# Patient Record
Sex: FEMALE | Race: BLACK OR AFRICAN AMERICAN | Employment: OTHER | URBAN - METROPOLITAN AREA
[De-identification: names, ages, dates, MRNs, and addresses within clinical notes are randomized per-mention and may not be internally consistent; named-entity substitution may affect disease eponyms.]

---

## 2018-08-04 LAB
CREATININE, EXTERNAL: 0.8
HBA1C MFR BLD HPLC: 6.4 %
LDL-C, EXTERNAL: 54

## 2018-12-27 NOTE — PROGRESS NOTES
HISTORY OF PRESENT ILLNESS  Belvie Harada is a 70 y.o. female. HPI  She presents to [facility]. She has recently moved here from [facility]. She is very involved in her [employer]. She is accompanied by her . She presents for follow up of diabetes mellitus (since ) with microalbuninuria, hypertension (since ), hyperlipidemia (since ), obesity and obstuctive sleep apnea. Old records refer to her having CKD, but creat and eGFR have always been normal. She was offered, but declined Jardiance by prior PCP due to side effect profile and black box warning. Has a very old CPAP machine. When insurance changed she lost \"nice new one. \" . Has had diabetes for 21 years. No retinopathy, neuropathy. She has copies of old records which were reviewed. Last lab was done in August revealing A1c 6.4,  total cholesterol 123, HDL 45, LDL 54, Creat 0.8, microalbumin/creat ratio 183.6, TSH 4.02 (0.27-4.20), CMP nl except gluc 104. .  Diet and Lifestyle: generally follows a low fat low cholesterol diet, generally follows a low sodium diet, follows a diabetic diet regularly, sedentary, nonsmoker  Diabetic ROS - medication compliance: compliant all of the time, Does not feel good with Trulcity, makes her feel flushed and nausea. home glucose monitoring: fasting 102 average, non-fasting not done     home BP Monitorin's/70's.      further diabetic ROS: no polyuria or polydipsia, no numbness, tingling or pain in extremities, no unusual visual symptoms, no hypoglycemia. Cardiovascular ROS:  She denies palpitations, orthopnea, exertional chest pressure/discomfort, claudication, lower extremity edema, dyspnea on exertion, dizziness    She presents for follow up of hypothyroidism (since ). Patient denies weight changes, heat / cold intolerance, change in energy level. Course to date has been well controlled. Spinal stenosis diagnosed in . She had an MRI of cervical and lumbar spines.  She has left sided weakness that was diagnosed as a possible stroke when she saw neurosugeon for spinal stenosis. MRI 2016 showed \"severe stenosis with cauda inquina(sic) nerve impingement. \" She was told to lose 25 lbs. (which she has), and go to pain management. She was given gabapentin and Topamax, but she did not tolerate them due to palpitations and burning pain. She has left sided neck pain with burning pain from behind left ear and to shoulder and down arm and in buttock into left lateral leg. She may have had SARA for cervical spine symptoms in 2017) The old records do not include MRreports only a note about results: \"stenosis possible cord compression\". Colonoscopy 2009 (Dr Silviano Michael in [facility])  [address] (? At [address] scores: Lumbar -1.1, Left femoral neck -1.3  Medicare Wellness Visit 18  Eye exam: 18  Foot exam 18  Pap 2009 Had ELLEN/BSO      Patient Active Problem List   Diagnosis Code    Hypertension, essential I10    Hypercholesterolemia E78.00    Controlled type 2 diabetes mellitus with microalbuminuria, without long-term current use of insulin (HCC) E11.29, R80.9    Acquired hypothyroidism E03.9    MANUEL (obstructive sleep apnea) G47.33    Carpal tunnel syndrome of right wrist G56.01    Spinal stenosis of cervical region M48.02    Spinal stenosis of lumbar region with neurogenic claudication M48.062     History reviewed. No pertinent past medical history. Past Surgical History:   Procedure Laterality Date    HX BREAST BIOPSY      HX  SECTION       x 2    HX CHOLECYSTECTOMY      HX HYSTERECTOMY       Social History     Socioeconomic History    Marital status:      Spouse name: Not on file    Number of children: Not on file    Years of education: Not on file    Highest education level: Not on file   Tobacco Use    Smoking status: Never Smoker    Smokeless tobacco: Never Used   Substance and Sexual Activity    Alcohol use:  No Frequency: Never    Drug use: No    Sexual activity: Yes     Partners: Male     Birth control/protection: Surgical     Family History   Problem Relation Age of Onset   Sandhya Cancer Mother         bone    Diabetes Mother    Sandhya Hypertension Mother     Hypertension Father     Cancer Father         blood    Hypertension Sister     Sleep Apnea Brother     No Known Problems Brother      Allergies   Allergen Reactions    Gabapentin Other (comments)     Hot flashes    Topiramate Other (comments)     Hot flashes     Current Outpatient Medications   Medication Sig Dispense Refill    aspirin delayed-release 81 mg tablet Take  by mouth daily.  acetaminophen (TYLENOL EXTRA STRENGTH) 500 mg tablet Take 1,000 mg by mouth daily.  naproxen sodium (ALEVE) 220 mg cap Take  by mouth daily.  omega 3-dha-epa-fish oil 100-160-1,000 mg cap Take 2 Caps by mouth daily.  magnesium oxide 500 mg tab Take 500 mg by mouth daily.  OTHER energy greens 1 scoop with 8oz water daily      rosuvastatin (CRESTOR) 10 mg tablet Take 10 mg by mouth daily.  levothyroxine (SYNTHROID) 88 mcg tablet 88 mcg. Half tablet on Monday and 1 tablet all other days      metFORMIN (GLUCOPHAGE) 1,000 mg tablet Take 1,000 mg by mouth two (2) times daily (with meals).  TRULICITY 1.07 IR/1.5 mL sub-q pen 0.75 mg by SubCUTAneous route every seven (7) days.  fluticasone (FLONASE ALLERGY RELIEF) 50 mcg/actuation nasal spray 2 Sprays by Both Nostrils route daily.  glucose blood VI test strips (ONETOUCH ULTRA BLUE TEST STRIP) strip by Does Not Apply route See Admin Instructions. Test 3 times daily      amLODIPine (NORVASC) 2.5 mg tablet Take 2.5 mg by mouth daily.  ascorbic acid, vitamin C, (VITAMIN C) 500 mg tablet Take  by mouth.  multivitamin with iron tablet Take 1 Tab by mouth daily.  ergocalciferol (ERGOCALCIFEROL) 50,000 unit capsule Take 50,000 Units by mouth every seven (7) days.       vit B Cmplx 3-FA-Vit C-Biotin (NEPHRO MANINDER RX) 1- mg-mg-mcg tablet Take 1 Tab by mouth daily.  SITagliptin (JANUVIA) 100 mg tablet Take 100 mg by mouth daily.  lisinopril (PRINIVIL, ZESTRIL) 40 mg tablet Take 1 Tab by mouth daily. 90 Tab 3    DULoxetine (CYMBALTA) 30 mg capsule Take 1 Cap by mouth daily. When finished start 60 mg capsules 14 Cap 0    DULoxetine (CYMBALTA) 60 mg capsule Take 1 Cap by mouth daily. 30 Cap 5         Review of Systems   Constitutional: Positive for weight loss (intentionally over 2 years 40 lbs). Negative for malaise/fatigue. HENT: Negative for congestion. Mole on top of right foot, and back of left leg and right thigh. Eyes: Negative for blurred vision and double vision. Respiratory: Negative for cough and shortness of breath. Cardiovascular: Negative for chest pain, palpitations, orthopnea, claudication and leg swelling. Gastrointestinal: Negative for constipation and diarrhea. Genitourinary: Negative for frequency and urgency. Musculoskeletal: Positive for back pain and joint pain (hip rlght >left). Skin: Negative for itching and rash. Neurological: Positive for tingling. Negative for focal weakness (left leg) and headaches. Tremors: right hand median nerve distribution. Endo/Heme/Allergies: Negative for environmental allergies. Psychiatric/Behavioral: Negative for depression. The patient is not nervous/anxious. Visit Vitals  /87 (BP 1 Location: Left arm, BP Patient Position: Sitting)   Pulse 79   Temp 98.6 °F (37 °C) (Oral)   Resp 14   Ht 5' 6\" (1.676 m)   Wt 207 lb (93.9 kg)   SpO2 97%   BMI 33.41 kg/m²     Physical Exam   Constitutional: She is oriented to person, place, and time. She appears well-developed and well-nourished. HENT:   Head: Normocephalic and atraumatic. Eyes: Conjunctivae are normal. Pupils are equal, round, and reactive to light. Neck: Neck supple. Carotid bruit is not present. No thyromegaly present. Cardiovascular: Normal rate, regular rhythm and normal heart sounds. PMI is not displaced. Exam reveals no gallop. No murmur heard. Pulses:       Dorsalis pedis pulses are 2+ on the right side, and 2+ on the left side. Posterior tibial pulses are 2+ on the right side, and 2+ on the left side. Pulmonary/Chest: Effort normal. She has no wheezes. She has no rhonchi. She has no rales. Abdominal: Soft. Normal appearance. She exhibits no abdominal bruit and no mass. There is no hepatosplenomegaly. There is no tenderness. Musculoskeletal: She exhibits no edema. Lymphadenopathy:     She has no cervical adenopathy. Right: No supraclavicular adenopathy present. Left: No supraclavicular adenopathy present. Neurological: She is alert and oriented to person, place, and time. No sensory deficit. Skin: Skin is warm, dry and intact. No rash noted. Psychiatric: She has a normal mood and affect. Her behavior is normal.   Nursing note and vitals reviewed. Results for orders placed or performed in visit on 12/28/18   AMB POC HEMOGLOBIN A1C   Result Value Ref Range    Hemoglobin A1c (POC) 5.8 %     August 2.2018:  A1c 6.4,                              total cholesterol 123, HDL 45, LDL 54,                             Creat 0.8,                             microalbumin/creat ratio 183.6,                              TSH 4.02,                              CMP nl except gluc 104. ASSESSMENT and PLAN    ICD-10-CM ICD-9-CM    1. Controlled type 2 diabetes mellitus with microalbuminuria, without long-term current use of insulin ([facility]) Z77.29 206.08 METABOLIC PANEL, BASIC    T15.2 791.0 HEMOGLOBIN A1C WITH EAG      AMB POC HEMOGLOBIN A1C   2. MANUEL (obstructive sleep apnea) G47.33 327.23 SLEEP MEDICINE REFERRAL   3. Hypertension, essential I10 401.9 lisinopril (PRINIVIL, ZESTRIL) 40 mg tablet   4. Hypercholesterolemia E78.00 272.0    5.  Acquired hypothyroidism E03.9 244.9 TSH 3RD GENERATION      T4 (THYROXINE)   6. Vitamin D deficiency E55.9 268.9 VITAMIN D, 1, 25 DIHYDROXY      VITAMIN D, 25 HYDROXY   7. Spinal stenosis of lumbar region with neurogenic claudication M48.062 724.03 REFERRAL TO ORTHOPEDICS      DULoxetine (CYMBALTA) 30 mg capsule      DULoxetine (CYMBALTA) 60 mg capsule   8. Spinal stenosis of cervical region M48.02 723.0 REFERRAL TO ORTHOPEDICS      DULoxetine (CYMBALTA) 30 mg capsule      DULoxetine (CYMBALTA) 60 mg capsule   9. Screening breast examination Z12.31 V76.10 MARGARET 3D SHANNAN W MAMMO BI SCREENING INCL CAD   8. Encounter for immunization Z23 V03.89 PNEUMOCOCCAL CONJ VACCINE 13 VALENT IM      ADMIN PNEUMOCOCCAL VACCINE     Diagnoses and all orders for this visit:    1. Controlled type 2 diabetes mellitus with microalbuminuria, without long-term current use of insulin ([facility] [address]  Diabetes Mellitus: well controlled. Is taking statin and ACE/ARB  Issues reviewed with her: low cholesterol diet, weight control and daily exercise discussed and glycohemoglobin and other lab monitoring discussed. -     METABOLIC PANEL, BASIC  -     HEMOGLOBIN A1C WITH EAG; Future  -     AMB POC HEMOGLOBIN A1C    2. MANUEL (obstructive sleep apnea)  -     SLEEP MEDICINE REFERRAL    3. Hypertension, essential  Hypertension is controlled  -     lisinopril (PRINIVIL, ZESTRIL) 40 mg tablet; Take 1 Tab by mouth daily. 4. Hypercholesterolemia  Hyperlipidemia is controlled    5. Acquired hypothyroidism  Euthyroid on replacement.   -     TSH 3RD GENERATION; Future  -     T4 (THYROXINE); Future    6. Vitamin D deficiency  -     VITAMIN D, 1, 25 DIHYDROXY; Future  -     VITAMIN D, 25 HYDROXY; Future    7. Spinal stenosis of lumbar region with neurogenic claudication  -     REFERRAL TO ORTHOPEDICS  -     DULoxetine (CYMBALTA) 30 mg capsule; Take 1 Cap by mouth daily. When finished start 60 mg capsules  -     DULoxetine (CYMBALTA) 60 mg capsule; Take 1 Cap by mouth daily.     8. Spinal stenosis of cervical region  -     REFERRAL TO ORTHOPEDICS  -     DULoxetine (CYMBALTA) 30 mg capsule; Take 1 Cap by mouth daily. When finished start 60 mg capsules  -     DULoxetine (CYMBALTA) 60 mg capsule; Take 1 Cap by mouth daily. 9. Screening breast examination  -     [facility] 3D SHANNAN W MAMMO BI SCREENING INCL CAD; Future    10. Encounter for immunization  -     PNEUMOCOCCAL CONJ VACCINE 13 VALENT IM  -     ADMIN PNEUMOCOCCAL VACCINE      Follow-up Disposition:  Return in about 3 months (around 3/28/2019) for DM, HTN, chol, thyroid  non-fasting lab one week prior  . lab results and schedule of future lab studies reviewed with patient  reviewed diet, exercise and weight control  cardiovascular risk and specific lipid/LDL goals reviewed  I have discussed the diagnosis, evaluation and treatment options and the intended plan with the patient. Patient understands and is in agreement. The patient has received an after-visit summary and questions were answered concerning future plans. I have discussed side effects and warnings of any new medications with the patient as well.

## 2018-12-28 ENCOUNTER — OFFICE VISIT (OUTPATIENT)
Dept: INTERNAL MEDICINE CLINIC | Facility: CLINIC | Age: 63
End: 2018-12-28

## 2018-12-28 VITALS
OXYGEN SATURATION: 97 % | SYSTOLIC BLOOD PRESSURE: 131 MMHG | HEIGHT: 66 IN | HEART RATE: 79 BPM | RESPIRATION RATE: 14 BRPM | TEMPERATURE: 98.6 F | BODY MASS INDEX: 33.27 KG/M2 | DIASTOLIC BLOOD PRESSURE: 87 MMHG | WEIGHT: 207 LBS

## 2018-12-28 DIAGNOSIS — Z23 ENCOUNTER FOR IMMUNIZATION: ICD-10-CM

## 2018-12-28 DIAGNOSIS — E03.9 ACQUIRED HYPOTHYROIDISM: ICD-10-CM

## 2018-12-28 DIAGNOSIS — E55.9 VITAMIN D DEFICIENCY: ICD-10-CM

## 2018-12-28 DIAGNOSIS — G47.33 OSA (OBSTRUCTIVE SLEEP APNEA): ICD-10-CM

## 2018-12-28 DIAGNOSIS — R80.9 CONTROLLED TYPE 2 DIABETES MELLITUS WITH MICROALBUMINURIA, WITHOUT LONG-TERM CURRENT USE OF INSULIN (HCC): Primary | ICD-10-CM

## 2018-12-28 DIAGNOSIS — E11.29 CONTROLLED TYPE 2 DIABETES MELLITUS WITH MICROALBUMINURIA, WITHOUT LONG-TERM CURRENT USE OF INSULIN (HCC): Primary | ICD-10-CM

## 2018-12-28 DIAGNOSIS — M48.062 SPINAL STENOSIS OF LUMBAR REGION WITH NEUROGENIC CLAUDICATION: ICD-10-CM

## 2018-12-28 DIAGNOSIS — E78.00 HYPERCHOLESTEROLEMIA: ICD-10-CM

## 2018-12-28 DIAGNOSIS — Z12.39 SCREENING BREAST EXAMINATION: ICD-10-CM

## 2018-12-28 DIAGNOSIS — M48.02 SPINAL STENOSIS OF CERVICAL REGION: ICD-10-CM

## 2018-12-28 DIAGNOSIS — I10 HYPERTENSION, ESSENTIAL: ICD-10-CM

## 2018-12-28 PROBLEM — E89.0 POSTOPERATIVE HYPOTHYROIDISM: Status: ACTIVE | Noted: 2018-12-28

## 2018-12-28 PROBLEM — G56.01 CARPAL TUNNEL SYNDROME OF RIGHT WRIST: Status: ACTIVE | Noted: 2018-12-28

## 2018-12-28 LAB — HBA1C MFR BLD HPLC: 5.8 %

## 2018-12-28 RX ORDER — BACLOFEN 20 MG
500 TABLET ORAL DAILY
COMMUNITY
End: 2021-11-22 | Stop reason: DRUGHIGH

## 2018-12-28 RX ORDER — CHOLECALCIFEROL (VITAMIN D3) 125 MCG
CAPSULE ORAL DAILY
COMMUNITY
End: 2019-09-10

## 2018-12-28 RX ORDER — ASCORBIC ACID 500 MG
TABLET ORAL
COMMUNITY

## 2018-12-28 RX ORDER — LISINOPRIL 40 MG/1
40 TABLET ORAL DAILY
Qty: 90 TAB | Refills: 3 | Status: SHIPPED | OUTPATIENT
Start: 2018-12-28 | End: 2019-12-20

## 2018-12-28 RX ORDER — DULOXETIN HYDROCHLORIDE 30 MG/1
30 CAPSULE, DELAYED RELEASE ORAL DAILY
Qty: 14 CAP | Refills: 0 | Status: SHIPPED | OUTPATIENT
Start: 2018-12-28 | End: 2019-04-05

## 2018-12-28 RX ORDER — LEVOTHYROXINE SODIUM 88 UG/1
88 TABLET ORAL
COMMUNITY
Start: 2018-12-21 | End: 2019-03-11 | Stop reason: SDUPTHER

## 2018-12-28 RX ORDER — FLUTICASONE PROPIONATE 50 MCG
2 SPRAY, SUSPENSION (ML) NASAL AS NEEDED
COMMUNITY

## 2018-12-28 RX ORDER — DULAGLUTIDE 0.75 MG/.5ML
0.75 INJECTION, SOLUTION SUBCUTANEOUS
COMMUNITY
Start: 2018-12-16 | End: 2019-04-05

## 2018-12-28 RX ORDER — ACETAMINOPHEN 500 MG
1000 TABLET ORAL DAILY
COMMUNITY
End: 2021-11-22 | Stop reason: DRUGHIGH

## 2018-12-28 RX ORDER — DULOXETIN HYDROCHLORIDE 60 MG/1
60 CAPSULE, DELAYED RELEASE ORAL DAILY
Qty: 30 CAP | Refills: 5 | Status: SHIPPED | OUTPATIENT
Start: 2018-12-28 | End: 2019-07-25 | Stop reason: SDUPTHER

## 2018-12-28 RX ORDER — LANOLIN ALCOHOL/MO/W.PET/CERES
1000 CREAM (GRAM) TOPICAL DAILY
COMMUNITY
End: 2018-12-28

## 2018-12-28 RX ORDER — LISINOPRIL 30 MG/1
30 TABLET ORAL 2 TIMES DAILY
COMMUNITY
Start: 2018-12-07 | End: 2018-12-28 | Stop reason: DRUGHIGH

## 2018-12-28 RX ORDER — ROSUVASTATIN CALCIUM 10 MG/1
10 TABLET, COATED ORAL DAILY
COMMUNITY
Start: 2018-12-07 | End: 2019-02-11 | Stop reason: SDUPTHER

## 2018-12-28 RX ORDER — MULTIVITAMIN WITH IRON
1 TABLET ORAL DAILY
COMMUNITY
End: 2021-11-22

## 2018-12-28 RX ORDER — METFORMIN HYDROCHLORIDE 1000 MG/1
1000 TABLET ORAL 2 TIMES DAILY WITH MEALS
COMMUNITY
Start: 2018-12-02 | End: 2019-02-05 | Stop reason: SDUPTHER

## 2018-12-28 RX ORDER — ERGOCALCIFEROL 1.25 MG/1
50000 CAPSULE ORAL
COMMUNITY
End: 2019-09-10

## 2018-12-28 RX ORDER — ASPIRIN 81 MG/1
81 TABLET ORAL DAILY
COMMUNITY

## 2018-12-28 RX ORDER — AMLODIPINE BESYLATE 2.5 MG/1
2.5 TABLET ORAL DAILY
COMMUNITY
End: 2019-07-23 | Stop reason: SDUPTHER

## 2018-12-28 NOTE — PROGRESS NOTES
Laz Deleon  Identified pt with two pt identifiers(name and ). Chief Complaint Patient presents with [facility] Establish Care  Immunization/Injection Reviewed record In preparation for visit and have obtained necessary documentation. Will bring a copy of advanced directive / living will. 1. Have you been to the ER, urgent care clinic or hospitalized since your last visit? 1st visit 2. Have you seen or consulted any other health care providers outside of the [address] [place] [address] since your last visit? Include any pap smears or colon screening. 1st visit Vitals reviewed with provider. Health Maintenance reviewed: After verbal order read back of , patient received Pneumococcal 13 in left arm. 0.5ml NDC [id number] lot [id number] exp 20. Patient tolerated procedure without complaints and received VIS. Health Maintenance Due Topic  Hepatitis C Screening  DTaP/Tdap/Td series (1 - Tdap)  PAP AKA CERVICAL CYTOLOGY  Shingrix Vaccine Age 50> (1 of 2)  BREAST CANCER SCRN MAMMOGRAM   
 FOBT Q 1 YEAR AGE 50-75  Influenza Age 5 to Adult  GLAUCOMA SCREENING Q2Y  Bone Densitometry (Dexa) Screening  Pneumococcal 65+ Low/Medium Risk (1 of 2 - PCV13)  MEDICARE YEARLY EXAM   
  
 
 
Wt Readings from Last 3 Encounters:  
18 207 lb (93.9 kg) Temp Readings from Last 3 Encounters:  
18 98.6 °F (37 °C) (Oral) BP Readings from Last 3 Encounters:  
18 131/87 Pulse Readings from Last 3 Encounters:  
18 79 Vitals:  
 18 1129 18 1141 BP: (!) 144/95 131/87 Pulse: 79 Resp: 14 Temp: 98.6 °F (37 °C) TempSrc: Oral   
SpO2: 97% Weight: 207 lb (93.9 kg) Height: 5' 6\" (1.676 m) PainSc:   5 PainLoc: Generalized Learning Assessment:  
:  
 
 
Learning Assessment 2018 PRIMARY LEARNER Patient HIGHEST LEVEL OF EDUCATION - PRIMARY LEARNER  SOME COLLEGE  
 BARRIERS PRIMARY LEARNER NONE  
CO-LEARNER CAREGIVER No  
PRIMARY LANGUAGE ENGLISH  
LEARNER PREFERENCE PRIMARY DEMONSTRATION  
ANSWERED BY patient RELATIONSHIP SELF Depression Screening:  
:  
 
 
PHQ over the last two weeks 12/28/2018 Little interest or pleasure in doing things Not at all Feeling down, depressed, irritable, or hopeless Not at all Total Score PHQ 2 0 Fall Risk Assessment:  
:  
 
 
Fall Risk Assessment, last 12 mths 12/28/2018 Able to walk? Yes Fall in past 12 months? No  
  
 
Abuse Screening:  
:  
 
 
Abuse Screening Questionnaire 12/28/2018 Do you ever feel afraid of your partner? Gonzella Blank Are you in a relationship with someone who physically or mentally threatens you? Gonzella Blank Is it safe for you to go home? Y  
  
 
ADL Screening:  
:  
 
 

## 2018-12-28 NOTE — Clinical Note
See if [facility] can send us any imaging reports, especially DEXA, mammogram and MRI's of C-spine and L-spine

## 2018-12-28 NOTE — PATIENT INSTRUCTIONS
Stop Trulicity for now and we will check Hemoglobin A1c again in 3 months. Request that Cherry Srivastava send you copies of images of C-spine and L-spine done in 2016. You will need these to see spine surgeon. Office visit in 3 months with non-fasting lab a week before visit. [facility], [address] Phone: [phone] Fax: [phone] [facility] [address] Phone: [phone] [facility] 
[address], [address] [place], [place], [address] Phone: [phone] [facility] [address] Jacquie Fairbanks [facility] [address] [facility], [address]  Phone: [phone] Fax: [phone] [place] [address] [employer] [address] [facility], [address] [phone] Vaccine Information Statement Pneumococcal Conjugate Vaccine (PCV13): What You Need to Know Many Vaccine Information Statements are available in [place]ian and other languages. See www.immunize.org/vis. Hojas de información Sobre Vacunas están disponibles en español y en muchos otros idiomas. Visite www.immunize.org/vis. 1. Why get vaccinated? Vaccination can protect both children and adults from pneumococcal disease. Pneumococcal disease is caused by bacteria that can spread from person to person through close contact. It can cause ear infections, and it can also lead to more serious infections of the: 
 Lungs (pneumonia),  Blood (bacteremia), and 
 Covering of the brain and spinal cord (meningitis). Pneumococcal pneumonia is most common among adults. Pneumococcal meningitis can cause deafness and brain damage, and it kills about 1 child in 10 who get it.  
 
Anyone can get pneumococcal disease, but children under 3years of age and adults 72 years and older, people with certain medical conditions, and cigarette smokers are at the highest risk. Before there was a vaccine, the [facility] saw: 
 more than 700 cases of meningitis, 
 about 13,000 blood infections, 
 about 5 million ear infections, and 
 about 200 deaths 
in children under 5 each year from pneumococcal disease. Since vaccine became available, severe pneumococcal disease in these children has fallen by 88%. About 18,000 older adults die of pneumococcal disease each year in the [place]. Treatment of pneumococcal infections with penicillin and other drugs is not as effective as it used to be, because some strains of the disease have become resistant to these drugs. This makes prevention of the disease, through vaccination, even more important. 2. PCV13 vaccine Pneumococcal conjugate vaccine (called PCV13) protects against 13 types of pneumococcal bacteria. PCV13 is routinely given to children at 2, 4, 6, and 1515 months of age. It is also recommended for children and adults 3to 59years of age with certain health conditions, and for all adults 72years of age and older. Your doctor can give you details. 3. Some people should not get this vaccine Anyone who has ever had a life-threatening allergic reaction to a dose of this vaccine, to an earlier pneumococcal vaccine called PCV7, or to any vaccine containing diphtheria toxoid (for example, DTaP), should not get PCV13. Anyone with a severe allergy to any component of PCV13 should not get the vaccine. Tell your doctor if the person being vaccinated has any severe allergies. If the person scheduled for vaccination is not feeling well, your healthcare provider might decide to reschedule the shot on another day. 4. Risks of a vaccine reaction With any medicine, including vaccines, there is a chance of reactions. These are usually mild and go away on their own, but serious reactions are also possible. Problems reported following PCV13 varied by age and dose in the series. The most common problems reported among children were:  About half became drowsy after the shot, had a temporary loss of appetite, or had redness or tenderness where the shot was given.  About 1 out of 3 had swelling where the shot was given.  About 1 out of 3 had a mild fever, and about 1 in 20 had a fever over 102.2°F. 
 Up to about 8 out of 10 became fussy or irritable. Adults have reported pain, redness, and swelling where the shot was given; also mild fever, fatigue, headache, chills, or muscle pain. Willy Masseyill children who get PCV13 along with inactivated flu vaccine at the same time may be at increased risk for seizures caused by fever. Ask your doctor for more information. Problems that could happen after any vaccine:  People sometimes faint after a medical procedure, including vaccination. Sitting or lying down for about 15 minutes can help prevent fainting, and injuries caused by a fall. Tell your doctor if you feel dizzy, or have vision changes or ringing in the ears.  Some older children and adults get severe pain in the shoulder and have difficulty moving the arm where a shot was given. This happens very rarely.  Any medication can cause a severe allergic reaction. Such reactions from a vaccine are very rare, estimated at about 1 in a million doses, and would happen within a few minutes to a few hours after the vaccination. As with any medicine, there is a very small chance of a vaccine causing a serious injury or death. The safety of vaccines is always being monitored. For more information, visit: www.cdc.gov/vaccinesafety/  
 
5. What if there is a serious reaction? What should I look for?  Look for anything that concerns you, such as signs of a severe allergic reaction, very high fever, or unusual behavior.  
 
Signs of a severe allergic reaction can include hives, swelling of the face and throat, difficulty breathing, a fast heartbeat, dizziness, and weakness  usually within a few minutes to a few hours after the vaccination. What should I do?  If you think it is a severe allergic reaction or other emergency that cant wait, call 9-1-1 or get the person to the nearest hospital. Otherwise, call your doctor. Reactions should be reported to the Vaccine Adverse Event Reporting System (VAERS). Your doctor should file this report, or you can do it yourself through the VAERS web site at www.vaers. [facility].gov, or by calling 0-752.634.9883. VAERS does not give medical advice. 6. The National Vaccine Injury Compensation Program 
 
The [facility] Vaccine Injury Compensation Program (VICP) is a federal program that was created to compensate people who may have been injured by certain vaccines. Persons who believe they may have been injured by a vaccine can learn about the program and about filing a claim by calling 0-677.576.9364 or visiting the [address] website at www.[facility].gov/vaccinecompensation. There is a time limit to file a claim for compensation. 7. How can I learn more?  Ask your healthcare provider. He or she can give you the vaccine package insert or suggest other sources of information.  Call your local or state health department.  Contact the Centers for Disease Control and Prevention (CDC): 
- Call 0-207.465.8699 (1-800-CDC-INFO) or 
- Visit CDCs website at www.cdc.gov/vaccines Vaccine Information Statement PCV13 Vaccine 11/5/2015  
[address] ANIRUDH Boggs [facility] [id number]-[id number] [facility] [employer] Centers for Disease Control and Prevention Office Use Only

## 2019-01-12 ENCOUNTER — TELEPHONE (OUTPATIENT)
Dept: INTERNAL MEDICINE CLINIC | Facility: CLINIC | Age: 64
End: 2019-01-12

## 2019-01-12 DIAGNOSIS — R31.9 URINARY TRACT INFECTION WITH HEMATURIA, SITE UNSPECIFIED: Primary | ICD-10-CM

## 2019-01-12 DIAGNOSIS — N39.0 URINARY TRACT INFECTION WITH HEMATURIA, SITE UNSPECIFIED: Primary | ICD-10-CM

## 2019-01-12 RX ORDER — SULFAMETHOXAZOLE AND TRIMETHOPRIM 800; 160 MG/1; MG/1
1 TABLET ORAL 2 TIMES DAILY
Qty: 14 TAB | Refills: 0
Start: 2019-01-12 | End: 2019-01-19

## 2019-01-12 NOTE — TELEPHONE ENCOUNTER
On Call Phone Message:  Patient complains of 2 day history of suprapubic pressure when urinating and small amount of blood with wiping. Has urinary urgency. Denies burning or frequency. Her symptoms feel similar to when she had a UTI years ago. She does not want to wait until Mon, 1/14/19 to be seen or to go to Urgent Care. I called a prescription for Bactrim to the [address] for her and instructed her to call clinic if she notices no improvement in symptoms within 72 hrs.

## 2019-02-05 ENCOUNTER — TELEPHONE (OUTPATIENT)
Dept: INTERNAL MEDICINE CLINIC | Facility: CLINIC | Age: 64
End: 2019-02-05

## 2019-02-05 RX ORDER — METFORMIN HYDROCHLORIDE 1000 MG/1
1000 TABLET ORAL 2 TIMES DAILY WITH MEALS
Qty: 180 TAB | Refills: 3 | Status: SHIPPED | OUTPATIENT
Start: 2019-02-05 | End: 2019-12-30

## 2019-02-05 NOTE — TELEPHONE ENCOUNTER
Pt's  called and stated that pt's previous doctor wrote in her chat that she has chronic kidney disease. He stated however that Dr Henrietta Rivera stated she does not have that condition. She needs a letter to send to her insurance stating that she does not have the disease. Pt's  can be reached at [phone].

## 2019-02-05 NOTE — TELEPHONE ENCOUNTER
----- Message from Kellie Grover sent at 2/5/2019  8:29 AM EST -----  Regarding: Dr. Ramona Joel. [place] Cali / refill  Patient is requesting refill Rx\" metformin 1000 mg\" and also since last visit 12/2018 numbers are high (130 -146) in the morning without taking \"Trulicity 0.24/2.2\"  May need to get back on it.      Send to [facility].  [phone]      Best contact [phone]

## 2019-02-05 NOTE — TELEPHONE ENCOUNTER
Pt states sugar in morning have been around 136 - 138 mostly, twice at 146. Per Dr Jacques Bond keep checking sugars and let us know if it gets any higher, Dr Jacques Bond wants to see how A1C is on lab visit in March. Pt has no further questions at this time.

## 2019-02-11 RX ORDER — ROSUVASTATIN CALCIUM 10 MG/1
10 TABLET, COATED ORAL DAILY
Qty: 90 TAB | Refills: 3 | Status: SHIPPED | OUTPATIENT
Start: 2019-02-11 | End: 2019-06-03 | Stop reason: SDUPTHER

## 2019-02-11 NOTE — TELEPHONE ENCOUNTER
----- Message from Simi Yousif sent at 2/11/2019 11:15 AM EST -----  Regarding: Dr. Darshan Valles refill  Pt would like to request a refill on \" Rosuvastatin 10 mg  \" and sent to the [facility].  Pt only has one pill left to cover her for tomorrow and would like for it to be sent today  Due to  is in the hospital .  Pt stated she  Pharmacy callback: [phone] Pt's callback: [phone]

## 2019-02-11 NOTE — TELEPHONE ENCOUNTER
PCP: Reshma Simmons MD     Last appt: 12/28/2018   Future Appointments   Date Time Provider Chidi Unger   3/29/2019  8:45 AM LAB [facility] SHEMAR JULIEN   4/5/2019  8:15 AM Reshma Simmons MD [facility]   4/11/2019  9:40 AM Nicole Ribeiro  [address]        Requested Prescriptions     Pending Prescriptions Disp Refills    rosuvastatin (CRESTOR) 10 mg tablet 90 Tab 3     Sig: Take 1 Tab by mouth daily.

## 2019-02-21 ENCOUNTER — DOCUMENTATION ONLY (OUTPATIENT)
Dept: INTERNAL MEDICINE CLINIC | Facility: CLINIC | Age: 64
End: 2019-02-21

## 2019-02-21 NOTE — PROGRESS NOTES
Old records from [facility] reviewed. Notes are very complete and summarize past lab. Only 2 most recent visits will be scanned into this chart. Other pages were destroyed.

## 2019-03-07 ENCOUNTER — TELEPHONE (OUTPATIENT)
Dept: INTERNAL MEDICINE CLINIC | Facility: CLINIC | Age: 64
End: 2019-03-07

## 2019-03-07 NOTE — TELEPHONE ENCOUNTER
Pt was taken off Trulicity in Dec per Dr DUMAS Pt calling to advise her readings have been \"crazy\" in feb between 130's and 150's with the fasting morning reading. Was around 890-292 with Trulicity. Would like to know what Dr Mora Tomlinson.

## 2019-03-07 NOTE — TELEPHONE ENCOUNTER
----- Message from Iraida Mars sent at 3/7/2019 11:24 AM EST -----  Regarding: FW: Dr. Fabi Washington: [phone]    Per Reg Pilling    ----- Message -----  From: Harshil Ashraf  Sent: 3/7/2019  11:15 AM  To: [facility]  Subject: Dr. Diane Mayorga                          Pt requesting a call back in regards to her sugar level readings. A few readings between 160, 164, 174 etc.     PT requesting call back from \"Julia\".

## 2019-03-07 NOTE — TELEPHONE ENCOUNTER
We stopped Trulicity because she told me it made her feel bad. Does she feel better without it? Often morning blood sugar is the highest of the day. If this is the case for her, then I am not really worried about 130-140 just yet. Please ask her to check some sugars later in the day (before and after supper). Rotate among checking In the morning, before supper and 2 hours after supper. Call us with results when she has 3 of each. A1c was low enough in August that a slight rise would still leave her in the controlled range. I really would like to see what A1c is in March/April before changing therapy.

## 2019-03-07 NOTE — TELEPHONE ENCOUNTER
Pt reports when she was on Trulicity she would feel some nausea the day she took it, but fine after that day. States now she does not feel as well as when she did when she was on the Trulicity. Pt will take her various glucose readings now and thru the weekend. She will call back Monday with the numbers. Pt also states her eyes feel more glassy and her fingers feel more tingly off of the Trulicity.

## 2019-03-11 ENCOUNTER — TELEPHONE (OUTPATIENT)
Dept: INTERNAL MEDICINE CLINIC | Facility: CLINIC | Age: 64
End: 2019-03-11

## 2019-03-11 NOTE — TELEPHONE ENCOUNTER
----- Message from Leticia Henriquez sent at 3/11/2019 10:58 AM EDT -----  Regarding: /Telephone  Pt is requesting a call from Victorino Young to report he BP readings.  Best contact [phone]

## 2019-03-11 NOTE — TELEPHONE ENCOUNTER
Date  Fasting Prior dinner 2 hrs after dinner  3/7/19  174  105  117  3/8/19  157  154  128  3/9/19  158  137  134  3/10/19     147  3/11/19 143      PCP: Reshma Simmons MD     Last appt: 12/28/2018   Future Appointments   Date Time Provider Chidi Unger   3/15/2019  8:20 AM [facility]   3/29/2019  8:45 AM LAB [facility] SHEMAR SCHED   4/5/2019  8:15 AM Reshma Simmons  [address]   4/11/2019  9:40 AM Nicole Ribeiro  [address]        Requested Prescriptions     Pending Prescriptions Disp Refills    levothyroxine (SYNTHROID) 88 mcg tablet 90 Tab 3     Sig: Half tablet on Monday and 1 tablet all other days    ergocalciferol (ERGOCALCIFEROL) 50,000 unit capsule 12 Cap      Sig: Take 50,000 Units by mouth every seven (7) days.

## 2019-03-12 RX ORDER — LEVOTHYROXINE SODIUM 88 UG/1
TABLET ORAL
Qty: 90 TAB | Refills: 3 | Status: SHIPPED | OUTPATIENT
Start: 2019-03-12 | End: 2020-04-10

## 2019-03-12 RX ORDER — ERGOCALCIFEROL 1.25 MG/1
CAPSULE ORAL
Qty: 12 CAP | OUTPATIENT
Start: 2019-03-12

## 2019-03-12 NOTE — TELEPHONE ENCOUNTER
Vitamin D is a stored vitamin and it possible to get too much of it. The 50,000 units is usually given for only 6 weeks. It is not meant for long term use. It is best to wait for next Vit D level before renewing this. Many experts are now recommending not even checking Vit D levels unless treating osteoporosis with medications that do not work well if level is low. I have refilled levothyroxine.

## 2019-03-15 ENCOUNTER — HOSPITAL ENCOUNTER (OUTPATIENT)
Dept: MAMMOGRAPHY | Age: 64
Discharge: HOME | End: 2019-03-15
Attending: INTERNAL MEDICINE
Payer: MEDICARE

## 2019-03-15 DIAGNOSIS — Z12.39 SCREENING BREAST EXAMINATION: ICD-10-CM

## 2019-03-15 PROCEDURE — 77063 BREAST TOMOSYNTHESIS BI: CPT

## 2019-03-29 DIAGNOSIS — E03.9 ACQUIRED HYPOTHYROIDISM: ICD-10-CM

## 2019-03-29 DIAGNOSIS — R80.9 CONTROLLED TYPE 2 DIABETES MELLITUS WITH MICROALBUMINURIA, WITHOUT LONG-TERM CURRENT USE OF INSULIN (HCC): ICD-10-CM

## 2019-03-29 DIAGNOSIS — E55.9 VITAMIN D DEFICIENCY: ICD-10-CM

## 2019-03-29 DIAGNOSIS — E11.29 CONTROLLED TYPE 2 DIABETES MELLITUS WITH MICROALBUMINURIA, WITHOUT LONG-TERM CURRENT USE OF INSULIN (HCC): ICD-10-CM

## 2019-04-02 LAB
1,25(OH)2D3 SERPL-MCNC: 53.6 PG/ML (ref 19.9–79.3)
25(OH)D3+25(OH)D2 SERPL-MCNC: 50.4 NG/ML (ref 30–100)
EST. AVERAGE GLUCOSE BLD GHB EST-MCNC: 131 MG/DL
HBA1C MFR BLD: 6.2 % (ref 4.8–5.6)
T4 SERPL-MCNC: 6.6 UG/DL (ref 4.5–12)
TSH SERPL DL<=0.005 MIU/L-ACNC: 0.85 UIU/ML (ref 0.45–4.5)

## 2019-04-05 ENCOUNTER — OFFICE VISIT (OUTPATIENT)
Dept: INTERNAL MEDICINE CLINIC | Facility: CLINIC | Age: 64
End: 2019-04-05

## 2019-04-05 VITALS
RESPIRATION RATE: 12 BRPM | HEIGHT: 66 IN | WEIGHT: 207 LBS | SYSTOLIC BLOOD PRESSURE: 123 MMHG | OXYGEN SATURATION: 97 % | HEART RATE: 81 BPM | TEMPERATURE: 98 F | DIASTOLIC BLOOD PRESSURE: 84 MMHG | BODY MASS INDEX: 33.27 KG/M2

## 2019-04-05 DIAGNOSIS — G57.11 MERALGIA PARESTHETICA OF RIGHT SIDE: ICD-10-CM

## 2019-04-05 DIAGNOSIS — E11.29 CONTROLLED TYPE 2 DIABETES MELLITUS WITH MICROALBUMINURIA, WITHOUT LONG-TERM CURRENT USE OF INSULIN (HCC): ICD-10-CM

## 2019-04-05 DIAGNOSIS — M48.02 SPINAL STENOSIS OF CERVICAL REGION: ICD-10-CM

## 2019-04-05 DIAGNOSIS — I10 HYPERTENSION, ESSENTIAL: ICD-10-CM

## 2019-04-05 DIAGNOSIS — Z00.00 MEDICARE ANNUAL WELLNESS VISIT, SUBSEQUENT: Primary | ICD-10-CM

## 2019-04-05 DIAGNOSIS — M48.062 SPINAL STENOSIS OF LUMBAR REGION WITH NEUROGENIC CLAUDICATION: ICD-10-CM

## 2019-04-05 DIAGNOSIS — E03.9 ACQUIRED HYPOTHYROIDISM: ICD-10-CM

## 2019-04-05 DIAGNOSIS — Z78.0 POSTMENOPAUSAL ESTROGEN DEFICIENCY: ICD-10-CM

## 2019-04-05 DIAGNOSIS — Z71.89 ADVANCE DIRECTIVE DISCUSSED WITH PATIENT: ICD-10-CM

## 2019-04-05 DIAGNOSIS — E78.00 HYPERCHOLESTEROLEMIA: ICD-10-CM

## 2019-04-05 DIAGNOSIS — Z12.11 SCREEN FOR COLON CANCER: ICD-10-CM

## 2019-04-05 DIAGNOSIS — R80.9 CONTROLLED TYPE 2 DIABETES MELLITUS WITH MICROALBUMINURIA, WITHOUT LONG-TERM CURRENT USE OF INSULIN (HCC): ICD-10-CM

## 2019-04-05 DIAGNOSIS — Z13.31 SCREENING FOR DEPRESSION: ICD-10-CM

## 2019-04-05 NOTE — PROGRESS NOTES
HISTORY OF PRESENT ILLNESS Zoila Damico is a 70 y.o. female. HPI  She presents for follow up of diabetes mellitus, hypertension and hyperlipidemia. Diet and Lifestyle: generally follows a low fat low cholesterol diet, generally follows a low sodium diet, follows a diabetic diet regularly, does not rigorously follow a diabetic diet Diabetic ROS - medication compliance: compliant most of the time,  
   home glucose monitoring: fasting 140-150's most of the time,  
   home BP Monitorin-130's/80's.  
   further diabetic ROS: no polyuria or polydipsia, no numbness, tingling or pain in extremities, no unusual visual symptoms, no medication side effects noted. She has sweating over night. Does not feel as good since stopped Trulicity. Cardiovascular ROS: 
She denies palpitations, orthopnea, exertional chest pressure/discomfort, claudication, lower extremity edema, dyspnea on exertion, dizziness She presents for follow up of hypothyroidism. Symptoms include heat intolerance. . Patient denies weight changes, change in energy level. Course to date has been well controlled. She has cervical and lumbar spinal stenosis based on note in old records. However there are no imaging results included in the records. Patient Active Problem List  
Diagnosis Code  Hypertension, essential I10  
 Hypercholesterolemia E78.00  Controlled type 2 diabetes mellitus with microalbuminuria, without long-term current use of insulin (HCC) E11.29, R80.9  Acquired hypothyroidism E03.9  MANUEL (obstructive sleep apnea) G47.33  
 Carpal tunnel syndrome of right wrist G56.01  
 Spinal stenosis of cervical region M48.02  Spinal stenosis of lumbar region with neurogenic claudication M48.062 No past medical history on file. Past Surgical History:  
Procedure Laterality Date  HX BREAST BIOPSY Right  Benign (per patient)  HX  SECTION    
  x 2  
 HX CHOLECYSTECTOMY  HX HEENT    
 parathyroidectomy  HX HYSTERECTOMY Social History Socioeconomic History  Marital status:  Spouse name: Not on file  Number of children: Not on file  Years of education: Not on file  Highest education level: Not on file Tobacco Use  Smoking status: Never Smoker  Smokeless tobacco: Never Used Substance and Sexual Activity  Alcohol use: No  
  Frequency: Never  Drug use: No  
 Sexual activity: Yes  
  Partners: Male Birth control/protection: Surgical  
 
Family History Problem Relation Age of Onset  Cancer Mother   
     bone  Diabetes Mother  Hypertension Mother  Hypertension Father  Cancer Father   
     blood  Hypertension Sister  Sleep Apnea Brother  No Known Problems Brother Allergies Allergen Reactions  Gabapentin Other (comments) Hot flashes  Topiramate Other (comments) Hot flashes Current Outpatient Medications Medication Sig Dispense Refill  levothyroxine (SYNTHROID) 88 mcg tablet Half tablet on Monday and 1 tablet all other days 90 Tab 3  
 rosuvastatin (CRESTOR) 10 mg tablet Take 1 Tab by mouth daily. 90 Tab 3  
 metFORMIN (GLUCOPHAGE) 1,000 mg tablet Take 1 Tab by mouth two (2) times daily (with meals). 180 Tab 3  
 aspirin delayed-release 81 mg tablet Take  by mouth daily.  acetaminophen (TYLENOL EXTRA STRENGTH) 500 mg tablet Take 1,000 mg by mouth daily.  naproxen sodium (ALEVE) 220 mg cap Take  by mouth daily.  omega 3-dha-epa-fish oil 100-160-1,000 mg cap Take 2 Caps by mouth daily.  magnesium oxide 500 mg tab Take 500 mg by mouth daily.  OTHER energy greens 1 scoop with 8oz water daily  fluticasone (FLONASE ALLERGY RELIEF) 50 mcg/actuation nasal spray 2 Sprays by Both Nostrils route daily.  glucose blood VI test strips (ONETOUCH ULTRA BLUE TEST STRIP) strip by Does Not Apply route See Admin Instructions. Test 3 times daily  amLODIPine (NORVASC) 2.5 mg tablet Take 2.5 mg by mouth daily.  ascorbic acid, vitamin C, (VITAMIN C) 500 mg tablet Take  by mouth.  multivitamin with iron tablet Take 1 Tab by mouth daily.  ergocalciferol (ERGOCALCIFEROL) 50,000 unit capsule Take 50,000 Units by mouth every seven (7) days.  vit B Cmplx 3-FA-Vit C-Biotin (NEPHRO MANINDER RX) 1- mg-mg-mcg tablet Take 1 Tab by mouth daily.  SITagliptin (JANUVIA) 100 mg tablet Take 100 mg by mouth daily.  lisinopril (PRINIVIL, ZESTRIL) 40 mg tablet Take 1 Tab by mouth daily. 90 Tab 3  
 DULoxetine (CYMBALTA) 60 mg capsule Take 1 Cap by mouth daily. 30 Cap 5 Review of Systems Constitutional: Negative for malaise/fatigue and weight loss. Gastrointestinal: Negative for constipation, diarrhea and heartburn. Musculoskeletal: Negative for back pain and joint pain. Neurological: Positive for tingling (tips of fingers right hand. ). Negative for dizziness and focal weakness. And tingling with sense of warm water running over right alex-lateral thigh Visit Vitals /84 (BP 1 Location: Left arm, BP Patient Position: Sitting) Pulse 81 Temp 98 °F (36.7 °C) (Oral) Resp 12 Ht 5' 6\" (1.676 m) Wt 207 lb (93.9 kg) SpO2 97% BMI 33.41 kg/m² Physical Exam  
Constitutional: She is oriented to person, place, and time. She appears well-developed and well-nourished. HENT:  
Head: Normocephalic and atraumatic. Eyes: Pupils are equal, round, and reactive to light. Conjunctivae are normal.  
Neck: Neck supple. Carotid bruit is not present. No thyromegaly present. Cardiovascular: Normal rate, regular rhythm and normal heart sounds. PMI is not displaced. Exam reveals no gallop. No murmur heard. Pulses: 
     Dorsalis pedis pulses are 2+ on the right side, and 2+ on the left side. Posterior tibial pulses are 2+ on the right side, and 2+ on the left side. Pulmonary/Chest: Effort normal. She has no wheezes. She has no rhonchi. She has no rales. Abdominal: Soft. Normal appearance. She exhibits no abdominal bruit and no mass. There is no hepatosplenomegaly. There is no tenderness. Musculoskeletal: She exhibits no edema. Lymphadenopathy:  
  She has no cervical adenopathy. Right: No supraclavicular adenopathy present. Left: No supraclavicular adenopathy present. Neurological: She is alert and oriented to person, place, and time. A sensory deficit (right anterolateral thigh) is present. Skin: Skin is warm, dry and intact. No rash noted. Psychiatric: She has a normal mood and affect. Her behavior is normal.  
Nursing note and vitals reviewed. Diabetic foot exam:  
 
Left Foot: 
 Visual Exam: normal  
 Pulse DP: 2+ (normal) Filament test: 6/6 Vibratory sensation: diminished Right Foot: 
 Visual Exam: normal  
 Pulse DP: 2+ (normal) Filament test: 6/6 Vibratory sensation: diminished Results for orders placed or performed in visit on 03/29/19 VITAMIN D, 25 HYDROXY Result Value Ref Range VITAMIN D, 25-HYDROXY 50.4 30.0 - 100.0 ng/mL VITAMIN D, 1, 25 DIHYDROXY Result Value Ref Range Calcitriol (Vit D 1, 25 di-OH) 53.6 19.9 - 79.3 pg/mL T4 (THYROXINE) Result Value Ref Range T4, Total 6.6 4.5 - 12.0 ug/dL TSH 3RD GENERATION Result Value Ref Range TSH 0.852 0.450 - 4.500 uIU/mL HEMOGLOBIN A1C WITH EAG Result Value Ref Range Hemoglobin A1c 6.2 (H) 4.8 - 5.6 % Estimated average glucose 131 mg/dL Last lipid and cmp Aug 2018 at former physician ASSESSMENT and PLAN 
  ICD-10-CM ICD-9-CM 1. Medicare annual wellness visit, subsequent Z00.00 V70.0 2. Advance directive discussed with patient Z71.89 V65.49   
3.  Controlled type 2 diabetes mellitus with microalbuminuria, without long-term current use of insulin (HCC) E11.29 250.40  DIABETES FOOT EXAM  
 R80.9 791.0 HEMOGLOBIN A1C WITH EAG  
 MICROALBUMIN, UR, RAND W/ MICROALB/CREAT RATIO SITagliptin (JANUVIA) 100 mg tablet 4. Hypertension, essential I10 401.9 5. Hypercholesterolemia E78.00 272.0 LIPID PANEL  
   METABOLIC PANEL, COMPREHENSIVE 6. Acquired hypothyroidism E03.9 244.9 7. Spinal stenosis of lumbar region with neurogenic claudication M48.062 724.03   
8. Spinal stenosis of cervical region M48.02 723.0 9. Meralgia paresthetica of right side G57.11 355.1 10. Postmenopausal estrogen deficiency Z78.0 V49.81   
11. Screen for colon cancer Z12.11 V76.51   
12. Screening for depression Z13.31 V79.0 [address] Diagnoses and all orders for this visit: 
 
1. Medicare annual wellness visit, subsequent 2. Advance directive discussed with patient 3. Controlled type 2 diabetes mellitus with microalbuminuria, without long-term current use of insulin ([address] Diabetes Mellitus: well controlled. Is taking statin and ACE/ARB Issues reviewed with her: low cholesterol diet, weight control and daily exercise discussed and annual eye examinations at Ophthalmology discussed. -      DIABETES FOOT EXAM 
-     HEMOGLOBIN A1C WITH EAG; Future -     MICROALBUMIN, UR, RAND W/ MICROALB/CREAT RATIO; Future 
-     SITagliptin (JANUVIA) 100 mg tablet; Take 1 Tab by mouth daily. 4. Hypertension, essential 
Hypertension is controlled. 5. Hypercholesterolemia Hyperlipidemia is controlled -     LIPID PANEL; Future -     METABOLIC PANEL, COMPREHENSIVE; Future 6. Acquired hypothyroidism Euthyroid on replacement. 7. Spinal stenosis of lumbar region with neurogenic claudication Stable. She is tolerating current degree of pain. 8. Spinal stenosis of cervical region Stable. She is tolerating current degree of pain. 9. Meralgia paresthetica of right side New problem. Reassured this a cutaneous nerve problem and not related to back problems. 10. Postmenopausal estrogen deficiency We discussed when to repeat DEXA. Last Feb 2016 with T scores Lumbar -1.1, left femoral neck -1.3. She would like to wait until next year to repeat. 11. Screen for colon cancer Last colonoscopy recorded as May 2009, but we have had not response to our request for the report. With or without report, will need to schedule at next OV. 12. Screening for depression-negative -     [id number] [facility] Follow-up and Dispositions · Return in about 4 months (around 8/5/2019) for DM, HTN, chol,   Fasting lab one week prior  . lab results and schedule of future lab studies reviewed with patient 
reviewed diet, exercise and weight control I have discussed the diagnosis, evaluation and treatment options and the intended plan with the patient. Patient understands and is in agreement. The patient has received an after-visit summary and questions were answered concerning future plans. I have discussed side effects and warnings of any new medications with the patient as well.

## 2019-04-05 NOTE — PROGRESS NOTES
This is the Subsequent Medicare Annual Wellness Exam, performed 12 months or more after the Initial AWV or the last Subsequent AWV I have reviewed the patient's medical history in detail and updated the computerized patient record. History No past medical history on file. Past Surgical History:  
Procedure Laterality Date  HX BREAST BIOPSY Right 1990s Benign (per patient)  HX  SECTION    
  x 2  
 HX CHOLECYSTECTOMY  HX HEENT    
 parathyroidectomy  HX HYSTERECTOMY Current Outpatient Medications Medication Sig Dispense Refill  levothyroxine (SYNTHROID) 88 mcg tablet Half tablet on Monday and 1 tablet all other days 90 Tab 3  
 rosuvastatin (CRESTOR) 10 mg tablet Take 1 Tab by mouth daily. 90 Tab 3  
 metFORMIN (GLUCOPHAGE) 1,000 mg tablet Take 1 Tab by mouth two (2) times daily (with meals). 180 Tab 3  
 aspirin delayed-release 81 mg tablet Take  by mouth daily.  acetaminophen (TYLENOL EXTRA STRENGTH) 500 mg tablet Take 1,000 mg by mouth daily.  naproxen sodium (ALEVE) 220 mg cap Take  by mouth daily.  omega 3-dha-epa-fish oil 100-160-1,000 mg cap Take 2 Caps by mouth daily.  magnesium oxide 500 mg tab Take 500 mg by mouth daily.  OTHER energy greens 1 scoop with 8oz water daily  fluticasone (FLONASE ALLERGY RELIEF) 50 mcg/actuation nasal spray 2 Sprays by Both Nostrils route daily.  glucose blood VI test strips (ONETOUCH ULTRA BLUE TEST STRIP) strip by Does Not Apply route See Admin Instructions. Test 3 times daily  amLODIPine (NORVASC) 2.5 mg tablet Take 2.5 mg by mouth daily.  ascorbic acid, vitamin C, (VITAMIN C) 500 mg tablet Take  by mouth.  multivitamin with iron tablet Take 1 Tab by mouth daily.  ergocalciferol (ERGOCALCIFEROL) 50,000 unit capsule Take 50,000 Units by mouth every seven (7) days.  vit B Cmplx 3-FA-Vit C-Biotin (NEPHRO MANINDER RX) 1- mg-mg-mcg tablet Take 1 Tab by mouth daily.  SITagliptin (JANUVIA) 100 mg tablet Take 100 mg by mouth daily.  lisinopril (PRINIVIL, ZESTRIL) 40 mg tablet Take 1 Tab by mouth daily. 90 Tab 3  
 DULoxetine (CYMBALTA) 60 mg capsule Take 1 Cap by mouth daily. 30 Cap 5 Allergies Allergen Reactions  Gabapentin Other (comments) Hot flashes  Topiramate Other (comments) Hot flashes Family History Problem Relation Age of Onset  Cancer Mother   
     bone  Diabetes Mother  Hypertension Mother  Hypertension Father  Cancer Father   
     blood  Hypertension Sister  Sleep Apnea Brother  No Known Problems Brother Social History Tobacco Use  Smoking status: Never Smoker  Smokeless tobacco: Never Used Substance Use Topics  Alcohol use: No  
  Frequency: Never Patient Active Problem List  
Diagnosis Code  Hypertension, essential I10  
 Hypercholesterolemia E78.00  Controlled type 2 diabetes mellitus with microalbuminuria, without long-term current use of insulin (HCC) E11.29, R80.9  Acquired hypothyroidism E03.9  MANUEL (obstructive sleep apnea) G47.33  
 Carpal tunnel syndrome of right wrist G56.01  
 Spinal stenosis of cervical region M48.02  Spinal stenosis of lumbar region with neurogenic claudication M48.062 Depression Risk Factor Screening:  
 
3 most recent PHQ Screens 4/5/2019 Little interest or pleasure in doing things Not at all Feeling down, depressed, irritable, or hopeless Not at all Total Score PHQ 2 0 Alcohol Risk Factor Screening: You do not drink alcohol or very rarely. Functional Ability and Level of Safety:  
Hearing Loss Slight hearing loss in left ear Activities of Daily Living The home contains: handrails and grab bars Patient does total self care Fall Risk Fall Risk Assessment, last 12 mths 12/28/2018 Able to walk? Yes Fall in past 12 months? No  
 
 
Abuse Screen Patient is not abused Cognitive Screening Evaluation of Cognitive Function: 
Has your family/caregiver stated any concerns about your memory: no 
Normal 
 
Patient Care Team  
Patient Care Team: 
Ashlie Collins MD as PCP - General (Internal Medicine) Assessment/Plan Education and counseling provided: 
End-of-Life planning (with patient's consent) Colorectal cancer screening tests ?due May of this year (still trying to get report). Bone mass measurement (DEXA) has osteopenia with last DEXA 2016. She wants to repeat next year. Health Maintenance Due Topic Date Due  
 Hepatitis C Screening  1955  
 FOOT EXAM Q1  12/19/1963  
 EYE EXAM RETINAL OR DILATED  12/19/1963  COLONOSCOPY  12/19/1971  Shingrix Vaccine Age 50> (1 of 2) 12/19/2003  GLAUCOMA SCREENING Q2Y  12/19/2018  Bone Densitometry (Dexa) Screening  12/19/2018  MEDICARE YEARLY EXAM  12/28/2018

## 2019-04-05 NOTE — PROGRESS NOTES
Bruna Villanueva  Identified pt with two pt identifiers(name and ). Chief Complaint Patient presents with  Diabetes  Hypertension  Cholesterol Problem  Thyroid Problem Reviewed record In preparation for visit and have obtained necessary documentation. Will bring a copy of advanced directive / living will. 1. Have you been to the ER, urgent care clinic or hospitalized since your last visit? No  
 
2. Have you seen or consulted any other health care providers outside of the [address] [place] [address] since your last visit? Include any pap smears or colon screening. Mammogram  
 
Vitals reviewed with provider. Health Maintenance reviewed:  
 
Health Maintenance Due Topic  Hepatitis C Screening  FOOT EXAM Q1   
 EYE EXAM RETINAL OR DILATED  COLONOSCOPY  Shingrix Vaccine Age 50> (1 of 2)  GLAUCOMA SCREENING Q2Y  Bone Densitometry (Dexa) Screening  MEDICARE YEARLY EXAM   
  
 
 
Wt Readings from Last 3 Encounters:  
19 207 lb (93.9 kg) 18 207 lb (93.9 kg) Temp Readings from Last 3 Encounters:  
19 98 °F (36.7 °C) (Oral) 18 98.6 °F (37 °C) (Oral) BP Readings from Last 3 Encounters:  
19 123/84  
18 131/87 Pulse Readings from Last 3 Encounters:  
19 81  
18 79 Vitals:  
 19 0830 BP: 123/84 Pulse: 81 Resp: 12 Temp: 98 °F (36.7 °C) TempSrc: Oral  
SpO2: 97% Weight: 207 lb (93.9 kg) Height: 5' 6\" (1.676 m) PainSc:   6 PainLoc: Back Learning Assessment:  
:  
 
 
Learning Assessment 2018 PRIMARY LEARNER Patient HIGHEST LEVEL OF EDUCATION - PRIMARY LEARNER  SOME COLLEGE  
BARRIERS PRIMARY LEARNER NONE  
CO-LEARNER CAREGIVER No  
PRIMARY LANGUAGE ENGLISH  
LEARNER PREFERENCE PRIMARY DEMONSTRATION  
ANSWERED BY patient RELATIONSHIP SELF Depression Screening:  
:  
 
 
3 most recent PHQ Screens 2019 Little interest or pleasure in doing things Not at all Feeling down, depressed, irritable, or hopeless Not at all Total Score PHQ 2 0 Fall Risk Assessment:  
:  
 
 
Fall Risk Assessment, last 12 mths 12/28/2018 Able to walk? Yes Fall in past 12 months? No  
  
 
Abuse Screening:  
:  
 
 
Abuse Screening Questionnaire 12/28/2018 Do you ever feel afraid of your partner? Berenice Ores Are you in a relationship with someone who physically or mentally threatens you? Berenice Ores Is it safe for you to go home? Y  
  
 
ADL Screening:  
:  
 
 

## 2019-04-05 NOTE — PATIENT INSTRUCTIONS
Check at least 3 overnight sugars in the next week to see if they are low. Call with results. If low, we need to decrease medication. If not low, we should add back Trulicity. Office visit in 4 months with fasting lab work a week before visit. The best way to stay healthy is to live a healthy lifestyle. A healthy lifestyle includes regular exercise, eating a well-balanced diet, keeping a healthy weight and not smoking. Regular physical exams and screening tests are another important way to take care of yourself. Preventive exams provided by health care providers can find health problems early when treatment works best and can keep you from getting certain diseases or illnesses. Preventive services include exams, lab tests, screenings, shots, monitoring and information to help you take care of your own health. All people over 65 should have a pneumonia shot. Pneumonia shots are usually only needed once in a lifetime unless your doctor decides differently. In addition to your physical exam, some screening tests are recommended: 
 
All people over 65 should have a yearly flu shot. People over 65 are at medium to high risk for Hepatitis B. Three shots are needed for complete protection. Bone mass measurement (dexa scan) is recommended every two years. Diabetes Mellitus screening is recommended every year. Glaucoma is an eye disease caused by high pressure in the eye. An eye exam is recommended every year. Cardiovascular screening tests that check your cholesterol and other blood fat (lipid) levels are recommended every five years. Colorectal Cancer screening tests help to find pre-cancerous polyps (growths in the colon) so they can be removed before they turn into cancer. Tests ordered for screening depend on your personal and family history risk factors. Prostate Cancer Screening (annually up to age 76) Screening for breast cancer is recommended yearly with a Mammogram. 
 Screening for cervical and vaginal cancer is recommended with a pelvic and Pap test every two years. However if you have had an abnormal pap in the past  three years or at high risk for cervical or vaginal cancer Medicare will cover a pap test and a pelvic exam every year. Here is a list of your current Health Maintenance items with a due date: 
Health Maintenance Due Topic Date Due  
 Hepatitis C Test  1955 [facility] Diabetic Foot Care  12/19/1963 Travis Inman Eye Exam  12/19/1963  Colonoscopy  12/19/1971  Shingles Vaccine (1 of 2) 12/19/2003  Glaucoma Screening   12/19/2018  Bone Mineral Density   12/19/2018 Travis Inman Annual Well Visit  12/28/2018 Learning About Low Blood Sugar (Hypoglycemia) in Diabetes What is low blood sugar (hypoglycemia)? Hypoglycemia means that your blood sugar is low and your body (especially your brain) is not getting enough fuel. If you have diabetes, your blood sugar can go too low if you take too much of some diabetes medicines. It can also go too low if you miss a meal. And it can happen if you exercise too hard without eating enough food. Some medicines used to treat other health problems can cause low blood sugar too. What are the symptoms? Symptoms of low blood sugar can start quickly. It may take just 10 to 15 minutes. If you have had diabetes for many years, you may not realize that your blood sugar is low until it drops very low. · If your blood sugar level drops below 70 (mild low blood sugar), you may feel tired, anxious, dizzy, weak, shaky, or sweaty. You may have a fast heartbeat or blurry vision. · If your blood sugar level continues to drop (usually below 40), your behavior may change. You may feel more irritable. You may find it hard to concentrate or talk. And you may feel unsteady when you stand or walk. You may become too weak or confused to eat something with sugar to raise your blood sugar level. · If your blood sugar level drops very low (usually below 20), you may pass out (lose consciousness). Or you may have a seizure or stroke. If you have symptoms of severe low blood sugar, you need to get medical care right away. If you had a low blood sugar level during the night, you may wake up tired or with a headache. Or you may sweat so much during the night that your pajamas or sheets are damp when you wake up. How is low blood sugar treated? You can treat low blood sugar by eating or drinking something that has 15 grams of carbohydrate. These should be quick-sugar foods. Check your blood sugar level again 15 minutes after having a quick-sugar food to make sure your level is getting back to your target range. Here are examples of quick-sugar foods that have 15 grams of carbohydrate: · 3 to 4 glucose tablets · 1 tube of glucose gel · Hard candy (such as 3 Jolly Ranchers or 5 to [employer]) · 1 tablespoon honey · 2 tablespoons of raisins · ½ cup to ¾ cup (4 to 6 ounces) of fruit juice or regular (not diet) soda · 1 tablespoon of sugar · 1 cup of fat-free milk If you have problems with severe low blood sugar, someone else may have to give you a shot of glucagon. This is a hormone that raises blood sugar levels quickly. How can you prevent low blood sugar? You can take steps to prevent low blood sugar. · Follow your treatment plan. Take your insulin or other diabetes medicine exactly as your doctor prescribed it. Talk with your doctor if you're having low blood sugar often. Your medicine may need to be adjusted if it's causing your low blood sugar. · Check your blood sugar levels often. This helps you find early changes before an emergency happens. · Keep a quick-sugar food with you in case your blood sugar level drops low. · Eat small meals more often so that you don't get too hungry between meals. Don't skip meals. · Balance extra exercise with eating more.  Check your blood sugar and learn how it changes after exercise. If your blood sugar stays at a normal level, you may not need to eat after you exercise. · Limit how much alcohol you drink. Alcohol can make low blood sugar go even lower. Don't drink alcohol if you have problems recognizing the early signs of low blood sugar. · Keep a diary of your symptoms. This helps you learn when changes in your body may signal low blood sugar. And keep track of how often you have low blood sugar, including when you last ate and what you ate. This will help you learn what causes your blood sugar to drop. · Learn about diabetes and low blood sugar. Support groups or a diabetes education center can help you understand how medicines, diet, and exercise affect your blood sugar levels. Since low blood sugar levels can quickly become an emergency, be sure to wear medical alert jewelry, such as a medical alert bracelet. This is to let people know you have diabetes so they can get help for you. You can buy this at most drugstores. And make sure your family, friends, and coworkers know the symptoms of low blood sugar. Teach them what to do to get your sugar level up. Follow-up care is a key part of your treatment and safety. Be sure to make and go to all appointments, and call your doctor if you are having problems. It's also a good idea to know your test results and keep a list of the medicines you take. Where can you learn more? Go to http://stephanie-saleem.info/. Enter C461 in the search box to learn more about \"Learning About Low Blood Sugar (Hypoglycemia) in Diabetes. \" Current as of: July 25, 2018 Content Version: 11.9 © 3245-6482 [employer]. Care instructions adapted under license by [employer] (which disclaims liability or warranty for this information).  If you have questions about a medical condition or this instruction, always ask your healthcare professional. [employer] disclaims any warranty or liability for your use of this information.

## 2019-04-06 PROBLEM — M85.89 OSTEOPENIA OF MULTIPLE SITES: Status: ACTIVE | Noted: 2019-04-06

## 2019-04-11 ENCOUNTER — DOCUMENTATION ONLY (OUTPATIENT)
Dept: SLEEP MEDICINE | Age: 64
End: 2019-04-11

## 2019-04-11 ENCOUNTER — HOSPITAL ENCOUNTER (OUTPATIENT)
Dept: SLEEP MEDICINE | Age: 64
Discharge: HOME | End: 2019-04-11
Payer: MEDICARE

## 2019-04-11 ENCOUNTER — OFFICE VISIT (OUTPATIENT)
Dept: SLEEP MEDICINE | Age: 64
End: 2019-04-11

## 2019-04-11 VITALS
OXYGEN SATURATION: 99 % | DIASTOLIC BLOOD PRESSURE: 86 MMHG | HEART RATE: 75 BPM | RESPIRATION RATE: 19 BRPM | TEMPERATURE: 98.7 F | HEIGHT: 66 IN | WEIGHT: 207 LBS | BODY MASS INDEX: 33.27 KG/M2 | SYSTOLIC BLOOD PRESSURE: 146 MMHG

## 2019-04-11 DIAGNOSIS — R80.9 CONTROLLED TYPE 2 DIABETES MELLITUS WITH MICROALBUMINURIA, WITHOUT LONG-TERM CURRENT USE OF INSULIN (HCC): ICD-10-CM

## 2019-04-11 DIAGNOSIS — E11.29 CONTROLLED TYPE 2 DIABETES MELLITUS WITH MICROALBUMINURIA, WITHOUT LONG-TERM CURRENT USE OF INSULIN (HCC): ICD-10-CM

## 2019-04-11 DIAGNOSIS — I10 HYPERTENSION, ESSENTIAL: ICD-10-CM

## 2019-04-11 DIAGNOSIS — G47.33 OSA (OBSTRUCTIVE SLEEP APNEA): Primary | ICD-10-CM

## 2019-04-11 PROCEDURE — 95806 SLEEP STUDY UNATT&RESP EFFT: CPT | Performed by: INTERNAL MEDICINE

## 2019-04-11 NOTE — PROGRESS NOTES
Patients PAP machine was unable to be downloaded. The following information was able to be collected:     Therapy Type: CPAP  PAP /Model: [employer] Sleepstyle 200  Set Pressure: 15cm with manometer  Heated humidifier: yes  Mask /Size: [employer] Simplus Fullface-medium

## 2019-04-11 NOTE — PROGRESS NOTES
[address] [facility] [place]. [place], [address]  Tel.  [phone]  Fax. 100 [facility]  [place], [address]  Tel.  [phone]  Fax. [phone] [address]  Tel.  [phone]  Fax. [phone]       Subjective:      Waqar Montenegro is a 70 y.o. female who I am asked to see in consultation for evaluation and management of sleep apnea. She was diagnosed with sleep apnea several years ago. She is using her device regularly. She complains of needing a new device associated with hers is very old and not comfortable. .  Symptoms began several months ago, gradually worsening since that time. She usually can fall asleep in 5 minutes. Family or house members note tossing and turning. She denies falling asleep while driving. There are maximal  mask comfort problems. The following problems are noted with PAP:     Drowsiness no Problems exhaling no   Snoring no Forget to put on no   Mask Comfortable no Can't fall asleep no   Dry Mouth yes Mask falls off no   Air Leaking yes Frequent awakenings Yes with this machine (machine is non-downloadable and set at 15 cm     Waqar Monten[place]ro does not wake up frequently at night. She is not bothered by waking up too early and left unable to get back to sleep. She actually sleeps about 10 hours at night and wakes up about 3 times during the night. She does not work shifts:  .   Alice Elizalde indicates she does not get too little sleep at night. Her bedtime is 1000. She awakens at 0800. She does take naps. She takes 7 naps a week lasting 1, Hour(s). She has the following observed behaviors:  ;  .  Other remarks:      [place] Sleepiness Score: 3      Allergies   Allergen Reactions    Gabapentin Other (comments)     Hot flashes    Topiramate Other (comments)     Hot flashes         Current Outpatient Medications:     SITagliptin (JANUVIA) 100 mg tablet, Take 1 Tab by mouth daily. , Disp: 30 Tab, Rfl: 11    levothyroxine (SYNTHROID) 88 mcg tablet, Half tablet on Monday and 1 tablet all other days, Disp: 90 Tab, Rfl: 3    rosuvastatin (CRESTOR) 10 mg tablet, Take 1 Tab by mouth daily. , Disp: 90 Tab, Rfl: 3    metFORMIN (GLUCOPHAGE) 1,000 mg tablet, Take 1 Tab by mouth two (2) times daily (with meals). , Disp: 180 Tab, Rfl: 3    aspirin delayed-release 81 mg tablet, Take  by mouth daily. , Disp: , Rfl:     acetaminophen (TYLENOL EXTRA STRENGTH) 500 mg tablet, Take 1,000 mg by mouth daily. , Disp: , Rfl:     naproxen sodium (ALEVE) 220 mg cap, Take  by mouth daily. , Disp: , Rfl:     omega 3-dha-epa-fish oil 100-160-1,000 mg cap, Take 2 Caps by mouth daily. , Disp: , Rfl:     magnesium oxide 500 mg tab, Take 500 mg by mouth daily. , Disp: , Rfl:     OTHER, energy greens 1 scoop with 8oz water daily, Disp: , Rfl:     fluticasone (FLONASE ALLERGY RELIEF) 50 mcg/actuation nasal spray, 2 Sprays by Both Nostrils route daily. , Disp: , Rfl:     glucose blood VI test strips (ONETOUCH ULTRA BLUE TEST STRIP) strip, by Does Not Apply route See Admin Instructions. Test 3 times daily, Disp: , Rfl:     amLODIPine (NORVASC) 2.5 mg tablet, Take 2.5 mg by mouth daily. , Disp: , Rfl:     ascorbic acid, vitamin C, (VITAMIN C) 500 mg tablet, Take  by mouth., Disp: , Rfl:     multivitamin with iron tablet, Take 1 Tab by mouth daily. , Disp: , Rfl:     ergocalciferol (ERGOCALCIFEROL) 50,000 unit capsule, Take 50,000 Units by mouth every seven (7) days. , Disp: , Rfl:     vit B Cmplx 3-FA-Vit C-Biotin (NEPHRO MANINDER RX) 1- mg-mg-mcg tablet, Take 1 Tab by mouth daily. , Disp: , Rfl:     lisinopril (PRINIVIL, ZESTRIL) 40 mg tablet, Take 1 Tab by mouth daily. , Disp: 90 Tab, Rfl: 3    DULoxetine (CYMBALTA) 60 mg capsule, Take 1 Cap by mouth daily. , Disp: 30 Cap, Rfl: 5     She  has no past medical history on file.     She  has a past surgical history that includes hx hysterectomy; hx cholecystectomy; hx  section; hx heent; and hx breast biopsy (Right, 1990s). She family history includes Cancer in her father and mother; Diabetes in her mother; Hypertension in her father, mother, and sister; No Known Problems in her brother; Sleep Apnea in her brother. She  reports that she has never smoked. She has never used smokeless tobacco. She reports that she does not drink alcohol or use drugs. Review of Systems:  Constitutional:+ weight gain. Eyes:  No blurred vision. CVS:  No significant chest pain  Pulm:  No significant shortness of breath  GI:  No significant nausea or vomiting  :  No significant nocturia  Musculoskeletal:  No significant joint pain at night  Skin:  No significant rashes  Neuro:  No significant dizziness   Psych:  No active mood issues    Sleep Review of Systems: notable for no difficulty falling asleep; +frequent awakenings at night; somedreaming noted; no nightmares ; no early morning headaches ; no memory problems; no concentration issues; no history of any automobile or occupational accidents due to daytime drowsiness. Objective:     Visit Vitals  /86 (BP 1 Location: Left arm, BP Patient Position: Sitting)   Pulse 75   Temp 98.7 °F (37.1 °C) (Oral)   Resp 19   Ht 5' 6\" (1.676 m)   Wt 207 lb (93.9 kg)   SpO2 99%   BMI 33.41 kg/m²         General:   Not in acute distress   Eyes:  Anicteric sclerae, no obvious strabismus   Nose:  No obvious nasal septum deviation    Oropharynx:   Class 3 oropharyngeal outlet, thick tongue base, enlarged and boggy uvula, low-lying soft palate, narrow tonsilo-pharyngeal pilars   Tonsils:   tonsils are present and normal   Neck:   Neck circ. in \"inches\": 15; midline trachea   Chest/Lungs:  Equal lung expansion, clear on auscultation    CVS:  Normal rate, regular rhythm; no JVD   Skin:  Warm to touch; no obvious rashes   Neuro:  No focal deficits ; no obvious tremor    Psych:  Normal affect,  normal countenance;          Assessment:       ICD-10-CM ICD-9-CM    1.  MANUEL (obstructive sleep apnea) G47.33 327.23 SLEEP STUDY UNATTENDED, 4 CHANNEL   2. Hypertension, essential I10 401.9    3. Controlled type 2 diabetes mellitus with microalbuminuria, without long-term current use of insulin ([facility]) E11.29 250.40     R80.9 791.0        Plan:     Sleep study ordered. I have reviewed the different types of sleep studies. Attended sleep studies and home sleep apnea tests. Home sleep testing tests only for the presence and severity of sleep apnea. she understands that if the HSAT does not provide reliable result(such as poor data/failed HSAT recording), she may have to repeat the HSAT or come in for an attended polysomnogram.   She strongly prefers an HSAT  Treatment options for sleep apnea were reviewed. She needs a new machine  I have reviewed medicare requirements regarding PAP usage  The treatment plan was reviewed with the patient in detail and reviewed with the patient and the lead technologist. she understands that the lead technologist will be calling her  with the results and assisting with the next step in the treatment plan as outlined today during the consultation with me. All of her questions were addressed. Counseling was provided regarding the importance of regular PAP use and on proper sleep hygiene and safe driving. 2. Hypertension - she continues on her current regimen. I have reviewed the relationship between hypertension as it relates to sleep-disordered breathing. 3. Type II diabetes - she continues on her current regimen. I have reviewed the relationship between sleep disordered breathing as it relates to diabetes. Thank you for allowing us to participate in your patient's medical care.   Electronically signed by    Edith Carrasquillo MD  Diplomate in Sleep Medicine  [facility]

## 2019-04-11 NOTE — PATIENT INSTRUCTIONS
[address] [place]. [place], [address]  Tel.  [phone]  Fax. [address] [facility] 60  [place], [address] [facility]  Tel.  [phone]  Fax. [phone] [address]  Tel.  [phone]  Fax. [phone]     Sleep Apnea: After Your Visit  Your Care Instructions  Sleep apnea occurs when you frequently stop breathing for 10 seconds or longer during sleep. It can be mild to severe, based on the number of times per hour that you stop breathing or have slowed breathing. Blocked or narrowed airways in your nose, mouth, or throat can cause sleep apnea. Your airway can become blocked when your throat muscles and tongue relax during sleep. Sleep apnea is common, occurring in 1 out of 20 individuals. Individuals having any of the following characteristics should be evaluated and treated right away due to high risk and detrimental consequences from untreated sleep apnea:  1. Obesity  2. Congestive Heart failure  3. Atrial Fibrillation  4. Uncontrolled Hypertension  5. Type II Diabetes  6. Night-time Arrhythmias  7. Stroke  8. Pulmonary Hypertension  9. High-risk Driving Populations (pilots, truck drivers, etc.)  10. Patients Considering Weight-loss Surgery    How do you know you have sleep apnea? You probably have sleep apnea if you answer 'yes' to 3 or more of the following questions:  S - Have you been told that you Snore? T - Are you often Tired during the day? O - Has anyone Observed you stop breathing while sleeping? P- Do you have (or are being treated for) high blood Pressure? B - Are you obese (Body Mass Index > 35)? A - Is your Age 48years old or older? N - Is your Neck size greater than 16 inches? G - Are you male Gender? A sleep physician can prescribe a breathing device that prevents tissues in the throat from blocking your airway.  Or your doctor may recommend using a dental device (oral breathing device) to help keep your airway open. In some cases, surgery may be needed to remove enlarged tissues in the throat. Follow-up care is a key part of your treatment and safety. Be sure to make and go to all appointments, and call your doctor if you are having problems. It's also a good idea to know your test results and keep a list of the medicines you take. How can you care for yourself at home? · Lose weight, if needed. It may reduce the number of times you stop breathing or have slowed breathing. · Go to bed at the same time every night. · Sleep on your side. It may stop mild apnea. If you tend to roll onto your back, sew a pocket in the back of your pajama top. Put a tennis ball into the pocket, and stitch the pocket shut. This will help keep you from sleeping on your back. · Avoid alcohol and medicines such as sleeping pills and sedatives before bed. · Do not smoke. Smoking can make sleep apnea worse. If you need help quitting, talk to your doctor about stop-smoking programs and medicines. These can increase your chances of quitting for good. · Prop up the head of your bed 4 to 6 inches by putting bricks under the legs of the bed. · Treat breathing problems, such as a stuffy nose, caused by a cold or allergies. · Use a continuous positive airway pressure (CPAP) breathing machine if lifestyle changes do not help your apnea and your doctor recommends it. The machine keeps your airway from closing when you sleep. · If CPAP does not help you, ask your doctor whether you should try other breathing machines. A bilevel positive airway pressure machine has two types of air pressureâone for breathing in and one for breathing out. Another device raises or lowers air pressure as needed while you breathe. · If your nose feels dry or bleeds when using one of these machines, talk with your doctor about increasing moisture in the air. A humidifier may help.   · If your nose is runny or stuffy from using a breathing machine, talk with your doctor about using decongestants or a corticosteroid nasal spray. When should you call for help? Watch closely for changes in your health, and be sure to contact your doctor if:  · You still have sleep apnea even though you have made lifestyle changes. · You are thinking of trying a device such as CPAP. · You are having problems using a CPAP or similar machine. Where can you learn more? Go to [employer]. Enter E043 in the search box to learn more about \"Sleep Apnea: After Your Visit. \"   © 7232-5379 Healthwise, Incorporated. Care instructions adapted under license by [facility] [employer] (which disclaims liability or warranty for this information). This care instruction is for use with your licensed healthcare professional. If you have questions about a medical condition or this instruction, always ask your healthcare professional. [place] any warranty or liability for your use of this information. PROPER SLEEP HYGIENE    What to avoid  · Do not have drinks with caffeine, such as coffee or black tea, for 8 hours before bed. · Do not smoke or use other types of tobacco near bedtime. Nicotine is a stimulant and can keep you awake. · Avoid drinking alcohol late in the evening, because it can cause you to wake in the middle of the night. · Do not eat a big meal close to bedtime. If you are hungry, eat a light snack. · Do not drink a lot of water close to bedtime, because the need to urinate may wake you up during the night. · Do not read or watch TV in bed. Use the bed only for sleeping and sexual activity. What to try  · Go to bed at the same time every night, and wake up at the same time every morning. Do not take naps during the day. · Keep your bedroom quiet, dark, and cool. · Get regular exercise, but not within 3 to 4 hours of your bedtime. .  · Sleep on a comfortable pillow and mattress.   · If watching the clock makes you anxious, turn it facing away from you so you cannot see the time. · If you worry when you lie down, start a worry book. Well before bedtime, write down your worries, and then set the book and your concerns aside. · Try meditation or other relaxation techniques before you go to bed. · If you cannot fall asleep, get up and go to another room until you feel sleepy. Do something relaxing. Repeat your bedtime routine before you go to bed again. · Make your house quiet and calm about an hour before bedtime. Turn down the lights, turn off the TV, log off the computer, and turn down the volume on music. This can help you relax after a busy day. Drowsy Driving  The [address] [place] [address] Road Traffic Safety Administration cites drowsiness as a causing factor in more than 087,319 police reported crashes annually, resulting in 76,000 injuries and 1,500 deaths. Other surveys suggest 55% of people polled have driven while drowsy in the past year, 23% had fallen asleep but not crashed, 3% crashed, and 2% had and accident due to drowsy driving. Who is at risk? Young Drivers: One study of drowsy driving accidents states that 55% of the drivers were under 25 years. Of those, 75% were male. Shift Workers and Travelers: People who work overnight or travel across time zones frequently are at higher risk of experiencing Circadian Rhythm Disorders. They are trying to work and function when their body is programed to sleep. Sleep Deprived: Lack of sleep has a serious impact on your ability to pay attention or focus on a task. Consistently getting less than the average of 8 hours your body needs creates partial or cumulative sleep deprivation. Untreated Sleep Disorders: Sleep Apnea, Narcolepsy, R.L.S., and other sleep disorders (untreated) prevent a person from getting enough restful sleep. This leads to excessive daytime sleepiness and increases the risk for drowsy driving accidents by up to 7 times.   Medications / Alcohol: Even over the counter medications can cause drowsiness. Medications that impair a drivers attention should have a warning label. Alcohol naturally makes you sleepy and on its own can cause accidents. Combined with excessive drowsiness its effects are amplified. Signs of Drowsy Driving:   * You don't remember driving the last few miles   * You may drift out of your tyrese   * You are unable to focus and your thoughts wander   * You may yawn more often than normal   * You have difficulty keeping your eyes open / nodding off   * Missing traffic signs, speeding, or tailgating  Prevention-   Good sleep hygiene, lifestyle and behavioral choices have the most impact on drowsy driving. There is no substitute for sleep and the average person requires 8 hours nightly. If you find yourself driving drowsy, stop and sleep. Consider the sleep hygiene tips provided during your visit as well. Medication Refill Policy: Refills for all medications require 1 week advance notice. Please have your pharmacy fax a refill request. We are unable to fax, or call in \"controled substance\" medications and you will need to pick these prescriptions up from our office. Storrz Activation    Thank you for requesting access to Storrz. Please follow the instructions below to securely access and download your online medical record. Storrz allows you to send messages to your doctor, view your test results, renew your prescriptions, schedule appointments, and more. How Do I Sign Up? 1. In your internet browser, go to [email or web address]. 2. Click on the First Time User? Click Here link in the Sign In box. You will see the New Member Sign Up page. 3. Enter your Storrz Access Code exactly as it appears below. You will not need to use this code after youve completed the sign-up process. If you do not sign up before the expiration date, you must request a new code.     Storrz Access Code: [email or web address]  Expires: 4/25/2019 11:02 AM (This is the date your BitPass access code will )    4. Enter the last four digits of your Social Security Number (xxxx) and Date of Birth (mm/dd/yyyy) as indicated and click Submit. You will be taken to the next sign-up page. 5. Create a Red Swoosht ID. This will be your BitPass login ID and cannot be changed, so think of one that is secure and easy to remember. 6. Create a BitPass password. You can change your password at any time. 7. Enter your Password Reset Question and Answer. This can be used at a later time if you forget your password. 8. Enter your e-mail address. You will receive e-mail notification when new information is available in [address] 9. Click Sign Up. You can now view and download portions of your medical record. 10. Click the Download Summary menu link to download a portable copy of your medical information. Additional Information    If you have questions, please call [phone]. Remember, BitPass is NOT to be used for urgent needs. For medical emergencies, dial 911.

## 2019-04-12 ENCOUNTER — DOCUMENTATION ONLY (OUTPATIENT)
Dept: SLEEP MEDICINE | Age: 64
End: 2019-04-12

## 2019-04-12 ENCOUNTER — TELEPHONE (OUTPATIENT)
Dept: INTERNAL MEDICINE CLINIC | Facility: CLINIC | Age: 64
End: 2019-04-12

## 2019-04-12 DIAGNOSIS — E11.29 CONTROLLED TYPE 2 DIABETES MELLITUS WITH MICROALBUMINURIA, WITHOUT LONG-TERM CURRENT USE OF INSULIN (HCC): Primary | ICD-10-CM

## 2019-04-12 DIAGNOSIS — R80.9 CONTROLLED TYPE 2 DIABETES MELLITUS WITH MICROALBUMINURIA, WITHOUT LONG-TERM CURRENT USE OF INSULIN (HCC): Primary | ICD-10-CM

## 2019-04-12 NOTE — TELEPHONE ENCOUNTER
Since overnight sugars are not low, it is safe to add back Trulicity. I believe she has an active prescription; if not we can send one.

## 2019-04-12 NOTE — TELEPHONE ENCOUNTER
Pt states she was on both the trulicity and the [facility] [place] when she cam to us and do not like the way she feels when sugars are high. Per verbal order of Dr Jay Serna may do both. Pt will let us know if she has any problems. Pt has no further questions at this time.

## 2019-04-12 NOTE — TELEPHONE ENCOUNTER
Pt called and stated that she told to call and give her BP readings for three days.   Please see readings below:    Blood Sugar Readings    Monday 4:29   Tuesday 2:42AM 182  Thursday 3:51

## 2019-04-12 NOTE — TELEPHONE ENCOUNTER
Pt notified of Dr Aurelio Diaz message, needs new Rx sent to [facility].

## 2019-04-22 ENCOUNTER — DOCUMENTATION ONLY (OUTPATIENT)
Dept: SLEEP MEDICINE | Age: 64
End: 2019-04-22

## 2019-05-09 ENCOUNTER — OFFICE VISIT (OUTPATIENT)
Dept: INTERNAL MEDICINE CLINIC | Facility: CLINIC | Age: 64
End: 2019-05-09

## 2019-05-09 VITALS
DIASTOLIC BLOOD PRESSURE: 83 MMHG | BODY MASS INDEX: 32.85 KG/M2 | SYSTOLIC BLOOD PRESSURE: 128 MMHG | TEMPERATURE: 98 F | HEIGHT: 66 IN | HEART RATE: 80 BPM | WEIGHT: 204.4 LBS | OXYGEN SATURATION: 93 % | RESPIRATION RATE: 18 BRPM

## 2019-05-09 DIAGNOSIS — M48.062 SPINAL STENOSIS OF LUMBAR REGION WITH NEUROGENIC CLAUDICATION: ICD-10-CM
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
## 2019-05-09 DIAGNOSIS — M85.89 OSTEOPENIA OF MULTIPLE SITES: ICD-10-CM

## 2019-05-09 DIAGNOSIS — M54.50 ACUTE MIDLINE LOW BACK PAIN WITHOUT SCIATICA: Primary | ICD-10-CM

## 2019-05-09 RX ORDER — TRAMADOL HYDROCHLORIDE 50 MG/1
50 TABLET ORAL
Qty: 15 TAB | Refills: 0 | Status: SHIPPED | OUTPATIENT
Start: 2019-05-09 | End: 2019-05-12

## 2019-05-09 RX ORDER — METHOCARBAMOL 500 MG/1
500 TABLET, FILM COATED ORAL AS NEEDED
Qty: 15 TAB | Refills: 0 | Status: SHIPPED | OUTPATIENT
Start: 2019-05-09 | End: 2019-09-10

## 2019-05-09 NOTE — PATIENT INSTRUCTIONS
Learning About How to Have a Healthy Back  What causes back pain? Back pain is often caused by overuse, strain, or injury. For example, people often hurt their backs playing sports or working in the yard, being jolted in a car accident, or lifting something too heavy. Aging plays a part too. Your bones and muscles tend to lose strength as you age, which makes injury more likely. The spongy discs between the bones of the spine (vertebrae) may suffer from wear and tear and no longer provide enough cushion between the bones. A disc that bulges or breaks open (herniated disc) can press on nerves, causing back pain. In some people, back pain is the result of arthritis, broken vertebrae caused by bone loss (osteoporosis), illness, or a spine problem. Although most people have back pain at one time or another, there are steps you can take to make it less likely. How can you have a healthy back? Reduce stress on your back through good posture  Slumping or slouching alone may not cause low back pain. But after the back has been strained or injured, bad posture can make pain worse. · Sleep in a position that maintains your back's normal curves and on a mattress that feels comfortable. Sleep on your side with a pillow between your knees, or sleep on your back with a pillow under your knees. These positions can reduce strain on your back. · Stand and sit up straight. \"Good posture\" generally means your ears, shoulders, and hips are in a straight line. · If you must stand for a long time, put one foot on a stool, ledge, or box. Switch feet every now and then. · Sit in a chair that is low enough to let you place both feet flat on the floor with both knees nearly level with your hips. If your chair or desk is too high, use a footrest to raise your knees. Place a small pillow, a rolled-up towel, or a lumbar roll in the curve of your back if you need extra support.   · Try a kneeling chair, which helps tilt your hips forward. This takes pressure off your lower back. · Try sitting on an exercise ball. It can rock from side to side, which helps keep your back loose. · When driving, keep your knees nearly level with your hips. Sit straight, and drive with both hands on the steering wheel. Your arms should be in a slightly bent position. Reduce stress on your back through careful lifting  · Squat down, bending at the hips and knees only. If you need to, put one knee to the floor and extend your other knee in front of you, bent at a right angle (half kneeling). · Press your chest straight forward. This helps keep your upper back straight while keeping a slight arch in your low back. · Hold the load as close to your body as possible, at the level of your belly button (navel). · Use your feet to change direction, taking small steps. · Lead with your hips as you change direction. Keep your shoulders in line with your hips as you move. · Set down your load carefully, squatting with your knees and hips only. Exercise and stretch your back  · Do some exercise on most days of the week, if your doctor says it is okay. You can walk, run, swim, or cycle. · Stretch your back muscles. Here are a few exercises to try:  ? Lie on your back, and gently pull one bent knee to your chest. Put that foot back on the floor, and then pull the other knee to your chest.  ? Do pelvic tilts. Lie on your back with your knees bent. Tighten your stomach muscles. Pull your belly button (navel) in and up toward your ribs. You should feel like your back is pressing to the floor and your hips and pelvis are slightly lifting off the floor. Hold for 6 seconds while breathing smoothly. ? Sit with your back flat against a wall. · Keep your core muscles strong. The muscles of your back, belly (abdomen), and buttocks support your spine. ? Pull in your belly and imagine pulling your navel toward your spine. Hold this for 6 seconds, then relax.  Remember to keep breathing normally as you tense your muscles. ? Do curl-ups. Always do them with your knees bent. Keep your low back on the floor, and curl your shoulders toward your knees using a smooth, slow motion. Keep your arms folded across your chest. If this bothers your neck, try putting your hands behind your neck (not your head), with your elbows spread apart. ? Lie on your back with your knees bent and your feet flat on the floor. Tighten your belly muscles, and then push with your feet and raise your buttocks up a few inches. Hold this position 6 seconds as you continue to breathe normally, then lower yourself slowly to the floor. Repeat 8 to 12 times. ? If you like group exercise, try Pilates or yoga. These classes have poses that strengthen the core muscles. Lead a healthy lifestyle  · Stay at a healthy weight to avoid strain on your back. · Do not smoke. Smoking increases the risk of osteoporosis, which weakens the spine. If you need help quitting, talk to your doctor about stop-smoking programs and medicines. These can increase your chances of quitting for good. Where can you learn more? Go to http://stephanieIMRICOR MEDICAL SYSTEMSsaleem.info/. Enter L315 in the search box to learn more about \"Learning About How to Have a Healthy Back. \"  Current as of: September 20, 2018  Content Version: 11.9  © 9750-1720 [employer]. Care instructions adapted under license by [employer] (which disclaims liability or warranty for this information). If you have questions about a medical condition or this instruction, always ask your healthcare professional. [place] [address] any warranty or liability for your use of this information. Preventing Falls: Care Instructions  Your Care Instructions    Getting around your home safely can be a challenge if you have injuries or health problems that make it easy for you to fall.  Loose rugs and furniture in walkways are among the dangers for many older people who have problems walking or who have poor eyesight. People who have conditions such as arthritis, osteoporosis, or dementia also have to be careful not to fall. You can make your home safer with a few simple measures. Follow-up care is a key part of your treatment and safety. Be sure to make and go to all appointments, and call your doctor if you are having problems. It's also a good idea to know your test results and keep a list of the medicines you take. How can you care for yourself at home? Taking care of yourself  · You may get dizzy if you do not drink enough water. To prevent dehydration, drink plenty of fluids, enough so that your urine is light yellow or clear like water. Choose water and other caffeine-free clear liquids. If you have kidney, heart, or liver disease and have to limit fluids, talk with your doctor before you increase the amount of fluids you drink. · Exercise regularly to improve your strength, muscle tone, and balance. Walk if you can. Swimming may be a good choice if you cannot walk easily. · Have your vision and hearing checked each year or any time you notice a change. If you have trouble seeing and hearing, you might not be able to avoid objects and could lose your balance. · Know the side effects of the medicines you take. Ask your doctor or pharmacist whether the medicines you take can affect your balance. Sleeping pills or sedatives can affect your balance. · Limit the amount of alcohol you drink. Alcohol can impair your balance and other senses. · Ask your doctor whether calluses or corns on your feet need to be removed. If you wear loose-fitting shoes because of calluses or corns, you can lose your balance and fall. · Talk to your doctor if you have numbness in your feet. Preventing falls at home  · Remove raised doorway thresholds, throw rugs, and clutter. Repair loose carpet or raised areas in the floor.   · Move furniture and electrical cords to keep them out of walking paths. · Use nonskid floor wax, and wipe up spills right away, especially on ceramic tile floors. · If you use a walker or cane, put rubber tips on it. If you use crutches, clean the bottoms of them regularly with an abrasive pad, such as steel wool. · Keep your house well lit, especially [employer], and outside walkways. Use night-lights in areas such as hallways and bathrooms. Add extra light switches or use remote switches (such as switches that go on or off when you clap your hands) to make it easier to turn lights on if you have to get up during the night. · Install sturdy handrails on stairways. · Move items in your cabinets so that the things you use a lot are on the lower shelves (about waist level). · Keep a cordless phone and a flashlight with new batteries by your bed. If possible, put a phone in each of the main rooms of your house, or carry a cell phone in case you fall and cannot reach a phone. Or, you can wear a device around your neck or wrist. You push a button that sends a signal for help. · Wear low-heeled shoes that fit well and give your feet good support. Use footwear with nonskid soles. Check the heels and soles of your shoes for wear. Repair or replace worn heels or soles. · Do not wear socks without shoes on wood floors. · Walk on the grass when the sidewalks are slippery. If you live in an area that gets snow and ice in the winter, sprinkle salt on slippery steps and sidewalks. Preventing falls in the bath  · Install grab bars and nonskid mats inside and outside your shower or tub and near the toilet and sinks. · Use shower chairs and bath benches. · Use a hand-held shower head that will allow you to sit while showering.   · Get into a tub or shower by putting the weaker leg in first. Get out of a tub or shower with your strong side first.  · Repair loose toilet seats and consider installing a raised toilet seat to make getting on and off the toilet easier. · Keep your bathroom door unlocked while you are in the shower. Where can you learn more? Go to http://stephanie-saleem.info/. Enter 0476 [id number] in the search box to learn more about \"Preventing Falls: Care Instructions. \"  Current as of: March 15, 2018  Content Version: 11.9  © 8315-2901 [employer]. Care instructions adapted under license by [employer] (which disclaims liability or warranty for this information). If you have questions about a medical condition or this instruction, always ask your healthcare professional. [place] [address] any warranty or liability for your use of this information.

## 2019-05-09 NOTE — PROGRESS NOTES
HPI  Marlene Carmona is a 70y.o. year old female patient of Evaristo Bennett MD who presents with c/o fall and back pain. Pt has history of has Hypertension, essential, Hypercholesterolemia, Controlled type 2 diabetes mellitus with microalbuminuria, without long-term current use of insulin ([address] Acquired hypothyroidism, MANUEL (obstructive sleep apnea), Carpal tunnel syndrome of right wrist, Spinal stenosis of cervical region, Spinal stenosis of lumbar region with neurogenic claudication, and Osteopenia of multiple sites on their problem list..    Pt c/o fall last Thursday. Having low back pain and left leg pain. Trying to kill a spider in the ceiling- stood on kitchen chair and when trying to reach with fly swatter chair slipped from under her and fell onto her back- hit back of her head. Not having any pain in her head. Denies LOC, denies headaches. Pain is 8/10 in back, hurts across lower back both sides, soreness in both hamstrings. Hurts when goes to get up from chair or first lies down in bed. Feels somewhat better today. Better once she gets going. Taking tylenol and aleve which helps. Denies numbness or tingling in legs- prior to fall has had some numbness in right groin and thigh area. Denies bowel or bladder incontinence. Per chart pt has hx of Spinal stenosis of lumbar region with neurogenic claudication and osteopenia, takes Cymbalta for nerve pain.         Patient Active Problem List   Diagnosis Code    Hypertension, essential I10    Hypercholesterolemia E78.00    Controlled type 2 diabetes mellitus with microalbuminuria, without long-term current use of insulin ([facility]) E11.29, R80.9    Acquired hypothyroidism E03.9    MANUEL (obstructive sleep apnea) G47.33    Carpal tunnel syndrome of right wrist G56.01    Spinal stenosis of cervical region M48.02    Spinal stenosis of lumbar region with neurogenic claudication M48.062    Osteopenia of multiple sites M85.89     No past medical history on file. Past Surgical History:   Procedure Laterality Date    HX BREAST BIOPSY Right 1990s    Benign (per patient)    HX  SECTION       x 2    HX CHOLECYSTECTOMY      HX HEENT      parathyroidectomy    HX HYSTERECTOMY       Social History     Socioeconomic History    Marital status:      Spouse name: Not on file    Number of children: Not on file    Years of education: Not on file    Highest education level: Not on file   Tobacco Use    Smoking status: Never Smoker    Smokeless tobacco: Never Used   Substance and Sexual Activity    Alcohol use: No     Frequency: Never    Drug use: No    Sexual activity: Yes     Partners: Male     Birth control/protection: Surgical     Family History   Problem Relation Age of Onset    Cancer Mother         bone    Diabetes Mother     Hypertension Mother     Hypertension Father     Cancer Father         blood    Hypertension Sister     Sleep Apnea Brother     No Known Problems Brother      Allergies   Allergen Reactions    Gabapentin Other (comments)     Hot flashes    Topiramate Other (comments)     Hot flashes       MEDICATIONS  Current Outpatient Medications   Medication Sig    dulaglutide (TRULICITY) 8.20 DB/3.8 mL sub-q pen 0.75 mg by SubCUTAneous route every seven (7) days.  SITagliptin (JANUVIA) 100 mg tablet Take 1 Tab by mouth daily.  levothyroxine (SYNTHROID) 88 mcg tablet Half tablet on Monday and 1 tablet all other days    rosuvastatin (CRESTOR) 10 mg tablet Take 1 Tab by mouth daily.  metFORMIN (GLUCOPHAGE) 1,000 mg tablet Take 1 Tab by mouth two (2) times daily (with meals).  aspirin delayed-release 81 mg tablet Take  by mouth daily.  acetaminophen (TYLENOL EXTRA STRENGTH) 500 mg tablet Take 1,000 mg by mouth daily.  naproxen sodium (ALEVE) 220 mg cap Take  by mouth daily.  omega 3-dha-epa-fish oil 100-160-1,000 mg cap Take 2 Caps by mouth daily.  magnesium oxide 500 mg tab Take 500 mg by mouth daily.     OTHER energy greens 1 scoop with 8oz water daily    fluticasone (FLONASE ALLERGY RELIEF) 50 mcg/actuation nasal spray 2 Sprays by Both Nostrils route daily.  glucose blood VI test strips (ONETOUCH ULTRA BLUE TEST STRIP) strip by Does Not Apply route See Admin Instructions. Test 3 times daily    amLODIPine (NORVASC) 2.5 mg tablet Take 2.5 mg by mouth daily.  ascorbic acid, vitamin C, (VITAMIN C) 500 mg tablet Take  by mouth.  multivitamin with iron tablet Take 1 Tab by mouth daily.  ergocalciferol (ERGOCALCIFEROL) 50,000 unit capsule Take 50,000 Units by mouth every seven (7) days.  vit B Cmplx 3-FA-Vit C-Biotin (NEPHRO MANINDER RX) 1- mg-mg-mcg tablet Take 1 Tab by mouth daily.  lisinopril (PRINIVIL, ZESTRIL) 40 mg tablet Take 1 Tab by mouth daily.  DULoxetine (CYMBALTA) 60 mg capsule Take 1 Cap by mouth daily. No current facility-administered medications for this visit. REVIEW OF SYSTEMS  Per HPI        Visit Vitals  /83 (BP 1 Location: Left arm, BP Patient Position: Sitting)   Pulse 80   Temp 98 °F (36.7 °C) (Oral)   Resp 18   Ht 5' 6\" (1.676 m)   Wt 204 lb 6.4 oz (92.7 kg)   SpO2 93%   BMI 32.99 kg/m²         General: Well-developed, well-nourished. In no distress. A&O x 3. Head: Normocephalic, atraumatic. Eyes: Conjunctiva clear. Back: Symmetric. Reduced ROM due to pain - reports pain with spinal flexion. Spine is non-tender to palpation. Neg SLR bilat. Skin: No rashes or lesions. Neurological: CN II-XII intact. Normal strength, sensation throughout. LE strength 5/5 bilat. Neurovasc: No edema appreciated. Musculoskeletal: Gait normal.  Psychiatric: Normal mood and affect. Behavior is normal.       No results found for any visits on 05/09/19. ASSESSMENT and PLAN  Diagnoses and all orders for this visit:    1. Acute midline low back pain without sciatica  -     XR SPINE LUMB 2 OR 3 V; Future  -     traMADol (ULTRAM) 50 mg tablet;  Take 1 Tab by mouth every six (6) hours as needed for Pain for up to 3 days. Max Daily Amount: 200 mg.  -     methocarbamol (ROBAXIN) 500 mg tablet; Take 1 Tab by mouth as needed (muscle spasm, up to TID). R/o compression fracture  If xray negative suspect exacerbation of chronic back pain/stenosis  Tramadol for severe pain,  checked  May cont Tylenol for mild to mod pain    2. Fall, initial encounter  See #1    3. Spinal stenosis of lumbar region with neurogenic claudication  Chronic pain managed with cymbalta, has not seen Dr. Nayana Jamison as low weight and pain improved. Discussed if pain persists or xrays positive finding would refer to Dr. Nayana Jamison    4. Osteopenia of multiple sites  R/o compression fx given osteopenia          Patient Instructions        Learning About How to Have a Healthy Back  What causes back pain? Back pain is often caused by overuse, strain, or injury. For example, people often hurt their backs playing sports or working in the yard, being jolted in a car accident, or lifting something too heavy. Aging plays a part too. Your bones and muscles tend to lose strength as you age, which makes injury more likely. The spongy discs between the bones of the spine (vertebrae) may suffer from wear and tear and no longer provide enough cushion between the bones. A disc that bulges or breaks open (herniated disc) can press on nerves, causing back pain. In some people, back pain is the result of arthritis, broken vertebrae caused by bone loss (osteoporosis), illness, or a spine problem. Although most people have back pain at one time or another, there are steps you can take to make it less likely. How can you have a healthy back? Reduce stress on your back through good posture  Slumping or slouching alone may not cause low back pain. But after the back has been strained or injured, bad posture can make pain worse. · Sleep in a position that maintains your back's normal curves and on a mattress that feels comfortable.  Sleep on your side with a pillow between your knees, or sleep on your back with a pillow under your knees. These positions can reduce strain on your back. · Stand and sit up straight. \"Good posture\" generally means your ears, shoulders, and hips are in a straight line. · If you must stand for a long time, put one foot on a stool, ledge, or box. Switch feet every now and then. · Sit in a chair that is low enough to let you place both feet flat on the floor with both knees nearly level with your hips. If your chair or desk is too high, use a footrest to raise your knees. Place a small pillow, a rolled-up towel, or a lumbar roll in the curve of your back if you need extra support. · Try a kneeling chair, which helps tilt your hips forward. This takes pressure off your lower back. · Try sitting on an exercise ball. It can rock from side to side, which helps keep your back loose. · When driving, keep your knees nearly level with your hips. Sit straight, and drive with both hands on the steering wheel. Your arms should be in a slightly bent position. Reduce stress on your back through careful lifting  · Squat down, bending at the hips and knees only. If you need to, put one knee to the floor and extend your other knee in front of you, bent at a right angle (half kneeling). · Press your chest straight forward. This helps keep your upper back straight while keeping a slight arch in your low back. · Hold the load as close to your body as possible, at the level of your belly button (navel). · Use your feet to change direction, taking small steps. · Lead with your hips as you change direction. Keep your shoulders in line with your hips as you move. · Set down your load carefully, squatting with your knees and hips only. Exercise and stretch your back  · Do some exercise on most days of the week, if your doctor says it is okay. You can walk, run, swim, or cycle. · Stretch your back muscles.  Here are a few exercises to try:  ? Lie on your back, and gently pull one bent knee to your chest. Put that foot back on the floor, and then pull the other knee to your chest.  ? Do pelvic tilts. Lie on your back with your knees bent. Tighten your stomach muscles. Pull your belly button (navel) in and up toward your ribs. You should feel like your back is pressing to the floor and your hips and pelvis are slightly lifting off the floor. Hold for 6 seconds while breathing smoothly. ? Sit with your back flat against a wall. · Keep your core muscles strong. The muscles of your back, belly (abdomen), and buttocks support your spine. ? Pull in your belly and imagine pulling your navel toward your spine. Hold this for 6 seconds, then relax. Remember to keep breathing normally as you tense your muscles. ? Do curl-ups. Always do them with your knees bent. Keep your low back on the floor, and curl your shoulders toward your knees using a smooth, slow motion. Keep your arms folded across your chest. If this bothers your neck, try putting your hands behind your neck (not your head), with your elbows spread apart. ? Lie on your back with your knees bent and your feet flat on the floor. Tighten your belly muscles, and then push with your feet and raise your buttocks up a few inches. Hold this position 6 seconds as you continue to breathe normally, then lower yourself slowly to the floor. Repeat 8 to 12 times. ? If you like group exercise, try Pilates or yoga. These classes have poses that strengthen the core muscles. Lead a healthy lifestyle  · Stay at a healthy weight to avoid strain on your back. · Do not smoke. Smoking increases the risk of osteoporosis, which weakens the spine. If you need help quitting, talk to your doctor about stop-smoking programs and medicines. These can increase your chances of quitting for good. Where can you learn more? Go to http://stephanie-saleem.info/.   Enter L315 in the search box to learn more about \"Learning About How to Have a Healthy Back. \"  Current as of: September 20, 2018  Content Version: 11.9  © 2181-7714 [employer]. Care instructions adapted under license by [employer] (which disclaims liability or warranty for this information). If you have questions about a medical condition or this instruction, always ask your healthcare professional. [address] any warranty or liability for your use of this information. Preventing Falls: Care Instructions  Your Care Instructions    Getting around your home safely can be a challenge if you have injuries or health problems that make it easy for you to fall. Loose rugs and furniture in walkways are among the dangers for many older people who have problems walking or who have poor eyesight. People who have conditions such as arthritis, osteoporosis, or dementia also have to be careful not to fall. You can make your home safer with a few simple measures. Follow-up care is a key part of your treatment and safety. Be sure to make and go to all appointments, and call your doctor if you are having problems. It's also a good idea to know your test results and keep a list of the medicines you take. How can you care for yourself at home? Taking care of yourself  · You may get dizzy if you do not drink enough water. To prevent dehydration, drink plenty of fluids, enough so that your urine is light yellow or clear like water. Choose water and other caffeine-free clear liquids. If you have kidney, heart, or liver disease and have to limit fluids, talk with your doctor before you increase the amount of fluids you drink. · Exercise regularly to improve your strength, muscle tone, and balance. Walk if you can. Swimming may be a good choice if you cannot walk easily. · Have your vision and hearing checked each year or any time you notice a change.  If you have trouble seeing and hearing, you might not be able to avoid objects and could lose your balance. · Know the side effects of the medicines you take. Ask your doctor or pharmacist whether the medicines you take can affect your balance. Sleeping pills or sedatives can affect your balance. · Limit the amount of alcohol you drink. Alcohol can impair your balance and other senses. · Ask your doctor whether calluses or corns on your feet need to be removed. If you wear loose-fitting shoes because of calluses or corns, you can lose your balance and fall. · Talk to your doctor if you have numbness in your feet. Preventing falls at home  · Remove raised doorway thresholds, throw rugs, and clutter. Repair loose carpet or raised areas in the floor. · Move furniture and electrical cords to keep them out of walking paths. · Use nonskid floor wax, and wipe up spills right away, especially on ceramic tile floors. · If you use a walker or cane, put rubber tips on it. If you use crutches, clean the bottoms of them regularly with an abrasive pad, such as steel wool. · Keep your house well lit, especially [place], and outside walkways. Use night-lights in areas such as hallways and bathrooms. Add extra light switches or use remote switches (such as switches that go on or off when you clap your hands) to make it easier to turn lights on if you have to get up during the night. · Install sturdy handrails on stairways. · Move items in your cabinets so that the things you use a lot are on the lower shelves (about waist level). · Keep a cordless phone and a flashlight with new batteries by your bed. If possible, put a phone in each of the main rooms of your house, or carry a cell phone in case you fall and cannot reach a phone. Or, you can wear a device around your neck or wrist. You push a button that sends a signal for help. · Wear low-heeled shoes that fit well and give your feet good support. Use footwear with nonskid soles.  Check the heels and soles of your shoes for wear. Repair or replace worn heels or soles. · Do not wear socks without shoes on wood floors. · Walk on the grass when the sidewalks are slippery. If you live in an area that gets snow and ice in the winter, sprinkle salt on slippery steps and sidewalks. Preventing falls in the bath  · Install grab bars and nonskid mats inside and outside your shower or tub and near the toilet and sinks. · Use shower chairs and bath benches. · Use a hand-held shower head that will allow you to sit while showering. · Get into a tub or shower by putting the weaker leg in first. Get out of a tub or shower with your strong side first.  · Repair loose toilet seats and consider installing a raised toilet seat to make getting on and off the toilet easier. · Keep your bathroom door unlocked while you are in the shower. Where can you learn more? Go to http://stephanie-saleem.info/. Enter [id number] [id number] [phone] in the search box to learn more about \"Preventing Falls: Care Instructions. \"  Current as of: March 15, 2018  Content Version: 11.9  © 0676-1623 [employer]. Care instructions adapted under license by [employer] (which disclaims liability or warranty for this information). If you have questions about a medical condition or this instruction, always ask your healthcare professional. [place] [address] any warranty or liability for your use of this information. Please keep your follow-up appointment with Lorna Cheung MD.         Health Maintenance Due   Topic Date Due    Hepatitis C Screening  1955    EYE EXAM RETINAL OR DILATED  12/19/1963    COLONOSCOPY  12/19/1971    Shingrix Vaccine Age 50> (1 of 2) 12/19/2003    GLAUCOMA SCREENING Q2Y  12/19/2018    Bone Densitometry (Dexa) Screening  12/19/2018       I have discussed the diagnosis with the patient and the intended plan as seen in the above orders. Patient is in agreement.   The patient has received an after-visit summary and questions were answered concerning future plans. I have discussed medication side effects and warnings with the patient as well. Warning signs for the above conditions were discussed including when to call our office or go to the emergency room. The nurse provided the patient and/or family with advanced directive information if needed and encouraged the patient to provide a copy to the office when available.

## 2019-05-09 NOTE — PROGRESS NOTES
Ti Manley  Identified pt with two pt identifiers(name and ). Chief Complaint   Patient presents with    Fall     last thursday    Back Pain     lower    Leg Pain       Reviewed record In preparation for visit and have obtained necessary documentation. Has info on advanced directive but has not filled them out. 1. Have you been to the ER, urgent care clinic or hospitalized since your last visit? No     2. Have you seen or consulted any other health care providers outside of the [address] [place] [address] since your last visit? Include any pap smears or colon screening. No    Vitals reviewed with provider.     Health Maintenance reviewed:     Health Maintenance Due   Topic    Hepatitis C Screening     EYE EXAM RETINAL OR DILATED     COLONOSCOPY     Shingrix Vaccine Age 50> (1 of 2)    GLAUCOMA SCREENING Q2Y     Bone Densitometry (Dexa) Screening           Wt Readings from Last 3 Encounters:   19 204 lb 6.4 oz (92.7 kg)   19 207 lb (93.9 kg)   19 207 lb (93.9 kg)        Temp Readings from Last 3 Encounters:   19 98 °F (36.7 °C) (Oral)   19 98.7 °F (37.1 °C) (Oral)   19 98 °F (36.7 °C) (Oral)        BP Readings from Last 3 Encounters:   19 128/83   19 146/86   19 123/84        Pulse Readings from Last 3 Encounters:   19 80   19 75   19 81        Vitals:    19 1110   BP: 128/83   Pulse: 80   Resp: 18   Temp: 98 °F (36.7 °C)   TempSrc: Oral   SpO2: 93%   Weight: 204 lb 6.4 oz (92.7 kg)   Height: 5' 6\" (1.676 m)   PainSc:   8   PainLoc: Buttocks          Learning Assessment:   :       Learning Assessment 2018   PRIMARY LEARNER Patient   HIGHEST LEVEL OF EDUCATION - PRIMARY LEARNER  SOME COLLEGE   BARRIERS PRIMARY LEARNER NONE   CO-LEARNER CAREGIVER No   PRIMARY LANGUAGE ENGLISH   LEARNER PREFERENCE PRIMARY DEMONSTRATION   ANSWERED BY patient   RELATIONSHIP SELF        Depression Screening:   :       3 most recent PHQ Screens 4/5/2019   Little interest or pleasure in doing things Not at all   Feeling down, depressed, irritable, or hopeless Not at all   Total Score PHQ 2 0        Fall Risk Assessment:   :       Fall Risk Assessment, last 12 mths 12/28/2018   Able to walk? Yes   Fall in past 12 months? No        Abuse Screening:   :       Abuse Screening Questionnaire 12/28/2018   Do you ever feel afraid of your partner? N   Are you in a relationship with someone who physically or mentally threatens you? N   Is it safe for you to go home?  Y        ADL Screening:   :       ADL Assessment 12/28/2018   Feeding yourself No Help Needed   Getting from bed to chair No Help Needed   Getting dressed No Help Needed   Bathing or showering No Help Needed   Walk across the room (includes cane/walker) No Help Needed   Using the telphone No Help Needed   Taking your medications No Help Needed   Preparing meals No Help Needed   Managing money (expenses/bills) No Help Needed   Moderately strenuous housework (laundry) No Help Needed   Shopping for personal items (toiletries/medicines) No Help Needed   Shopping for groceries No Help Needed   Driving No Help Needed   Climbing a flight of stairs No Help Needed   Getting to places beyond walking distances No Help Needed

## 2019-05-14 ENCOUNTER — TELEPHONE (OUTPATIENT)
Dept: INTERNAL MEDICINE CLINIC | Facility: CLINIC | Age: 64
End: 2019-05-14

## 2019-05-14 NOTE — TELEPHONE ENCOUNTER
Pt called to speak with the nurse in regards to her dulaglutide (TRULICITY) 4.38 OF/9.5 mL sub-q pen being to expensive. Medication went from 47 $ to 94 $ and will continue to go up until she meets her out of pocket expenses. Pt also stated the SITagliptin (JANUVIA) 100 mg tablet has gone up too. Pt will need another alternative for these two medications. Per Patient a letter can also be written for pt to send to her insurance for patient assistance to get a lower price on these medications.

## 2019-05-16 NOTE — TELEPHONE ENCOUNTER
Pt is in her donut hole, she has 23 tablets of Januvia left and 3 pens of Trulicity. She will call us when she is down to 5 days worth of medication and will send in either glipizide or pioglitazone (per Dr David Jj). Pt has no further questions at this time.

## 2019-05-30 ENCOUNTER — TELEPHONE (OUTPATIENT)
Dept: INTERNAL MEDICINE CLINIC | Facility: CLINIC | Age: 64
End: 2019-05-30

## 2019-05-30 DIAGNOSIS — E11.29 CONTROLLED TYPE 2 DIABETES MELLITUS WITH MICROALBUMINURIA, WITHOUT LONG-TERM CURRENT USE OF INSULIN (HCC): Primary | ICD-10-CM

## 2019-05-30 DIAGNOSIS — R80.9 CONTROLLED TYPE 2 DIABETES MELLITUS WITH MICROALBUMINURIA, WITHOUT LONG-TERM CURRENT USE OF INSULIN (HCC): Primary | ICD-10-CM

## 2019-05-30 RX ORDER — GLIPIZIDE 5 MG/1
5 TABLET, FILM COATED, EXTENDED RELEASE ORAL DAILY
Qty: 30 TAB | Refills: 11 | Status: SHIPPED | OUTPATIENT
Start: 2019-05-30 | End: 2019-08-07 | Stop reason: SDUPTHER

## 2019-05-30 NOTE — TELEPHONE ENCOUNTER
We can add glipizide XR 5 mg once a day to start. This works in a different way than the other medications and will have to see how this does for blood sugars. It also has the potential to cause low blood sugars. Do not overlap with Trulicity and sitagliptin. She needs to send blood sugars in 7-10 days. Alternate checking before breakfast and after dinner.

## 2019-05-30 NOTE — TELEPHONE ENCOUNTER
Pt states she was told to call 1 week prior to running out of medication so that Dr. Jay Serna may try her on an alternative to replace her Loletta Police (will run out next Saturday) and her Trulicity (will use last pen this coming Monday 6/3/19)

## 2019-05-31 NOTE — TELEPHONE ENCOUNTER
Pt advised of Dr Argentina Galdamez message, she will be out of both the Januvia and Trulicity by next weekend. Will send sugars after starting new medication. Pt has no further questions at this time.

## 2019-06-03 DIAGNOSIS — E78.00 HYPERCHOLESTEROLEMIA: Primary | ICD-10-CM

## 2019-06-03 RX ORDER — ROSUVASTATIN CALCIUM 10 MG/1
10 TABLET, COATED ORAL DAILY
Qty: 90 TAB | Refills: 3 | Status: SHIPPED | OUTPATIENT
Start: 2019-06-03 | End: 2020-02-03 | Stop reason: CLARIF

## 2019-06-27 ENCOUNTER — OFFICE VISIT (OUTPATIENT)
Dept: SLEEP MEDICINE | Age: 64
End: 2019-06-27

## 2019-06-27 VITALS
DIASTOLIC BLOOD PRESSURE: 80 MMHG | HEART RATE: 96 BPM | OXYGEN SATURATION: 99 % | RESPIRATION RATE: 21 BRPM | HEIGHT: 66 IN | WEIGHT: 201 LBS | BODY MASS INDEX: 32.3 KG/M2 | SYSTOLIC BLOOD PRESSURE: 146 MMHG

## 2019-06-27 DIAGNOSIS — E11.29 CONTROLLED TYPE 2 DIABETES MELLITUS WITH MICROALBUMINURIA, WITHOUT LONG-TERM CURRENT USE OF INSULIN (HCC): ICD-10-CM

## 2019-06-27 DIAGNOSIS — R80.9 CONTROLLED TYPE 2 DIABETES MELLITUS WITH MICROALBUMINURIA, WITHOUT LONG-TERM CURRENT USE OF INSULIN (HCC): ICD-10-CM

## 2019-06-27 DIAGNOSIS — I10 HYPERTENSION, ESSENTIAL: ICD-10-CM

## 2019-06-27 DIAGNOSIS — G47.33 OBSTRUCTIVE SLEEP APNEA (ADULT) (PEDIATRIC): Primary | ICD-10-CM

## 2019-06-27 NOTE — PROGRESS NOTES
[employer] CPAP pressure changed via [employer] from [id number] to [id number] per Litzy Rosen MD .

## 2019-06-27 NOTE — PROGRESS NOTES
[address] [facility]. [place], [address]  Tel.  [phone]  Fax. 100 [facility] 60  [place], [address] [facility]  Tel.  [phone]  Fax. [phone] [address]   Tel.  [phone]  Fax. [phone]     S>Sumi Love is a 70 y.o. female seen for a positive airway pressure follow-up. She reports no problems using the device. The following problems are identified:    Drowsiness no Problems exhaling no   Snoring no Forget to put on no   Mask Comfortable yes Can't fall asleep no   Dry Mouth no Mask falls off no   Air Leaking no Frequent awakenings no       Download reviewed. She admits that her sleep has improved. Therapy Apnea Index averaged over PAP use: 3 /hr which reflects improved sleep breathing condition. Allergies   Allergen Reactions    Gabapentin Other (comments)     Hot flashes    Topiramate Other (comments)     Hot flashes       She has a current medication list which includes the following prescription(s): rosuvastatin, glipizide sr, methocarbamol, levothyroxine, metformin, aspirin delayed-release, acetaminophen, naproxen sodium, omega 3-dha-epa-fish oil, magnesium oxide, OTHER, fluticasone propionate, glucose blood vi test strips, amlodipine, ascorbic acid (vitamin c), multivitamin with iron, ergocalciferol, vit b cmplx 3-fa-vit c-biotin, lisinopril, duloxetine, sitagliptin, and dulaglutide. .      She  has no past medical history on file. Christoval Sleepiness Score: 4   and Modified F.O.S.Q. Score Total / 2: 19   which reflect improved sleep quality over therapy time.     O>    Visit Vitals  /80 (BP 1 Location: Left arm, BP Patient Position: Sitting)   Pulse 96   Resp 21   Ht 5' 6\" (1.676 m)   Wt 201 lb (91.2 kg)   SpO2 99%   BMI 32.44 kg/m²           General:   Alert, oriented, not in distress   Neck:   No JVD    Chest/Lungs:  symetrical lung expansion , no accessory muscle use    Extremities:  no obvious rashes , negative edema    Neuro:  No focal deficits ; No obvious tremor    Psych:  Normal affect ,  Normal countenance ;           A>    ICD-10-CM ICD-9-CM    1. Obstructive sleep apnea (adult) (pediatric) G47.33 327.23    2. Controlled type 2 diabetes mellitus with microalbuminuria, without long-term current use of insulin (HCC) E11.29 250.40     R80.9 791.0    3. Hypertension, essential I10 401.9      AHI = 34(4-19). On CPAP :  12-16 cmH2O. Compliant:      yes    Therapeutic Response:  Positive    P>      *   Follow-up and Dispositions    · Return in about 1 year (around 6/27/2020). she is compliant with PAP therapy and PAP continues to benefit patient and remains necessary for control of her sleep apnea. Change to 13-16 cmH20       * She was asked to contact our office for any problems regarding PAP therapy. * Counseling was provided regarding the importance of regular PAP use and on proper sleep hygiene and safe driving. * Re-enforced proper and regular cleaning for the device. I have counseled the patient regarding the benefits of weight loss. 2. Type II diabetes - she continues on her current regimen. I have reviewed the relationship between sleep disordered breathing as it relates to diabetes. 3. Hypertension - she continues on her current regimen. I have reviewed the relationship between hypertension as it relates to sleep-disordered breathing.      Electronically signed by    Litzy Rosen MD  Diplomate in Sleep Medicine  [facility]

## 2019-06-27 NOTE — PATIENT INSTRUCTIONS
[address] [place]. [place], [address]  Tel.  [phone]  Fax. 100 [facility] 60  [place], [address]  Tel.  [phone]  Fax. [phone] [address] [facility] Mya Argueta  Tel.  [phone]  Fax. [phone]     PROPER SLEEP HYGIENE    What to avoid  · Do not have drinks with caffeine, such as coffee or black tea, for 8 hours before bed. · Do not smoke or use other types of tobacco near bedtime. Nicotine is a stimulant and can keep you awake. · Avoid drinking alcohol late in the evening, because it can cause you to wake in the middle of the night. · Do not eat a big meal close to bedtime. If you are hungry, eat a light snack. · Do not drink a lot of water close to bedtime, because the need to urinate may wake you up during the night. · Do not read or watch TV in bed. Use the bed only for sleeping and sexual activity. What to try  · Go to bed at the same time every night, and wake up at the same time every morning. Do not take naps during the day. · Keep your bedroom quiet, dark, and cool. · Get regular exercise, but not within 3 to 4 hours of your bedtime. .  · Sleep on a comfortable pillow and mattress. · If watching the clock makes you anxious, turn it facing away from you so you cannot see the time. · If you worry when you lie down, start a worry book. Well before bedtime, write down your worries, and then set the book and your concerns aside. · Try meditation or other relaxation techniques before you go to bed. · If you cannot fall asleep, get up and go to another room until you feel sleepy. Do something relaxing. Repeat your bedtime routine before you go to bed again. · Make your house quiet and calm about an hour before bedtime. Turn down the lights, turn off the TV, log off the computer, and turn down the volume on music. This can help you relax after a busy day.     Drowsy Driving  The [address] [place] [address] Road Traffic Safety Administration cites drowsiness as a causing factor in more than 581,817 police reported crashes annually, resulting in 76,000 injuries and 1,500 deaths. Other surveys suggest 55% of people polled have driven while drowsy in the past year, 23% had fallen asleep but not crashed, 3% crashed, and 2% had and accident due to drowsy driving. Who is at risk? Young Drivers: One study of drowsy driving accidents states that 55% of the drivers were under 25 years. Of those, 75% were male. Shift Workers and Travelers: People who work overnight or travel across time zones frequently are at higher risk of experiencing Circadian Rhythm Disorders. They are trying to work and function when their body is programed to sleep. Sleep Deprived: Lack of sleep has a serious impact on your ability to pay attention or focus on a task. Consistently getting less than the average of 8 hours your body needs creates partial or cumulative sleep deprivation. Untreated Sleep Disorders: Sleep Apnea, Narcolepsy, R.L.S., and other sleep disorders (untreated) prevent a person from getting enough restful sleep. This leads to excessive daytime sleepiness and increases the risk for drowsy driving accidents by up to 7 times. Medications / Alcohol: Even over the counter medications can cause drowsiness. Medications that impair a drivers attention should have a warning label. Alcohol naturally makes you sleepy and on its own can cause accidents. Combined with excessive drowsiness its effects are amplified. Signs of Drowsy Driving:   * You don't remember driving the last few miles   * You may drift out of your tyrese   * You are unable to focus and your thoughts wander   * You may yawn more often than normal   * You have difficulty keeping your eyes open / nodding off   * Missing traffic signs, speeding, or tailgating  Prevention-   Good sleep hygiene, lifestyle and behavioral choices have the most impact on drowsy driving.  There is no substitute for sleep and the average person requires 8 hours nightly. If you find yourself driving drowsy, stop and sleep. Consider the sleep hygiene tips provided during your visit as well. Medication Refill Policy: Refills for all medications require 1 week advance notice. Please have your pharmacy fax a refill request. We are unable to fax, or call in \"controled substance\" medications and you will need to pick these prescriptions up from our office. mydala Activation    Thank you for requesting access to mydala. Please follow the instructions below to securely access and download your online medical record. mydala allows you to send messages to your doctor, view your test results, renew your prescriptions, schedule appointments, and more. How Do I Sign Up? 1. In your internet browser, go to [email or web address]. 2. Click on the First Time User? Click Here link in the Sign In box. You will see the New Member Sign Up page. 3. Enter your mydala Access Code exactly as it appears below. You will not need to use this code after youve completed the sign-up process. If you do not sign up before the expiration date, you must request a new code. mydala Access Code: [email or web address]  Expires: 2019 12:03 PM (This is the date your mydala access code will )    4. Enter the last four digits of your Social Security Number (xxxx) and Date of Birth (mm/dd/yyyy) as indicated and click Submit. You will be taken to the next sign-up page. 5. Create a mydala ID. This will be your mydala login ID and cannot be changed, so think of one that is secure and easy to remember. 6. Create a mydala password. You can change your password at any time. 7. Enter your Password Reset Question and Answer. This can be used at a later time if you forget your password. 8. Enter your e-mail address. You will receive e-mail notification when new information is available in [address] 9. Click Sign Up.  You can now view and download portions of your medical record. 10. Click the Download Summary menu link to download a portable copy of your medical information. Additional Information    If you have questions, please call [phone]. Remember, Thinglink is NOT to be used for urgent needs. For medical emergencies, dial 911.

## 2019-07-03 ENCOUNTER — TELEPHONE (OUTPATIENT)
Dept: INTERNAL MEDICINE CLINIC | Facility: CLINIC | Age: 64
End: 2019-07-03

## 2019-07-03 NOTE — TELEPHONE ENCOUNTER
Pt would like for the nurse to call her so she can go over her blood sugar readings and answer questions relating to the new medication

## 2019-07-03 NOTE — TELEPHONE ENCOUNTER
Date   Prior breakfast  2hrs after dinner  6-9-19  125  6-10-19    127  6-11-19 116  6-12-19    123  6-13-19 126  6-14-19    113   6-15-19 106  6-16-19    150  6-17-19 139  6-18-19    ----  6-19-19 128   6-20-19    138  6-21-19 174  6-22-19    140  6-23-19 151  6-24-19    136  6-25-19 150  6-26-19    147  6-27-19 145  6-28-19    ----  6-29-19 165  6-30-19    142  7-1-19  160  7-2-19     128  7-3-19  163    Only medication she is taking is the glipizide 5mg one tablet daily as the [facility] and [place] and trulicity are too expensive.

## 2019-07-04 NOTE — TELEPHONE ENCOUNTER
In order to avoid hypoglycemia, I recommend we continue current dose of glipizide. If A1c is not in controlled range at OV in September, we can consider a dose increase at that time. Let us know is sugars are consistently higher than 160 in the meantime.

## 2019-07-05 NOTE — TELEPHONE ENCOUNTER
Pt notified of Dr Rosita Rueda message will call back if sugars are consistently above 160. Pt has no further questions at this time.

## 2019-07-23 RX ORDER — AMLODIPINE BESYLATE 2.5 MG/1
2.5 TABLET ORAL DAILY
Qty: 90 TAB | Refills: 3 | Status: SHIPPED | OUTPATIENT
Start: 2019-07-23 | End: 2020-06-18

## 2019-07-23 NOTE — TELEPHONE ENCOUNTER
PCP: Kimberly Marmolejo MD     Last appt: 5/9/2019   Future Appointments   Date Time Provider Chidi Unger   9/4/2019  8:30 AM LAB [facility] SHEMAR JULIEN   9/10/2019  8:15 AM Kimberly Marmolejo  [facility]   6/25/2020 10:40 AM Olinda Briggs  [address]        Requested Prescriptions     Pending Prescriptions Disp Refills    amLODIPine (NORVASC) 2.5 mg tablet 90 Tab 3     Sig: Take 1 Tab by mouth daily.

## 2019-07-25 DIAGNOSIS — M48.062 SPINAL STENOSIS OF LUMBAR REGION WITH NEUROGENIC CLAUDICATION: ICD-10-CM

## 2019-07-25 DIAGNOSIS — M48.02 SPINAL STENOSIS OF CERVICAL REGION: ICD-10-CM

## 2019-07-25 RX ORDER — DULOXETIN HYDROCHLORIDE 60 MG/1
CAPSULE, DELAYED RELEASE ORAL
Qty: 30 CAP | Refills: 5 | Status: SHIPPED | OUTPATIENT
Start: 2019-07-25 | End: 2020-02-11

## 2019-08-07 DIAGNOSIS — E11.29 CONTROLLED TYPE 2 DIABETES MELLITUS WITH MICROALBUMINURIA, WITHOUT LONG-TERM CURRENT USE OF INSULIN (HCC): ICD-10-CM

## 2019-08-07 DIAGNOSIS — R80.9 CONTROLLED TYPE 2 DIABETES MELLITUS WITH MICROALBUMINURIA, WITHOUT LONG-TERM CURRENT USE OF INSULIN (HCC): ICD-10-CM

## 2019-08-07 RX ORDER — GLIPIZIDE 5 MG/1
5 TABLET, FILM COATED, EXTENDED RELEASE ORAL DAILY
Qty: 90 TAB | Refills: 3 | Status: SHIPPED | OUTPATIENT
Start: 2019-08-07 | End: 2020-10-06 | Stop reason: DRUGHIGH

## 2019-08-07 NOTE — TELEPHONE ENCOUNTER
Insurance prefers 90 days. PCP: Patricia Ballard MD     Last appt: 5/9/2019   Future Appointments   Date Time Provider Chidi Unger   9/4/2019  8:30 AM LAB [facility] SHEMAR ANAYA   9/10/2019  8:15 AM Patricia Ballard  [facility]   6/25/2020 10:40 AM Dex Irving  [address]        Requested Prescriptions     Pending Prescriptions Disp Refills    glipiZIDE SR (GLUCOTROL XL) 5 mg CR tablet 90 Tab 3     Sig: Take 1 Tab by mouth daily.

## 2019-08-19 ENCOUNTER — HOSPITAL ENCOUNTER (OUTPATIENT)
Dept: MRI IMAGING | Age: 64
Discharge: HOME | End: 2019-08-19
Attending: ORTHOPAEDIC SURGERY
Payer: MEDICARE

## 2019-08-19 DIAGNOSIS — M54.12 CERVICAL RADICULOPATHY: ICD-10-CM

## 2019-08-19 DIAGNOSIS — M48.061 FORAMINAL STENOSIS OF LUMBAR REGION: ICD-10-CM

## 2019-08-19 DIAGNOSIS — M47.812 CERVICAL SPONDYLOSIS WITHOUT MYELOPATHY: ICD-10-CM

## 2019-08-19 DIAGNOSIS — M54.31 BILATERAL SCIATICA: ICD-10-CM

## 2019-08-19 DIAGNOSIS — M43.10 ACQUIRED SPONDYLOLISTHESIS: ICD-10-CM

## 2019-08-19 DIAGNOSIS — M54.5 LOW BACK PAIN, UNSPECIFIED BACK PAIN LATERALITY, UNSPECIFIED CHRONICITY, WITH SCIATICA PRESENCE UNSPECIFIED: ICD-10-CM

## 2019-08-19 DIAGNOSIS — M54.2 CERVICALGIA: ICD-10-CM

## 2019-08-19 DIAGNOSIS — M54.32 BILATERAL SCIATICA: ICD-10-CM

## 2019-08-19 DIAGNOSIS — M48.062 SPINAL STENOSIS, LUMBAR REGION WITH NEUROGENIC CLAUDICATION: ICD-10-CM

## 2019-08-19 DIAGNOSIS — M47.817 LUMBOSACRAL SPONDYLOSIS WITHOUT MYELOPATHY: ICD-10-CM

## 2019-08-19 PROCEDURE — 72141 MRI NECK SPINE W/O DYE: CPT

## 2019-08-19 PROCEDURE — 72148 MRI LUMBAR SPINE W/O DYE: CPT

## 2019-09-04 DIAGNOSIS — E78.00 HYPERCHOLESTEROLEMIA: ICD-10-CM

## 2019-09-04 DIAGNOSIS — E11.29 CONTROLLED TYPE 2 DIABETES MELLITUS WITH MICROALBUMINURIA, WITHOUT LONG-TERM CURRENT USE OF INSULIN (HCC): ICD-10-CM

## 2019-09-04 DIAGNOSIS — R80.9 CONTROLLED TYPE 2 DIABETES MELLITUS WITH MICROALBUMINURIA, WITHOUT LONG-TERM CURRENT USE OF INSULIN (HCC): ICD-10-CM

## 2019-09-05 LAB
ALBUMIN SERPL-MCNC: 4.3 G/DL (ref 3.6–4.8)
ALBUMIN/CREAT UR: 233.9 MG/G CREAT (ref 0–30)
ALBUMIN/GLOB SERPL: 1.5 {RATIO} (ref 1.2–2.2)
ALP SERPL-CCNC: 54 IU/L (ref 39–117)
ALT SERPL-CCNC: 28 IU/L (ref 0–32)
AST SERPL-CCNC: 30 IU/L (ref 0–40)
BILIRUB SERPL-MCNC: 0.2 MG/DL (ref 0–1.2)
BUN SERPL-MCNC: 14 MG/DL (ref 8–27)
BUN/CREAT SERPL: 17 (ref 12–28)
CALCIUM SERPL-MCNC: 8.9 MG/DL (ref 8.7–10.3)
CHLORIDE SERPL-SCNC: 102 MMOL/L (ref 96–106)
CHOLEST SERPL-MCNC: 137 MG/DL (ref 100–199)
CO2 SERPL-SCNC: 24 MMOL/L (ref 20–29)
CREAT SERPL-MCNC: 0.81 MG/DL (ref 0.57–1)
CREAT UR-MCNC: 78.5 MG/DL
EST. AVERAGE GLUCOSE BLD GHB EST-MCNC: 137 MG/DL
GLOBULIN SER CALC-MCNC: 2.8 G/DL (ref 1.5–4.5)
GLUCOSE SERPL-MCNC: 139 MG/DL (ref 65–99)
HBA1C MFR BLD: 6.4 % (ref 4.8–5.6)
HDLC SERPL-MCNC: 48 MG/DL
LDLC SERPL CALC-MCNC: 64 MG/DL (ref 0–99)
MICROALBUMIN UR-MCNC: 183.6 UG/ML
POTASSIUM SERPL-SCNC: 4.3 MMOL/L (ref 3.5–5.2)
PROT SERPL-MCNC: 7.1 G/DL (ref 6–8.5)
SODIUM SERPL-SCNC: 140 MMOL/L (ref 134–144)
TRIGL SERPL-MCNC: 125 MG/DL (ref 0–149)
VLDLC SERPL CALC-MCNC: 25 MG/DL (ref 5–40)

## 2019-09-06 ENCOUNTER — TELEPHONE (OUTPATIENT)
Dept: NEUROLOGY | Age: 64
End: 2019-09-06

## 2019-09-06 NOTE — TELEPHONE ENCOUNTER
Spoke with patient, advised no sooner appointments when what she has.  Offered our other locations, patient stated she already called [address] and she was offered an appointment for November however she chose to keep the appointment she already has because she wants to stay around this location at [facility].

## 2019-09-06 NOTE — TELEPHONE ENCOUNTER
----- Message from Elaine Cruz sent at 9/6/2019 10:08 AM EDT -----  Regarding: Dr. Jay Dumont Telephone  Patient would like to see if she can come in sooner then Dec for several issues. Will see anyone.  Contact is [phone]

## 2019-09-10 ENCOUNTER — OFFICE VISIT (OUTPATIENT)
Dept: INTERNAL MEDICINE CLINIC | Facility: CLINIC | Age: 64
End: 2019-09-10

## 2019-09-10 VITALS
WEIGHT: 205 LBS | TEMPERATURE: 98.3 F | DIASTOLIC BLOOD PRESSURE: 85 MMHG | SYSTOLIC BLOOD PRESSURE: 130 MMHG | HEIGHT: 66 IN | RESPIRATION RATE: 12 BRPM | HEART RATE: 80 BPM | BODY MASS INDEX: 32.95 KG/M2 | OXYGEN SATURATION: 98 %

## 2019-09-10 DIAGNOSIS — Z12.11 SCREEN FOR COLON CANCER: ICD-10-CM

## 2019-09-10 DIAGNOSIS — Z78.0 POSTMENOPAUSAL ESTROGEN DEFICIENCY: ICD-10-CM

## 2019-09-10 DIAGNOSIS — E66.9 OBESITY (BMI 30.0-34.9): ICD-10-CM

## 2019-09-10 DIAGNOSIS — M48.062 SPINAL STENOSIS OF LUMBAR REGION WITH NEUROGENIC CLAUDICATION: ICD-10-CM

## 2019-09-10 DIAGNOSIS — R80.9 CONTROLLED TYPE 2 DIABETES MELLITUS WITH MICROALBUMINURIA, WITHOUT LONG-TERM CURRENT USE OF INSULIN (HCC): Primary | ICD-10-CM

## 2019-09-10 DIAGNOSIS — E11.29 CONTROLLED TYPE 2 DIABETES MELLITUS WITH MICROALBUMINURIA, WITHOUT LONG-TERM CURRENT USE OF INSULIN (HCC): Primary | ICD-10-CM

## 2019-09-10 DIAGNOSIS — I10 HYPERTENSION, ESSENTIAL: ICD-10-CM

## 2019-09-10 DIAGNOSIS — E03.9 ACQUIRED HYPOTHYROIDISM: ICD-10-CM

## 2019-09-10 DIAGNOSIS — E78.00 HYPERCHOLESTEROLEMIA: ICD-10-CM

## 2019-09-10 RX ORDER — TRAMADOL HYDROCHLORIDE 50 MG/1
50 TABLET ORAL
COMMUNITY
End: 2020-10-06

## 2019-09-10 RX ORDER — LANOLIN ALCOHOL/MO/W.PET/CERES
500 CREAM (GRAM) TOPICAL DAILY
COMMUNITY

## 2019-09-10 NOTE — ACP (ADVANCE CARE PLANNING)
With patient's permission advance care planning discussed. Health Care Agent would be . First Alternate Health Care Agent would be Solange Mcmanus, followed byArmando Carter  . She wants no life prolonging treatment if death is imminent. She wants no life prolonging treatment if there is overwhelming, permanent neurologic injury. Reviewed paperwork. Conversation took 4 minutes.

## 2019-09-10 NOTE — PROGRESS NOTES
Kavya Lutz  Identified pt with two pt identifiers(name and ). Chief Complaint   Patient presents with    Diabetes    Hypertension    Cholesterol Problem       Reviewed record In preparation for visit and have obtained necessary documentation. Will bring a copy of advanced directive / living will. 1. Have you been to the ER, urgent care clinic or hospitalized since your last visit? No     2. Have you seen or consulted any other health care providers outside of the [address] [place] [address] since your last visit? Include any pap smears or colon screening. Dr Dalia Hancock, ortho, MRI    Vitals reviewed with provider.     Health Maintenance reviewed:     Health Maintenance Due   Topic    Hepatitis C Screening     EYE EXAM RETINAL OR DILATED     COLONOSCOPY     Shingrix Vaccine Age 50> (1 of 2)    GLAUCOMA SCREENING Q2Y     Bone Densitometry (Dexa) Screening     Influenza Age 5 to Adult           Wt Readings from Last 3 Encounters:   09/10/19 205 lb (93 kg)   19 201 lb (91.2 kg)   19 204 lb 6.4 oz (92.7 kg)        Temp Readings from Last 3 Encounters:   09/10/19 98.3 °F (36.8 °C) (Oral)   19 98 °F (36.7 °C) (Oral)   19 98.7 °F (37.1 °C) (Oral)        BP Readings from Last 3 Encounters:   09/10/19 130/85   19 146/80   19 128/83        Pulse Readings from Last 3 Encounters:   09/10/19 80   19 96   19 80        Vitals:    09/10/19 0830   BP: 130/85   Pulse: 80   Resp: 12   Temp: 98.3 °F (36.8 °C)   TempSrc: Oral   SpO2: 98%   Weight: 205 lb (93 kg)   Height: 5' 6\" (1.676 m)   PainSc:   6   PainLoc: Back          Learning Assessment:   :       Learning Assessment 2018   PRIMARY LEARNER Patient   HIGHEST LEVEL OF EDUCATION - PRIMARY LEARNER  SOME COLLEGE   BARRIERS PRIMARY LEARNER NONE   CO-LEARNER CAREGIVER No   PRIMARY LANGUAGE ENGLISH   LEARNER PREFERENCE PRIMARY DEMONSTRATION   ANSWERED BY patient   RELATIONSHIP SELF        Depression Screening:   : 3 most recent PHQ Screens 4/5/2019   Little interest or pleasure in doing things Not at all   Feeling down, depressed, irritable, or hopeless Not at all   Total Score PHQ 2 0        Fall Risk Assessment:   :       Fall Risk Assessment, last 12 mths 12/28/2018   Able to walk? Yes   Fall in past 12 months? No        Abuse Screening:   :       Abuse Screening Questionnaire 12/28/2018   Do you ever feel afraid of your partner? N   Are you in a relationship with someone who physically or mentally threatens you? N   Is it safe for you to go home?  Y        ADL Screening:   :       ADL Assessment 12/28/2018   Feeding yourself No Help Needed   Getting from bed to chair No Help Needed   Getting dressed No Help Needed   Bathing or showering No Help Needed   Walk across the room (includes cane/walker) No Help Needed   Using the telphone No Help Needed   Taking your medications No Help Needed   Preparing meals No Help Needed   Managing money (expenses/bills) No Help Needed   Moderately strenuous housework (laundry) No Help Needed   Shopping for personal items (toiletries/medicines) No Help Needed   Shopping for groceries No Help Needed   Driving No Help Needed   Climbing a flight of stairs No Help Needed   Getting to places beyond walking distances No Help Needed

## 2019-09-10 NOTE — PATIENT INSTRUCTIONS
Remember to get your flu shot in October horn November You may call us for a nurse appointment or get this from your pharmacy. Office visit in 6 months with hemoglobin A1c at that visit. Learning About Diabetes Food Guidelines  Your Care Instructions    Meal planning is important to manage diabetes. It helps keep your blood sugar at a target level (which you set with your doctor). You don't have to eat special foods. You can eat what your family eats, including sweets once in a while. But you do have to pay attention to how often you eat and how much you eat of certain foods. You may want to work with a dietitian or a certified diabetes educator (CDE) to help you plan meals and snacks. A dietitian or CDE can also help you lose weight if that is one of your goals. What should you know about eating carbs? Managing the amount of carbohydrate (carbs) you eat is an important part of healthy meals when you have diabetes. Carbohydrate is found in many foods. · Learn which foods have carbs. And learn the amounts of carbs in different foods. ? Bread, cereal, pasta, and rice have about 15 grams of carbs in a serving. A serving is 1 slice of bread (1 ounce), ½ cup of cooked cereal, or 1/3 cup of cooked pasta or rice. ? Fruits have 15 grams of carbs in a serving. A serving is 1 small fresh fruit, such as an apple or orange; ½ of a banana; ½ cup of cooked or canned fruit; ½ cup of fruit juice; 1 cup of melon or raspberries; or 2 tablespoons of dried fruit. ? Milk and no-sugar-added yogurt have 15 grams of carbs in a serving. A serving is 1 cup of milk or 2/3 cup of no-sugar-added yogurt. ? Starchy vegetables have 15 grams of carbs in a serving. A serving is ½ cup of mashed potatoes or sweet potato; 1 cup winter squash; ½ of a small baked potato; ½ cup of cooked beans; or ½ cup cooked corn or green peas.   · Learn how much carbs to eat each day and at each meal. A dietitian or CDE can teach you how to keep track of the amount of carbs you eat. This is called carbohydrate counting. · If you are not sure how to count carbohydrate grams, use the Plate Method to plan meals. It is a good, quick way to make sure that you have a balanced meal. It also helps you spread carbs throughout the day. ? Divide your plate by types of foods. Put non-starchy vegetables on half the plate, meat or other protein food on one-quarter of the plate, and a grain or starchy vegetable in the final quarter of the plate. To this you can add a small piece of fruit and 1 cup of milk or yogurt, depending on how many carbs you are supposed to eat at a meal.  · Try to eat about the same amount of carbs at each meal. Do not \"save up\" your daily allowance of carbs to eat at one meal.  · Proteins have very little or no carbs per serving. Examples of proteins are beef, chicken, turkey, fish, eggs, tofu, cheese, cottage cheese, and peanut butter. A serving size of meat is 3 ounces, which is about the size of a deck of cards. Examples of meat substitute serving sizes (equal to 1 ounce of meat) are 1/4 cup of cottage cheese, 1 egg, 1 tablespoon of peanut butter, and ½ cup of tofu. How can you eat out and still eat healthy? · Learn to estimate the serving sizes of foods that have carbohydrate. If you measure food at home, it will be easier to estimate the amount in a serving of restaurant food. · If the meal you order has too much carbohydrate (such as potatoes, corn, or baked beans), ask to have a low-carbohydrate food instead. Ask for a salad or green vegetables. · If you use insulin, check your blood sugar before and after eating out to help you plan how much to eat in the future. · If you eat more carbohydrate at a meal than you had planned, take a walk or do other exercise. This will help lower your blood sugar. What else should you know? · Limit saturated fat, such as the fat from meat and dairy products.  This is a healthy choice because people who have diabetes are at higher risk of heart disease. So choose lean cuts of meat and nonfat or low-fat dairy products. Use olive or canola oil instead of butter or shortening when cooking. · Don't skip meals. Your blood sugar may drop too low if you skip meals and take insulin or certain medicines for diabetes. · Check with your doctor before you drink alcohol. Alcohol can cause your blood sugar to drop too low. Alcohol can also cause a bad reaction if you take certain diabetes medicines. Follow-up care is a key part of your treatment and safety. Be sure to make and go to all appointments, and call your doctor if you are having problems. It's also a good idea to know your test results and keep a list of the medicines you take. Where can you learn more? Go to http://stephanie-saleem.info/. Enter G251 in the search box to learn more about \"Learning About Diabetes Food Guidelines. \"  Current as of: July 25, 2018  Content Version: 12.1  © 3816-3939 [employer]wise, [employer]. Care instructions adapted under license by [facility] (which disclaims liability or warranty for this information). If you have questions about a medical condition or this instruction, always ask your healthcare professional. [address] any warranty or liability for your use of this information.

## 2019-09-10 NOTE — PROGRESS NOTES
HISTORY OF PRESENT ILLNESS  Renata Lawton is a 70 y.o. female. She is accompanied by her . HPI  She presents for follow up of diabetes mellitus with micoralbuminuria, hypertension, hyperlipidemia and obesity. Diet and Lifestyle: generally follows a low fat low cholesterol diet, generally follows a low sodium diet, does not rigorously follow a diabetic diet, sedentary, nonsmoker  Diabetic ROS - medication compliance: compliant all of the time,      home glucose monitoring: fasting 120-140, highest 170, non-fasting not done     home BP Monitorin's/80's.      further diabetic ROS: no polyuria or polydipsia, no numbness, tingling or pain in extremities, no unusual visual symptoms, no hypoglycemia, no medication side effects noted. Cardiovascular ROS:  She complains of claudication (neurogenic), lower extremity edema (left ankle). She denies palpitations, orthopnea, exertional chest pressure/discomfort, dyspnea on exertion, dizziness      She presents for follow up of hypothyroidism. Symptoms include heat intolerance. . Patient denies weight changes, change in energy level   Course to date has been well controlled. She has spinal stenosis with neurogenic claudication. She cannot have surgery until she has a neurology evaluation for area of focal increased signal within the cord posterior to C2 on the left seen on C-spine MRI. Per radiologist: \"This is nonspecific but may be infectious inflammatory or demyelinating. Neoplasm is  felt to be less likely. \"    Patient Active Problem List   Diagnosis Code    Hypertension, essential I10    Hypercholesterolemia E78.00    Controlled type 2 diabetes mellitus with microalbuminuria, without long-term current use of insulin (HCC) E11.29, R80.9    Acquired hypothyroidism E03.9    MANUEL (obstructive sleep apnea) G47.33    Carpal tunnel syndrome of right wrist G56.01    Spinal stenosis of cervical region M48.02    Spinal stenosis of lumbar region with neurogenic claudication M48.062    Osteopenia of multiple sites M85.89     No past medical history on file. Past Surgical History:   Procedure Laterality Date    HX BREAST BIOPSY Right 1990s    Benign (per patient)    HX  SECTION       x 2    HX CHOLECYSTECTOMY      HX HEENT      parathyroidectomy    HX HYSTERECTOMY       Social History     Socioeconomic History    Marital status:      Spouse name: Not on file    Number of children: Not on file    Years of education: Not on file    Highest education level: Not on file   Tobacco Use    Smoking status: Never Smoker    Smokeless tobacco: Never Used   Substance and Sexual Activity    Alcohol use: No     Frequency: Never    Drug use: No    Sexual activity: Yes     Partners: Male     Birth control/protection: Surgical     Family History   Problem Relation Age of Onset    Cancer Mother         bone    Diabetes Mother     Hypertension Mother     Hypertension Father     Cancer Father         blood    Hypertension Sister     Sleep Apnea Brother     No Known Problems Brother      Allergies   Allergen Reactions    Gabapentin Other (comments)     Hot flashes    Topiramate Other (comments)     Hot flashes     Current Outpatient Medications   Medication Sig Dispense Refill    cyanocobalamin (VITAMIN B-12) 500 mcg tablet Take 500 mcg by mouth daily.  traMADol (ULTRAM) 50 mg tablet Take 50 mg by mouth every six (6) hours as needed for Pain.  glipiZIDE SR (GLUCOTROL XL) 5 mg CR tablet Take 1 Tab by mouth daily. 90 Tab 3    DULoxetine (CYMBALTA) 60 mg capsule TAKE 1 CAPSULE BY MOUTH ONCE DAILY 30 Cap 5    amLODIPine (NORVASC) 2.5 mg tablet Take 1 Tab by mouth daily. 90 Tab 3    rosuvastatin (CRESTOR) 10 mg tablet Take 1 Tab by mouth daily.  90 Tab 3    levothyroxine (SYNTHROID) 88 mcg tablet Half tablet on Monday and 1 tablet all other days 90 Tab 3    metFORMIN (GLUCOPHAGE) 1,000 mg tablet Take 1 Tab by mouth two (2) times daily (with meals). 180 Tab 3    aspirin delayed-release 81 mg tablet Take  by mouth daily.  acetaminophen (TYLENOL EXTRA STRENGTH) 500 mg tablet Take 1,000 mg by mouth daily.  naproxen sodium (ALEVE) 220 mg cap Take  by mouth daily.  omega 3-dha-epa-fish oil 100-160-1,000 mg cap Take 2 Caps by mouth daily.  magnesium oxide 500 mg tab Take 500 mg by mouth daily.  OTHER energy greens 1 scoop with 8oz water daily      fluticasone (FLONASE ALLERGY RELIEF) 50 mcg/actuation nasal spray 2 Sprays by Both Nostrils route daily.  glucose blood VI test strips (ONETOUCH ULTRA BLUE TEST STRIP) strip by Does Not Apply route See Admin Instructions. Test 3 times daily      ascorbic acid, vitamin C, (VITAMIN C) 500 mg tablet Take  by mouth.  multivitamin with iron tablet Take 1 Tab by mouth daily.  vit B Cmplx 3-FA-Vit C-Biotin (NEPHRO MANINDER RX) 1- mg-mg-mcg tablet Take 1 Tab by mouth daily.  lisinopril (PRINIVIL, ZESTRIL) 40 mg tablet Take 1 Tab by mouth daily. 90 Tab 3             Review of Systems   Constitutional: Negative for malaise/fatigue and weight loss. Gastrointestinal: Negative for constipation and diarrhea. Musculoskeletal: Positive for back pain and neck pain (left side into shoulder. ). Negative for joint pain. Neurological: Negative for tingling and focal weakness. Visit Vitals  /85 (BP 1 Location: Left arm, BP Patient Position: Sitting)   Pulse 80   Temp 98.3 °F (36.8 °C) (Oral)   Resp 12   Ht 5' 6\" (1.676 m)   Wt 205 lb (93 kg)   SpO2 98%   BMI 33.09 kg/m²     Physical Exam   Constitutional: She is oriented to person, place, and time. She appears well-developed and well-nourished. HENT:   Head: Normocephalic and atraumatic. Eyes: Pupils are equal, round, and reactive to light. Conjunctivae are normal.   Neck: Neck supple. Carotid bruit is not present. No thyromegaly present. Cardiovascular: Normal rate, regular rhythm and normal heart sounds.  PMI is not displaced. Exam reveals no gallop. No murmur heard. Pulses:       Dorsalis pedis pulses are 2+ on the right side, and 2+ on the left side. Posterior tibial pulses are 2+ on the right side, and 2+ on the left side. Pulmonary/Chest: Effort normal. She has no wheezes. She has no rhonchi. She has no rales. Abdominal: Soft. Normal appearance. She exhibits no abdominal bruit and no mass. There is no hepatosplenomegaly. There is no tenderness. Musculoskeletal: She exhibits no edema. Lymphadenopathy:     She has no cervical adenopathy. Right: No supraclavicular adenopathy present. Left: No supraclavicular adenopathy present. Neurological: She is alert and oriented to person, place, and time. No sensory deficit. Skin: Skin is warm, dry and intact. No rash noted. Psychiatric: She has a normal mood and affect. Her behavior is normal.   Nursing note and vitals reviewed. Results for orders placed or performed in visit on 09/04/19   MICROALBUMIN, UR, RAND W/ MICROALB/CREAT RATIO   Result Value Ref Range    Creatinine, urine 78.5 Not Estab. mg/dL    Microalbumin, urine 183.6 Not Estab. ug/mL    Microalb/Creat ratio (ug/mg creat.) 233.9 (H) 0.0 - 30.0 mg/g creat   HEMOGLOBIN A1C WITH EAG   Result Value Ref Range    Hemoglobin A1c 6.4 (H) 4.8 - 5.6 %    Estimated average glucose 840 mg/dL   METABOLIC PANEL, COMPREHENSIVE   Result Value Ref Range    Glucose 139 (H) 65 - 99 mg/dL    BUN 14 8 - 27 mg/dL    Creatinine 0.81 0.57 - 1.00 mg/dL    GFR est non-AA 76 >59 mL/min/1.73    GFR est AA 88 >59 mL/min/1.73    BUN/Creatinine ratio 17 12 - 28    Sodium 140 134 - 144 mmol/L    Potassium 4.3 3.5 - 5.2 mmol/L    Chloride 102 96 - 106 mmol/L    CO2 24 20 - 29 mmol/L    Calcium 8.9 8.7 - 10.3 mg/dL    Protein, total 7.1 6.0 - 8.5 g/dL    Albumin 4.3 3.6 - 4.8 g/dL    GLOBULIN, TOTAL 2.8 1.5 - 4.5 g/dL    A-G Ratio 1.5 1.2 - 2.2    Bilirubin, total 0.2 0.0 - 1.2 mg/dL    Alk.  phosphatase 54 39 - 117 IU/L    AST (SGOT) 30 0 - 40 IU/L    ALT (SGPT) 28 0 - 32 IU/L   LIPID PANEL   Result Value Ref Range    Cholesterol, total 137 100 - 199 mg/dL    Triglyceride 125 0 - 149 mg/dL    HDL Cholesterol 48 >39 mg/dL    VLDL, calculated 25 5 - 40 mg/dL    LDL, calculated 64 0 - 99 mg/dL       ASSESSMENT and PLAN    ICD-10-CM ICD-9-CM    1. Controlled type 2 diabetes mellitus with microalbuminuria, without long-term current use of insulin (HCC) E11.29 250.40     R80.9 791.0    2. Hypercholesterolemia E78.00 272.0    3. Hypertension, essential I10 401.9    4. Acquired hypothyroidism E03.9 244.9    5. Spinal stenosis of lumbar region with neurogenic claudication M48.062 724.03    6. Screen for colon cancer Z12.11 V76.51 REFERRAL TO GASTROENTEROLOGY   7. Postmenopausal estrogen deficiency Z78.0 V49.81 DEXA BONE DENSITY STUDY AXIAL   8. Obesity (BMI 30.0-34. 9) E66.9 278.00      Diagnoses and all orders for this visit:    1. Controlled type 2 diabetes mellitus with microalbuminuria, without long-term current use of insulin ([facility] [address]  Diabetes Mellitus: well controlled. Is taking statin and ACE/ARB  Issues reviewed with her: low cholesterol diet, weight control and daily exercise discussed and glycohemoglobin and other lab monitoring discussed. 2. Hypercholesterolemia  Hyperlipidemia is controlled    3. Hypertension, essential  Hypertension is controlled. 4. Acquired hypothyroidism  Euthyroid on replacement. 5. Spinal stenosis of lumbar region with neurogenic claudication  Surgery planned, but not until neurologist evaluation for possible  demyelinating disease. I think this is unlikely, but defer to neurologist. Age is atypical and she is without symtptoms. 6. Screen for colon cancer. REFERRAL TO GASTROENTEROLOGY     7. Postmenopausal estrogen deficiency  -     DEXA BONE DENSITY STUDY AXIAL; Future    8. Obesity (BMI 30.0-34. 9)  Discussed using smaller plate for portion control, keeping a food diary for awareness of food consumed, checking weight often, and increasing physical activity. Will re-evaluate next visit. Follow-up and Dispositions    · Return in about 6 months (around 3/10/2020) for DM, HTN, chol, POC A1c.       lab results and schedule of future lab studies reviewed with patient  reviewed diet, exercise and weight control  I have discussed the diagnosis, evaluation and treatment options and the intended plan with the patient. Patient understands and is in agreement. The patient has received an after-visit summary and questions were answered concerning future plans. I have discussed side effects and warnings of any new medications with the patient as well.

## 2019-12-04 ENCOUNTER — OFFICE VISIT (OUTPATIENT)
Dept: NEUROLOGY | Age: 64
End: 2019-12-04

## 2019-12-04 VITALS
DIASTOLIC BLOOD PRESSURE: 84 MMHG | HEIGHT: 66 IN | HEART RATE: 100 BPM | BODY MASS INDEX: 34.07 KG/M2 | SYSTOLIC BLOOD PRESSURE: 142 MMHG | WEIGHT: 212 LBS | OXYGEN SATURATION: 98 %

## 2019-12-04 DIAGNOSIS — D86.89 NEUROSARCOIDOSIS: ICD-10-CM

## 2019-12-04 DIAGNOSIS — M48.02 SPINAL STENOSIS OF CERVICAL REGION: Primary | ICD-10-CM

## 2019-12-04 DIAGNOSIS — E53.8 B12 DEFICIENCY: ICD-10-CM

## 2019-12-04 DIAGNOSIS — M48.062 SPINAL STENOSIS OF LUMBAR REGION WITH NEUROGENIC CLAUDICATION: ICD-10-CM

## 2019-12-04 NOTE — PROGRESS NOTES
NEUROLOGY HISTORY AND PHYSICAL    Name Juan Carlos Horvath Age 70 y.o. MRN [id number]  1955     Referring Physician: Angelita Bourgeois MD      Chief Complaint:  Spinal stenosis     This is a 70 y.o. right handed female with a medical history of chronic back pain and neck pain. She had an MRI of the c and l spine and an incidental  focal increased signal within the cord posterior to C2 on the left. It is nonspecific but within the cord. She has no history or symptoms suggestive of MS. Assessment and Plan  1. Nonspecific T2 hyperintensity of the c spine  Repeat MRI with contrast and get MRI of the brain as well  Depending on results may get Spinal tap. CRP, LEO, B12,     2. Ataxia  Fall precations. 3. Sensory loss  EMG    4. Lumbar stenosis  Severe probably needs surgery    6. Cervical spondylosis        Patient Allergies  Gabapentin and Topiramate     Current Outpatient Medications   Medication Sig    cyanocobalamin (VITAMIN B-12) 500 mcg tablet Take 500 mcg by mouth daily.  traMADol (ULTRAM) 50 mg tablet Take 50 mg by mouth every six (6) hours as needed for Pain.  glipiZIDE SR (GLUCOTROL XL) 5 mg CR tablet Take 1 Tab by mouth daily.  DULoxetine (CYMBALTA) 60 mg capsule TAKE 1 CAPSULE BY MOUTH ONCE DAILY    amLODIPine (NORVASC) 2.5 mg tablet Take 1 Tab by mouth daily.  rosuvastatin (CRESTOR) 10 mg tablet Take 1 Tab by mouth daily.  levothyroxine (SYNTHROID) 88 mcg tablet Half tablet on Monday and 1 tablet all other days    metFORMIN (GLUCOPHAGE) 1,000 mg tablet Take 1 Tab by mouth two (2) times daily (with meals).  aspirin delayed-release 81 mg tablet Take  by mouth daily.  acetaminophen (TYLENOL EXTRA STRENGTH) 500 mg tablet Take 1,000 mg by mouth daily.  omega 3-dha-epa-fish oil 100-160-1,000 mg cap Take 2 Caps by mouth daily.  magnesium oxide 500 mg tab Take 500 mg by mouth daily.     OTHER energy greens 1 scoop with 8oz water daily    fluticasone (FLONASE ALLERGY RELIEF) 50 mcg/actuation nasal spray 2 Sprays by Both Nostrils route daily.  glucose blood VI test strips (ONETOUCH ULTRA BLUE TEST STRIP) strip by Does Not Apply route See Admin Instructions. Test 3 times daily    ascorbic acid, vitamin C, (VITAMIN C) 500 mg tablet Take  by mouth.  multivitamin with iron tablet Take 1 Tab by mouth daily.  vit B Cmplx 3-FA-Vit C-Biotin (NEPHRO MANINDER RX) 1- mg-mg-mcg tablet Take 1 Tab by mouth daily.  lisinopril (PRINIVIL, ZESTRIL) 40 mg tablet Take 1 Tab by mouth daily. No current facility-administered medications for this visit. No past medical history on file. Social History     Tobacco Use    Smoking status: Never Smoker    Smokeless tobacco: Never Used   Substance Use Topics    Alcohol use: No     Frequency: Never       Family History   Problem Relation Age of Onset   Harden Cancer Mother         bone    Diabetes Mother    Harden Hypertension Mother     Hypertension Father     Cancer Father         blood    Hypertension Sister     Sleep Apnea Brother     No Known Problems Brother      Review of Systems   Constitutional: Negative for chills and fever. HENT: Negative for ear pain. Eyes: Negative for pain and discharge. Respiratory: Negative for cough and hemoptysis. Cardiovascular: Negative for chest pain and claudication. Gastrointestinal: Negative for constipation and diarrhea. Genitourinary: Negative for flank pain and hematuria. Musculoskeletal: Positive for myalgias and neck pain. Negative for back pain. Skin: Negative for itching and rash. Neurological: Positive for sensory change. Negative for headaches. Endo/Heme/Allergies: Negative for environmental allergies. Does not bruise/bleed easily. Psychiatric/Behavioral: Negative for depression and hallucinations.        Exam  Visit Vitals  /84   Pulse 100   Ht 5' 6\" (1.676 m)   Wt 212 lb (96.2 kg)   SpO2 98%   BMI 34.22 kg/m²      General: Well developed, well nourished. Patient in no apparent distress   Head: Normocephalic, atraumatic, anicteric sclera   Neck Normal ROM, No thyromegally   Cardiac: Regular rate and rhythm   Ext: No pedal edema   Skin: Supple no rash     NeurologicExam:  Mental Status: Alert and oriented to person place and time   Speech: Fluent no aphasia or dysarthria. Cranial Nerves:  II - XII Intact   Motor:  Full and symmetric strength of upper and lower proximal and distal muscles. Normal bulk and tone. Sensory:   Symmetric distal reduction in sensory perception affecting all modalities   Gait:  Ataxic gait    Tremor:   No tremor noted. Cerebellar:  Coordination intact. Imaging  MRI Results (most recent):  Results from Hospital Encounter encounter on 08/19/19   MRI LUMB SPINE WO CONT    Narrative EXAM: MRI LUMB SPINE WO CONT    INDICATION: Low back pain. COMPARISON: None    TECHNIQUE: MR imaging of the lumbar spine was performed using the following  sequences: sagittal T1, T2, STIR;  axial T1, T2.     CONTRAST:  None. FINDINGS:    6 mm grade 1 anterolisthesis of L4-L5. Minimal grade 1 anterolisthesis of L3-L4. At least moderate facet arthrosis L3-S1. No evidence of spondylolysis. Vertebral  body heights are maintained. Moderate degenerative bone marrow edema as on both  sides of L5-S1. Moderate disc desiccation and vacuum disc phenomenon at L5-S1. No discitis. The conus medullaris terminates at L1. Signal and caliber of the distal spinal  cord are within normal limits. Cauda equina are wavy proximal to L4-5. Cyst in the lower pole of the right kidney measures 2.7 cm. Lower thoracic spine: No herniation or stenosis. L1-L2: No herniation or stenosis. L2-L3: No herniation or stenosis. L3-L4: Diffuse disc bulge, facet arthrosis, and thickened ligamentum flavum. Moderate central spinal canal stenosis. Mild right foraminal stenosis.     L4-L5: Diffuse disc bulge, facet arthrosis, and thickened ligamentum flavum. Severe central spinal canal stenosis. Moderate right foraminal stenosis. L5-S1: Diffuse disc bulge, facet arthrosis, and thickened ligamentum flavum. Epidural lipomatosis. Moderate central spinal canal stenosis. Moderate-severe  bilateral foraminal stenosis. Impression IMPRESSION:    1. L4-5 severe central spinal canal stenosis and grade 1 anterolisthesis. 2. L5-S1 moderate central spinal canal stenosis and moderate-severe bilateral  foraminal stenosis. Degenerative bone marrow edema at L5 and S1 may contribute  to pain.        Lab Review    Lab Results   Component Value Date/Time    Sodium 140 09/04/2019 08:33 AM    Potassium 4.3 09/04/2019 08:33 AM    Chloride 102 09/04/2019 08:33 AM    CO2 24 09/04/2019 08:33 AM    Glucose 139 (H) 09/04/2019 08:33 AM    BUN 14 09/04/2019 08:33 AM    Creatinine 0.81 09/04/2019 08:33 AM    Calcium 8.9 09/04/2019 08:33 AM       Lab Results   Component Value Date/Time    LDL, calculated 64 09/04/2019 08:33 AM     Lab Results   Component Value Date/Time    Hemoglobin A1c 6.4 (H) 09/04/2019 08:33 AM    Hemoglobin A1c (POC) 5.8 12/28/2018 11:49 AM    Hemoglobin A1c, External 6.4 08/04/2018

## 2019-12-17 ENCOUNTER — HOSPITAL ENCOUNTER (OUTPATIENT)
Dept: MRI IMAGING | Age: 64
Discharge: HOME | End: 2019-12-17
Attending: PSYCHIATRY & NEUROLOGY
Payer: MEDICARE

## 2019-12-17 ENCOUNTER — HOSPITAL ENCOUNTER (OUTPATIENT)
Dept: GENERAL RADIOLOGY | Age: 64
Discharge: HOME | End: 2019-12-17
Attending: PSYCHIATRY & NEUROLOGY
Payer: MEDICARE

## 2019-12-17 VITALS — WEIGHT: 210 LBS | HEIGHT: 66 IN | BODY MASS INDEX: 33.75 KG/M2

## 2019-12-17 VITALS
HEART RATE: 78 BPM | OXYGEN SATURATION: 99 % | RESPIRATION RATE: 17 BRPM | DIASTOLIC BLOOD PRESSURE: 86 MMHG | SYSTOLIC BLOOD PRESSURE: 135 MMHG

## 2019-12-17 DIAGNOSIS — D86.89 NEUROSARCOIDOSIS: ICD-10-CM

## 2019-12-17 LAB
APPEARANCE CSF: ABNORMAL
APPEARANCE CSF: ABNORMAL
COLOR CSF: ABNORMAL
COLOR CSF: ABNORMAL
COLOR SPUN CSF: COLORLESS
COLOR SPUN CSF: COLORLESS
GLUCOSE CSF-MCNC: 79 MG/DL (ref 40–70)
LYMPHOCYTES NFR CSF MANUAL: 40 % (ref 28–96)
LYMPHOCYTES NFR CSF MANUAL: 41 % (ref 28–96)
MONOCYTES NFR CSF MANUAL: 10 % (ref 16–56)
MONOCYTES NFR CSF MANUAL: 4 % (ref 16–56)
NEUTROPHILS NFR CSF MANUAL: 50 % (ref 0–7)
NEUTROPHILS NFR CSF MANUAL: 55 % (ref 0–7)
PROT CSF-MCNC: >250 MG/DL (ref 15–45)
RBC # CSF: ABNORMAL /CU MM
RBC # CSF: ABNORMAL /CU MM
TUBE # CSF: 1
TUBE # CSF: 4
VIT B12 SERPL-MCNC: 893 PG/ML (ref 193–986)
WBC # CSF: 25 /CU MM (ref 0–5)
WBC # CSF: 26 /CU MM (ref 0–5)

## 2019-12-17 PROCEDURE — 74011000250 HC RX REV CODE- 250: Performed by: RADIOLOGY

## 2019-12-17 PROCEDURE — A9575 INJ GADOTERATE MEGLUMI 0.1ML: HCPCS | Performed by: RADIOLOGY

## 2019-12-17 PROCEDURE — 72156 MRI NECK SPINE W/O & W/DYE: CPT

## 2019-12-17 PROCEDURE — 82784 ASSAY IGA/IGD/IGG/IGM EACH: CPT

## 2019-12-17 PROCEDURE — 74011250636 HC RX REV CODE- 250/636: Performed by: RADIOLOGY

## 2019-12-17 PROCEDURE — 82164 ANGIOTENSIN I ENZYME TEST: CPT

## 2019-12-17 PROCEDURE — 36415 COLL VENOUS BLD VENIPUNCTURE: CPT

## 2019-12-17 PROCEDURE — 88108 CYTOPATH CONCENTRATE TECH: CPT

## 2019-12-17 PROCEDURE — 89050 BODY FLUID CELL COUNT: CPT

## 2019-12-17 PROCEDURE — 82607 VITAMIN B-12: CPT

## 2019-12-17 PROCEDURE — 84157 ASSAY OF PROTEIN OTHER: CPT

## 2019-12-17 PROCEDURE — 70551 MRI BRAIN STEM W/O DYE: CPT

## 2019-12-17 PROCEDURE — 62270 DX LMBR SPI PNXR: CPT

## 2019-12-17 PROCEDURE — 82042 OTHER SOURCE ALBUMIN QUAN EA: CPT

## 2019-12-17 PROCEDURE — 82945 GLUCOSE OTHER FLUID: CPT

## 2019-12-17 RX ORDER — LIDOCAINE HYDROCHLORIDE 10 MG/ML
4 INJECTION, SOLUTION EPIDURAL; INFILTRATION; INTRACAUDAL; PERINEURAL
Status: COMPLETED | OUTPATIENT
Start: 2019-12-17 | End: 2019-12-17

## 2019-12-17 RX ORDER — GADOTERATE MEGLUMINE 376.9 MG/ML
19 INJECTION INTRAVENOUS
Status: COMPLETED | OUTPATIENT
Start: 2019-12-17 | End: 2019-12-17

## 2019-12-17 RX ADMIN — LIDOCAINE HYDROCHLORIDE 5 ML: 10 INJECTION, SOLUTION EPIDURAL; INFILTRATION; INTRACAUDAL; PERINEURAL at 11:22

## 2019-12-17 RX ADMIN — GADOTERATE MEGLUMINE 19 ML: 376.9 INJECTION INTRAVENOUS at 08:27

## 2019-12-17 RX ADMIN — SODIUM BICARBONATE 2 ML: 0.2 INJECTION, SOLUTION INTRAVENOUS at 11:22

## 2019-12-17 NOTE — PROGRESS NOTES
Patient received s/p lumbar puncture. Site is CDI. Patient denies pain. Vitals taken, see flow sheet. Venipuncture for MS panel and 2 gold tops per order complete. Family to bedside,discharge education reviewed. Patient assisted with dressing, placed in wheelchair and taken to awaiting transportation.   Discharge complete at 12:15

## 2019-12-17 NOTE — DISCHARGE INSTRUCTIONS
[address]  Department of Radiology  [phone]    POST LUMBAR PUNCTURE DISCHARGE INSTRUCTIONS  General Information:  Lumbar Puncture: A LP is done to help diagnose several disorders, like pseudo tumor, migraines, meningitis, and multiple sclerosis. It involves a puncture (usually in the lower spine) into the sac that protects the spinal column. A sample of the fluid in that space is removed and tested in the lab. Today:              When you go home, recline your seat back in your vehicle as much as possible. Rest today on your couch, bed or recliner with your lower back flat. Avoid twisting or bending. You may eat and drink your normal diet but avoid prolonged standing or walking. Drink plenty of water today and if you should feel you need some, you may have caffeine. You may return to taking your previously held medications unless instructed otherwise. Tomorrow, rise slowly. If you develop a headache, return to resting flat for four hours and continue to hydrate with water, then attempt to rise again. You can remove your dressing in 24 hours and shower regularly. Do not soak in hot tub or swim for 72 hours. Call If:   You should call your Physician and/or the Radiology Nurse if you develop a headache that is not relieved by Tylenol, and worsens when you stand and eases when you lie down, you need to call. You may have developed what is referred to as a spinal headache. Our physicians will probably advise you to be on strict bed rest for 24 hours, to drink lots of fluids and caffeine. If this does not help the head pain, call again the next day. You should call if you have bleeding other than a small spot on your bandage. You should call if you have any numbness, tingling, weakness, fever, chills, urinary retention, severe itching, rash, welts, swelling, or confusion. Follow-up Instructions: See the doctor who ordered your procedure as he/she has instructed. If you had a Lumbar Puncture or Myelogram, your results should be available to your ordering doctor in 3-5 business days.    To Reach Us:  Patient Signature:  Date: 12/5/2019  Discharging Nurse: Tip Bnoner RN

## 2019-12-19 LAB
COMMENT, HOLDF: NORMAL
SAMPLES BEING HELD,HOLD: NORMAL

## 2019-12-23 LAB
ALB CSF/SERPL: 19 {RATIO} (ref 0–8)
ALBUMIN CSF-MCNC: 73 MG/DL (ref 11–48)
ALBUMIN SERPL-MCNC: 3.8 G/DL (ref 3.6–4.8)
IGG CSF-MCNC: 18.4 MG/DL (ref 0–8.6)
IGG SERPL-MCNC: 1371 MG/DL (ref 700–1600)
IGG SYNTH RATE SER+CSF CALC-MRATE: 29.6 MG/DAY
IGG/ALB CLEAR SER+CSF-RTO: 0.7 (ref 0–0.7)
IGG/ALB CSF: 0.25 {RATIO} (ref 0–0.25)
OLIGOCLONAL BANDS.IT SER+CSF QL: ABNORMAL

## 2019-12-24 LAB
ALBUMIN SERPL ELPH-MCNC: 4.3 G/DL (ref 2.9–4.4)
ALBUMIN/GLOB SERPL: 1.4 {RATIO} (ref 0.7–1.7)
ALPHA1 GLOB SERPL ELPH-MCNC: 0.1 G/DL (ref 0–0.4)
ALPHA2 GLOB SERPL ELPH-MCNC: 0.6 G/DL (ref 0.4–1)
B-GLOBULIN SERPL ELPH-MCNC: 1.1 G/DL (ref 0.7–1.3)
GAMMA GLOB SERPL ELPH-MCNC: 1.2 G/DL (ref 0.4–1.8)
GLOBULIN SER-MCNC: 3.1 G/DL (ref 2.2–3.9)
IGA SERPL-MCNC: 194 MG/DL (ref 87–352)
IGG SERPL-MCNC: 1465 MG/DL (ref 700–1600)
IGM SERPL-MCNC: 79 MG/DL (ref 26–217)
INTERPRETATION SERPL IEP-IMP: ABNORMAL
KAPPA LC FREE SER-MCNC: 29.2 MG/L (ref 3.3–19.4)
KAPPA LC FREE/LAMBDA FREE SER: 1.12 {RATIO} (ref 0.26–1.65)
LAMBDA LC FREE SERPL-MCNC: 26.1 MG/L (ref 5.7–26.3)
M PROTEIN SERPL ELPH-MCNC: 0.3 G/DL
PROT SERPL-MCNC: 7.4 G/DL (ref 6–8.5)

## 2019-12-30 ENCOUNTER — OFFICE VISIT (OUTPATIENT)
Dept: NEUROLOGY | Age: 64
End: 2019-12-30

## 2019-12-30 VITALS — SYSTOLIC BLOOD PRESSURE: 150 MMHG | DIASTOLIC BLOOD PRESSURE: 85 MMHG | OXYGEN SATURATION: 97 % | HEART RATE: 92 BPM

## 2019-12-30 DIAGNOSIS — E11.42 TYPE 2 DIABETES MELLITUS WITH DIABETIC POLYNEUROPATHY, WITHOUT LONG-TERM CURRENT USE OF INSULIN (HCC): ICD-10-CM

## 2019-12-30 DIAGNOSIS — I10 ESSENTIAL HYPERTENSION: ICD-10-CM

## 2019-12-30 DIAGNOSIS — G57.32 NEUROPATHY OF LEFT PERONEAL NERVE: ICD-10-CM

## 2019-12-30 DIAGNOSIS — M54.16 LUMBAR RADICULOPATHY: ICD-10-CM

## 2019-12-30 DIAGNOSIS — E03.9 ACQUIRED HYPOTHYROIDISM: ICD-10-CM

## 2019-12-30 DIAGNOSIS — G95.9 CERVICAL MYELOPATHY (HCC): Primary | ICD-10-CM

## 2019-12-30 NOTE — PROCEDURES
ELECTRODIAGNOSTIC REPORT      Test Date:  2019    Patient: Mary Mckee : 1955 Physician: Vicky Sanders M.D.   ID#: [id number] SEX: Female Ref. Phys: Vicyk Sanders M.D. Patient History / Exam:  75year old right handed female with lower extremity back pain as well as abnormal MRI of the C spine. EMG & NCV Findings:  Evaluation of the left Fibular motor and the right Fibular motor nerves showed prolonged distal onset latency (L7.1, R6.6 ms), reduced amplitude (L1.0, R1.0 mV), decreased conduction velocity (B Fib-Ankle, L37, R36 m/s), and normal conduction velocity (Poplt-B Fib, L50, R53 m/s). The left Fibular TA motor nerve showed prolonged distal onset latency (5.5 ms) and normal amplitude (3.9 mV). The right Fibular TA motor nerve showed prolonged distal onset latency (5.8 ms), normal amplitude (4.8 mV), and normal conduction velocity (Poplit-Fib Head, 77 m/s). The left tibial motor and the right tibial motor nerves showed normal distal onset latency (L5.2, R5.4 ms), normal amplitude (L1.9, R1.7 mV), and normal conduction velocity (Knee-Ankle, L43, R46 m/s). The left Sup Fibular sensory and the right Sup Fibular sensory nerves showed normal distal peak latency (L2.3, R2.4 ms), normal amplitude (L6.3, R8.4 µV), and normal conduction velocity (Lower leg-Lat ankle, L56, R59 m/s). The left sural sensory and the right sural sensory nerves showed normal distal peak latency (L3.8, R3.6 ms) and normal amplitude (L5.6, R4.6 µV). Needle evaluation of the left abductor hallucis and the left extensor digitorum brevis muscles showed diminished recruitment and increased motor unit amplitude. The left lateral gastrocnemius muscle showed increased insertional activity and increased spontaneous activity. The left Mid Lumb Parasp muscle showed slightly increased polyphasic potentials. All remaining muscles (as indicated in the following table) showed no evidence of electrical instability. Impression  Acute left S1 radiculopathy.  Imaging recommended  Bilateral superficial peroneal neuropathies at the head of the fibula      ___________________________  Vicky Sanders M.D.    Nerve Conduction Studies  Anti Sensory Summary Table     Stim Site NR Onset (ms) Peak (ms) O-P Amp (µV) Norm Peak (ms) Norm O-P Amp Site1 Site2 Dist (cm) Norm Gene (m/s)   Left Sup Fibular Anti Sensory (Lat ankle)  35.1°C   Lower leg    1.8 2.3 6.3 <4.4 >5.0 Lower leg Lat ankle 10.0 >32   Right Sup Fibular Anti Sensory (Lat ankle)  33.8°C   Lower leg    1.7 2.4 8.4 <4.4 >5.0 Lower leg Lat ankle 10.0 >32   Left Sural Anti Sensory (Lat Mall)  35.1°C   Calf    3.0 3.8 5.6 <4.5 >4.0 Calf Lat Mall 14.0    Right Sural Anti Sensory (Lat Mall)  32.3°C   Calf    3.0 3.6 4.6 <4.5 >4.0 Calf Lat Mall 14.0      Motor Summary Table     Stim Site NR Onset (ms) Norm Onset (ms) O-P Amp (mV) Norm O-P Amp P-T Amp (mV) Site1 Site2 Dist (cm) Gene (m/s)   Left Fibular Motor (Ext Dig Brev)  35.2°C   Ankle    7.1 <6.5 1.0 >1.1  Ankle Ext Dig Brev 8.0 11   B Fib    16.0  0.9   B Fib Ankle 33.0 37   Poplt    18.0  0.9   Poplt B Fib 10.0 50   Right Fibular Motor (Ext Dig Brev)  34.4°C   Ankle    6.6 <6.5 1.0 >1.1  Ankle Ext Dig Brev 8.0 12   B Fib    15.5  0.9   B Fib Ankle 32.0 36   Poplt    17.4  0.7   Poplt B Fib 10.0 53   Left Fibular TA Motor (Tib Ant)  35.3°C   Fib Head    5.5 <4.5 3.9 >3.0  Fib Head Tib Ant 10.0 18   Poplit    5.5  4.2   Poplit Fib Head 10.0    Right Fibular TA Motor (Tib Ant)  34.9°C   Fib Head    5.8 <4.5 4.8 >3.0  Fib Head Tib Ant 10.0 17   Poplit    7.1  4.7   Poplit Fib Head 10.0 77   Left Tibial Motor (Abd Pina Brev)  35.3°C   Ankle    5.2 <6.1 1.9 >1.1  Ankle Abd Pina Brev 8.0 15   Knee    14.4  1.4   Knee Ankle 40.0 43   Right Tibial Motor (Abd Pina Brev)  35°C   Ankle    5.4 <6.1 1.7 >1.1  Ankle Abd Pina Brev 8.0 15   Knee    14.1  1.5   Knee Ankle 40.0 46       EMG     Side Muscle Nerve Root Ins Act Fibs Psw Recrt Duration Amp Poly Comment   Left AbdHallucis MedPlantar S1-2 Nml Nml Nml Reduced Nml Incr Nml    Left LatGastroc Tibial S1-2 Incr 3+ 2+ Nml Nml Nml Nml    Left AntTibialis Dp Br Peron L4-5 Nml Nml Nml Nml Nml Nml Nml    Left BicepsFemS Sciatic L5-S1 Nml Nml Nml Nml Nml Nml Nml    Left VastusLat Femoral L2-4 Nml Nml Nml Nml Nml Nml Nml    Left Ext Dig Brev Dp Br Peron L5, S1 Nml Nml Nml Reduced Nml Incr Nml    Right MedGastroc Tibial S1-2 Nml Nml Nml Nml Nml Nml Nml    Left Mid Lumb Parasp Rami L4,5 Nml Nml Nml Nml Nml Nml 1+    Left Lower Lumb Parasp Rami L5,S1 Nml Nml Nml Nml Nml Nml 1+      Waveforms:

## 2019-12-31 NOTE — PROGRESS NOTES
Name: Carla Plata    Chief Complaint: cervical myleopathy    MRI and labs reviewed and discussed with the patient. Most likely lesion seen is related to sarcoid and less likely transverse myelitis. Multiple sclerosis is an unlikely possibility. EMG is support the need for surgical intervention. Assesment and Plan  1. Cervical myelopathy (HCC)  Most likely secondary to sarcoid or less likely transverse myelitis  Repeat MRI in one year  No reason to postpone surgery    2. Lumbar radiculopathy  Followed by Dr. Lan Montiel    3. Neuropathy of left peroneal nerve    4. Essential hypertension  Continue lisinopril    5. Type 2 diabetes mellitus with diabetic polyneuropathy, without long-term current use of insulin (HCC)  Continue glipizide    6. Acquired hypothyroidism  Continue synthroid    D/w with patient and   Allergies  Gabapentin and Topiramate     Medications  Current Outpatient Medications   Medication Sig    metFORMIN (GLUCOPHAGE) 1,000 mg tablet TAKE 1 TABLET BY MOUTH TWICE DAILY WITH MEALS    lisinopril (PRINIVIL, ZESTRIL) 40 mg tablet TAKE 1 TABLET BY MOUTH ONCE DAILY    cyanocobalamin (VITAMIN B-12) 500 mcg tablet Take 500 mcg by mouth daily.  traMADol (ULTRAM) 50 mg tablet Take 50 mg by mouth every six (6) hours as needed for Pain.  glipiZIDE SR (GLUCOTROL XL) 5 mg CR tablet Take 1 Tab by mouth daily.  DULoxetine (CYMBALTA) 60 mg capsule TAKE 1 CAPSULE BY MOUTH ONCE DAILY    amLODIPine (NORVASC) 2.5 mg tablet Take 1 Tab by mouth daily.  rosuvastatin (CRESTOR) 10 mg tablet Take 1 Tab by mouth daily.  levothyroxine (SYNTHROID) 88 mcg tablet Half tablet on Monday and 1 tablet all other days    aspirin delayed-release 81 mg tablet Take  by mouth daily.  acetaminophen (TYLENOL EXTRA STRENGTH) 500 mg tablet Take 1,000 mg by mouth daily.  omega 3-dha-epa-fish oil 100-160-1,000 mg cap Take 2 Caps by mouth daily.  magnesium oxide 500 mg tab Take 500 mg by mouth daily.     OTHER energy greens 1 scoop with 8oz water daily    fluticasone (FLONASE ALLERGY RELIEF) 50 mcg/actuation nasal spray 2 Sprays by Both Nostrils route daily.  glucose blood VI test strips (ONETOUCH ULTRA BLUE TEST STRIP) strip by Does Not Apply route See Admin Instructions. Test 3 times daily    ascorbic acid, vitamin C, (VITAMIN C) 500 mg tablet Take  by mouth.  multivitamin with iron tablet Take 1 Tab by mouth daily.  vit B Cmplx 3-FA-Vit C-Biotin (NEPHRO MANINDER RX) 1- mg-mg-mcg tablet Take 1 Tab by mouth daily. No current facility-administered medications for this visit. Medical History  Diabetes  Hypothyroidism  Hyperlipidemia    Review of Systems   Constitutional: Positive for malaise/fatigue. Negative for chills and fever. HENT: Negative for ear pain. Eyes: Negative for pain and discharge. Respiratory: Negative for cough and hemoptysis. Cardiovascular: Negative for chest pain and claudication. Gastrointestinal: Negative for constipation and diarrhea. Genitourinary: Negative for flank pain and hematuria. Musculoskeletal: Positive for myalgias. Skin: Negative for itching and rash. Neurological: Positive for focal weakness. Negative for headaches. Endo/Heme/Allergies: Negative for environmental allergies. Does not bruise/bleed easily. Psychiatric/Behavioral: Negative for depression and hallucinations. Exam:  Visit Vitals  /85   Pulse 92   Ht (P) 5' 8\" (1.727 m)   Wt (P) 210 lb (95.3 kg)   SpO2 97%   BMI (P) 31.93 kg/m²      General: Well developed, well nourished. Patient in no apparent distress   Head: Normocephalic, atraumatic, anicteric sclera   Neck Normal ROM, No thyromegally   Cardiac: Regular rate and rhythm   Ext: No pedal edema   Skin: Supple no rash      NeurologicExam:  Mental Status: Alert and oriented to person place and time   Speech: Fluent no aphasia or dysarthria.    Cranial Nerves:  II - XII Intact   Motor:  Full and symmetric strength of upper and lower proximal and distal muscles. Normal bulk and tone. Sensory:   Symmetric distal reduction in sensory perception affecting all modalities   Gait:  Ataxic gait    Tremor:   No tremor noted. Cerebellar:  Coordination intact. Imaging    MRI Results (most recent):  Results from Hospital Encounter encounter on 12/17/19   MRI BRAIN WO CONT    Narrative EXAM: MRI BRAIN WO CONT  Clinical history: sarcoidosis  INDICATION: Sarcoidosis    COMPARISON: None. CONTRAST: None. TECHNIQUE:    Multiplanar multisequence acquisition without contrast of the brain. FINDINGS:  There is no Chiari or syrinx. Empty sella. Cavernous sinuses are symmetric. IACs  are symmetric in size. There is no acute infarct, hemorrhage, extra-axial fluid  collection, or mass effect. There is no cerebellar tonsillar herniation. Expected arterial flow-voids are present. The paranasal sinuses, mastoid air cells, and middle ears are clear. The orbital  contents are within normal limits. No significant osseous or scalp lesions are  identified. Abnormal cervical cord signal intensity at the C2 level is better  demonstrated on the MR of the cervical spine performed concomitantly. Impression IMPRESSION:   Normal MRI of the brain. No intracranial mass, hemorrhage or evidence of acute infarction.          Lab Review    Lab Results   Component Value Date/Time    Sodium 140 09/04/2019 08:33 AM    Potassium 4.3 09/04/2019 08:33 AM    Chloride 102 09/04/2019 08:33 AM    CO2 24 09/04/2019 08:33 AM    Glucose 139 (H) 09/04/2019 08:33 AM    BUN 14 09/04/2019 08:33 AM    Creatinine 0.81 09/04/2019 08:33 AM    Calcium 8.9 09/04/2019 08:33 AM           Lab Results   Component Value Date/Time    Hemoglobin A1c 6.4 (H) 09/04/2019 08:33 AM    Hemoglobin A1c (POC) 5.8 12/28/2018 11:49 AM    Hemoglobin A1c, External 6.4 08/04/2018        Lab Results   Component Value Date/Time    Vitamin B12 893 12/17/2019 11:32 AM       Lab Results Component Value Date/Time    Cholesterol, total 137 09/04/2019 08:33 AM    HDL Cholesterol 48 09/04/2019 08:33 AM    LDL, calculated 64 09/04/2019 08:33 AM    VLDL, calculated 25 09/04/2019 08:33 AM    Triglyceride 125 09/04/2019 08:33 AM

## 2020-01-02 DIAGNOSIS — D47.2 MONOCLONAL GAMMOPATHY: Primary | ICD-10-CM

## 2020-01-03 ENCOUNTER — DOCUMENTATION ONLY (OUTPATIENT)
Dept: NEUROLOGY | Age: 65
End: 2020-01-03

## 2020-01-06 ENCOUNTER — TELEPHONE (OUTPATIENT)
Dept: NEUROLOGY | Age: 65
End: 2020-01-06

## 2020-01-06 NOTE — TELEPHONE ENCOUNTER
Spoke with patient and explained that this appointment is because she has abnormal proteins in her blood.

## 2020-01-06 NOTE — TELEPHONE ENCOUNTER
Renetta/Dr. Karen Caldwell Office states that she called pt to [facility] her apptmt. She states that pt was not aware of the apptmt. Jay would like for the nurse to call pt and explain why her apptmt is needed.  She will call pt back tomorrow to [facility]

## 2020-01-13 DIAGNOSIS — E11.29 CONTROLLED TYPE 2 DIABETES MELLITUS WITH MICROALBUMINURIA, WITHOUT LONG-TERM CURRENT USE OF INSULIN (HCC): ICD-10-CM

## 2020-01-13 DIAGNOSIS — R80.9 CONTROLLED TYPE 2 DIABETES MELLITUS WITH MICROALBUMINURIA, WITHOUT LONG-TERM CURRENT USE OF INSULIN (HCC): ICD-10-CM

## 2020-01-13 NOTE — TELEPHONE ENCOUNTER
Pt wanted to notify the nurse that her Medicare enrollment period has restarted and she is now eligible to begin taking Trulicity again. Pt is requesting a refill of   Requested Prescriptions     Pending Prescriptions Disp Refills    dulaglutide (TRULICITY) 4.92 GS/1.4 mL sub-q pen 4 Pen 11     Si.75 mg by SubCUTAneous route every seven (7) days. Be called into the [facility]) on file. Pt stated that her blood sugar levels are 190-210 regularly.

## 2020-01-14 NOTE — TELEPHONE ENCOUNTER
PCP: Cleo Kim MD     Last appt: 9/10/2019   Future Appointments   Date Time Provider Chidi Unger   2020  2:00 PM Ana Maria Van MD [address]   3/3/2020  8:15 AM Cleo Kim  [facility]   3/9/2020 10:00 AM Hudson Velasco  [facility]   2020 10:40 AM Haley Farooq  [address]        Requested Prescriptions     Pending Prescriptions Disp Refills    dulaglutide (TRULICITY) 5.45 FI/1.9 mL sub-q pen 4 Pen 11     Si.75 mg by SubCUTAneous route every seven (7) days.

## 2020-01-17 ENCOUNTER — HOSPITAL ENCOUNTER (OUTPATIENT)
Dept: LAB | Age: 65
Discharge: HOME | End: 2020-01-17
Payer: MEDICARE

## 2020-01-17 ENCOUNTER — OFFICE VISIT (OUTPATIENT)
Dept: ONCOLOGY | Age: 65
End: 2020-01-17

## 2020-01-17 VITALS
WEIGHT: 210 LBS | HEIGHT: 66 IN | DIASTOLIC BLOOD PRESSURE: 85 MMHG | OXYGEN SATURATION: 94 % | HEART RATE: 92 BPM | SYSTOLIC BLOOD PRESSURE: 125 MMHG | BODY MASS INDEX: 33.75 KG/M2 | RESPIRATION RATE: 18 BRPM | TEMPERATURE: 98.4 F

## 2020-01-17 DIAGNOSIS — D47.2 MGUS (MONOCLONAL GAMMOPATHY OF UNKNOWN SIGNIFICANCE): Primary | ICD-10-CM

## 2020-01-17 PROCEDURE — 85025 COMPLETE CBC W/AUTO DIFF WBC: CPT

## 2020-01-17 PROCEDURE — 84165 PROTEIN E-PHORESIS SERUM: CPT

## 2020-01-17 PROCEDURE — 82784 ASSAY IGA/IGD/IGG/IGM EACH: CPT

## 2020-01-17 PROCEDURE — 36415 COLL VENOUS BLD VENIPUNCTURE: CPT

## 2020-01-17 NOTE — PROGRESS NOTES
Chief Complaint   Patient presents with   [address] [place] [address]     new patient       1. Have you been to the ER, urgent care clinic since your last visit? Hospitalized since your last visit? no    2. Have you seen or consulted any other health care providers outside of the [address] [place] [address] since your last visit? Include any pap smears or colon screening.   no

## 2020-01-17 NOTE — PROGRESS NOTES
[facility]  at [address] [place] [address], [address] [facility]  [phone]      Hematology Consultation        Patient: Shereen Polanco MRN: [id number]  SSN: [id number]    YOB: 1955  Age: 75 y.o. Sex: female        Diagnosis:     1. MGUS    Subjective:      Shereen Polanco is a 75 y.o. female who I am seeing in consultation for a diagnosis of MGUS. She suffers with cervical myelopathy and lumbar radiculopathy. Routine laboratory testing reveals a presence of IgG kappa monoclonal protein. She denies fatigue, bone pains, recent infection etc. The abnormal protein has been recognized in just the last month. Review of Systems:    Constitutional: negative  Eyes: negative  Ears, Nose, Mouth, Throat, and Face: negative  Respiratory: negative  Cardiovascular: negative  Gastrointestinal: negative  Genitourinary:negative  Integument/Breast: negative  Hematologic/Lymphatic: negative  Musculoskeletal:negative  Neurological: negative      Past Medical History:   Diagnosis Date    Diabetes ([facility] [address]     Hypercholesterolemia     Hypertension     Thyroid disease      Past Surgical History:   Procedure Laterality Date    HX BREAST BIOPSY Right 1990s    Benign (per patient)    HX  SECTION       x 2    HX CHOLECYSTECTOMY      HX HEENT      parathyroidectomy    HX HYSTERECTOMY        Family History   Problem Relation Age of Onset    Cancer Mother         bone    Diabetes Mother     Hypertension Mother     Hypertension Father     Cancer Father         blood    Hypertension Sister     Sleep Apnea Brother     No Known Problems Brother      Social History     Tobacco Use    Smoking status: Never Smoker    Smokeless tobacco: Never Used   Substance Use Topics    Alcohol use: No     Frequency: Never      Prior to Admission medications    Medication Sig Start Date End Date Taking?  Authorizing Provider dulaglutide (TRULICITY) 8.94 OT/3.4 mL sub-q pen 0.75 mg by SubCUTAneous route every seven (7) days. 1/14/20  Yes Lorna Cheung MD   metFORMIN (GLUCOPHAGE) 1,000 mg tablet TAKE 1 TABLET BY MOUTH TWICE DAILY WITH MEALS 12/30/19  Yes Lorna Cheung MD   lisinopril (PRINIVIL, ZESTRIL) 40 mg tablet TAKE 1 TABLET BY MOUTH ONCE DAILY 12/20/19  Yes Lorna Cheung MD   cyanocobalamin (VITAMIN B-12) 500 mcg tablet Take 500 mcg by mouth daily. Yes Provider, Historical   glipiZIDE SR (GLUCOTROL XL) 5 mg CR tablet Take 1 Tab by mouth daily. 8/7/19  Yes Lorna Cheung MD   DULoxetine (CYMBALTA) 60 mg capsule TAKE 1 CAPSULE BY MOUTH ONCE DAILY 7/25/19  Yes Lorna Cheung MD   amLODIPine (NORVASC) 2.5 mg tablet Take 1 Tab by mouth daily. 7/23/19  Yes Lorna Cheung MD   rosuvastatin (CRESTOR) 10 mg tablet Take 1 Tab by mouth daily. 6/3/19  Yes Lorna Cheung MD   levothyroxine (SYNTHROID) 88 mcg tablet Half tablet on Monday and 1 tablet all other days 3/12/19  Yes Lorna Cheung MD   aspirin delayed-release 81 mg tablet Take  by mouth daily. Yes Provider, Historical   acetaminophen (TYLENOL EXTRA STRENGTH) 500 mg tablet Take 1,000 mg by mouth daily. Yes Provider, Historical   omega 3-dha-epa-fish oil 100-160-1,000 mg cap Take 2 Caps by mouth daily. Yes Provider, Historical   magnesium oxide 500 mg tab Take 500 mg by mouth daily. Yes Provider, Historical   OTHER energy greens 1 scoop with 8oz water daily   Yes Provider, Historical   glucose blood VI test strips (ONETOUCH ULTRA BLUE TEST STRIP) strip by Does Not Apply route See Admin Instructions. Test 3 times daily   Yes Provider, Historical   ascorbic acid, vitamin C, (VITAMIN C) 500 mg tablet Take  by mouth. Yes Provider, Historical   multivitamin with iron tablet Take 1 Tab by mouth daily. Yes Provider, Historical   vit B Cmplx 3-FA-Vit C-Biotin (NEPHRO MANINDER RX) 1- mg-mg-mcg tablet Take 1 Tab by mouth daily.    Yes Provider, Historical   traMADol (ULTRAM) 50 mg tablet Take 50 mg by mouth every six (6) hours as needed for Pain. Provider, Historical   fluticasone (FLONASE ALLERGY RELIEF) 50 mcg/actuation nasal spray 2 Sprays by Both Nostrils route daily. Provider, Historical              Allergies   Allergen Reactions    Gabapentin Other (comments)     Hot flashes    Topiramate Other (comments)     Hot flashes           Objective:     Vitals:    01/17/20 1405   BP: 125/85   Pulse: 92   Resp: 18   Temp: 98.4 °F (36.9 °C)   TempSrc: Oral   SpO2: 94%   Weight: 210 lb (95.3 kg)   Height: 5' 6\" (1.676 m)            Physical Exam:    GENERAL: alert, cooperative, no distress, appears stated age  EYE: conjunctivae/corneas clear. PERRL, EOM's intact  LYMPHATIC: Cervical, supraclavicular, and axillary nodes normal.   THROAT & NECK: normal and no erythema or exudates noted. LUNG: clear to auscultation bilaterally  HEART: regular rate and rhythm, S1, S2 normal, no murmur, click, rub or gallop  ABDOMEN: soft, non-tender. Bowel sounds normal. No masses,  no organomegaly  EXTREMITIES:  extremities normal, atraumatic, no cyanosis or edema  SKIN: Normal.  NEUROLOGIC: AOx3. Gait normal. Reflexes and motor strength normal and symmetric. Cranial nerves 2-12 and sensation grossly intact. Lab Results   Component Value Date/Time    Sodium 140 09/04/2019 08:33 AM    Potassium 4.3 09/04/2019 08:33 AM    Chloride 102 09/04/2019 08:33 AM    CO2 24 09/04/2019 08:33 AM    Glucose 139 (H) 09/04/2019 08:33 AM    BUN 14 09/04/2019 08:33 AM    Creatinine 0.81 09/04/2019 08:33 AM    BUN/Creatinine ratio 17 09/04/2019 08:33 AM    GFR est AA 88 09/04/2019 08:33 AM    GFR est non-AA 76 09/04/2019 08:33 AM    Calcium 8.9 09/04/2019 08:33 AM    Bilirubin, total 0.2 09/04/2019 08:33 AM    AST (SGOT) 30 09/04/2019 08:33 AM    Alk.  phosphatase 54 09/04/2019 08:33 AM    Protein, total 7.4 12/17/2019 11:32 AM    Albumin 4.3 09/04/2019 08:33 AM    A-G Ratio 1.5 09/04/2019 08:33 AM    ALT (SGPT) 28 09/04/2019 08:33 AM                Assessment:     1. MGUS    Incidental finding  Need more laboratory testing to determine the quantity of monoclonal proteins  I will also obtain CBC to rule out anemia. She is not suffering with renal insufficiency or hypercalcemia. I reassured her that in most likelihood the finding is not clinically relevant. Plan:       > CBC, SPEP/BRIANA  > Return as needed      Signed by: Shanel Salcido MD                     January 17, 2020          CC. Sierra Martinez MD  CC.  Shanel Salcido MD

## 2020-01-21 LAB
ALBUMIN SERPL ELPH-MCNC: 4.1 G/DL (ref 2.9–4.4)
ALBUMIN/GLOB SERPL: 1.3 {RATIO} (ref 0.7–1.7)
ALPHA1 GLOB SERPL ELPH-MCNC: 0.1 G/DL (ref 0–0.4)
ALPHA2 GLOB SERPL ELPH-MCNC: 0.7 G/DL (ref 0.4–1)
B-GLOBULIN SERPL ELPH-MCNC: 1.1 G/DL (ref 0.7–1.3)
BASOPHILS # BLD AUTO: 0 X10E3/UL (ref 0–0.2)
BASOPHILS NFR BLD AUTO: 0 %
EOSINOPHIL # BLD AUTO: 0 X10E3/UL (ref 0–0.4)
EOSINOPHIL NFR BLD AUTO: 0 %
ERYTHROCYTE [DISTWIDTH] IN BLOOD BY AUTOMATED COUNT: 14 % (ref 11.7–15.4)
GAMMA GLOB SERPL ELPH-MCNC: 1.2 G/DL (ref 0.4–1.8)
GLOBULIN SER CALC-MCNC: 3.1 G/DL (ref 2.2–3.9)
HCT VFR BLD AUTO: 35.4 % (ref 34–46.6)
HGB BLD-MCNC: 11.5 G/DL (ref 11.1–15.9)
IGA SERPL-MCNC: 181 MG/DL (ref 87–352)
IGG SERPL-MCNC: 1362 MG/DL (ref 700–1600)
IGM SERPL-MCNC: 78 MG/DL (ref 26–217)
IMM GRANULOCYTES # BLD AUTO: 0 X10E3/UL (ref 0–0.1)
IMM GRANULOCYTES NFR BLD AUTO: 1 %
LYMPHOCYTES # BLD AUTO: 1.6 X10E3/UL (ref 0.7–3.1)
LYMPHOCYTES NFR BLD AUTO: 40 %
M PROTEIN SERPL ELPH-MCNC: NORMAL G/DL
MCH RBC QN AUTO: 28 PG (ref 26.6–33)
MCHC RBC AUTO-ENTMCNC: 32.5 G/DL (ref 31.5–35.7)
MCV RBC AUTO: 86 FL (ref 79–97)
MONOCYTES # BLD AUTO: 0.5 X10E3/UL (ref 0.1–0.9)
MONOCYTES NFR BLD AUTO: 12 %
NEUTROPHILS # BLD AUTO: 2 X10E3/UL (ref 1.4–7)
NEUTROPHILS NFR BLD AUTO: 47 %
PLATELET # BLD AUTO: 239 X10E3/UL (ref 150–450)
PLEASE NOTE, 011150: NORMAL
PROT PATTERN SERPL IFE-IMP: NORMAL
PROT SERPL-MCNC: 7.2 G/DL (ref 6–8.5)
RBC # BLD AUTO: 4.11 X10E6/UL (ref 3.77–5.28)
WBC # BLD AUTO: 4.1 X10E3/UL (ref 3.4–10.8)

## 2020-01-28 ENCOUNTER — TELEPHONE (OUTPATIENT)
Dept: INTERNAL MEDICINE CLINIC | Facility: CLINIC | Age: 65
End: 2020-01-28

## 2020-01-28 DIAGNOSIS — E78.00 HYPERCHOLESTEROLEMIA: Primary | ICD-10-CM

## 2020-01-28 NOTE — TELEPHONE ENCOUNTER
[phone] pt number     Patient calling wanting to know what medication could replace Rosuvastatin for Tier 1 or Tier 2

## 2020-02-03 RX ORDER — ATORVASTATIN CALCIUM 20 MG/1
20 TABLET, FILM COATED ORAL DAILY
Qty: 90 TAB | Refills: 3 | Status: SHIPPED | OUTPATIENT
Start: 2020-02-03 | End: 2020-02-14 | Stop reason: ALTCHOICE

## 2020-02-03 NOTE — TELEPHONE ENCOUNTER
Pt states that Atorvastatin is on the formulary, needs a 90 day sent to [facility].

## 2020-02-14 DIAGNOSIS — E78.00 HYPERCHOLESTEROLEMIA: ICD-10-CM

## 2020-02-14 RX ORDER — ATORVASTATIN CALCIUM 20 MG/1
20 TABLET, FILM COATED ORAL DAILY
Qty: 90 TAB | Refills: 3 | Status: CANCELLED | OUTPATIENT
Start: 2020-02-14

## 2020-02-14 RX ORDER — ROSUVASTATIN CALCIUM 10 MG/1
10 TABLET, COATED ORAL
Qty: 90 TAB | Refills: 3 | Status: SHIPPED | OUTPATIENT
Start: 2020-02-14 | End: 2020-02-17 | Stop reason: SDUPTHER

## 2020-02-14 NOTE — TELEPHONE ENCOUNTER
Pt  called and stated that pt needs the rx for atorvastatin refilled but this time send it to the [facility] [address]. Pt  stated that they do not want the lipitor.

## 2020-02-17 DIAGNOSIS — E78.00 HYPERCHOLESTEROLEMIA: ICD-10-CM

## 2020-02-17 RX ORDER — ROSUVASTATIN CALCIUM 10 MG/1
10 TABLET, COATED ORAL
Qty: 90 TAB | Refills: 3 | Status: SHIPPED | OUTPATIENT
Start: 2020-02-17 | End: 2021-05-06 | Stop reason: SDUPTHER

## 2020-02-17 NOTE — TELEPHONE ENCOUNTER
Per Envera:    Dr. Noah Foss   Received: 2 days ago   Message Contents   Sanjiv Brunson Office             Caller (if not patient): Shailesh   Relationship of caller (if not patient):    Best contact number(s): [phone]   Name of medication and dosage if known: \" Rosuvastatin 10mg\"   Is patient out of this medication (yes/no): yes   Pharmacy name:  [facility]   Pharmacy listed in chart? (yes/no): Yes   Pharmacy phone number: [phone]   Date of last visit: Tuesday, September 10, 2019 08:15 AM   Details to clarify the request: Caller stated that he called in his medication and pt medication at the same time . Caller stated that he is at the pharmacy 2/14/2020 and only his medication was called in .  Caller stated that the pt is out of her medication and needs it filled right away.

## 2020-02-17 NOTE — TELEPHONE ENCOUNTER
It was sent to [facility] on Feb 14 and confirmed at 12:02 PM. They should have it. Apparently the correct pharmacy [facility] ) was not attached to the prescription. Will send again.

## 2020-02-17 NOTE — TELEPHONE ENCOUNTER
PCP: Lonny Zheng MD     Last appt: 9/10/2019   Future Appointments   Date Time Provider Chidi Unger   3/3/2020  8:15 AM Lonny Zheng MD [facility] [place]   3/9/2020 10:00 AM MD Susi Chaney   6/25/2020 10:40 AM David Gómez  [address]        Requested Prescriptions     Pending Prescriptions Disp Refills    rosuvastatin (CRESTOR) 10 mg tablet 90 Tab 3     Sig: Take 1 Tab by mouth nightly.

## 2020-03-03 ENCOUNTER — OFFICE VISIT (OUTPATIENT)
Dept: INTERNAL MEDICINE CLINIC | Facility: CLINIC | Age: 65
End: 2020-03-03

## 2020-03-03 VITALS
SYSTOLIC BLOOD PRESSURE: 138 MMHG | RESPIRATION RATE: 12 BRPM | HEIGHT: 66 IN | TEMPERATURE: 98 F | DIASTOLIC BLOOD PRESSURE: 85 MMHG | WEIGHT: 209.5 LBS | HEART RATE: 86 BPM | OXYGEN SATURATION: 95 % | BODY MASS INDEX: 33.67 KG/M2

## 2020-03-03 DIAGNOSIS — Z12.11 SCREEN FOR COLON CANCER: ICD-10-CM

## 2020-03-03 DIAGNOSIS — R00.2 PALPITATIONS: ICD-10-CM

## 2020-03-03 DIAGNOSIS — E03.9 ACQUIRED HYPOTHYROIDISM: ICD-10-CM

## 2020-03-03 DIAGNOSIS — E11.29 CONTROLLED TYPE 2 DIABETES MELLITUS WITH MICROALBUMINURIA, WITHOUT LONG-TERM CURRENT USE OF INSULIN (HCC): Primary | ICD-10-CM

## 2020-03-03 DIAGNOSIS — E78.00 HYPERCHOLESTEROLEMIA: ICD-10-CM

## 2020-03-03 DIAGNOSIS — Z13.31 SCREENING FOR DEPRESSION: ICD-10-CM

## 2020-03-03 DIAGNOSIS — I10 HYPERTENSION, ESSENTIAL: ICD-10-CM

## 2020-03-03 DIAGNOSIS — R80.9 CONTROLLED TYPE 2 DIABETES MELLITUS WITH MICROALBUMINURIA, WITHOUT LONG-TERM CURRENT USE OF INSULIN (HCC): Primary | ICD-10-CM

## 2020-03-03 LAB — HBA1C MFR BLD HPLC: 6.6 %

## 2020-03-03 NOTE — PROGRESS NOTES
Marquis Anglin  Identified pt with two pt identifiers(name and ). Chief Complaint   Patient presents with    Diabetes    Hypertension    Cholesterol Problem       Reviewed record In preparation for visit and have obtained necessary documentation. Advanced directive / living will on file. 1. Have you been to the ER, urgent care clinic or hospitalized since your last visit? No     2. Have you seen or consulted any other health care providers outside of the [address] [place] [address] since your last visit? Include any pap smears or colon screening. Neurology, Dr Mercedes Chowdary reviewed with provider. Health Maintenance reviewed:     Health Maintenance Due   Topic    Hepatitis C Screening     Eye Exam Retinal or Dilated     Colonoscopy     Shingrix Vaccine Age 50> (1 of 2)    GLAUCOMA SCREENING Q2Y     Influenza Age 5 to Adult         Wt Readings from Last 3 Encounters:   20 210 lb (95.3 kg)   19 (P) 210 lb (95.3 kg)   19 210 lb (95.3 kg)      Temp Readings from Last 3 Encounters:   20 98.4 °F (36.9 °C) (Oral)   09/10/19 98.3 °F (36.8 °C) (Oral)   19 98 °F (36.7 °C) (Oral)      BP Readings from Last 3 Encounters:   20 125/85   19 150/85   19 135/86      Pulse Readings from Last 3 Encounters:   20 92   19 92   19 78      There were no vitals filed for this visit.        Learning Assessment:   :     Learning Assessment 2018   PRIMARY LEARNER Patient   HIGHEST LEVEL OF EDUCATION - PRIMARY LEARNER  SOME COLLEGE   BARRIERS PRIMARY LEARNER NONE   CO-LEARNER CAREGIVER No   PRIMARY LANGUAGE ENGLISH   LEARNER PREFERENCE PRIMARY DEMONSTRATION   ANSWERED BY patient   RELATIONSHIP SELF        Depression Screening:   :     3 most recent PHQ Screens 2019   Little interest or pleasure in doing things Not at all   Feeling down, depressed, irritable, or hopeless Not at all   Total Score PHQ 2 0        Fall Risk Assessment:   :     Fall Risk Assessment, last 12 mths 12/4/2019   Able to walk? Yes   Fall in past 12 months? No        Abuse Screening:   :     Abuse Screening Questionnaire 12/28/2018   Do you ever feel afraid of your partner? N   Are you in a relationship with someone who physically or mentally threatens you? N   Is it safe for you to go home?  Y        ADL Screening:   :     ADL Assessment 12/28/2018   Feeding yourself No Help Needed   Getting from bed to chair No Help Needed   Getting dressed No Help Needed   Bathing or showering No Help Needed   Walk across the room (includes cane/walker) No Help Needed   Using the telphone No Help Needed   Taking your medications No Help Needed   Preparing meals No Help Needed   Managing money (expenses/bills) No Help Needed   Moderately strenuous housework (laundry) No Help Needed   Shopping for personal items (toiletries/medicines) No Help Needed   Shopping for groceries No Help Needed   Driving No Help Needed   Climbing a flight of stairs No Help Needed   Getting to places beyond walking distances No Help Needed

## 2020-03-03 NOTE — PROGRESS NOTES
HISTORY OF PRESENT ILLNESS  Ruslan Gray is a 75 y.o. female. HPI  She presents for follow up of diabetes mellitus with micoralbuminuria, hypertension, hyperlipidemia and obesity. Several requests to switch statin to atorvastatin and then back to rosuvastatin. Diet and Lifestyle: generally follows a low fat low cholesterol diet, generally follows a low sodium diet, follows a diabetic diet regularly, exercises sporadically, nonsmoker, alcohol intake none  Diabetic ROS - medication compliance: compliant all of the time,      home glucose monitoring: fasting 100-110's,      home BP Monitorin's/70's.      further diabetic ROS: no polyuria or polydipsia, no unusual visual symptoms, no hypoglycemia, no medication side effects noted, tinglin right thigh and hand. Cardiovascular ROS:  She complains of palpitations (at night after taking amlodipine), lower extremity edema (ankles). She denies orthopnea, exertional chest pressure/discomfort, claudication, dyspnea on exertion, dizziness      She presents for follow up of hypothyroidism. Symptoms include heat intolerance, Patient denies weight changes, change in energy level   Course to date has been well controlled. CTS on right. continues to bother her despite use of splints a t night. Symptoms are constant if not wearing splint. Mentioned it to Dr Cristal Kim, but he did not address.      3 most recent PHQ Screens 3/3/2020   Little interest or pleasure in doing things Not at all   Feeling down, depressed, irritable, or hopeless Not at all   Total Score PHQ 2 0     Patient Active Problem List   Diagnosis Code    Hypertension, essential I10    Hypercholesterolemia E78.00    Controlled type 2 diabetes mellitus with microalbuminuria, without long-term current use of insulin ([facility]) E11.29, R80.9    Acquired hypothyroidism E03.9    MANUEL (obstructive sleep apnea) G47.33    Carpal tunnel syndrome of right wrist G56.01    Spinal stenosis of cervical region M48.02  Spinal stenosis of lumbar region with neurogenic claudication M48.062    Osteopenia of multiple sites M85.89     Past Medical History:   Diagnosis Date    Diabetes ([address]     Hypercholesterolemia     Hypertension     Thyroid disease      Past Surgical History:   Procedure Laterality Date    HX BREAST BIOPSY Right     Benign (per patient)    HX  SECTION       x 2    HX CHOLECYSTECTOMY      HX HEENT      parathyroidectomy    HX HYSTERECTOMY       Social History     Socioeconomic History    Marital status:      Spouse name: Not on file    Number of children: Not on file    Years of education: Not on file    Highest education level: Not on file   Tobacco Use    Smoking status: Never Smoker    Smokeless tobacco: Never Used   Substance and Sexual Activity    Alcohol use: No     Frequency: Never    Drug use: No    Sexual activity: Yes     Partners: Male     Birth control/protection: Surgical     Family History   Problem Relation Age of Onset    Cancer Mother         bone    Diabetes Mother     Hypertension Mother     Hypertension Father     Cancer Father         blood    Hypertension Sister     Sleep Apnea Brother     No Known Problems Brother      Allergies   Allergen Reactions    Gabapentin Other (comments)     Hot flashes    Topiramate Other (comments)     Hot flashes     Current Outpatient Medications   Medication Sig Dispense Refill    rosuvastatin (CRESTOR) 10 mg tablet Take 1 Tab by mouth nightly. 90 Tab 3    DULoxetine (CYMBALTA) 60 mg capsule TAKE 1 CAPSULE BY MOUTH ONCE DAILY 30 Cap 5    dulaglutide (TRULICITY) 0.10 PZ/8.6 mL sub-q pen 0.75 mg by SubCUTAneous route every seven (7) days. 4 Pen 11    metFORMIN (GLUCOPHAGE) 1,000 mg tablet TAKE 1 TABLET BY MOUTH TWICE DAILY WITH MEALS 180 Tab 3    lisinopril (PRINIVIL, ZESTRIL) 40 mg tablet TAKE 1 TABLET BY MOUTH ONCE DAILY 90 Tab 3    cyanocobalamin (VITAMIN B-12) 500 mcg tablet Take 500 mcg by mouth daily.  traMADol (ULTRAM) 50 mg tablet Take 50 mg by mouth every six (6) hours as needed for Pain.  glipiZIDE SR (GLUCOTROL XL) 5 mg CR tablet Take 1 Tab by mouth daily. 90 Tab 3    amLODIPine (NORVASC) 2.5 mg tablet Take 1 Tab by mouth daily. 90 Tab 3    levothyroxine (SYNTHROID) 88 mcg tablet Half tablet on Monday and 1 tablet all other days 90 Tab 3    aspirin delayed-release 81 mg tablet Take  by mouth daily.  acetaminophen (TYLENOL EXTRA STRENGTH) 500 mg tablet Take 1,000 mg by mouth daily.  omega 3-dha-epa-fish oil 100-160-1,000 mg cap Take 2 Caps by mouth daily.  magnesium oxide 500 mg tab Take 500 mg by mouth daily.  OTHER energy greens 1 scoop with 8oz water daily      fluticasone (FLONASE ALLERGY RELIEF) 50 mcg/actuation nasal spray 2 Sprays by Both Nostrils route daily.  glucose blood VI test strips (ONETOUCH ULTRA BLUE TEST STRIP) strip by Does Not Apply route See Admin Instructions. Test 3 times daily      ascorbic acid, vitamin C, (VITAMIN C) 500 mg tablet Take  by mouth.  multivitamin with iron tablet Take 1 Tab by mouth daily.  vit B Cmplx 3-FA-Vit C-Biotin (NEPHRO MANINDER RX) 1- mg-mg-mcg tablet Take 1 Tab by mouth daily. Review of Systems   Constitutional: Negative for malaise/fatigue and weight loss. Gastrointestinal: Negative for constipation and diarrhea. Musculoskeletal: Positive for back pain and joint pain. Neurological: Negative for focal weakness. Visit Vitals  /85 (BP 1 Location: Left arm, BP Patient Position: Sitting)   Pulse 86   Temp 98 °F (36.7 °C) (Oral)   Resp 12   Ht 5' 6\" (1.676 m)   Wt 209 lb 8 oz (95 kg)   SpO2 95%   BMI 33.81 kg/m²       Physical Exam  Vitals signs and nursing note reviewed. Constitutional:       Appearance: Normal appearance. She is well-developed. HENT:      Head: Normocephalic and atraumatic.    Eyes:      Conjunctiva/sclera: Conjunctivae normal.      Pupils: Pupils are equal, round, and reactive to light. Neck:      Musculoskeletal: Neck supple. Thyroid: No thyromegaly. Vascular: No carotid bruit. Cardiovascular:      Rate and Rhythm: Normal rate and regular rhythm. Chest Wall: PMI is not displaced. Pulses:           Dorsalis pedis pulses are 2+ on the right side and 2+ on the left side. Posterior tibial pulses are 2+ on the right side and 2+ on the left side. Heart sounds: Normal heart sounds. No murmur. No gallop. Pulmonary:      Effort: Pulmonary effort is normal.      Breath sounds: No wheezing, rhonchi or rales. Abdominal:      General: Bowel sounds are normal. There is no distension or abdominal bruit. Palpations: Abdomen is soft. There is no hepatomegaly, splenomegaly or mass. Tenderness: There is no abdominal tenderness. Musculoskeletal:      Right lower leg: No edema. Left lower leg: No edema. Lymphadenopathy:      Cervical: No cervical adenopathy. Upper Body:      Right upper body: No supraclavicular adenopathy. Left upper body: No supraclavicular adenopathy. Skin:     General: Skin is warm and dry. Findings: No rash. Neurological:      Mental Status: She is alert and oriented to person, place, and time. Sensory: No sensory deficit. Motor: Motor function is intact. Gait: Gait is intact.    Psychiatric:         Attention and Perception: Attention normal.         Mood and Affect: Mood normal.         Speech: Speech normal.         Behavior: Behavior normal.         Results for orders placed or performed in visit on 03/03/20   AMB POC HEMOGLOBIN A1C   Result Value Ref Range    Hemoglobin A1c (POC) 6.6 %     Lab Results   Component Value Date/Time    Hemoglobin A1c 6.4 (H) 09/04/2019 08:33 AM    Hemoglobin A1c (POC) 5.8 12/28/2018 11:49 AM    Hemoglobin A1c, External 6.4 08/04/2018     Lab Results   Component Value Date/Time    TSH 0.852 03/29/2019 08:47 AM    T4, Total 6.6 03/29/2019 08:47 AM Lab Results   Component Value Date/Time    Microalb/Creat ratio (ug/mg creat.) 233.9 (H) 09/04/2019 08:33 AM     Lab Results   Component Value Date/Time    Cholesterol, total 137 09/04/2019 08:33 AM    HDL Cholesterol 48 09/04/2019 08:33 AM    LDL, calculated 64 09/04/2019 08:33 AM    VLDL, calculated 25 09/04/2019 08:33 AM    Triglyceride 125 09/04/2019 08:33 AM     Lab Results   Component Value Date/Time    Sodium 140 09/04/2019 08:33 AM    Potassium 4.3 09/04/2019 08:33 AM    Chloride 102 09/04/2019 08:33 AM    CO2 24 09/04/2019 08:33 AM    Glucose 139 (H) 09/04/2019 08:33 AM    BUN 14 09/04/2019 08:33 AM    Creatinine 0.81 09/04/2019 08:33 AM    BUN/Creatinine ratio 17 09/04/2019 08:33 AM    GFR est AA 88 09/04/2019 08:33 AM    GFR est non-AA 76 09/04/2019 08:33 AM    Calcium 8.9 09/04/2019 08:33 AM    Bilirubin, total 0.2 09/04/2019 08:33 AM    AST (SGOT) 30 09/04/2019 08:33 AM    Alk. phosphatase 54 09/04/2019 08:33 AM    Protein, total 7.2 01/17/2020 03:00 PM    Albumin 4.3 09/04/2019 08:33 AM    A-G Ratio 1.5 09/04/2019 08:33 AM    ALT (SGPT) 28 09/04/2019 08:33 AM       Results for orders placed or performed in visit on 01/17/20   CBC WITH AUTOMATED DIFF   Result Value Ref Range    WBC 4.1 3.4 - 10.8 x10E3/uL    RBC 4.11 3.77 - 5.28 x10E6/uL    HGB 11.5 11.1 - 15.9 g/dL    HCT 35.4 34.0 - 46.6 %    MCV 86 79 - 97 fL    MCH 28.0 26.6 - 33.0 pg    MCHC 32.5 31.5 - 35.7 g/dL    RDW 14.0 11.7 - 15.4 %    PLATELET 229 833 - 527 x10E3/uL    NEUTROPHILS 47 Not Estab. %    Lymphocytes 40 Not Estab. %    MONOCYTES 12 Not Estab. %    EOSINOPHILS 0 Not Estab. %    BASOPHILS 0 Not Estab. %    ABS. NEUTROPHILS 2.0 1.4 - 7.0 x10E3/uL    Abs Lymphocytes 1.6 0.7 - 3.1 x10E3/uL    ABS. MONOCYTES 0.5 0.1 - 0.9 x10E3/uL    ABS. EOSINOPHILS 0.0 0.0 - 0.4 x10E3/uL    ABS. BASOPHILS 0.0 0.0 - 0.2 x10E3/uL    IMMATURE GRANULOCYTES 1 Not Estab. %    ABS. IMM.  GRANS. 0.0 0.0 - 0.1 x10E3/uL   PROTEIN ELECTROPHORESIS   Result Value Ref Range    Protein, total 7.2 6.0 - 8.5 g/dL    Albumin 4.1 2.9 - 4.4 g/dL    Alpha-1-globulin 0.1 0.0 - 0.4 g/dL    ALPHA-2 GLOBULIN 0.7 0.4 - 1.0 g/dL    Beta globulin 1.1 0.7 - 1.3 g/dL    Gamma globulin 1.2 0.4 - 1.8 g/dL    M-Charlie Not Observed Not Observed g/dL    Globulin, total 3.1 2.2 - 3.9 g/dL    A/G ratio 1.3 0.7 - 1.7    Please note Comment    IMMUNOELECTROPHORESIS [facility].)   Result Value Ref Range    Immunofixation, serum Comment     Immunoglobulin G, Qt. 1,362 700 - 1,600 mg/dL    Immunoglobulin A, Qt. 181 87 - 352 mg/dL    Immunoglobulin M, Qt. 78 26 - 217 mg/dL       ASSESSMENT and PLAN    ICD-10-CM ICD-9-CM    1. Controlled type 2 diabetes mellitus with microalbuminuria, without long-term current use of insulin ([facility]) E11.29 250.40 AMB POC HEMOGLOBIN A1C    R80.9 791.0 MICROALBUMIN, UR, RAND W/ MICROALB/CREAT RATIO      HEMOGLOBIN A1C WITH EAG   2. Acquired hypothyroidism E03.9 244.9 TSH AND FREE T4   3. Hypertension, essential I10 401.9    4. Hypercholesterolemia E78.00 272.0 LIPID PANEL      METABOLIC PANEL, COMPREHENSIVE   5. Screening for depression Z13.31 V79.0 [place] DEPRESSION SCREEN ANNUAL   6. Screen for colon cancer Z12.11 V76.51    7. Palpitations R00.2 785.1 CARDIAC HOLTER MONITOR       1. Controlled type 2 diabetes mellitus with microalbuminuria, without long-term current use of insulin ([facility])  A1c has risen from prediabetes to diabetes zone. We discussed metformin, but she wants to do better with diet and exercise first. Discussed diet and monitoring required. -     AMB POC HEMOGLOBIN A1C  -     MICROALBUMIN, UR, RAND W/ MICROALB/CREAT RATIO; Future  -     HEMOGLOBIN A1C WITH EAG; Future    2. Acquired hypothyroidism  Euthyroid on replacement.   -     TSH AND FREE T4; Future    3. Hypertension, essential  Hypertension is controlled. 4. Hypercholesterolemia  Hyperlipidemia is controlled  -     LIPID PANEL; Future  -     METABOLIC PANEL, COMPREHENSIVE; Future    5.  Screening for depression-Negative  -     [place] DEPRESSION SCREEN ANNUAL    6. Screen for colon cancer    7. Palpitations  -     CARDIAC HOLTER MONITOR; Future      Follow-up and Dispositions    · Return in about 6 months (around 9/3/2020) for DM, HTN, chol, thyroid  Fasting lab one week prior. lab results and schedule of future lab studies reviewed with patient  reviewed diet, exercise and weight control  I have discussed the diagnosis, evaluation and treatment options and the intended plan with the patient. Patient understands and is in agreement. The patient has received an after-visit summary and questions were answered concerning future plans. I have discussed side effects and warnings of any new medications with the patient as well.

## 2020-03-03 NOTE — PATIENT INSTRUCTIONS
Make another appointment with Dr Severiano Montaño regarding carpal tunnel We will order a Holter monitor for palpitations Remember to set up colonoscopy.

## 2020-03-09 ENCOUNTER — OFFICE VISIT (OUTPATIENT)
Dept: NEUROLOGY | Age: 65
End: 2020-03-09

## 2020-03-09 VITALS
HEART RATE: 85 BPM | BODY MASS INDEX: 33.27 KG/M2 | SYSTOLIC BLOOD PRESSURE: 138 MMHG | HEIGHT: 66 IN | DIASTOLIC BLOOD PRESSURE: 84 MMHG | OXYGEN SATURATION: 98 % | WEIGHT: 207 LBS

## 2020-03-09 DIAGNOSIS — I10 ESSENTIAL HYPERTENSION: ICD-10-CM

## 2020-03-09 DIAGNOSIS — E11.42 TYPE 2 DIABETES MELLITUS WITH DIABETIC POLYNEUROPATHY, WITHOUT LONG-TERM CURRENT USE OF INSULIN (HCC): ICD-10-CM

## 2020-03-09 DIAGNOSIS — G56.01 CARPAL TUNNEL SYNDROME OF RIGHT WRIST: Primary | ICD-10-CM

## 2020-03-09 DIAGNOSIS — E03.9 ACQUIRED HYPOTHYROIDISM: ICD-10-CM

## 2020-03-09 NOTE — PROGRESS NOTES
Name: Tabitha Carson    Chief Complaint: cervical myleopathy    Patient comes for a follow up visit. She is complaining of right median digit sensory loss for several years. It is exacerbated by wringing cloths. She has trouble feeling things in her hand. 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 She has been using a a wrist brace for several months. She in significant pain which is refractory to over the counter analgesics and the brace,     Assesment and Plan  1. right carpal tunnel  Refer to Dr. Natalie Riley for carpal tunnel release  In significant pain. Did a carpal tunnel injection    2. Neuropathy of left peroneal nerve    3. Essential hypertension  Continue lisinopril    4. Type 2 diabetes mellitus with diabetic polyneuropathy, without long-term current use of insulin (HCC)  Continue glipizide    5. Acquired hypothyroidism  Continue synthroid    D/w with patient and   Allergies  Gabapentin and Topiramate     Medications  Current Outpatient Medications   Medication Sig    rosuvastatin (CRESTOR) 10 mg tablet Take 1 Tab by mouth nightly.  DULoxetine (CYMBALTA) 60 mg capsule TAKE 1 CAPSULE BY MOUTH ONCE DAILY    dulaglutide (TRULICITY) 1.63 [place]/2.4 mL sub-q pen 0.75 mg by SubCUTAneous route every seven (7) days.  metFORMIN (GLUCOPHAGE) 1,000 mg tablet TAKE 1 TABLET BY MOUTH TWICE DAILY WITH MEALS    lisinopril (PRINIVIL, ZESTRIL) 40 mg tablet TAKE 1 TABLET BY MOUTH ONCE DAILY    cyanocobalamin (VITAMIN B-12) 500 mcg tablet Take 500 mcg by mouth daily.  traMADol (ULTRAM) 50 mg tablet Take 50 mg by mouth every six (6) hours as needed for Pain.  glipiZIDE SR (GLUCOTROL XL) 5 mg CR tablet Take 1 Tab by mouth daily.  amLODIPine (NORVASC) 2.5 mg tablet Take 1 Tab by mouth daily.  levothyroxine (SYNTHROID) 88 mcg tablet Half tablet on Monday and 1 tablet all other days    aspirin delayed-release 81 mg tablet Take  by mouth daily.     acetaminophen (TYLENOL EXTRA STRENGTH) 500 mg tablet Take 1,000 mg by mouth daily.  omega 3-dha-epa-fish oil 100-160-1,000 mg cap Take 2 Caps by mouth daily.  magnesium oxide 500 mg tab Take 500 mg by mouth daily.  OTHER energy greens 1 scoop with 8oz water daily    fluticasone (FLONASE ALLERGY RELIEF) 50 mcg/actuation nasal spray 2 Sprays by Both Nostrils route daily.  glucose blood VI test strips (ONETOUCH ULTRA BLUE TEST STRIP) strip by Does Not Apply route See Admin Instructions. Test 3 times daily    ascorbic acid, vitamin C, (VITAMIN C) 500 mg tablet Take  by mouth.  multivitamin with iron tablet Take 1 Tab by mouth daily.  vit B Cmplx 3-FA-Vit C-Biotin (NEPHRO MANINDER RX) 1- mg-mg-mcg tablet Take 1 Tab by mouth daily. No current facility-administered medications for this visit. Medical History  Diabetes  Hypothyroidism  Hyperlipidemia    Review of Systems   Constitutional: Positive for malaise/fatigue. Negative for chills and fever. HENT: Negative for ear pain. Eyes: Negative for pain and discharge. Respiratory: Negative for cough and hemoptysis. Cardiovascular: Negative for chest pain and claudication. Gastrointestinal: Negative for constipation and diarrhea. Genitourinary: Negative for flank pain and hematuria. Musculoskeletal: Positive for myalgias. Skin: Negative for itching and rash. Neurological: Positive for tingling, sensory change and focal weakness. Negative for headaches. Endo/Heme/Allergies: Negative for environmental allergies. Does not bruise/bleed easily. Psychiatric/Behavioral: Negative for depression and hallucinations. Exam:  Visit Vitals  /84   Pulse 85   Ht 5' 6\" (1.676 m)   Wt 207 lb (93.9 kg)   SpO2 98%   BMI 33.41 kg/m²      General: Well developed, well nourished.  Patient in no apparent distress   Head: Normocephalic, atraumatic, anicteric sclera   Neck Normal ROM, No thyromegally   Cardiac: Regular rate and rhythm   Ext: No pedal edema   Skin: Supple no rash    NeurologicExam:  Mental Status: Alert and oriented to person place and time   Speech: Fluent no aphasia or dysarthria. Cranial Nerves:  II - XII Intact   Motor:  Full and symmetric strength of upper and lower proximal and distal muscles. Normal bulk and tone. Loss of opponens pollicis right    Sensory:   Symmetric distal reduction in sensory perception affecting all modalities  Significant sensory loss right hand especially thumb forefinger and middle finger    Gait:  Ataxic gait    Tremor:   No tremor noted. Cerebellar:  Coordination intact. Imaging    MRI Results (most recent):  Results from Hospital Encounter encounter on 12/17/19   MRI BRAIN WO CONT    Narrative EXAM: MRI BRAIN WO CONT  Clinical history: sarcoidosis  INDICATION: Sarcoidosis    COMPARISON: None. CONTRAST: None. TECHNIQUE:    Multiplanar multisequence acquisition without contrast of the brain. FINDINGS:  There is no Chiari or syrinx. Empty sella. Cavernous sinuses are symmetric. IACs  are symmetric in size. There is no acute infarct, hemorrhage, extra-axial fluid  collection, or mass effect. There is no cerebellar tonsillar herniation. Expected arterial flow-voids are present. The paranasal sinuses, mastoid air cells, and middle ears are clear. The orbital  contents are within normal limits. No significant osseous or scalp lesions are  identified. Abnormal cervical cord signal intensity at the C2 level is better  demonstrated on the MR of the cervical spine performed concomitantly. Impression IMPRESSION:   Normal MRI of the brain. No intracranial mass, hemorrhage or evidence of acute infarction.          Lab Review    Lab Results   Component Value Date/Time    Sodium 140 09/04/2019 08:33 AM    Potassium 4.3 09/04/2019 08:33 AM    Chloride 102 09/04/2019 08:33 AM    CO2 24 09/04/2019 08:33 AM    Glucose 139 (H) 09/04/2019 08:33 AM    BUN 14 09/04/2019 08:33 AM    Creatinine 0.81 09/04/2019 08:33 AM    Calcium 8.9 09/04/2019 08:33 AM           Lab Results   Component Value Date/Time    Hemoglobin A1c 6.4 (H) 09/04/2019 08:33 AM    Hemoglobin A1c (POC) 6.6 03/03/2020 08:10 AM    Hemoglobin A1c, External 6.4 08/04/2018        Lab Results   Component Value Date/Time    Vitamin B12 893 12/17/2019 11:32 AM       Lab Results   Component Value Date/Time    Cholesterol, total 137 09/04/2019 08:33 AM    HDL Cholesterol 48 09/04/2019 08:33 AM    LDL, calculated 64 09/04/2019 08:33 AM    VLDL, calculated 25 09/04/2019 08:33 AM    Triglyceride 125 09/04/2019 08:33 AM

## 2020-03-09 NOTE — PROCEDURES
Right Carpal Tunnel Block    40mg/ml methlyprednisolone suspension was mixed with 1% lidocaine. Approximately 10mg of  Methyldrednisolone was mixed 1ml of lidocaine solution. . 1.5 inch 27 gauge needle was inserted midline approximately  one inch above the wrist line and directed towards the ring finger Needle was aspirated to ensure no vascualr compromise. Patient reported no lancinating pain. The solution was injected. Approximately five minutes later patient reported numbness of the median innervated finger Procedure was tolerated well. No bleeding noted.

## 2020-03-11 ENCOUNTER — DOCUMENTATION ONLY (OUTPATIENT)
Dept: NEUROLOGY | Age: 65
End: 2020-03-11

## 2020-03-16 ENCOUNTER — OFFICE VISIT (OUTPATIENT)
Dept: NEUROLOGY | Age: 65
End: 2020-03-16

## 2020-03-16 VITALS
OXYGEN SATURATION: 97 % | SYSTOLIC BLOOD PRESSURE: 141 MMHG | BODY MASS INDEX: 33.27 KG/M2 | HEIGHT: 66 IN | HEART RATE: 86 BPM | DIASTOLIC BLOOD PRESSURE: 81 MMHG | WEIGHT: 207 LBS

## 2020-03-16 DIAGNOSIS — G56.01 CARPAL TUNNEL SYNDROME OF RIGHT WRIST: ICD-10-CM

## 2020-03-31 DIAGNOSIS — E11.29 CONTROLLED TYPE 2 DIABETES MELLITUS WITH MICROALBUMINURIA, WITHOUT LONG-TERM CURRENT USE OF INSULIN (HCC): ICD-10-CM

## 2020-03-31 DIAGNOSIS — R80.9 CONTROLLED TYPE 2 DIABETES MELLITUS WITH MICROALBUMINURIA, WITHOUT LONG-TERM CURRENT USE OF INSULIN (HCC): ICD-10-CM

## 2020-03-31 NOTE — TELEPHONE ENCOUNTER
PCP: Ankita Rodriguez MD     Last appt: 3/3/2020   Future Appointments   Date Time Provider Chidi Unger   2020 10:40 AM [facility]ab[facility]   2020 11:20 AM Savannah Doan  [facility]        Requested Prescriptions     Pending Prescriptions Disp Refills    dulaglutide (Trulicity) 4.44 UX/3.6 mL sub-q pen 4 Pen 11     Si.75 mg by SubCUTAneous route every seven (7) days.

## 2020-03-31 NOTE — TELEPHONE ENCOUNTER
[address] sent a fax requesting a refill of   Requested Prescriptions     Pending Prescriptions Disp Refills    dulaglutide (Trulicity) 3.04 [place]/3.6 mL sub-q pen 4 Pen 11     Si.75 mg by SubCUTAneous route every seven (7) days.

## 2020-04-01 RX ORDER — DULAGLUTIDE 0.75 MG/.5ML
INJECTION, SOLUTION SUBCUTANEOUS
Qty: 4 PEN | Refills: 11 | Status: SHIPPED | OUTPATIENT
Start: 2020-04-01 | End: 2020-10-14 | Stop reason: SDUPTHER

## 2020-05-07 NOTE — PERIOP NOTES
[facility]  Ambulatory Surgery Unit  Pre-operative Instructions    Surgery/Procedure Date  05/14            Tentative Arrival Time 0715      1. On the day of your surgery/procedure, please report to the Ambulatory Surgery Unit Registration Desk and sign in at your designated time. The Ambulatory Surgery Unit is located in [facility] on the [facility] side of the [facility] across from the [facility] building. Please have all of your health insurance cards and a photo ID. 2. You must have someone with you to drive you home, as you should not drive a car for 24 hours following anesthesia. Please make arrangements for a responsible adult friend or family member to stay with you for at least the first 24 hours after your surgery. 3. Do not have anything to eat or drink (including water, gum, mints, coffee, juice) after 11:59 PM  05/13. This may not apply to medications prescribed by your physician. (Please note below the special instructions with medications to take the morning of surgery, if applicable.)    4. We recommend you do not drink any alcoholic beverages for 24 hours before and after your surgery. 5. Contact your surgeons office for instructions on the following medications: non-steroidal anti-inflammatory drugs (i.e. Advil, Aleve), vitamins, and supplements. (Some surgeons will want you to stop these medications prior to surgery and others may allow you to take them)   **If you are currently taking Plavix, Coumadin, Aspirin and/or other blood-thinning agents, contact your surgeon for instructions. ** Your surgeon will partner with the physician prescribing these medications to determine if it is safe to stop or if you need to continue taking. Please do not stop taking these medications without instructions from your surgeon.     6. In an effort to help prevent surgical site infection, we ask that you shower with an anti-bacterial soap (i.e. Dial/Safeguard, or the soap provided to you at your preadmission testing appointment) for 3 days prior to and on the morning of surgery, using a fresh towel after each shower. (Please begin this process with fresh bed linens.) Do not apply any lotions, powders, or deodorants after the shower on the day of your procedure. If applicable, please do not shave the operative site for 48 hours prior to surgery. 7. Wear comfortable clothes. Wear glasses instead of contacts. Do not bring any jewelry or money (other than copays or fees as instructed). Do not wear make-up, particularly mascara, the morning of your surgery. Do not wear nail polish, particularly if you are having foot /hand surgery. Wear your hair loose or down, no ponytails, buns, xochitl pins or clips. All body piercings must be removed. 8. You should understand that if you do not follow these instructions your surgery may be cancelled. If your physical condition changes (i.e. fever, cold or flu) please contact your surgeon as soon as possible. 9. It is important that you be on time. If a situation occurs where you may be late, or if you have any questions or problems, please call [phone].    10. Your surgery time may be subject to change. You will receive a phone call the day prior to surgery to confirm your arrival time. Special Instructions: Take all medications and inhalers, as prescribed, on the morning of surgery with a sip of water EXCEPT: diabetic meds       Insulin Dependent Diabetic patients: Take your diabetic medications as prescribed the day before surgery. Hold all diabetic medications the day of surgery. If you are scheduled to arrive for surgery after 8:00 AM, and your AM blood sugar is >200, please call Ambulatory Surgery. As a precaution [facility] has implemented a restricted visitation policy. I understand a pre-operative phone call will be made to verify my surgery time.   In the event that I am not available, I give permission for a message to be left on my answering service and/or with another person?       yes         ___________________      ___________________      ________________  (Signature of Patient)          (Witness)                   (Date and Time)

## 2020-05-11 ENCOUNTER — HOSPITAL ENCOUNTER (OUTPATIENT)
Dept: PREADMISSION TESTING | Age: 65
Discharge: HOME | End: 2020-05-11
Attending: ORTHOPAEDIC SURGERY
Payer: MEDICARE

## 2020-05-11 ENCOUNTER — HOSPITAL ENCOUNTER (OUTPATIENT)
Dept: MAMMOGRAPHY | Age: 65
Discharge: HOME | End: 2020-05-11
Attending: INTERNAL MEDICINE
Payer: MEDICARE

## 2020-05-11 DIAGNOSIS — Z12.31 VISIT FOR SCREENING MAMMOGRAM: ICD-10-CM

## 2020-05-11 PROCEDURE — 87635 SARS-COV-2 COVID-19 AMP PRB: CPT

## 2020-05-11 PROCEDURE — 77063 BREAST TOMOSYNTHESIS BI: CPT

## 2020-05-13 ENCOUNTER — ANESTHESIA EVENT (OUTPATIENT)
Dept: SURGERY | Age: 65
End: 2020-05-13
Payer: MEDICARE

## 2020-05-14 ENCOUNTER — ANESTHESIA (OUTPATIENT)
Dept: SURGERY | Age: 65
End: 2020-05-14
Payer: MEDICARE

## 2020-05-14 ENCOUNTER — HOSPITAL ENCOUNTER (OUTPATIENT)
Age: 65
Setting detail: OUTPATIENT SURGERY
Discharge: HOME | End: 2020-05-14
Attending: ORTHOPAEDIC SURGERY | Admitting: ORTHOPAEDIC SURGERY
Payer: MEDICARE

## 2020-05-14 VITALS
DIASTOLIC BLOOD PRESSURE: 74 MMHG | TEMPERATURE: 98 F | WEIGHT: 213 LBS | RESPIRATION RATE: 20 BRPM | HEIGHT: 66 IN | OXYGEN SATURATION: 99 % | SYSTOLIC BLOOD PRESSURE: 139 MMHG | HEART RATE: 92 BPM | BODY MASS INDEX: 34.23 KG/M2

## 2020-05-14 DIAGNOSIS — G89.18 POSTOPERATIVE PAIN OF EXTREMITY: Primary | ICD-10-CM

## 2020-05-14 DIAGNOSIS — M79.609 POSTOPERATIVE PAIN OF EXTREMITY: Primary | ICD-10-CM

## 2020-05-14 LAB
GLUCOSE BLD STRIP.AUTO-MCNC: 106 MG/DL (ref 65–100)
GLUCOSE BLD STRIP.AUTO-MCNC: 138 MG/DL (ref 65–100)
SARS-COV-2, COV2NT: NOT DETECTED
SERVICE CMNT-IMP: ABNORMAL
SERVICE CMNT-IMP: ABNORMAL
SPECIMEN SOURCE, FCOV2M: NORMAL

## 2020-05-14 PROCEDURE — 74011250636 HC RX REV CODE- 250/636: Performed by: ANESTHESIOLOGY

## 2020-05-14 PROCEDURE — 76210000040 HC AMBSU PH I REC FIRST 0.5 HR: Performed by: ORTHOPAEDIC SURGERY

## 2020-05-14 PROCEDURE — 76210000050 HC AMBSU PH II REC 0.5 TO 1 HR: Performed by: ORTHOPAEDIC SURGERY

## 2020-05-14 PROCEDURE — 76060000061 HC AMB SURG ANES 0.5 TO 1 HR: Performed by: ORTHOPAEDIC SURGERY

## 2020-05-14 PROCEDURE — 77030006607 HC BLD CARPL SAFGD INLC -C: Performed by: ORTHOPAEDIC SURGERY

## 2020-05-14 PROCEDURE — 74011250636 HC RX REV CODE- 250/636: Performed by: NURSE ANESTHETIST, CERTIFIED REGISTERED

## 2020-05-14 PROCEDURE — 74011000250 HC RX REV CODE- 250: Performed by: NURSE ANESTHETIST, CERTIFIED REGISTERED

## 2020-05-14 PROCEDURE — 77030040356 HC CORD BPLR FRCP COVD -A: Performed by: ORTHOPAEDIC SURGERY

## 2020-05-14 PROCEDURE — 76030000000 HC AMB SURG OR TIME 0.5 TO 1: Performed by: ORTHOPAEDIC SURGERY

## 2020-05-14 PROCEDURE — 77030018836 HC SOL IRR NACL ICUM -A: Performed by: ORTHOPAEDIC SURGERY

## 2020-05-14 PROCEDURE — 77030002916 HC SUT ETHLN J&J -A: Performed by: ORTHOPAEDIC SURGERY

## 2020-05-14 PROCEDURE — A4565 SLINGS: HCPCS | Performed by: ORTHOPAEDIC SURGERY

## 2020-05-14 PROCEDURE — 77030021352 HC CBL LD SYS DISP COVD -B: Performed by: ORTHOPAEDIC SURGERY

## 2020-05-14 PROCEDURE — 74011250637 HC RX REV CODE- 250/637: Performed by: ORTHOPAEDIC SURGERY

## 2020-05-14 PROCEDURE — 82962 GLUCOSE BLOOD TEST: CPT

## 2020-05-14 PROCEDURE — 77030000032 HC CUF TRNQT ZIMM -B: Performed by: ORTHOPAEDIC SURGERY

## 2020-05-14 PROCEDURE — 74011000250 HC RX REV CODE- 250: Performed by: ORTHOPAEDIC SURGERY

## 2020-05-14 RX ORDER — FENTANYL CITRATE 50 UG/ML
INJECTION, SOLUTION INTRAMUSCULAR; INTRAVENOUS AS NEEDED
Status: DISCONTINUED | OUTPATIENT
Start: 2020-05-14 | End: 2020-05-14 | Stop reason: HOSPADM

## 2020-05-14 RX ORDER — CEFAZOLIN SODIUM 1 G/3ML
INJECTION, POWDER, FOR SOLUTION INTRAMUSCULAR; INTRAVENOUS AS NEEDED
Status: DISCONTINUED | OUTPATIENT
Start: 2020-05-14 | End: 2020-05-14 | Stop reason: HOSPADM

## 2020-05-14 RX ORDER — LIDOCAINE HYDROCHLORIDE AND EPINEPHRINE 20; 5 MG/ML; UG/ML
INJECTION, SOLUTION EPIDURAL; INFILTRATION; INTRACAUDAL; PERINEURAL AS NEEDED
Status: DISCONTINUED | OUTPATIENT
Start: 2020-05-14 | End: 2020-05-14 | Stop reason: HOSPADM

## 2020-05-14 RX ORDER — SODIUM CHLORIDE 0.9 % (FLUSH) 0.9 %
5-40 SYRINGE (ML) INJECTION EVERY 8 HOURS
Status: DISCONTINUED | OUTPATIENT
Start: 2020-05-14 | End: 2020-05-14 | Stop reason: HOSPADM

## 2020-05-14 RX ORDER — ONDANSETRON 2 MG/ML
4 INJECTION INTRAMUSCULAR; INTRAVENOUS AS NEEDED
Status: DISCONTINUED | OUTPATIENT
Start: 2020-05-14 | End: 2020-05-14 | Stop reason: HOSPADM

## 2020-05-14 RX ORDER — FENTANYL CITRATE 50 UG/ML
25 INJECTION, SOLUTION INTRAMUSCULAR; INTRAVENOUS
Status: DISCONTINUED | OUTPATIENT
Start: 2020-05-14 | End: 2020-05-14 | Stop reason: HOSPADM

## 2020-05-14 RX ORDER — SODIUM CHLORIDE, SODIUM LACTATE, POTASSIUM CHLORIDE, CALCIUM CHLORIDE 600; 310; 30; 20 MG/100ML; MG/100ML; MG/100ML; MG/100ML
INJECTION, SOLUTION INTRAVENOUS
Status: DISCONTINUED | OUTPATIENT
Start: 2020-05-14 | End: 2020-05-14 | Stop reason: HOSPADM

## 2020-05-14 RX ORDER — CEFAZOLIN SODIUM 1 G/3ML
INJECTION, POWDER, FOR SOLUTION INTRAMUSCULAR; INTRAVENOUS
Status: COMPLETED
Start: 2020-05-14 | End: 2020-05-14

## 2020-05-14 RX ORDER — HYDROMORPHONE HYDROCHLORIDE 1 MG/ML
.2-.5 INJECTION, SOLUTION INTRAMUSCULAR; INTRAVENOUS; SUBCUTANEOUS
Status: DISCONTINUED | OUTPATIENT
Start: 2020-05-14 | End: 2020-05-14 | Stop reason: HOSPADM

## 2020-05-14 RX ORDER — MIDAZOLAM HYDROCHLORIDE 1 MG/ML
INJECTION, SOLUTION INTRAMUSCULAR; INTRAVENOUS AS NEEDED
Status: DISCONTINUED | OUTPATIENT
Start: 2020-05-14 | End: 2020-05-14 | Stop reason: HOSPADM

## 2020-05-14 RX ORDER — SODIUM CHLORIDE, SODIUM LACTATE, POTASSIUM CHLORIDE, CALCIUM CHLORIDE 600; 310; 30; 20 MG/100ML; MG/100ML; MG/100ML; MG/100ML
25 INJECTION, SOLUTION INTRAVENOUS CONTINUOUS
Status: DISCONTINUED | OUTPATIENT
Start: 2020-05-14 | End: 2020-05-14 | Stop reason: HOSPADM

## 2020-05-14 RX ORDER — DIPHENHYDRAMINE HYDROCHLORIDE 50 MG/ML
12.5 INJECTION, SOLUTION INTRAMUSCULAR; INTRAVENOUS AS NEEDED
Status: DISCONTINUED | OUTPATIENT
Start: 2020-05-14 | End: 2020-05-14 | Stop reason: HOSPADM

## 2020-05-14 RX ORDER — WATER FOR INJECTION,STERILE
VIAL (ML) INJECTION
Status: DISCONTINUED
Start: 2020-05-14 | End: 2020-05-14 | Stop reason: HOSPADM

## 2020-05-14 RX ORDER — SODIUM CHLORIDE 0.9 % (FLUSH) 0.9 %
5-40 SYRINGE (ML) INJECTION AS NEEDED
Status: DISCONTINUED | OUTPATIENT
Start: 2020-05-14 | End: 2020-05-14 | Stop reason: HOSPADM

## 2020-05-14 RX ORDER — LIDOCAINE HYDROCHLORIDE 20 MG/ML
INJECTION, SOLUTION EPIDURAL; INFILTRATION; INTRACAUDAL; PERINEURAL AS NEEDED
Status: DISCONTINUED | OUTPATIENT
Start: 2020-05-14 | End: 2020-05-14 | Stop reason: HOSPADM

## 2020-05-14 RX ORDER — PROPOFOL 10 MG/ML
INJECTION, EMULSION INTRAVENOUS
Status: DISCONTINUED | OUTPATIENT
Start: 2020-05-14 | End: 2020-05-14 | Stop reason: HOSPADM

## 2020-05-14 RX ORDER — MORPHINE SULFATE 10 MG/ML
2 INJECTION, SOLUTION INTRAMUSCULAR; INTRAVENOUS
Status: DISCONTINUED | OUTPATIENT
Start: 2020-05-14 | End: 2020-05-14 | Stop reason: HOSPADM

## 2020-05-14 RX ORDER — HYDROCODONE BITARTRATE AND ACETAMINOPHEN 5; 325 MG/1; MG/1
1 TABLET ORAL
Qty: 15 TAB | Refills: 0 | Status: SHIPPED | OUTPATIENT
Start: 2020-05-14 | End: 2020-05-17

## 2020-05-14 RX ADMIN — CEFAZOLIN 2 G: 1 INJECTION, POWDER, FOR SOLUTION INTRAMUSCULAR; INTRAVENOUS; PARENTERAL at 08:27

## 2020-05-14 RX ADMIN — MIDAZOLAM HYDROCHLORIDE 1 MG: 1 INJECTION, SOLUTION INTRAMUSCULAR; INTRAVENOUS at 08:26

## 2020-05-14 RX ADMIN — MIDAZOLAM HYDROCHLORIDE 1 MG: 1 INJECTION, SOLUTION INTRAMUSCULAR; INTRAVENOUS at 08:33

## 2020-05-14 RX ADMIN — SODIUM CHLORIDE, SODIUM LACTATE, POTASSIUM CHLORIDE, AND CALCIUM CHLORIDE 25 ML/HR: 600; 310; 30; 20 INJECTION, SOLUTION INTRAVENOUS at 08:10

## 2020-05-14 RX ADMIN — SODIUM CHLORIDE, POTASSIUM CHLORIDE, SODIUM LACTATE AND CALCIUM CHLORIDE: 600; 310; 30; 20 INJECTION, SOLUTION INTRAVENOUS at 08:05

## 2020-05-14 RX ADMIN — FENTANYL CITRATE 50 MCG: 50 INJECTION, SOLUTION INTRAMUSCULAR; INTRAVENOUS at 08:26

## 2020-05-14 RX ADMIN — FENTANYL CITRATE 50 MCG: 50 INJECTION, SOLUTION INTRAMUSCULAR; INTRAVENOUS at 08:35

## 2020-05-14 RX ADMIN — PROPOFOL 50 MCG/KG/MIN: 10 INJECTION, EMULSION INTRAVENOUS at 08:35

## 2020-05-14 RX ADMIN — LIDOCAINE HYDROCHLORIDE 20 MG: 20 INJECTION, SOLUTION EPIDURAL; INFILTRATION; INTRACAUDAL; PERINEURAL at 08:34

## 2020-05-14 RX ADMIN — Medication 3 AMPULE: at 08:09

## 2020-05-14 NOTE — DISCHARGE INSTRUCTIONS
Discharge Instructions for:    Julius Gómez / [id number] : 1955    Admitted 2020 Discharged: 2020       Postoperative Hand Surgery Instructions      Elevation:  Elevation is one of the most important things to do following your surgery. Not only does it decrease swelling, but it also decreases pain. For hand or wrist surgery, keep the arm in your sling while up and about, with your hand above your elbow. When lying down, keep your arm propped up on a few pillows, so that your fingers are pointing towards the ceiling. Finger Motion:  **It is very important that you begin moving your fingers immediately after surgery, as often as you can, in order to prevent stiffness. Wiggling your fingers is not the same as moving them - moving your fingers involves bending them until they touch the dressing   (if possible). You should use your other hand to gently move the fingers on the operative hand if necessary. Dressings:  Please leave the surgical dressing in place until you are seen in the office for your first post-operative appointment with Dr Ramya Cho. The dressing helps to prevent infection and positions the extremity to protect the surgical procedure  that was performed. Bathing and showering are allowed as long as the dressing is kept dry. To keep your dressing dry while bathing, simply place a plastic bag (bread bag, newspaper bag, trash bag or subway sandwich bag) over the dressing and seal it with tape or a rubber band. Medications: You have likely been given a prescription for pain medication. Please follow the instructions closely. It is safe to take up to 600mg of Ibuprofen (Advil or Motrin) along with the pain prescription as long you are not allergic to it, are not on blood thinners, and do not have gastric reflux or an ulcer. However, do not take any additional tylenol/acetaminophen if your prescription contains tylenol.       Note that my policy is to give only one prescription for pain medication at the time of the surgery - if you use it up quickly, then you will have no more pain medication left after only a few days, and I will not give you another prescription. So use your pain medication sparingly, and only when necessary! If you have new or increasing numbness after any numbing medicine wears off (12-24 hours for regional blocks, 3 hours for local), call the office immediately. If you experience nausea, vomiting or itching with the pain medication, please call the office during regular office hours. Refills for pain medication prescriptions ARE NOT given on the weekend or after regular office hours. If antibiotics have been prescribed, please be sure to complete the entire prescription. Post-Operative Appointments:  Dr. Mary Yi likes to see you back in the office about 10-14 days following your surgery to change the post-operative dressing and remove any necessary sutures or staples. If you have not received a follow up appointment card please contact our office at [phone]. *If you have any questions or concerns or experience any sudden changes in your condition, please call our office at [phone]*    DO NOT [address]. TAKE NARCOTIC PAIN MEDICATIONS WITH FOOD     Narcotics tend to be constipating, we suggest taking a stool softener such as Colace or Miralax (follow package instructions). DO NOT DRIVE WHILE TAKING NARCOTIC PAIN MEDICATIONS. DO NOT TAKE SLEEPING MEDICATIONS OR ANTIANXIETY MEDICATIONS WHILE TAKING NARCOTIC PAIN MEDICATIONS,  ESPECIALLY THE NIGHT OF ANESTHESIA! CPAP PATIENTS BE SURE TO WEAR MACHINE WHENEVER NAPPING OR SLEEPING! DISCHARGE SUMMARY from Nurse    The following personal items collected during your admission are returned to you:   Dental Appliance: Dental Appliances:  Other (comment)  Vision: Visual Aid: Glasses, With patient  Hearing Aid:    Jewelry: Jewelry: None  Clothing: Clothing: Socks, Undergarments, At bedside, Footwear, Pants, Shirt  Other Valuables: Other Valuables: Eyeglasses(placed in pacu)  Valuables sent to safe:        PATIENT INSTRUCTIONS:    After General Anesthesia or Intravenous Sedation, for 24 hours or while taking prescription Narcotics:        Someone should be with you for the next 24 hours. For your own safety, a responsible adult must drive you home. · Limit your activities  · Recommended activity: Rest today, up with assistance today. Do not climb stairs or shower unattended for the next 24 hours. · Please start with a soft bland diet and advance as tolerated (no nausea) to regular diet. · If you have a sore throat you should try the following: fluids, warm salt water gargles, or throat lozenges. If it does not improve after several days please follow up with your primary physician. · Do not drive and operate hazardous machinery  · Do not make important personal or business decisions  · Do  not drink alcoholic beverages  · If you have not urinated within 8 hours after discharge, please contact your surgeon on call. Report the following to your surgeon:  · Excessive pain, swelling, redness or odor of or around the surgical area  · Temperature over 100.5  · Nausea and vomiting lasting longer than 4 hours or if unable to take medications  · Any signs of decreased circulation or nerve impairment to extremity: change in color, persistent  numbness, tingling, coldness or increase pain      · You will receive a Post Operative Call from one of the Recovery Room Nurses on the day after your surgery to check on you. It is very important for us to know how you are recovering after your surgery. If you have an issue or need to speak with someone, please call your surgeon, do not wait for the post operative call. · You may receive an e-mail or letter in the mail from [employer] regarding your experience with us in the Ambulatory Surgery Unit.  Your feedback is valuable to us and we appreciate your participation in the survey. · If the above instructions are not adequate or you are having problems after your surgery, call the physician at their office number. · We wish you a speedy recovery ? What to do at Home:      *  Please give a list of your current medications to your Primary Care Provider. *  Please update this list whenever your medications are discontinued, doses are      changed, or new medications (including over-the-counter products) are added. *  Please carry medication information at all times in case of emergency situations. If you have not received your influenza and/or pneumococcal vaccine, please follow up with your primary care physician. The discharge information has been reviewed with the patient and caregiver. The patient and caregiver verbalized understanding. TO PREVENT AN INFECTION      1. [address] YOUR HANDS     To prevent infection, good handwashing is the most important thing you or your caregiver can do.  Wash your hands with soap and water or use the hand  we gave you before you touch any wounds. 2. SHOWER     Use the antibacterial soap we gave you when you take a shower.  Shower with this soap until your wounds are healed.  To reach all areas of your body, you may need someone to help you.  Dont forget to clean your belly button with every shower. 3.  USE CLEAN SHEETS     Use freshly cleaned sheets on your bed after surgery.  To keep the surgery site clean, do not allow pets to sleep with you while your wound is still healing. [place] THROMBOSIS AND PULMONARY EMBOLUS    SURGICAL PATIENTS  Surgical patients are the #1 risk for DVT and PE. WHAT IS DVT? WHAT IS PE?  DVT is a serious condition where blood clots develop deep in the veins of the legs.   PE occurs when a blood clot breaks loose from the wall of a vein and travels to the lungs blocking the pulmonary artery or one of its branches impairing blood flow from the heart, which could result in death. RISK FACTORS   Surgery lasting longer than 45 minutes   History of inflammatory bowel disease   Oral contraceptive or hormone replacement therapy   Immobilization   Varicose veins / swollen legs   Smoking    CHF / Acute MI / Irregular heart beat   Family history of thrombosis   General anesthesia greater than 2 hours   Obesity   Infection of less than one month   Less than 1 month postpartum   COPD / Pneumonia   Arthroscopic surgery   Malignancy / cancer   Spine surgery   Blood abnormalities   Stroke / Paralysis / Coma    SIGNS AND SYMPTOMS OF DEEP VEIN TROMBOSIS   -  ER VISIT  Usually occurs in one leg, above or below the knee   Swelling - one calf or thigh may be larger than the other   Feeling increased warmth in the area of the leg that is swollen or painful   Leg pain, which may increase when standing or walking   Swelling along the vein of the leg   When swollen areas is pressed with a finger, a depression may remain   Tenderness of the leg that may be confined to one area   Change in leg color (bluish or red)    SIGNS AND SYMPTOMS OF PULMONARY EMBOLUS  - 911 CALL   Chest pain that gets worse with deep breath, coughing or chest movement   Coughing up blood   Sweating   Shortness of breath or difficulty breathing   Rapid heart beat   Lightheadedness    HOW TO REDUCE THE POSSIBLE RISK OF DVT   Exercise - simple activities as rotating ankles and wrists, wiggling toes and fingers, tightening and relaxing muscles in calves and thighs promotes circulation while recovering from surgery. Please do these exercises every hour during waking hours   JO ANN hose - If you have been given white support hose while having surgery, wear them home.  You may remove them for half an hour every 8-hour period and stop wearing them 48 hours after surgery or as prescribed by your physician. JO ANN hose may be reused for air travel or extended car travel   Take mediation as prescribed by your physician (Lovenox, Coumadin, Aspirin)   Resume your normal activities as soon as your doctor advises you to do so.  Remember, when traveling, to [place] your legs frequently. Wear non skid shoes when JO ANN hose are on. They are very slippery! PATIENTS WHO BELIEVE THEY MAY BE EXPERIENCING SIGNS AND SYMPTOMS OF DVT OR PE SHOULD SEEK MEDICAL HELP IMMEDIATELY         Patient Education   Learning About Coronavirus (COVID-19)  Coronavirus (COVID-19): Overview  What is coronavirus (COVID-19)? The coronavirus disease (COVID-19) is caused by a virus. It is an illness that was first found in [place] in December 2019. It has since spread worldwide. The virus can cause fever, cough, and trouble breathing. In severe cases, it can cause pneumonia and make it hard to breathe without help. It can cause death. Coronaviruses are a large group of viruses. They cause the common cold. They also cause more serious illnesses like Middle East respiratory syndrome (MERS) and severe acute respiratory syndrome (SARS). COVID-19 is caused by a novel coronavirus. That means it's a new type that has not been seen in people before. This virus spreads person-to-person through droplets from coughing and sneezing. It can also spread when you are close to someone who is infected. And it can spread when you touch something that has the virus on it, such as a doorknob or a tabletop. What can you do to protect yourself from coronavirus (COVID-19)? The best way to protect yourself from getting sick is to: Avoid areas where there is an outbreak. Avoid contact with people who may be infected. Wash your hands often with soap or alcohol-based hand sanitizers. Avoid crowds and try to stay at least 6 feet away from other people. Wash your hands often, especially after you cough or sneeze.  Use soap and water, and scrub for at least 20 seconds. If soap and water aren't available, use an alcohol-based hand . Avoid touching your mouth, nose, and eyes. What can you do to avoid spreading the virus to others? To help avoid spreading the virus to others:  Cover your mouth with a tissue when you cough or sneeze. Then throw the tissue in the trash. Use a disinfectant to clean things that you touch often. Stay home if you are sick or have been exposed to the virus. Don't go to school, work, or public areas. And don't use public transportation. If you are sick:  Leave your home only if you need to get medical care. But call the doctor's office first so they know you're coming. And wear a face mask, if you have one. If you have a face mask, wear it whenever you're around other people. It can help stop the spread of the virus when you cough or sneeze. Clean and disinfect your home every day. Use household  and disinfectant wipes or sprays. Take special care to clean things that you grab with your hands. These include doorknobs, remote controls, phones, and handles on your refrigerator and microwave. And don't forget countertops, tabletops, bathrooms, and computer keyboards. When to call for help  Call 911 anytime you think you may need emergency care. For example, call if:  You have severe trouble breathing. (You can't talk at all.)  You have constant chest pain or pressure. You are severely dizzy or lightheaded. You are confused or can't think clearly. Your face and lips have a blue color. You pass out (lose consciousness) or are very hard to wake up. Call your doctor now if you develop symptoms such as:  Shortness of breath. Fever. Cough. If you need to get care, call ahead to the doctor's office for instructions before you go. Make sure you wear a face mask, if you have one, to prevent exposing other people to the virus. Where can you get the latest information?   The following health organizations are tracking and studying this virus. Their websites contain the most up-to-date information. Sina Moscoso also learn what to do if you think you may have been exposed to the virus. U.S. Centers for Disease Control and Prevention (CDC): The CDC provides updated news about the disease and travel advice. The website also tells you how to prevent the spread of infection. www.cdc.gov  World Health Organization [facility]): WHO offers information about the virus outbreaks. WHO also has travel advice. www.who.int  Current as of: April 1, 2020               Content Version: 12.4  © 2006-2020 Healthwise[employer] Incorporated. Care instructions adapted under license by your healthcare professional. If you have questions about a medical condition or this instruction, always ask your healthcare professional. [address] any warranty or liability for your use of this information.

## 2020-05-14 NOTE — BRIEF OP NOTE
Brief Postoperative Note    Patient: Diane London  YOB: 1955  MRN: [id number]    Date of Procedure: 5/14/2020     Pre-Op Diagnosis: RIGHT CARPAL TUNNEL SYNDROME    Post-Op Diagnosis: Same as preoperative diagnosis.       Procedure(s):  RIGHT CARPAL TUNNEL RELEASE    Surgeon(s):  Jax Elizondo MD    Surgical Assistant: None    Anesthesia: MAC     Estimated Blood Loss (mL): Minimal    Complications: None    Specimens: * No specimens in log *     Implants: * No implants in log *    Drains: * No LDAs found *    Findings: CTS    Electronically Signed by Wilbre Dillon MD on 5/14/2020 at 9:22 AM

## 2020-05-14 NOTE — ANESTHESIA POSTPROCEDURE EVALUATION
Procedure(s):  RIGHT CARPAL TUNNEL RELEASE.    general, total IV anesthesia    Anesthesia Post Evaluation      Multimodal analgesia: multimodal analgesia used between 6 hours prior to anesthesia start to PACU discharge  Patient location during evaluation: PACU  Patient participation: complete - patient participated  Level of consciousness: awake and alert  Pain score: 0  Airway patency: patent  Anesthetic complications: no  Cardiovascular status: acceptable  Respiratory status: acceptable  Hydration status: acceptable  Post anesthesia nausea and vomiting:  none      Vitals Value Taken Time   /69 5/14/2020  9:30 AM   Temp 36.8 °C (98.2 °F) 5/14/2020  9:15 AM   Pulse 86 5/14/2020  9:30 AM   Resp 23 5/14/2020  9:30 AM   SpO2 96 % 5/14/2020  9:30 AM

## 2020-05-14 NOTE — PERIOP NOTES
Permission received to review discharge instructions and discuss private health information with  al

## 2020-05-14 NOTE — PERIOP NOTES
Janeth Juarez  1955  [id number]    Situation:  Verbal report given from: Emma Shultz CRNA, Mc, RN  Procedure: Procedure(s):  RIGHT CARPAL TUNNEL RELEASE    Background:    Preoperative diagnosis: RIGHT CARPAL TUNNEL RELEASE    Postoperative diagnosis: RIGHT CARPAL TUNNEL RELEASE    :  Dr. Ernesto Hawley    Assistant(s): Circ-1: Edwinna Epley, RN  Scrub Tech-1: Francheska HANNA RT    Specimens: * No specimens in log *    Assessment:  Intra-procedure medications         Anesthesia gave intra-procedure sedation and medications, see anesthesia flow sheet     Intravenous fluids: LR@ KVO     Vital signs stable

## 2020-05-14 NOTE — PERIOP NOTES
Spoke with Dr. Dora Zamudio yesterday at 445pm and informed him that this patient's COVID results were still pending. He wishes to proceed with procedure as scheduled. Patient will be screened prior to registering and will wear a mask into the building.

## 2020-05-14 NOTE — ANESTHESIA PREPROCEDURE EVALUATION
Relevant Problems   No relevant active problems       Anesthetic History   No history of anesthetic complications            Review of Systems / Medical History  Patient summary reviewed, nursing notes reviewed and pertinent labs reviewed    Pulmonary        Sleep apnea: CPAP           Neuro/Psych         TIA (?)     Cardiovascular    Hypertension: well controlled          Hyperlipidemia    Exercise tolerance: >4 METS     GI/Hepatic/Renal  Within defined limits              Endo/Other    Diabetes: type 2  Hypothyroidism       Other Findings            Physical Exam    Airway  Mallampati: III  TM Distance: 4 - 6 cm  Neck ROM: normal range of motion   Mouth opening: Normal     Cardiovascular    Rhythm: regular  Rate: normal         Dental    Dentition: Lower partial plate     Pulmonary  Breath sounds clear to auscultation               Abdominal  GI exam deferred       Other Findings            Anesthetic Plan    ASA: 2  Anesthesia type: MAC          Induction: Intravenous  Anesthetic plan and risks discussed with: Patient      preop glucose 138

## 2020-05-14 NOTE — OP NOTES
OPERATIVE REPORT          PREOPERATIVE DIAGNOSES  1. right carpal tunnel syndrome. POSTOPERATIVE DIAGNOSES  1. right carpal tunnel syndrome. OPERATIVE PROCEDURE: right carpal tunnel release. SURGEON: Trevon Michael MD    ANESTHESIA: Local MAC anesthesia. COMPLICATIONS: None. ESTIMATED BLOOD LOSS: Minimal.    SPECIMENS:  None    IMPLANTS:  None    DESCRIPTION OF PROCEDURE: The patient brought into the operating room and  placed on the operating room table in the supine position and sedation  administered. The operative site had been identified, and appropriate preoperative antibiotic prophylaxis administered if indicated, in pre-op holding. The right upper extremity was prepped and draped in the usual  manner. After a time-out about 8 mL of 2% lidocaine with epinephrine were  injected in line with the proposed incision in the proximal palm. The limb  was then elevated, exsanguinated with an Esmarch bandage, and the  tourniquet inflated to 250 mmHg. A curved longitudinal incision was made in the proximal palm. The  subcutaneous tissues and palmar fascia were carefully divided. A mosquito  clamp was inserted underneath the ulnar-most portion of the transverse  carpal ligament. Under direct visualization a 15 blade was used to cut down  over the clamp, dividing the distal few millimeters of the TCL. A [place]  elevator was then inserted underneath the ulnar-most portion of the TCL,  followed by positioning of the I carpal tunnel release guide. The  single-use knife was then passed from distal to proximal, completely  dividing the TCL. Complete division was confirmed using right angle  retraction and loop magnification. The wound was thoroughly irrigated. Closure was performed with 4-0 nylon suture. Xeroform, 4 x 4's Kerlix and Coban  were used as dressing. The tourniquet was released. Total tourniquet time  was 29 minutes.  The patient tolerated the procedure well, and was taken  back to the PACU in stable condition.           Han Tabor MD

## 2020-05-15 NOTE — PERIOP NOTES
1400 5/15/20 Post op phone call. Pt states she has some overall itching-no rash. Explained probably from narcotic. She is going to try Claritan and will call MD if develops rash and stop pain medication.  Is going to start Ibuprofen

## 2020-05-29 ENCOUNTER — TELEPHONE (OUTPATIENT)
Dept: INTERNAL MEDICINE CLINIC | Facility: CLINIC | Age: 65
End: 2020-05-29

## 2020-05-29 DIAGNOSIS — R80.9 CONTROLLED TYPE 2 DIABETES MELLITUS WITH MICROALBUMINURIA, WITHOUT LONG-TERM CURRENT USE OF INSULIN (HCC): Primary | ICD-10-CM

## 2020-05-29 DIAGNOSIS — E11.29 CONTROLLED TYPE 2 DIABETES MELLITUS WITH MICROALBUMINURIA, WITHOUT LONG-TERM CURRENT USE OF INSULIN (HCC): Primary | ICD-10-CM

## 2020-05-29 RX ORDER — INSULIN PUMP SYRINGE, 3 ML
EACH MISCELLANEOUS
Qty: 1 KIT | Refills: 0 | Status: SHIPPED | OUTPATIENT
Start: 2020-05-29 | End: 2020-10-06

## 2020-05-29 RX ORDER — LANCETS
EACH MISCELLANEOUS
Qty: 100 EACH | Refills: 3 | Status: SHIPPED | OUTPATIENT
Start: 2020-05-29 | End: 2020-10-06

## 2020-05-29 NOTE — TELEPHONE ENCOUNTER
Pt called to request that the provider issue her an rx for \"diabetic supplies\" including lancets, a new meter, and test strips. Pt stated that she was told by Medicare that her insurance uses the [facility] as the local supplier to cover these supplies. Please give pt a call back at [phone] to discuss as needed.

## 2020-05-29 NOTE — TELEPHONE ENCOUNTER
REFILL     PCP: Kate Dockery MD     Last appt: 3/3/2020     Future Appointments   Date Time Provider Chidi Unger   6/25/2020 10:40 AM Daron Rosales  [facility]   8/20/2020 11:20 AM Yas Quiles  [facility]          Requested Prescriptions     Pending Prescriptions Disp Refills    Blood-Glucose Meter monitoring kit 1 Kit 0     Sig: Use to test blood sugar once a day    lancets misc 100 Each      Sig: Use to test blood sugar once a day    glucose blood VI test strips (blood glucose test) strip 100 Strip      Sig: Use to test blood sugar once a day

## 2020-06-18 RX ORDER — AMLODIPINE BESYLATE 2.5 MG/1
TABLET ORAL
Qty: 90 TAB | Refills: 0 | Status: SHIPPED | OUTPATIENT
Start: 2020-06-18 | End: 2020-10-06 | Stop reason: DRUGHIGH

## 2020-09-08 DIAGNOSIS — M48.062 SPINAL STENOSIS OF LUMBAR REGION WITH NEUROGENIC CLAUDICATION: ICD-10-CM

## 2020-09-08 DIAGNOSIS — M48.02 SPINAL STENOSIS OF CERVICAL REGION: ICD-10-CM

## 2020-09-08 RX ORDER — DULOXETIN HYDROCHLORIDE 60 MG/1
CAPSULE, DELAYED RELEASE ORAL
Qty: 30 CAP | Refills: 0 | Status: SHIPPED | OUTPATIENT
Start: 2020-09-08 | End: 2021-01-19

## 2020-09-29 ENCOUNTER — DOCUMENTATION ONLY (OUTPATIENT)
Dept: SLEEP MEDICINE | Age: 65
End: 2020-09-29

## 2020-09-29 ENCOUNTER — VIRTUAL VISIT (OUTPATIENT)
Dept: SLEEP MEDICINE | Age: 65
End: 2020-09-29
Payer: MEDICARE

## 2020-09-29 DIAGNOSIS — G47.33 OBSTRUCTIVE SLEEP APNEA (ADULT) (PEDIATRIC): Primary | ICD-10-CM

## 2020-09-29 DIAGNOSIS — I10 HYPERTENSION, ESSENTIAL: ICD-10-CM

## 2020-09-29 PROCEDURE — 3017F COLORECTAL CA SCREEN DOC REV: CPT | Performed by: INTERNAL MEDICINE

## 2020-09-29 PROCEDURE — G8756 NO BP MEASURE DOC: HCPCS | Performed by: INTERNAL MEDICINE

## 2020-09-29 PROCEDURE — G9899 SCRN MAM PERF RSLTS DOC: HCPCS | Performed by: INTERNAL MEDICINE

## 2020-09-29 PROCEDURE — 1101F PT FALLS ASSESS-DOCD LE1/YR: CPT | Performed by: INTERNAL MEDICINE

## 2020-09-29 PROCEDURE — G8427 DOCREV CUR MEDS BY ELIG CLIN: HCPCS | Performed by: INTERNAL MEDICINE

## 2020-09-29 PROCEDURE — G8432 DEP SCR NOT DOC, RNG: HCPCS | Performed by: INTERNAL MEDICINE

## 2020-09-29 PROCEDURE — 1090F PRES/ABSN URINE INCON ASSESS: CPT | Performed by: INTERNAL MEDICINE

## 2020-09-29 PROCEDURE — 99213 OFFICE O/P EST LOW 20 MIN: CPT | Performed by: INTERNAL MEDICINE

## 2020-09-29 PROCEDURE — G8399 PT W/DXA RESULTS DOCUMENT: HCPCS | Performed by: INTERNAL MEDICINE

## 2020-09-29 NOTE — PROGRESS NOTES
[address] [place]. [place], [address]  Tel.  [phone]  Fax. 100 [facility] [address]  [place], [address] [facility] [facility]  Tel.  [phone]  Fax. [phone] [address] [facility], [facility] [place]   Tel.  [phone]  Fax. [phone]       Telemedicine visit performed with verbal consent of the patient. Patient called and identity confirmed with 2 patient identifers     Patient was seen at home  Mayur Schaffer is a 75 y.o. female who was seen by synchronous (real-time) audio-video technology on 9/29/2020. Consent:  She and/or her healthcare decision maker is aware that this patient-initiated Telehealth encounter is the equivalent to a face to face encounter in the sleep disorder center and has provided verbal consent to proceed: Yes    I was at home while conducting this encounter. S>Sumi Mercado is a 75 y.o. female seen at this telemedicine visit for a positive airway pressure follow-up. She reports no problems using the device. She is 100% compliant over the past 30 days. The following problems are identified:    Drowsiness no Problems exhaling no   Snoring no Forget to put on no   Mask Comfortable yes  Can't fall asleep no   Dry Mouth no Mask falls off no   Air Leaking no Frequent awakenings no       Download reviewed. She admits that her sleep has improved on PAP therapy using full mask and heated tubing.     Allergies   Allergen Reactions    Gabapentin Other (comments)     Hot flashes    Topiramate Other (comments)     Hot flashes       She has a current medication list which includes the following prescription(s): duloxetine, amlodipine, blood-glucose meter, lancets, glucose blood vi test strips, levothyroxine, trulicity, rosuvastatin, metformin, lisinopril, cyanocobalamin, tramadol, glipizide sr, aspirin delayed-release, acetaminophen, omega 3-dha-epa-fish oil, magnesium oxide, OTHER, fluticasone propionate, glucose blood vi test strips, ascorbic acid (vitamin c), multivitamin with iron, and vit b cmplx 3-fa-vit c-biotin. .      She  has a past medical history of Diabetes ([address] Hypercholesterolemia, Hypertension, Sleep apnea, Thyroid disease, and TIA (transient ischemic attack). [place] Sleepiness Score: 2   and     which reflect improved sleep quality over therapy time. O>      There were no vitals taken for this visit. Vital Signs: (As obtained by patient/caregiver at home)        Constitutional: [x] Appears well-developed and well-nourished [x] No apparent distress      [] Abnormal -     Mental status: [x] Alert and awake  [x] Oriented to person/place/time [x] Able to follow commands    [] Abnormal -     Eyes:   EOM    [x]  Normal    [] Abnormal -   Sclera  [x]  Normal    [] Abnormal -          Discharge [x]  None visible   [] Abnormal -     HENT: [x] Normocephalic, atraumatic  [] Abnormal -     External Ears [x] Normal  [] Abnormal -    Neck: [x] No visualized mass [] Abnormal -     Pulmonary/Chest: [x] Respiratory effort normal   [x] No visualized signs of difficulty breathing or respiratory distress        [] Abnormal -       Neurological:        [x] No Facial Asymmetry (Cranial nerve 7 motor function) (limited exam due to video visit)          [x] No gaze palsy        [] Abnormal -          Skin:        [x] No significant exanthematous lesions or discoloration noted on facial skin         [] Abnormal -            Psychiatric:       [x] Normal Affect [] Abnormal -        Other pertinent observable physical exam findings:-            A>    ICD-10-CM ICD-9-CM    1. Obstructive sleep apnea (adult) (pediatric)  G47.33 327.23 AMB SUPPLY ORDER   2. Hypertension, essential  I10 401.9      AHI = 34 (4-19). On CPAP :  13-16 cmH2O. Compliant:      yes    Therapeutic Response:  Positive    P>    she is compliant with PAP therapy and PAP continues to benefit patient and remains necessary for control of her sleep apnea.    CPAP setting - continue 13-16 cmH20     * We have recommended a dedicated weight loss through appropriate diet and an exercise regimen as significant weight reduction has been shown to reduce severity of obstructive sleep apnea. *   Follow-up and Dispositions    · Return in about 1 year (around 9/29/2021). I have ordered replacement supplies      * She was asked to contact our office for any problems regarding PAP therapy. * Counseling was provided regarding the importance of regular PAP use and on proper sleep hygiene and safe driving. * Re-enforced proper and regular cleaning for the device. 2. Hypertension - she continues on her current regimen. I have reviewed the relationship between hypertension as it relates to sleep-disordered breathing. All of her questions were addressed. Pursuant to the emergency declaration under the [facility], [facility] waiver authority and the [employer] and [employer] Act, this Virtual  Visit was conducted, with patient's consent, to reduce the patient's risk of exposure to COVID-19 and provide continuity of care for an established patient. Services were provided through a video synchronous discussion virtually to substitute for in-person clinic visit.     Darleen Savage MD    Electronically signed by    Sergey Armstrong MD  Diplomate in Sleep Medicine  [facility]

## 2020-09-29 NOTE — PATIENT INSTRUCTIONS
[address] [facility] [place]. [address] [facility], [address] Tel.  [phone] Fax. 100 [facility] [address] [place], [address] Tel.  [phone] Fax. [phone] [address] [facility] [place] Tel.  [phone] Fax. [phone] PROPER SLEEP HYGIENE What to avoid · Do not have drinks with caffeine, such as coffee or black tea, for 8 hours before bed. · Do not smoke or use other types of tobacco near bedtime. Nicotine is a stimulant and can keep you awake. · Avoid drinking alcohol late in the evening, because it can cause you to wake in the middle of the night. · Do not eat a big meal close to bedtime. If you are hungry, eat a light snack. · Do not drink a lot of water close to bedtime, because the need to urinate may wake you up during the night. · Do not read or watch TV in bed. Use the bed only for sleeping and sexual activity. What to try · Go to bed at the same time every night, and wake up at the same time every morning. Do not take naps during the day. · Keep your bedroom quiet, dark, and cool. · Get regular exercise, but not within 3 to 4 hours of your bedtime. Noah Taylor · Sleep on a comfortable pillow and mattress. · If watching the clock makes you anxious, turn it facing away from you so you cannot see the time. · If you worry when you lie down, start a worry book. Well before bedtime, write down your worries, and then set the book and your concerns aside. · Try meditation or other relaxation techniques before you go to bed. · If you cannot fall asleep, get up and go to another room until you feel sleepy. Do something relaxing. Repeat your bedtime routine before you go to bed again. · Make your house quiet and calm about an hour before bedtime. Turn down the lights, turn off the TV, log off the computer, and turn down the volume on music. This can help you relax after a busy day. Drowsy Driving [employer] cites drowsiness as a causing factor in more than 850,424 police reported crashes annually, resulting in 76,000 injuries and 1,500 deaths. Other surveys suggest 55% of people polled have driven while drowsy in the past year, 23% had fallen asleep but not crashed, 3% crashed, and 2% had and accident due to drowsy driving. Who is at risk? Young Drivers: One study of drowsy driving accidents states that 55% of the drivers were under 25 years. Of those, 75% were male. Shift Workers and Travelers: People who work overnight or travel across time zones frequently are at higher risk of experiencing Circadian Rhythm Disorders. They are trying to work and function when their body is programed to sleep. Sleep Deprived: Lack of sleep has a serious impact on your ability to pay attention or focus on a task. Consistently getting less than the average of 8 hours your body needs creates partial or cumulative sleep deprivation. Untreated Sleep Disorders: Sleep Apnea, Narcolepsy, R.L.S., and other sleep disorders (untreated) prevent a person from getting enough restful sleep. This leads to excessive daytime sleepiness and increases the risk for drowsy driving accidents by up to 7 times. Medications / Alcohol: Even over the counter medications can cause drowsiness. Medications that impair a drivers attention should have a warning label. Alcohol naturally makes you sleepy and on its own can cause accidents. Combined with excessive drowsiness its effects are amplified. Signs of Drowsy Driving: * You don't remember driving the last few miles * You may drift out of your tyrese * You are unable to focus and your thoughts wander * You may yawn more often than normal 
 * You have difficulty keeping your eyes open / nodding off * Missing traffic signs, speeding, or tailgating Prevention-  
Good sleep hygiene, lifestyle and behavioral choices have the most impact on drowsy driving. There is no substitute for sleep and the average person requires 8 hours nightly. If you find yourself driving drowsy, stop and sleep. Consider the sleep hygiene tips provided during your visit as well. Medication Refill Policy: Refills for all medications require 1 week advance notice. Please have your pharmacy fax a refill request. We are unable to fax, or call in \"controled substance\" medications and you will need to pick these prescriptions up from our office. [employer] Activation Thank you for requesting access to [facility]. Please follow the instructions below to securely access and download your online medical record. [facility] allows you to send messages to your doctor, view your test results, renew your prescriptions, schedule appointments, and more. How Do I Sign Up? 1. In your internet browser, go to [email or web address]. 2. Click on the First Time User? Click Here link in the Sign In box. You will see the New Member Sign Up page. 3. Enter your [facility] Access Code exactly as it appears below. You will not need to use this code after youve completed the sign-up process. If you do not sign up before the expiration date, you must request a new code. [facility] Access Code: Activation code not generated Current [facility] Status: Active (This is the date your [facility] access code will ) 4. Enter the last four digits of your Social Security Number (xxxx) and Date of Birth (mm/dd/yyyy) as indicated and click Submit. You will be taken to the next sign-up page. 5. Create a Storefrontt ID. This will be your [facility] login ID and cannot be changed, so think of one that is secure and easy to remember. 6. Create a [facility] password. You can change your password at any time. 7. Enter your Password Reset Question and Answer. This can be used at a later time if you forget your password. 8. Enter your e-mail address.  You will receive e-mail notification when new information is available in [facility]. 9. Click Sign Up. You can now view and download portions of your medical record. 10. Click the Download Summary menu link to download a portable copy of your medical information. Additional Information If you have questions, please call [phone]. Remember, [facility] is NOT to be used for urgent needs. For medical emergencies, dial 911.

## 2020-10-02 LAB
ALBUMIN SERPL-MCNC: 4.5 G/DL (ref 3.8–4.8)
ALBUMIN/CREAT UR: 929 MG/G CREAT (ref 0–29)
ALBUMIN/GLOB SERPL: 1.6 {RATIO} (ref 1.2–2.2)
ALP SERPL-CCNC: 64 IU/L (ref 39–117)
ALT SERPL-CCNC: 34 IU/L (ref 0–32)
AST SERPL-CCNC: 35 IU/L (ref 0–40)
BILIRUB SERPL-MCNC: 0.2 MG/DL (ref 0–1.2)
BUN SERPL-MCNC: 9 MG/DL (ref 8–27)
BUN/CREAT SERPL: 9 (ref 12–28)
CALCIUM SERPL-MCNC: 9.2 MG/DL (ref 8.7–10.3)
CHLORIDE SERPL-SCNC: 102 MMOL/L (ref 96–106)
CHOLEST SERPL-MCNC: 128 MG/DL (ref 100–199)
CO2 SERPL-SCNC: 21 MMOL/L (ref 20–29)
CREAT SERPL-MCNC: 0.97 MG/DL (ref 0.57–1)
CREAT UR-MCNC: 68.5 MG/DL
EST. AVERAGE GLUCOSE BLD GHB EST-MCNC: 166 MG/DL
GLOBULIN SER CALC-MCNC: 2.9 G/DL (ref 1.5–4.5)
GLUCOSE SERPL-MCNC: 159 MG/DL (ref 65–99)
HBA1C MFR BLD: 7.4 % (ref 4.8–5.6)
HDLC SERPL-MCNC: 41 MG/DL
LDLC SERPL CALC-MCNC: 69 MG/DL (ref 0–99)
MICROALBUMIN UR-MCNC: 636.3 UG/ML
POTASSIUM SERPL-SCNC: 4.6 MMOL/L (ref 3.5–5.2)
PROT SERPL-MCNC: 7.4 G/DL (ref 6–8.5)
SODIUM SERPL-SCNC: 140 MMOL/L (ref 134–144)
TRIGL SERPL-MCNC: 93 MG/DL (ref 0–149)
VLDLC SERPL CALC-MCNC: 18 MG/DL (ref 5–40)

## 2020-10-04 NOTE — PROGRESS NOTES
Will discuss at 10/6/20 appt. Normal kidney and liver tests, normal cholesterol (tot chol 128, LDL 69). A1c 7.4%, was 6.6% on 3/3/20. A1c of 7.4% means avg  over the past 3 mons. Urine test showed much protein in the urine, an early sign of diabetes starting to affect the kidneys.

## 2020-10-06 ENCOUNTER — OFFICE VISIT (OUTPATIENT)
Dept: INTERNAL MEDICINE CLINIC | Age: 65
End: 2020-10-06
Payer: MEDICARE

## 2020-10-06 VITALS
HEART RATE: 92 BPM | WEIGHT: 216.2 LBS | BODY MASS INDEX: 34.75 KG/M2 | DIASTOLIC BLOOD PRESSURE: 68 MMHG | TEMPERATURE: 98.7 F | SYSTOLIC BLOOD PRESSURE: 138 MMHG | OXYGEN SATURATION: 98 % | RESPIRATION RATE: 20 BRPM | HEIGHT: 66 IN

## 2020-10-06 DIAGNOSIS — Z78.9 ADVANCE DIRECTIVE ON FILE: ICD-10-CM

## 2020-10-06 DIAGNOSIS — Z76.89 ENCOUNTER TO ESTABLISH CARE: ICD-10-CM

## 2020-10-06 DIAGNOSIS — Z13.31 SCREENING FOR DEPRESSION: ICD-10-CM

## 2020-10-06 DIAGNOSIS — I10 HYPERTENSION, ESSENTIAL: ICD-10-CM

## 2020-10-06 DIAGNOSIS — Z23 ENCOUNTER FOR IMMUNIZATION: ICD-10-CM

## 2020-10-06 DIAGNOSIS — R80.9 CONTROLLED TYPE 2 DIABETES MELLITUS WITH MICROALBUMINURIA, WITHOUT LONG-TERM CURRENT USE OF INSULIN (HCC): ICD-10-CM

## 2020-10-06 DIAGNOSIS — G47.33 OSA (OBSTRUCTIVE SLEEP APNEA): ICD-10-CM

## 2020-10-06 DIAGNOSIS — E78.00 HYPERCHOLESTEROLEMIA: ICD-10-CM

## 2020-10-06 DIAGNOSIS — M48.02 SPINAL STENOSIS OF CERVICAL REGION: ICD-10-CM

## 2020-10-06 DIAGNOSIS — E03.9 ACQUIRED HYPOTHYROIDISM: ICD-10-CM

## 2020-10-06 DIAGNOSIS — E11.29 CONTROLLED TYPE 2 DIABETES MELLITUS WITH MICROALBUMINURIA, WITHOUT LONG-TERM CURRENT USE OF INSULIN (HCC): ICD-10-CM

## 2020-10-06 DIAGNOSIS — Z00.00 MEDICARE ANNUAL WELLNESS VISIT, SUBSEQUENT: Primary | ICD-10-CM

## 2020-10-06 DIAGNOSIS — Z12.11 SCREENING FOR COLON CANCER: ICD-10-CM

## 2020-10-06 PROBLEM — E66.01 SEVERE OBESITY (HCC): Status: ACTIVE | Noted: 2020-10-06

## 2020-10-06 PROCEDURE — G9899 SCRN MAM PERF RSLTS DOC: HCPCS

## 2020-10-06 PROCEDURE — 3051F HG A1C>EQUAL 7.0%<8.0%: CPT

## 2020-10-06 PROCEDURE — 99215 OFFICE O/P EST HI 40 MIN: CPT

## 2020-10-06 PROCEDURE — 90694 VACC AIIV4 NO PRSRV 0.5ML IM: CPT

## 2020-10-06 PROCEDURE — 3017F COLORECTAL CA SCREEN DOC REV: CPT

## 2020-10-06 PROCEDURE — G8427 DOCREV CUR MEDS BY ELIG CLIN: HCPCS

## 2020-10-06 PROCEDURE — 2022F DILAT RTA XM EVC RTNOPTHY: CPT

## 2020-10-06 PROCEDURE — G0008 ADMIN INFLUENZA VIRUS VAC: HCPCS

## 2020-10-06 PROCEDURE — 1101F PT FALLS ASSESS-DOCD LE1/YR: CPT

## 2020-10-06 PROCEDURE — G8399 PT W/DXA RESULTS DOCUMENT: HCPCS

## 2020-10-06 PROCEDURE — G8510 SCR DEP NEG, NO PLAN REQD: HCPCS

## 2020-10-06 PROCEDURE — G8754 DIAS BP LESS 90: HCPCS

## 2020-10-06 PROCEDURE — G0439 PPPS, SUBSEQ VISIT: HCPCS

## 2020-10-06 PROCEDURE — G8536 NO DOC ELDER MAL SCRN: HCPCS

## 2020-10-06 PROCEDURE — 1090F PRES/ABSN URINE INCON ASSESS: CPT

## 2020-10-06 PROCEDURE — G8417 CALC BMI ABV UP PARAM F/U: HCPCS

## 2020-10-06 PROCEDURE — G8752 SYS BP LESS 140: HCPCS

## 2020-10-06 PROCEDURE — G0444 DEPRESSION SCREEN ANNUAL: HCPCS

## 2020-10-06 RX ORDER — GLIPIZIDE 10 MG/1
10 TABLET, FILM COATED, EXTENDED RELEASE ORAL DAILY
Qty: 90 TAB | Refills: 3 | Status: SHIPPED | OUTPATIENT
Start: 2020-10-06 | End: 2021-10-06 | Stop reason: SDUPTHER

## 2020-10-06 RX ORDER — AMLODIPINE BESYLATE 5 MG/1
5 TABLET ORAL DAILY
Qty: 90 TAB | Refills: 3 | Status: SHIPPED | OUTPATIENT
Start: 2020-10-06 | End: 2021-09-27

## 2020-10-06 NOTE — PATIENT INSTRUCTIONS
Vaccine Information Statement Influenza (Flu) Vaccine (Inactivated or Recombinant): What You Need to Know Many Vaccine Information Statements are available in [place] and other languages. See www.immunize.org/vis Hojas de información sobre vacunas están disponibles en español y en muchos otros idiomas. Visite www.immunize.org/vis 1. Why get vaccinated? Influenza vaccine can prevent influenza (flu). Flu is a contagious disease that spreads around the [facility] every year, usually between October and May. Anyone can get the flu, but it is more dangerous for some people. Infants and young children, people 72years of age and older, pregnant women, and people with certain health conditions or a weakened immune system are at greatest risk of flu complications. Pneumonia, bronchitis, sinus infections and ear infections are examples of flu-related complications. If you have a medical condition, such as heart disease, cancer or diabetes, flu can make it worse. Flu can cause fever and chills, sore throat, muscle aches, fatigue, cough, headache, and runny or stuffy nose. Some people may have vomiting and diarrhea, though this is more common in children than adults. Each year thousands of people in the [facility] die from flu, and many more are hospitalized. Flu vaccine prevents millions of illnesses and flu-related visits to the doctor each year. 2. Influenza vaccines CDC recommends everyone 10months of age and older get vaccinated every flu season. Children 6 months through 6years of age may need 2 doses during a single flu season. Everyone else needs only 1 dose each flu season. It takes about 2 weeks for protection to develop after vaccination. There are many flu viruses, and they are always changing. Each year a new flu vaccine is made to protect against three or four viruses that are likely to cause disease in the upcoming flu season.  Even when the vaccine doesnt exactly match these viruses, it may still provide some protection. Influenza vaccine does not cause flu. Influenza vaccine may be given at the same time as other vaccines. 3. Talk with your health care provider Tell your vaccine provider if the person getting the vaccine: 
 Has had an allergic reaction after a previous dose of influenza vaccine, or has any severe, life-threatening allergies.  Has ever had Guillain-Barré Syndrome (also called GBS). In some cases, your health care provider may decide to postpone influenza vaccination to a future visit. People with minor illnesses, such as a cold, may be vaccinated. People who are moderately or severely ill should usually wait until they recover before getting influenza vaccine. Your health care provider can give you more information. 4. Risks of a reaction  Soreness, redness, and swelling where shot is given, fever, muscle aches, and headache can happen after influenza vaccine.  There may be a very small increased risk of Guillain-Barré Syndrome (GBS) after inactivated influenza vaccine (the flu shot). [employer] children who get the flu shot along with pneumococcal vaccine (PCV13), and/or DTaP vaccine at the same time might be slightly more likely to have a seizure caused by fever. Tell your health care provider if a child who is getting flu vaccine has ever had a seizure. People sometimes faint after medical procedures, including vaccination. Tell your provider if you feel dizzy or have vision changes or ringing in the ears. As with any medicine, there is a very remote chance of a vaccine causing a severe allergic reaction, other serious injury, or death. 5. What if there is a serious problem? An allergic reaction could occur after the vaccinated person leaves the clinic.  If you see signs of a severe allergic reaction (hives, swelling of the face and throat, difficulty breathing, a fast heartbeat, dizziness, or weakness), call 9-1-1 and get the person to the nearest hospital. 
 
For other signs that concern you, call your health care provider. Adverse reactions should be reported to the Vaccine Adverse Event Reporting System (VAERS). Your health care provider will usually file this report, or you can do it yourself. Visit the VAERS website at www.vaers. [facility].gov or call 7-395.967.2881. VAERS is only for reporting reactions, and VAERS staff do not give medical advice. 6. The National Vaccine Injury Compensation Program 
 
The [facility] Vaccine Injury Compensation Program (VICP) is a federal program that was created to compensate people who may have been injured by certain vaccines. Visit the VICP website at www.[facility].gov/vaccinecompensation or call 5-187.466.4584 to learn about the program and about filing a claim. There is a time limit to file a claim for compensation. 7. How can I learn more?  Ask your health care provider.  Call your local or state health department.  Contact the Centers for Disease Control and Prevention (CDC): 
- Call 0-843.315.6808 (7-987-EFV-INFO) or 
- Visit CDCs influenza website at www.cdc.gov/flu Vaccine Information Statement (Interim) Inactivated Influenza Vaccine 8/15/2019 
42 ANIRUDH Mathews 381AL-02 Department of Health and [place] [place] Centers for Disease Control and Prevention Office Use Only Vaccine Information Statement Influenza (Flu) Vaccine (Inactivated or Recombinant): What You Need to Know Many Vaccine Information Statements are available in Pashto and other languages. See www.immunize.org/vis Hojas de información sobre vacunas están disponibles en español y en muchos otros idiomas. Visite www.immunize.org/vis 1. Why get vaccinated? Influenza vaccine can prevent influenza (flu). Flu is a contagious disease that spreads around the [place] every year, usually between October and May. Anyone can get the flu, but it is more dangerous for some people. Infants and young children, people 72years of age and older, pregnant women, and people with certain health conditions or a weakened immune system are at greatest risk of flu complications. Pneumonia, bronchitis, sinus infections and ear infections are examples of flu-related complications. If you have a medical condition, such as heart disease, cancer or diabetes, flu can make it worse. Flu can cause fever and chills, sore throat, muscle aches, fatigue, cough, headache, and runny or stuffy nose. Some people may have vomiting and diarrhea, though this is more common in children than adults. Each year thousands of people in the [facility] die from flu, and many more are hospitalized. Flu vaccine prevents millions of illnesses and flu-related visits to the doctor each year. 2. Influenza vaccines CDC recommends everyone 10months of age and older get vaccinated every flu season. Children 6 months through 6years of age may need 2 doses during a single flu season. Everyone else needs only 1 dose each flu season. It takes about 2 weeks for protection to develop after vaccination. There are many flu viruses, and they are always changing. Each year a new flu vaccine is made to protect against three or four viruses that are likely to cause disease in the upcoming flu season. Even when the vaccine doesnt exactly match these viruses, it may still provide some protection. Influenza vaccine does not cause flu. Influenza vaccine may be given at the same time as other vaccines. 3. Talk with your health care provider Tell your vaccine provider if the person getting the vaccine: 
 Has had an allergic reaction after a previous dose of influenza vaccine, or has any severe, life-threatening allergies.  Has ever had Guillain-Barré Syndrome (also called GBS). In some cases, your health care provider may decide to postpone influenza vaccination to a future visit.  
 
People with minor illnesses, such as a cold, may be vaccinated. People who are moderately or severely ill should usually wait until they recover before getting influenza vaccine. Your health care provider can give you more information. 4. Risks of a reaction  Soreness, redness, and swelling where shot is given, fever, muscle aches, and headache can happen after influenza vaccine.  There may be a very small increased risk of Guillain-Barré Syndrome (GBS) after inactivated influenza vaccine (the flu shot). [employer] children who get the flu shot along with pneumococcal vaccine (PCV13), and/or DTaP vaccine at the same time might be slightly more likely to have a seizure caused by fever. Tell your health care provider if a child who is getting flu vaccine has ever had a seizure. People sometimes faint after medical procedures, including vaccination. Tell your provider if you feel dizzy or have vision changes or ringing in the ears. As with any medicine, there is a very remote chance of a vaccine causing a severe allergic reaction, other serious injury, or death. 5. What if there is a serious problem? An allergic reaction could occur after the vaccinated person leaves the clinic. If you see signs of a severe allergic reaction (hives, swelling of the face and throat, difficulty breathing, a fast heartbeat, dizziness, or weakness), call 9-1-1 and get the person to the nearest hospital. 
 
For other signs that concern you, call your health care provider. Adverse reactions should be reported to the Vaccine Adverse Event Reporting System (VAERS). Your health care provider will usually file this report, or you can do it yourself. Visit the VAERS website at www.vaers. hhs.gov or call 2-411.303.3258. VAERS is only for reporting reactions, and VAERS staff do not give medical advice.  
 
6. The National Vaccine Injury Compensation Program 
 
The [employer] Vaccine Injury Compensation Program (VICP) is a federal program that was created to compensate people who may have been injured by certain vaccines. Visit the VICP website at www.hrsa.gov/vaccinecompensation or call 8-905.846.2435 to learn about the program and about filing a claim. There is a time limit to file a claim for compensation. 7. How can I learn more?  Ask your health care provider.  Call your local or state health department.  Contact the Centers for Disease Control and Prevention (CDC): 
- Call 9-569.243.8116 (1-800-CDC-INFO) or 
- Visit CDCs influenza website at www.cdc.gov/flu Vaccine Information Statement (Interim) Inactivated Influenza Vaccine 8/15/2019 
42 ANIRUDH Perea [id number]-86 [facility] and [employer] Centers for Disease Control and Prevention Office Use Only Medicare Wellness Visit, Female The best way to live healthy is to have a lifestyle where you eat a well-balanced diet, exercise regularly, limit alcohol use, and quit all forms of tobacco/nicotine, if applicable. Regular preventive services are another way to keep healthy. Preventive services (vaccines, screening tests, monitoring & exams) can help personalize your care plan, which helps you manage your own care. Screening tests can find health problems at the earliest stages, when they are easiest to treat. [facility] follows the current, evidence-based guidelines published by the [place] (USPSTF) when recommending preventive services for our patients. Because we follow these guidelines, sometimes recommendations change over time as research supports it. (For example, mammograms used to be recommended annually. Even though Medicare will still pay for an annual mammogram, the newer guidelines recommend a mammogram every two years for women of average risk).   Of course, you and your doctor may decide to screen more often for some diseases, based on your risk and your co-morbidities (chronic disease you are already diagnosed with). Preventive services for you include: - Medicare offers their members a free annual wellness visit, which is time for you and your primary care provider to discuss and plan for your preventive service needs. Take advantage of this benefit every year! 
-All adults over the age of 72 should receive the recommended pneumonia vaccines. Current USPSTF guidelines recommend a series of two vaccines for the best pneumonia protection.  
-All adults should have a flu vaccine yearly and a tetanus vaccine every 10 years.  
-All adults age 48 and older should receive the shingles vaccines (series of two vaccines). -All adults age 38-68 who are overweight should have a diabetes screening test once every three years.  
-All adults born between [birth year] and 1965 should be screened once for Hepatitis C. 
-Other screening tests and preventive services for persons with diabetes include: an eye exam to screen for diabetic retinopathy, a kidney function test, a foot exam, and stricter control over your cholesterol.  
-Cardiovascular screening for adults with routine risk involves an electrocardiogram (ECG) at intervals determined by your doctor.  
-Colorectal cancer screenings should be done for adults age 54-65 with no increased risk factors for colorectal cancer. There are a number of acceptable methods of screening for this type of cancer. Each test has its own benefits and drawbacks. Discuss with your doctor what is most appropriate for you during your annual wellness visit. The different tests include: colonoscopy (considered the best screening method), a fecal occult blood test, a fecal DNA test, and sigmoidoscopy. 
 
-A bone mass density test is recommended when a woman turns 65 to screen for osteoporosis. This test is only recommended one time, as a screening. Some providers will use this same test as a disease monitoring tool if you already have osteoporosis.  
-Breast cancer screenings are recommended every other year for women of normal risk, age 54-69. 
-Cervical cancer screenings for women over age 72 are only recommended with certain risk factors. Here is a list of your current Health Maintenance items (your personalized list of preventive services) with a due date: 
Health Maintenance Due Topic Date Due  
 Hepatitis C Test  1955 [facility] Eye Exam  12/19/1963  Colonoscopy  12/19/1971  Shingles Vaccine (1 of 2) 12/19/2003 [facility] Annual Well Visit  04/05/2020 [facility]  04/05/2020  Yearly Flu Vaccine (1) 09/01/2020

## 2020-10-06 NOTE — PROGRESS NOTES
Room: 3b    Identified pt with two pt identifiers(name and ). Reviewed record in preparation for visit and have obtained necessary documentation. All patient medications has been reviewed. Chief Complaint   Patient presents with    Annual Wellness Visit    Diabetes    Cholesterol Problem    Hypertension       Health Maintenance Due   Topic    Hepatitis C Screening     Eye Exam Retinal or Dilated     Colonoscopy     Shingrix Vaccine Age 50> (1 of 2)    Medicare Yearly Exam     Foot Exam Q1     Flu Vaccine (1)     Colonoscopy:  Patient need referal    Shingrix: Not Done    Eye Exam: DR. Mehreen Gaspar        Vitals:    10/06/20 1322   BP: (!) 143/81   Pulse: 92   Resp: 20   Temp: 98.7 °F (37.1 °C)   TempSrc: Oral   SpO2: 98%   Weight: 216 lb 3.2 oz (98.1 kg)   Height: 5' 5.5\" (1.664 m)   PainSc:   8   PainLoc: Generalized       4. Have you been to the ER, urgent care clinic since your last visit? Hospitalized since your last visit? No    5. Have you seen or consulted any other health care providers outside of the [address] [place] [address] since your last visit? Include any pap smears or colon screening. No    6. Would you like to receive your flu shot today? YES    8. Do you have an Advanced Directive/ Living Will in place? YES  If yes, do we have a copy on file YES  If no, would you like information NO    Patient is accompanied by self I have received verbal consent from Vianey Cuello to discuss any/all medical information while they are present in the room.

## 2020-10-06 NOTE — PROGRESS NOTES
This is the Subsequent Medicare Annual Wellness Exam, performed 12 months or more after the Initial AWV or the last Subsequent AWV    I have reviewed the patient's medical history in detail and updated the computerized patient record. History     Jai Tavera is a 75 y.o. female. Presents for Medicare AWV and to establish care. Former PCP was Dr. Mallika Hannon, now retired, last saw on 3/3/20. She has type 2 DM, HTN, hyperlipidemia, hypothyroidism, MANUEL on CPAP, cervical and lumbar spinal stenosis, and obesity. She complains of blisters on her feet that develop when her DM is not well-controlled. Says she stopped taking Cymbalta because it was not helping her cervical stenosis symptoms. Endocrine Review  She is seen for diabetes. Diagnosed 23 yrs ago; reports she had been well-controlled for years on Janumet and Trulicity but due to being in the Medicare Part D donut hole last year was changed to glipizide and off Trulicity for 9 months; in donut hole again presently, pays $310 a month for Trulicity out of pocket; will be out of the donut hole in Dec.  Since last visit she reports no polyuria or polydipsia, no hypoglycemia, no significant changes. Denies numbness, tingling, or burning at feet. Home glucose monitoring: is performed regularly. FBS: 150's. PM: less than 190's. She reports medication compliance: compliant all of the time. Medication side effects: none. Diabetic diet compliance: compliant most of the time. Lab review: labs reviewed and discussed with patient, A1c was 7.4% on 10/1/20. Eye exam: UTD (Dr. Emilie Mcburney). Cardiovascular Review  The patient has hypertension and hyperlipidemia. Denies HA's, CP, SOB, palpitations, or leg swelling. Home BP: 130-138/70's. She reports taking medications as instructed, no medication side effects noted.   Diet and Lifestyle: generally follows a low fat low cholesterol diet, generally follows a low sodium diet, no formal exercise but active during the day. Lab review: labs reviewed and discussed with patient. Thyroid Review  Patient is seen for follow up of hypothyroidism. Thyroid ROS: denies fatigue, weight changes, heat/cold intolerance, bowel/skin changes or CVS symptoms. Taking medication as prescribed. Last TSH was normal.    Soc Hx  . Has 2 sons and 9 grandchildren. 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 Never smoker. Denies alcohol. Does stretching exercises regularly. Health Maintenance  Flu vaccine: 10/6/20  Pneumonia vaccine: PPSV-23 2/5/16, PCV-13 12/28/18, due for PPSV-23 in 12/2021  Tetanus vaccine: Tdap 7/17/12  Zoster vaccine: due for this, check at pharmacy  Colonoscopy: 2010, due for this, prefers to do FIT this yr due to COVID-19 pandemic  Mammogram: 5/11/20  Bone density: 9/24/19, osteopenia at lumbar spine  Eye exam: 9/20, Dr. Eva Santos exam: 10/6/20  Lipids: 10/1/20 (tot chol 128, LDL 69)  A1c: 10/1/20 (7.4%)  Urine microalbumin: 10/1/20, positive  Advanced Directives: on file  End of Life: on file       ROS  A complete review of systems was performed and is negative except for those mentioned in the HPI.     Patient Active Problem List   Diagnosis Code    Hypertension, essential I10    Hypercholesterolemia E78.00    Controlled type 2 diabetes mellitus with microalbuminuria, without long-term current use of insulin (HCC) E11.29, R80.9    Acquired hypothyroidism E03.9    MANUEL (obstructive sleep apnea) G47.33    Carpal tunnel syndrome of right wrist G56.01    Spinal stenosis of cervical region M48.02    Spinal stenosis of lumbar region with neurogenic claudication M48.062    Osteopenia of multiple sites M85.89    Severe obesity (HCC) E66.01     Past Medical History:   Diagnosis Date    Diabetes ([facility] [address]     Hypercholesterolemia     Hypertension     Sleep apnea     CPAP complient     Thyroid disease     TIA (transient ischemic attack)     Questionable per patient left side defecits Past Surgical History:   Procedure Laterality Date    HX BREAST BIOPSY Right     Benign (per patient)    HX  SECTION       x 2    HX CHOLECYSTECTOMY      HX HEENT      parathyroidectomy    HX HYSTERECTOMY       Current Outpatient Medications   Medication Sig Dispense Refill    DULoxetine (CYMBALTA) 60 mg capsule Take 1 capsule by mouth once daily 30 Cap 0    amLODIPine (NORVASC) 2.5 mg tablet Take 1 tablet by mouth once daily 90 Tab 0    Blood-Glucose Meter monitoring kit Use to test blood sugar once a day 1 Kit 0    lancets misc Use to test blood sugar once a day 100 Each 3    glucose blood VI test strips (blood glucose test) strip Use to test blood sugar once a day 100 Strip 3    levothyroxine (SYNTHROID) 88 mcg tablet TAKE 1/2 (ONE-HALF) TABLET BY MOUTH ON MONDAY,  AND 1 TAB ALL OTHER DAYS 90 Tab 3    dulaglutide (Trulicity) 3.75 SK/8.9 mL sub-q pen 0.75 mg by SubCUTAneous route every seven (7) days. 4 Pen 11    rosuvastatin (CRESTOR) 10 mg tablet Take 1 Tab by mouth nightly. 90 Tab 3    metFORMIN (GLUCOPHAGE) 1,000 mg tablet TAKE 1 TABLET BY MOUTH TWICE DAILY WITH MEALS 180 Tab 3    lisinopril (PRINIVIL, ZESTRIL) 40 mg tablet TAKE 1 TABLET BY MOUTH ONCE DAILY 90 Tab 3    cyanocobalamin (VITAMIN B-12) 500 mcg tablet Take 500 mcg by mouth daily.  glipiZIDE SR (GLUCOTROL XL) 5 mg CR tablet Take 1 Tab by mouth daily. 90 Tab 3    aspirin delayed-release 81 mg tablet Take  by mouth daily.  acetaminophen (TYLENOL EXTRA STRENGTH) 500 mg tablet Take 1,000 mg by mouth daily.  omega 3-dha-epa-fish oil 100-160-1,000 mg cap Take 2 Caps by mouth daily.  magnesium oxide 500 mg tab Take 500 mg by mouth daily.  OTHER energy greens 1 scoop with 8oz water daily      fluticasone (FLONASE ALLERGY RELIEF) 50 mcg/actuation nasal spray 2 Sprays by Both Nostrils route as needed.       glucose blood VI test strips (ONETOUCH ULTRA BLUE TEST STRIP) strip by Does Not Apply route See Admin Instructions. Test 3 times daily      ascorbic acid, vitamin C, (VITAMIN C) 500 mg tablet Take  by mouth.  multivitamin with iron tablet Take 1 Tab by mouth daily.  vit B Cmplx 3-FA-Vit C-Biotin (NEPHRO MANINDER RX) 1- mg-mg-mcg tablet Take 1 Tab by mouth daily. Allergies   Allergen Reactions    Gabapentin Other (comments)     Hot flashes    Topiramate Other (comments)     Hot flashes       Family History   Problem Relation Age of Onset   [facility] Cancer Mother         bone    Diabetes Mother     Hypertension Mother     Hypertension Father     Cancer Father         blood    Hypertension Sister     Sleep Apnea Brother     No Known Problems Brother      Social History     Tobacco Use    Smoking status: Never Smoker    Smokeless tobacco: Never Used   Substance Use Topics    Alcohol Use     Frequency: Never       Depression Risk Factor Screening:     3 most recent PHQ Screens 10/6/2020   Little interest or pleasure in doing things Not at all   Feeling down, depressed, irritable, or hopeless Not at all   Total Score PHQ 2 0       Alcohol Risk Screen   Do you average more than 1 drink per night or more than 7 drinks a week:  No    On any one occasion in the past three months have you have had more than 3 drinks containing alcohol:  No      Functional Ability and Level of Safety:   Hearing: Hearing is good. Activities of Daily Living: The home contains: grab bars  Patient does total self care     Ambulation: with no difficulty     Fall Risk:  Fall Risk Assessment, last 12 mths 10/6/2020   Able to walk? Yes   Fall in past 12 months?  No     Abuse Screen:  Patient is not abused       Cognitive Screening   Has your family/caregiver stated any concerns about your memory: no     Cognitive Screening: normal by exam    Visit Vitals  /68 (BP 1 Location: Left arm, BP Patient Position: Sitting)   Pulse 92   Temp 98.7 °F (37.1 °C) (Oral)   Resp 20   Ht 5' 5.5\" (1.664 m)   Wt 216 lb 3.2 oz (98.1 kg)   SpO2 98%   BMI 35.43 kg/m²       General: Obese, no distress. HEENT:  Head normocephalic/atraumatic, no scleral icterus  Neck: Supple. No JVD, lymphadenopathy, or thyromegaly. Lungs:  Clear to auscultation bilaterally. Good air movement. Heart:  Regular rate and rhythm, normal S1 and S2, no murmur, gallop, or rub  Extremities: No clubbing, cyanosis, or edema. Neurological: Alert and oriented. Psychiatric: Normal mood and affect. Behavior is normal.   Diabetic foot exam:     Left Foot:   Visual Exam: normal except for small blisters at 1st 2 toes   Pulse DP: 2+ (normal)   Filament test: normal sensation    Vibratory sensation: normal      Right Foot:   Visual Exam: normal except for small blister at great toe   Pulse DP: 2+ (normal)   Filament test: normal sensation    Vibratory sensation: normal    Results for orders placed or performed in visit on 08/35/27   METABOLIC PANEL, COMPREHENSIVE   Result Value Ref Range    Glucose 159 (H) 65 - 99 mg/dL    BUN 9 8 - 27 mg/dL    Creatinine 0.97 0.57 - 1.00 mg/dL    GFR est non-AA 61 >59 mL/min/1.73    GFR est AA 70 >59 mL/min/1.73    BUN/Creatinine ratio 9 (L) 12 - 28    Sodium 140 134 - 144 mmol/L    Potassium 4.6 3.5 - 5.2 mmol/L    Chloride 102 96 - 106 mmol/L    CO2 21 20 - 29 mmol/L    Calcium 9.2 8.7 - 10.3 mg/dL    Protein, total 7.4 6.0 - 8.5 g/dL    Albumin 4.5 3.8 - 4.8 g/dL    GLOBULIN, TOTAL 2.9 1.5 - 4.5 g/dL    A-G Ratio 1.6 1.2 - 2.2    Bilirubin, total 0.2 0.0 - 1.2 mg/dL    Alk.  phosphatase 64 39 - 117 IU/L    AST (SGOT) 35 0 - 40 IU/L    ALT (SGPT) 34 (H) 0 - 32 IU/L   LIPID PANEL   Result Value Ref Range    Cholesterol, total 128 100 - 199 mg/dL    Triglyceride 93 0 - 149 mg/dL    HDL Cholesterol 41 >39 mg/dL    VLDL Cholesterol Inko 18 5 - 40 mg/dL    LDL Chol Calc (NIH) 69 0 - 99 mg/dL   MICROALBUMIN, UR, RAND W/ MICROALB/CREAT RATIO   Result Value Ref Range    Creatinine, urine 68.5 Not Estab. mg/dL    Microalbumin, urine 636.3 Not Estab. ug/mL    Microalb/Creat ratio (ug/mg creat.) 929 (H) 0 - 29 mg/g creat   HEMOGLOBIN A1C WITH EAG   Result Value Ref Range    Hemoglobin A1c 7.4 (H) 4.8 - 5.6 %    Estimated average glucose 166 mg/dL       Patient Care Team   Patient Care Team:  Cory Zaldivar MD as PCP - General (Internal Medicine)  Tabitha Kenny NP as PCP - [address] [place] [address] [facility]  Elis Orona MD as Physician (Neurology)    Assessment/Plan   Education and counseling provided:  Are appropriate based on today's review and evaluation  End-of-Life planning (with patient's consent)    Discussed lab results from 10/1/20 with patient. Normal kidney and liver tests, normal cholesterol (tot chol 128, LDL 69).  A1c 7.4%, was 6.6% on 3/3/20.  A1c of 7.4% means avg  over the past 3 mons.  Urine test showed much protein in the urine, an early sign of diabetes starting to affect the kidneys. Diagnoses and all orders for this visit:    1. Medicare annual wellness visit, subsequent    2. Encounter to establish care  Medical records reviewed. 3. Controlled type 2 diabetes mellitus with microalbuminuria, without long-term current use of insulin ([facility])  A1c 7.4% on 10/1/20; not optimally controlled. Patient wants to continue on Trulicity despite cost.  Increase glipizide CR from 5 to 10 mg daily. -      DIABETES FOOT EXAM  -     Start glipiZIDE SR (GLUCOTROL XL) 10 mg CR tablet; Take 1 Tab by mouth daily. Indications: type 2 diabetes mellitus    4. Hypertension, essential  At upper normal range. Will increase amlodipine for better control.  -     Start amLODIPine (NORVASC) 5 mg tablet; Take 1 Tab by mouth daily. 5. Hypercholesterolemia  Controlled on Crestor 10 mg nightly. 6. Acquired hypothyroidism  Controlled on levothyroxine. 7. MANUEL (obstructive sleep apnea)  Controlled on CPAP. 8. Spinal stenosis of cervical region  Not controlled with Cymbalta. 9. Screening for depression  -     Lynda Alaniz    10. Screening for colon cancer  -     OCCULT BLOOD IMMUNOASSAY,DIAGNOSTIC; Future    11. Encounter for immunization  -     ADMIN INFLUENZA VIRUS VAC  -     FLU (FLUAD QUAD INFLUENZA VACCINE,QUAD,ADJUVANTED)    12. Advance directive on file    Greater than 40 mins direct face-to-face time spent with patient. Greater than 50% of time spent on counseling and coordination of care. Follow-up and Dispositions    · Return in about 3 months (around 1/6/2021), or if symptoms worsen or fail to improve, for DM, HTN, POC A1c.            Health Maintenance Due   Topic Date Due    Hepatitis C Screening  1955    Eye Exam Retinal or Dilated  12/19/1963    Colonoscopy  12/19/1971    Shingrix Vaccine Age 50> (1 of 2) 12/19/2003

## 2020-10-07 NOTE — ACP (ADVANCE CARE PLANNING)
Advance Care Planning       Advance Care Planning (ACP) Physician/NP/PA (Provider) Conversation        Date of ACP Conversation: 10/6/2020    Conversation Conducted with:   Patient with Decision Making Modesta Salazar Maker:    Current Designated Health Care Decision Maker:   (If there is a valid [address] named in the [address]" box in the ACP activity, but it is not visible above, be sure to open that field and then select the health care decision maker relationship (ie \"primary\") in the blank space to the right of the name.)    Note: Assess and validate information in current ACP documents, as indicated. If no Authorized Decision Maker has previously been identified, then patient chooses [address]:  \"Who would you like to name as your primary health care decision-maker? \"    Name: Kay Rodríguez   Relationship:  Phone number: [phone] (home), [phone] (mobile)  \"Can this person be reached easily? \" YES  \"Who would you like to name as your back-up decision maker? \"   Name: Nirmal Allen      Note: If the relationship of these Decision-Makers to the patient does NOT follow your state's Next of Kin hierarchy, recommend that patient complete ACP document that meets state-specific requirements to allow them to act on the patient's behalf when appropriate. Care Preferences:    Hospitalization: \"If your health worsens and it becomes clear that your chance of recovery is unlikely, what would your preference be regarding hospitalization? \"  If the patient would want hospitalization, answer \"yes\". If the patient would prefer comfort-focused treatment without hospitalization, answer \"no\". yes      Ventilation: \"If you were in your present state of health and suddenly became very ill and were unable to breathe on your own, what would your preference be about the use of a ventilator (breathing machine) if it was available to you? \"    If patient would desire the use of a ventilator (breathing machine), answer \"yes\", if not answer \"no\":yes    \"If your health worsens and it becomes clear that your chance of recovery is unlikely, what would your preference be about the use of a ventilator (breathing machine) if it was available to you? \"   no      Resuscitation:  \"CPR works best to restart the heart when there is a sudden event, like a heart attack, in someone who is otherwise healthy. Unfortunately, CPR does not typically restart the heart for people who have serious health conditions or who are very sick. \"    \"In the event your heart stopped as a result of an underlying serious health condition, would you want attempts to be made to restart your heart (answer \"yes\" for attempt to resuscitate) or would you prefer a natural death (answer \"no\" for do not attempt to resuscitate)? \"   yes, if chance of recovery likely    NOTE: If the patient has a valid advance directive AND provides care preference(s) that are inconsistent with that prior directive, advise the patient to consider either: creating a new advance directive that complies with state-specific requirements; or, if that is not possible, orally revoking that prior directive in accordance with state-specific requirements, which must be documented in the EHR.     Conversation Outcomes / Follow-Up Plan:   Reviewed Advance Directive on file      Length of Voluntary ACP Conversation in minutes:  <16 minutes (Non-Billable)      Tommy Tobin MD

## 2020-10-14 DIAGNOSIS — E11.29 CONTROLLED TYPE 2 DIABETES MELLITUS WITH MICROALBUMINURIA, WITHOUT LONG-TERM CURRENT USE OF INSULIN (HCC): ICD-10-CM

## 2020-10-14 DIAGNOSIS — R80.9 CONTROLLED TYPE 2 DIABETES MELLITUS WITH MICROALBUMINURIA, WITHOUT LONG-TERM CURRENT USE OF INSULIN (HCC): ICD-10-CM

## 2020-10-14 RX ORDER — DULAGLUTIDE 0.75 MG/.5ML
INJECTION, SOLUTION SUBCUTANEOUS
Qty: 12 PEN | Refills: 3 | Status: SHIPPED | OUTPATIENT
Start: 2020-10-14 | End: 2021-01-19 | Stop reason: DRUGHIGH

## 2020-10-14 NOTE — TELEPHONE ENCOUNTER
PCP: Cory Zaldivar MD     Last appt: 10/6/2020   Future Appointments   Date Time Provider Chidi Unger   2021 11:00 AM Ria Reyes MD [facility] AMB        Requested Prescriptions     Pending Prescriptions Disp Refills    dulaglutide (Trulicity) 2.61 JG/4.6 mL sub-q pen 12 Pen 3     Si.75 mg by SubCUTAneous route every seven (7) days.

## 2020-10-14 NOTE — TELEPHONE ENCOUNTER
[facility] on file sent a fax requesting a 90-day refill of   Requested Prescriptions     Pending Prescriptions Disp Refills    dulaglutide (Trulicity) 5.83 ZS/0.6 mL sub-q pen 4 Pen 11     Si.75 mg by SubCUTAneous route every seven (7) days.

## 2020-10-16 LAB — HEMOCCULT STL QL IA: NEGATIVE

## 2020-10-19 NOTE — PROGRESS NOTES
Message sent to patient by My Chart:  Your FIT test for colon cancer screening returned negative. This means you are very unlikely to have colon cancer. You should have a repeat FIT test in one year.     Dr. Candi Alpers

## 2020-12-06 DIAGNOSIS — I10 HYPERTENSION, ESSENTIAL: ICD-10-CM

## 2020-12-06 RX ORDER — LISINOPRIL 40 MG/1
TABLET ORAL
Qty: 90 TAB | Refills: 0 | Status: SHIPPED | OUTPATIENT
Start: 2020-12-06 | End: 2021-03-03

## 2021-01-19 ENCOUNTER — OFFICE VISIT (OUTPATIENT)
Dept: INTERNAL MEDICINE CLINIC | Age: 66
End: 2021-01-19
Payer: MEDICARE

## 2021-01-19 VITALS
OXYGEN SATURATION: 98 % | DIASTOLIC BLOOD PRESSURE: 84 MMHG | SYSTOLIC BLOOD PRESSURE: 137 MMHG | TEMPERATURE: 98.4 F | RESPIRATION RATE: 12 BRPM | HEART RATE: 83 BPM | HEIGHT: 65 IN | BODY MASS INDEX: 36.32 KG/M2 | WEIGHT: 218 LBS

## 2021-01-19 DIAGNOSIS — Z11.59 NEED FOR HEPATITIS C SCREENING TEST: ICD-10-CM

## 2021-01-19 DIAGNOSIS — M54.42 CHRONIC BILATERAL LOW BACK PAIN WITH LEFT-SIDED SCIATICA: ICD-10-CM

## 2021-01-19 DIAGNOSIS — E03.9 ACQUIRED HYPOTHYROIDISM: ICD-10-CM

## 2021-01-19 DIAGNOSIS — E78.00 HYPERCHOLESTEROLEMIA: ICD-10-CM

## 2021-01-19 DIAGNOSIS — E11.9 TYPE 2 DIABETES MELLITUS WITHOUT COMPLICATION, WITHOUT LONG-TERM CURRENT USE OF INSULIN (HCC): Primary | ICD-10-CM

## 2021-01-19 DIAGNOSIS — Z23 NEED FOR SHINGLES VACCINE: ICD-10-CM

## 2021-01-19 DIAGNOSIS — E66.01 SEVERE OBESITY (HCC): ICD-10-CM

## 2021-01-19 DIAGNOSIS — G89.29 CHRONIC BILATERAL LOW BACK PAIN WITH LEFT-SIDED SCIATICA: ICD-10-CM

## 2021-01-19 DIAGNOSIS — I10 HYPERTENSION, ESSENTIAL: ICD-10-CM

## 2021-01-19 PROBLEM — R80.9 CONTROLLED TYPE 2 DIABETES MELLITUS WITH MICROALBUMINURIA, WITHOUT LONG-TERM CURRENT USE OF INSULIN (HCC): Status: RESOLVED | Noted: 2018-12-28 | Resolved: 2021-01-19

## 2021-01-19 PROBLEM — E11.29 CONTROLLED TYPE 2 DIABETES MELLITUS WITH MICROALBUMINURIA, WITHOUT LONG-TERM CURRENT USE OF INSULIN (HCC): Status: RESOLVED | Noted: 2018-12-28 | Resolved: 2021-01-19

## 2021-01-19 LAB — HBA1C MFR BLD HPLC: 7.3 %

## 2021-01-19 PROCEDURE — G9899 SCRN MAM PERF RSLTS DOC: HCPCS | Performed by: INTERNAL MEDICINE

## 2021-01-19 PROCEDURE — 99214 OFFICE O/P EST MOD 30 MIN: CPT | Performed by: INTERNAL MEDICINE

## 2021-01-19 PROCEDURE — 3051F HG A1C>EQUAL 7.0%<8.0%: CPT | Performed by: INTERNAL MEDICINE

## 2021-01-19 PROCEDURE — 2022F DILAT RTA XM EVC RTNOPTHY: CPT | Performed by: INTERNAL MEDICINE

## 2021-01-19 PROCEDURE — G8754 DIAS BP LESS 90: HCPCS | Performed by: INTERNAL MEDICINE

## 2021-01-19 PROCEDURE — G8399 PT W/DXA RESULTS DOCUMENT: HCPCS | Performed by: INTERNAL MEDICINE

## 2021-01-19 PROCEDURE — G8752 SYS BP LESS 140: HCPCS | Performed by: INTERNAL MEDICINE

## 2021-01-19 PROCEDURE — 1090F PRES/ABSN URINE INCON ASSESS: CPT | Performed by: INTERNAL MEDICINE

## 2021-01-19 PROCEDURE — 1101F PT FALLS ASSESS-DOCD LE1/YR: CPT | Performed by: INTERNAL MEDICINE

## 2021-01-19 PROCEDURE — G8510 SCR DEP NEG, NO PLAN REQD: HCPCS | Performed by: INTERNAL MEDICINE

## 2021-01-19 PROCEDURE — G8427 DOCREV CUR MEDS BY ELIG CLIN: HCPCS | Performed by: INTERNAL MEDICINE

## 2021-01-19 PROCEDURE — 3017F COLORECTAL CA SCREEN DOC REV: CPT | Performed by: INTERNAL MEDICINE

## 2021-01-19 PROCEDURE — 83036 HEMOGLOBIN GLYCOSYLATED A1C: CPT | Performed by: INTERNAL MEDICINE

## 2021-01-19 PROCEDURE — G8536 NO DOC ELDER MAL SCRN: HCPCS | Performed by: INTERNAL MEDICINE

## 2021-01-19 PROCEDURE — G8417 CALC BMI ABV UP PARAM F/U: HCPCS | Performed by: INTERNAL MEDICINE

## 2021-01-19 NOTE — PROGRESS NOTES
CC:   Chief Complaint   Patient presents with    Diabetes    Hypertension       HISTORY OF PRESENT ILLNESS  Janeth Pizarro is a 77 y.o. female. Presents for 3 month follow up evaluation. She has type 2 DM, HTN, hyperlipidemia, hypothyroidism, MANUEL on CPAP, cervical and lumbar spinal stenosis with left-sided sciatica, and obesity. Complains of low back pain that radiates down left leg. No relief with Tylenol. Aleve 1 tab daily helps but was told in the past not to take NSAID's. Has occasional left-sided neck pain    Endocrine Review  She is seen for diabetes. Since last visit she reports no polyuria or polydipsia, no hypoglycemia, no numbness, tingling, or burning at feet. Home glucose monitoring: is performed regularly. FBS: 109-120. She reports medication compliance: compliant all of the time.  Medication side effects: none.  Diabetic diet compliance: compliant most of the time.  Lab review: A1c was 7.2% today (1/19/21), was 7.4% on 10/1/20.  Eye exam: UTD (Dr. Cameron Miranda).     Cardiovascular Review  The patient has hypertension and hyperlipidemia.  Denies HA's, CP, SOB, palpitations, or leg swelling. At last clinic visit, amlodipine increased from 2.5 to 5 mg daily. Home BP: 125-130/60-70's. She reports taking medications as instructed, no medication side effects noted.  Diet and Lifestyle: generally follows a low fat low cholesterol diet, generally follows a low sodium diet, no formal exercise but active during the day.       Thyroid Review  Patient is seen for follow up of hypothyroidism. Thyroid ROS: denies fatigue, weight changes, heat/cold intolerance, bowel/skin changes or CVS symptoms. Taking medication as prescribed. Last TSH was normal.     Soc Hx  . Has 2 sons and 9 grandchildren. 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 Never smoker. Denies alcohol.  Does stretching exercises regularly.      ROS  A complete review of systems was performed and is negative except for those mentioned in the HPI. Patient Active Problem List   Diagnosis Code    Hypertension, essential I10    Hypercholesterolemia E78.00    Controlled type 2 diabetes mellitus with microalbuminuria, without long-term current use of insulin (HCC) E11.29, R80.9    Acquired hypothyroidism E03.9    MANUEL (obstructive sleep apnea) G47.33    Carpal tunnel syndrome of right wrist G56.01    Spinal stenosis of cervical region M48.02    Spinal stenosis of lumbar region with neurogenic claudication M48.062    Osteopenia of multiple sites M85.89    Severe obesity ([facility]) E66.01     Past Medical History:   Diagnosis Date    Diabetes ([facility]     Hypercholesterolemia     Hypertension     Sleep apnea     CPAP complient     Thyroid disease     TIA (transient ischemic attack)     Questionable per patient left side defecits      Allergies   Allergen Reactions    Gabapentin Other (comments)     Hot flashes    Topiramate Other (comments)     Hot flashes       Current Outpatient Medications   Medication Sig Dispense Refill    lisinopriL (PRINIVIL, ZESTRIL) 40 mg tablet Take 1 tablet by mouth once daily 90 Tab 0    dulaglutide (Trulicity) 6.57 NH/6.3 mL sub-q pen 0.75 mg by SubCUTAneous route every seven (7) days. 12 Pen 3    amLODIPine (NORVASC) 5 mg tablet Take 1 Tab by mouth daily. 90 Tab 3    glipiZIDE SR (GLUCOTROL XL) 10 mg CR tablet Take 1 Tab by mouth daily. Indications: type 2 diabetes mellitus 90 Tab 3    levothyroxine (SYNTHROID) 88 mcg tablet TAKE 1/2 (ONE-HALF) TABLET BY MOUTH ON MONDAY,  AND 1 TAB ALL OTHER DAYS 90 Tab 3    rosuvastatin (CRESTOR) 10 mg tablet Take 1 Tab by mouth nightly. 90 Tab 3    metFORMIN (GLUCOPHAGE) 1,000 mg tablet TAKE 1 TABLET BY MOUTH TWICE DAILY WITH MEALS 180 Tab 3    cyanocobalamin (VITAMIN B-12) 500 mcg tablet Take 500 mcg by mouth daily.  aspirin delayed-release 81 mg tablet Take  by mouth daily.       acetaminophen (TYLENOL EXTRA STRENGTH) 500 mg tablet Take 1,000 mg by mouth daily.  omega 3-dha-epa-fish oil 100-160-1,000 mg cap Take 2 Caps by mouth daily.  magnesium oxide 500 mg tab Take 500 mg by mouth daily.  OTHER energy greens 1 scoop with 8oz water daily      fluticasone (FLONASE ALLERGY RELIEF) 50 mcg/actuation nasal spray 2 Sprays by Both Nostrils route as needed.  ascorbic acid, vitamin C, (VITAMIN C) 500 mg tablet Take  by mouth.  multivitamin with iron tablet Take 1 Tab by mouth daily.  vit B Cmplx 3-FA-Vit C-Biotin (NEPHRO MANINDER RX) 1- mg-mg-mcg tablet Take 1 Tab by mouth daily. PHYSICAL EXAM  Visit Vitals  /84 (BP 1 Location: Left arm, BP Patient Position: Sitting)   Pulse 83   Temp 98.4 °F (36.9 °C) (Oral)   Resp 12   Ht 5' 5\" (1.651 m)   Wt 218 lb (98.9 kg)   SpO2 98%   BMI 36.28 kg/m²       General: Obese, no distress. HEENT:  Head normocephalic/atraumatic, no scleral icterus  Neck: Supple. No carotid bruits, JVD, lymphadenopathy, or thyromegaly. Lungs:  Clear to ausculation bilaterally. Good air movement. Heart:  Regular rate and rhythm, normal S1 and S2, no murmur, gallop, or rub  Back: Decreased flexion. No tenderness. Neg SLR bilaterally. Extremities: No clubbing, cyanosis, or edema. Neurological: Alert and oriented. Psychiatric: Normal mood and affect. Behavior is normal.     Results for orders placed or performed in visit on 01/19/21   AMB POC HEMOGLOBIN A1C   Result Value Ref Range    Hemoglobin A1c (POC) 7.3 %         ASSESSMENT AND PLAN    ICD-10-CM ICD-9-CM    1. Type 2 diabetes mellitus without complication, without long-term current use of insulin (HCC)  E11.9 250.00 AMB POC HEMOGLOBIN A1C      dulaglutide (TRULICITY) 1.5 GR/0.5 mL sub-q pen      HEMOGLOBIN A1C WITH EAG   2. Hypertension, essential  Z64 135.3 METABOLIC PANEL, COMPREHENSIVE      CBC WITH AUTOMATED DIFF   3. Hypercholesterolemia  E78.00 272.0 LIPID PANEL   4. Acquired hypothyroidism  E03.9 244.9 TSH 3RD GENERATION   5.  Chronic bilateral low back pain with left-sided sciatica  M54.42 724.2     G89.29 724.3      338.29    6. Severe obesity ([address]  E66.01 278.01    7. Need for shingles vaccine  Z23 V04.89    8. Need for hepatitis C screening test  Z11.59 V73.89 HEPATITIS C AB     Diagnoses and all orders for this visit:    1. Type 2 diabetes mellitus without complication, without long-term current use of insulin (HCC)  Improving control with A1c 7.3% today but not at goal of <7.0%. Will increase Trulicity dose. Continue glipizide and metformin.  -     AMB POC HEMOGLOBIN A1C  -     Increase to: dulaglutide (TRULICITY) 1.5 PETERSON/7.9 mL sub-q pen; 0.5 mL by SubCUTAneous route every seven (7) days.  -     HEMOGLOBIN A1C WITH EAG; Future    2. Hypertension, essential  Controlled. Continue lisinopril and amlodipine.   -     METABOLIC PANEL, COMPREHENSIVE; Future  -     CBC WITH AUTOMATED DIFF; Future    3. Hypercholesterolemia  Controlled on Crestor 10 mg nightly. -     LIPID PANEL; Future    4. Acquired hypothyroidism  Controlled on present dose of levothyroxine.  -     TSH 3RD GENERATION; Future    5. Chronic bilateral low back pain with left-sided sciatica  Continue Aleve as needed; take no more than 2 tabs a day. 6. Severe obesity ([address]  Discussed the patient's BMI with her. The BMI follow up plan is as follows: dietary management education, guidance, and counseling; encourage exercise; monitor weight; prescribed dietary intake. Follow up BMI in 4 months. 7. Need for shingles vaccine    8. Need for hepatitis C screening test  -     HEPATITIS C AB; Future      Follow-up and Dispositions    · Return in about 4 months (around 5/19/2021), or if symptoms worsen or fail to improve, for DM, HTN, hypothyroidism; schedule fasting labs 1 week prior to next appointment. I have discussed the diagnosis with the patient and the intended plan as seen in the above orders. Patient is in agreement.   The patient has received an after-visit summary and questions were answered concerning future plans. I have discussed medication side effects and warnings with the patient as well.

## 2021-01-19 NOTE — PATIENT INSTRUCTIONS
Sciatica: Exercises  Introduction  Here are some examples of typical rehabilitation exercises for your condition. Start each exercise slowly. Ease off the exercise if you start to have pain. Your doctor or physical therapist will tell you when you can start these exercises and which ones will work best for you. When you are not being active, find a comfortable position for rest. Some people are comfortable on the floor or a medium-firm bed with a small pillow under their head and another under their knees. Some people prefer to lie on their side with a pillow between their knees. Don't stay in one position for too long. Take short walks (10 to 20 minutes) every 2 to 3 hours. Avoid slopes, hills, and stairs until you feel better. Walk only distances you can manage without pain, especially leg pain. How to do the exercises  Back stretches   1. Get down on your hands and knees on the floor. 2. Relax your head and allow it to droop. Round your back up toward the ceiling until you feel a nice stretch in your upper, middle, and lower back. Hold this stretch for as long as it feels comfortable, or about 15 to 30 seconds. 3. Return to the starting position with a flat back while you are on your hands and knees. 4. Let your back sway by pressing your stomach toward the floor. Lift your buttocks toward the ceiling. 5. Hold this position for 15 to 30 seconds. 6. Repeat 2 to 4 times. Follow-up care is a key part of your treatment and safety. Be sure to make and go to all appointments, and call your doctor if you are having problems. It's also a good idea to know your test results and keep a list of the medicines you take. Where can you learn more? Go to http://www.gray.com/  Enter X329 in the search box to learn more about \"Sciatica: Exercises. \"  Current as of: March 2, 2020               Content Version: 12.6  © 9896-1061 [employer].    Care instructions adapted under license by [address] (which disclaims liability or warranty for this information). If you have questions about a medical condition or this instruction, always ask your healthcare professional. [address] any warranty or liability for your use of this information.

## 2021-01-19 NOTE — PROGRESS NOTES
[facility]  Identified pt with two pt identifiers(name and ). Chief Complaint   Patient presents with    Diabetes    Hypertension       Reviewed record In preparation for visit and have obtained necessary documentation. Advanced directive / living will on file. 1. Have you been to the ER, urgent care clinic or hospitalized since your last visit? No     2. Have you seen or consulted any other health care providers outside of the [address] [place] [address] since your last visit? Include any pap smears or colon screening. No    Vitals reviewed with provider. Health Maintenance reviewed:     Health Maintenance Due   Topic    Hepatitis C Screening     Shingrix Vaccine Age 50> (1 of 2)        Wt Readings from Last 3 Encounters:   10/06/20 216 lb 3.2 oz (98.1 kg)   20 213 lb (96.6 kg)   20 207 lb (93.9 kg)      Temp Readings from Last 3 Encounters:   10/06/20 98.7 °F (37.1 °C) (Oral)   20 98 °F (36.7 °C)   20 98 °F (36.7 °C) (Oral)      BP Readings from Last 3 Encounters:   10/06/20 138/68   20 139/74   20 141/81      Pulse Readings from Last 3 Encounters:   10/06/20 92   20 92   20 86      There were no vitals filed for this visit. Learning Assessment:   :     Learning Assessment 2018   PRIMARY LEARNER Patient   HIGHEST LEVEL OF EDUCATION - PRIMARY LEARNER  SOME COLLEGE   BARRIERS PRIMARY LEARNER NONE   CO-LEARNER CAREGIVER No   PRIMARY LANGUAGE ENGLISH   LEARNER PREFERENCE PRIMARY DEMONSTRATION   ANSWERED BY patient   RELATIONSHIP SELF        Depression Screening:   :     3 most recent PHQ Screens 10/6/2020   Little interest or pleasure in doing things Not at all   Feeling down, depressed, irritable, or hopeless Not at all   Total Score PHQ 2 0        Fall Risk Assessment:   :     Fall Risk Assessment, last 12 mths 10/6/2020   Able to walk? Yes   Fall in past 12 months?  No        Abuse Screening:   :     Abuse Screening Questionnaire 10/6/2020 3/3/2020 12/28/2018   Do you ever feel afraid of your partner? N N N   Are you in a relationship with someone who physically or mentally threatens you? N N N   Is it safe for you to go home?  Y Y Y        ADL Screening:   :     ADL Assessment 10/6/2020   Feeding yourself No Help Needed   Getting from bed to chair No Help Needed   Getting dressed No Help Needed   Bathing or showering No Help Needed   Walk across the room (includes cane/walker) No Help Needed   Using the telphone No Help Needed   Taking your medications No Help Needed   Preparing meals No Help Needed   Managing money (expenses/bills) No Help Needed   Moderately strenuous housework (laundry) No Help Needed   Shopping for personal items (toiletries/medicines) No Help Needed   Shopping for groceries No Help Needed   Driving No Help Needed   Climbing a flight of stairs No Help Needed   Getting to places beyond walking distances No Help Needed

## 2021-04-15 DIAGNOSIS — M54.16 LUMBAR RADICULOPATHY: Primary | ICD-10-CM

## 2021-04-15 RX ORDER — AMITRIPTYLINE HYDROCHLORIDE 25 MG/1
25 TABLET, FILM COATED ORAL
Qty: 30 TAB | Refills: 3 | Status: SHIPPED | OUTPATIENT
Start: 2021-04-15 | End: 2021-05-21 | Stop reason: SDDI

## 2021-04-29 ENCOUNTER — TRANSCRIBE ORDER (OUTPATIENT)
Dept: SCHEDULING | Age: 66
End: 2021-04-29

## 2021-04-29 DIAGNOSIS — Z12.31 VISIT FOR SCREENING MAMMOGRAM: Primary | ICD-10-CM

## 2021-05-03 RX ORDER — LEVOTHYROXINE SODIUM 88 UG/1
TABLET ORAL
Qty: 90 TAB | Refills: 0 | Status: SHIPPED | OUTPATIENT
Start: 2021-05-03 | End: 2021-08-02

## 2021-05-06 DIAGNOSIS — E78.00 HYPERCHOLESTEROLEMIA: ICD-10-CM

## 2021-05-06 RX ORDER — ROSUVASTATIN CALCIUM 10 MG/1
10 TABLET, COATED ORAL
Qty: 90 TAB | Refills: 3 | Status: SHIPPED | OUTPATIENT
Start: 2021-05-06 | End: 2022-06-24

## 2021-05-06 NOTE — TELEPHONE ENCOUNTER
Requested Prescriptions     Pending Prescriptions Disp Refills    rosuvastatin (CRESTOR) 10 mg tablet 90 Tab 3     Sig: Take 1 Tab by mouth nightly.

## 2021-05-06 NOTE — TELEPHONE ENCOUNTER
PCP: Ludwig Esparza MD     Last appt: 1/19/2021   Future Appointments   Date Time Provider Chidi Unger   5/21/2021  8:30 AM Ludwig Esparza MD [facility] BS AMB   6/2/2021 10:30 AM [address]   9/28/2021 11:00 AM Theadore Kayser, MD [facility] BS AMB        Requested Prescriptions     Pending Prescriptions Disp Refills    rosuvastatin (CRESTOR) 10 mg tablet 90 Tab 3     Sig: Take 1 Tab by mouth nightly.

## 2021-05-14 LAB
ALBUMIN SERPL-MCNC: 4.5 G/DL (ref 3.8–4.8)
ALBUMIN/GLOB SERPL: 1.5 {RATIO} (ref 1.2–2.2)
ALP SERPL-CCNC: 56 IU/L (ref 39–117)
ALT SERPL-CCNC: 57 IU/L (ref 0–32)
AST SERPL-CCNC: 64 IU/L (ref 0–40)
BASOPHILS # BLD AUTO: 0 X10E3/UL (ref 0–0.2)
BASOPHILS NFR BLD AUTO: 0 %
BILIRUB SERPL-MCNC: 0.2 MG/DL (ref 0–1.2)
BUN SERPL-MCNC: 12 MG/DL (ref 8–27)
BUN/CREAT SERPL: 13 (ref 12–28)
CALCIUM SERPL-MCNC: 9.9 MG/DL (ref 8.7–10.3)
CHLORIDE SERPL-SCNC: 102 MMOL/L (ref 96–106)
CHOLEST SERPL-MCNC: 109 MG/DL (ref 100–199)
CO2 SERPL-SCNC: 22 MMOL/L (ref 20–29)
CREAT SERPL-MCNC: 0.95 MG/DL (ref 0.57–1)
EOSINOPHIL # BLD AUTO: 0 X10E3/UL (ref 0–0.4)
EOSINOPHIL NFR BLD AUTO: 0 %
ERYTHROCYTE [DISTWIDTH] IN BLOOD BY AUTOMATED COUNT: 15.4 % (ref 11.7–15.4)
EST. AVERAGE GLUCOSE BLD GHB EST-MCNC: 163 MG/DL
GLOBULIN SER CALC-MCNC: 3 G/DL (ref 1.5–4.5)
GLUCOSE SERPL-MCNC: 111 MG/DL (ref 65–99)
HBA1C MFR BLD: 7.3 % (ref 4.8–5.6)
HCT VFR BLD AUTO: 36.6 % (ref 34–46.6)
HCV AB S/CO SERPL IA: <0.1 S/CO RATIO (ref 0–0.9)
HDLC SERPL-MCNC: 40 MG/DL
HGB BLD-MCNC: 11.7 G/DL (ref 11.1–15.9)
IMM GRANULOCYTES # BLD AUTO: 0 X10E3/UL (ref 0–0.1)
IMM GRANULOCYTES NFR BLD AUTO: 1 %
LDLC SERPL CALC-MCNC: 54 MG/DL (ref 0–99)
LYMPHOCYTES # BLD AUTO: 1.6 X10E3/UL (ref 0.7–3.1)
LYMPHOCYTES NFR BLD AUTO: 44 %
MCH RBC QN AUTO: 27.7 PG (ref 26.6–33)
MCHC RBC AUTO-ENTMCNC: 32 G/DL (ref 31.5–35.7)
MCV RBC AUTO: 87 FL (ref 79–97)
MONOCYTES # BLD AUTO: 0.3 X10E3/UL (ref 0.1–0.9)
MONOCYTES NFR BLD AUTO: 9 %
NEUTROPHILS # BLD AUTO: 1.6 X10E3/UL (ref 1.4–7)
NEUTROPHILS NFR BLD AUTO: 46 %
PLATELET # BLD AUTO: 237 X10E3/UL (ref 150–450)
POTASSIUM SERPL-SCNC: 4.4 MMOL/L (ref 3.5–5.2)
PROT SERPL-MCNC: 7.5 G/DL (ref 6–8.5)
RBC # BLD AUTO: 4.22 X10E6/UL (ref 3.77–5.28)
SODIUM SERPL-SCNC: 137 MMOL/L (ref 134–144)
TRIGL SERPL-MCNC: 74 MG/DL (ref 0–149)
TSH SERPL DL<=0.005 MIU/L-ACNC: 3.42 UIU/ML (ref 0.45–4.5)
VLDLC SERPL CALC-MCNC: 15 MG/DL (ref 5–40)
WBC # BLD AUTO: 3.5 X10E3/UL (ref 3.4–10.8)

## 2021-05-20 NOTE — PROGRESS NOTES
Will discuss results at 5/21/21 appt. A1c 7.3%, was the same on 1/19/21. Two liver tests were mildly high. May be due to medications (Crestor or metformin) or to fat build-up in the liver. Consider US. Other labs all normal (kidney tests, blood counts, cholesterol, thyroid, and hep C screening test).

## 2021-05-21 ENCOUNTER — OFFICE VISIT (OUTPATIENT)
Dept: INTERNAL MEDICINE CLINIC | Age: 66
End: 2021-05-21
Payer: MEDICARE

## 2021-05-21 VITALS
DIASTOLIC BLOOD PRESSURE: 81 MMHG | WEIGHT: 213 LBS | SYSTOLIC BLOOD PRESSURE: 133 MMHG | TEMPERATURE: 98.6 F | BODY MASS INDEX: 34.23 KG/M2 | HEART RATE: 85 BPM | OXYGEN SATURATION: 98 % | RESPIRATION RATE: 16 BRPM | HEIGHT: 66 IN

## 2021-05-21 DIAGNOSIS — I10 HYPERTENSION, ESSENTIAL: ICD-10-CM

## 2021-05-21 DIAGNOSIS — E11.9 TYPE 2 DIABETES MELLITUS WITHOUT COMPLICATION, WITHOUT LONG-TERM CURRENT USE OF INSULIN (HCC): Primary | ICD-10-CM

## 2021-05-21 DIAGNOSIS — E78.00 HYPERCHOLESTEROLEMIA: ICD-10-CM

## 2021-05-21 DIAGNOSIS — M48.02 SPINAL STENOSIS OF CERVICAL REGION: ICD-10-CM

## 2021-05-21 DIAGNOSIS — E03.9 ACQUIRED HYPOTHYROIDISM: ICD-10-CM

## 2021-05-21 DIAGNOSIS — R74.8 ELEVATED LIVER ENZYMES: ICD-10-CM

## 2021-05-21 DIAGNOSIS — E66.9 OBESITY (BMI 30-39.9): ICD-10-CM

## 2021-05-21 DIAGNOSIS — M48.062 SPINAL STENOSIS OF LUMBAR REGION WITH NEUROGENIC CLAUDICATION: ICD-10-CM

## 2021-05-21 PROCEDURE — G8510 SCR DEP NEG, NO PLAN REQD: HCPCS | Performed by: INTERNAL MEDICINE

## 2021-05-21 PROCEDURE — 1101F PT FALLS ASSESS-DOCD LE1/YR: CPT | Performed by: INTERNAL MEDICINE

## 2021-05-21 PROCEDURE — 99214 OFFICE O/P EST MOD 30 MIN: CPT | Performed by: INTERNAL MEDICINE

## 2021-05-21 PROCEDURE — G8536 NO DOC ELDER MAL SCRN: HCPCS | Performed by: INTERNAL MEDICINE

## 2021-05-21 PROCEDURE — G8427 DOCREV CUR MEDS BY ELIG CLIN: HCPCS | Performed by: INTERNAL MEDICINE

## 2021-05-21 PROCEDURE — G8417 CALC BMI ABV UP PARAM F/U: HCPCS | Performed by: INTERNAL MEDICINE

## 2021-05-21 PROCEDURE — 3017F COLORECTAL CA SCREEN DOC REV: CPT | Performed by: INTERNAL MEDICINE

## 2021-05-21 PROCEDURE — 3051F HG A1C>EQUAL 7.0%<8.0%: CPT | Performed by: INTERNAL MEDICINE

## 2021-05-21 PROCEDURE — 1090F PRES/ABSN URINE INCON ASSESS: CPT | Performed by: INTERNAL MEDICINE

## 2021-05-21 PROCEDURE — G8399 PT W/DXA RESULTS DOCUMENT: HCPCS | Performed by: INTERNAL MEDICINE

## 2021-05-21 PROCEDURE — G8754 DIAS BP LESS 90: HCPCS | Performed by: INTERNAL MEDICINE

## 2021-05-21 PROCEDURE — G8752 SYS BP LESS 140: HCPCS | Performed by: INTERNAL MEDICINE

## 2021-05-21 PROCEDURE — G9899 SCRN MAM PERF RSLTS DOC: HCPCS | Performed by: INTERNAL MEDICINE

## 2021-05-21 PROCEDURE — 2022F DILAT RTA XM EVC RTNOPTHY: CPT | Performed by: INTERNAL MEDICINE

## 2021-05-21 NOTE — PROGRESS NOTES
Identified pt with two pt identifiers. Reviewed record in preparation for visit and have obtained necessary documentation. All patient medications has been reviewed. Chief Complaint   Patient presents with    Hypertension    Thyroid Problem    Diabetes     Additional information about chief complaint:    Visit Vitals  /81 (BP 1 Location: Left upper arm, BP Patient Position: Sitting, BP Cuff Size: Large adult)   Pulse 85   Temp 98.6 °F (37 °C) (Oral)   Resp 16   Ht 5' 5.5\" (1.664 m)   Wt 213 lb (96.6 kg)   SpO2 98%   BMI 34.91 kg/m²       Health Maintenance Due   Topic    COVID-19 Vaccine (1)    Pneumococcal 65+ years (2 of 2 - PPSV23)       1. Have you been to the ER, urgent care clinic since your last visit? Hospitalized since your last visit? NO   2. Have you seen or consulted any other health care providers outside of the [address] [place] [address] since your last visit? Include any pap smears or colon screening.   NO   bdg

## 2021-05-21 NOTE — PATIENT INSTRUCTIONS

## 2021-05-21 NOTE — PROGRESS NOTES
CC:   Chief Complaint   Patient presents with    Hypertension    Thyroid Problem    Diabetes       HISTORY OF PRESENT ILLNESS  Ivanna Costello is a 77 y.o. female. Presents for 4 month follow up evaluation. She has type 2 DM, HTN, hyperlipidemia, hypothyroidism, MANUEL on CPAP, cervical and lumbar spinal stenosis with left-sided sciatica, and obesity.     No new complaints. Is concerned about elevated liver tests on recent labs. Was told by a former physician that she had a fatty liver. Has been on Crestor and metformin for over 10 years. Unchanged low back pain that sometimes radiates down left leg and pain at left side of neck. Does stretching exercises and uses a special massage brush to soothe pain. Denies polydipsia, polyuria, hypoglycemia, or paresthesias at feet. Home glucose monitoring: is performed regularly.  FBS: 109-120's. Compliant with glipizide, metformin, and Trulicity. At last clinic visit Trulicity dose increased from 0.75 mg to 1.5 mg weekly. Mild nausea with this initially.  Diabetic diet compliance: compliant most of the time.  Lab review: A1c was 7.3% on 5/13/21, was 7.3% on 1/19/21. Eye exam: UTD (Dr. Fidelina Lew).     Denies HA's, CP, SOB, palpitations, or leg swelling. Reports compliance with lisinopril 40 mg, amlodipine 5 mg daily, and Crestor 10 mg nightly. Home BP monitoring is done. She reports taking medications as instructed, no medication side effects noted.  Diet and Lifestyle: generally follows a low fat low cholesterol diet, generally follows a low sodium diet, no formal exercise but active during the day.       Thyroid ROS: denies fatigue, weight changes, heat/cold intolerance, bowel/skin changes or CVS symptoms. Taking medication as prescribed.  Last TSH was normal (5/13/21:3.420).    Soc Hx   (: Elnita Friend). Has 2 sons and 9 grandchildren. 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 Never smoker. Denies alcohol.  Does stretching exercises regularly.      Health Maintenance  Mammogram: scheduled 6/2/21  COVID vaccine: plans to have after mammogram    ROS  A complete review of systems was performed and is negative except for those mentioned in the HPI. Patient Active Problem List   Diagnosis Code    Hypertension, essential I10    Hypercholesterolemia E78.00    Acquired hypothyroidism E03.9    MANUEL (obstructive sleep apnea) G47.33    Carpal tunnel syndrome of right wrist G56.01    Spinal stenosis of cervical region M48.02    Spinal stenosis of lumbar region with neurogenic claudication M48.062    Osteopenia of multiple sites M85.89    Severe obesity (HCC) E66.01    Type 2 diabetes mellitus without complication, without long-term current use of insulin (HCC) E11.9     Past Medical History:   Diagnosis Date    Diabetes ([facility] [address]     Hypercholesterolemia     Hypertension     Sleep apnea     CPAP complient     Thyroid disease     TIA (transient ischemic attack)     Questionable per patient left side defecits      Allergies   Allergen Reactions    Gabapentin Other (comments)     Hot flashes    Topiramate Other (comments)     Hot flashes       Current Outpatient Medications   Medication Sig Dispense Refill    rosuvastatin (CRESTOR) 10 mg tablet Take 1 Tab by mouth nightly. 90 Tab 3    levothyroxine (SYNTHROID) 88 mcg tablet TAKE 1/2 (ONE-HALF) TABLET BY MOUTH ON MONDAY, AND 1 TABLET ALL OTHER DAYS 90 Tab 0    lisinopriL (PRINIVIL, ZESTRIL) 40 mg tablet Take 1 tablet by mouth once daily 90 Tab 1    dulaglutide (TRULICITY) 1.5 YB/7.8 mL sub-q pen 0.5 mL by SubCUTAneous route every seven (7) days. 12 Syringe 1    metFORMIN (GLUCOPHAGE) 1,000 mg tablet TAKE 1 TABLET BY MOUTH TWICE DAILY WITH MEALS 180 Tab 3    amLODIPine (NORVASC) 5 mg tablet Take 1 Tab by mouth daily. 90 Tab 3    glipiZIDE SR (GLUCOTROL XL) 10 mg CR tablet Take 1 Tab by mouth daily.  Indications: type 2 diabetes mellitus 90 Tab 3    cyanocobalamin (VITAMIN B-12) 500 mcg tablet Take 500 mcg by mouth daily.  aspirin delayed-release 81 mg tablet Take  by mouth daily.  acetaminophen (TYLENOL EXTRA STRENGTH) 500 mg tablet Take 1,000 mg by mouth daily.  omega 3-dha-epa-fish oil 100-160-1,000 mg cap Take 2 Caps by mouth daily.  magnesium oxide 500 mg tab Take 500 mg by mouth daily.  OTHER energy greens 1 scoop with 8oz water daily      ascorbic acid, vitamin C, (VITAMIN C) 500 mg tablet Take  by mouth.  multivitamin with iron tablet Take 1 Tab by mouth daily.  vit B Cmplx 3-FA-Vit C-Biotin (NEPHRO MANINDER RX) 1- mg-mg-mcg tablet Take 1 Tab by mouth daily.  fluticasone (FLONASE ALLERGY RELIEF) 50 mcg/actuation nasal spray 2 Sprays by Both Nostrils route as needed. (Patient not taking: Reported on 5/21/2021)           PHYSICAL EXAM  Visit Vitals  /81 (BP 1 Location: Left upper arm, BP Patient Position: Sitting, BP Cuff Size: Large adult)   Pulse 85   Temp 98.6 °F (37 °C) (Oral)   Resp 16   Ht 5' 5.5\" (1.664 m)   Wt 213 lb (96.6 kg)   SpO2 98%   BMI 34.91 kg/m²   5 lb weight decrease since last clinic visit 4 months ago    General: Obese, no distress. HEENT:  Head normocephalic/atraumatic, no scleral icterus  Neck: Supple. No JVD, lymphadenopathy, or thyromegaly. Lungs:  Clear to ausculation bilaterally. Good air movement. Heart:  Regular rate and rhythm, normal S1 and S2, no murmur, gallop, or rub  Abdomen: Soft, non-distended, normal bowel sounds, no tenderness, no guarding, masses, rebound tenderness, or HSM. Extremities: No clubbing, cyanosis, or edema. 2+ pedal pulses. Normal ROM at knees. Neurological: Alert and oriented. Psychiatric: Normal mood and affect.  Behavior is normal.     Results for orders placed or performed in visit on 05/13/21   CBC WITH AUTOMATED DIFF   Result Value Ref Range    WBC 3.5 3.4 - 10.8 x10E3/uL    RBC 4.22 3.77 - 5.28 x10E6/uL    HGB 11.7 11.1 - 15.9 g/dL    HCT 36.6 34.0 - 46.6 %    MCV 87 79 - 97 fL MCH 27.7 26.6 - 33.0 pg    MCHC 32.0 31.5 - 35.7 g/dL    RDW 15.4 11.7 - 15.4 %    PLATELET 801 470 - 535 x10E3/uL    NEUTROPHILS 46 Not Estab. %    Lymphocytes 44 Not Estab. %    MONOCYTES 9 Not Estab. %    EOSINOPHILS 0 Not Estab. %    BASOPHILS 0 Not Estab. %    ABS. NEUTROPHILS 1.6 1.4 - 7.0 x10E3/uL    Abs Lymphocytes 1.6 0.7 - 3.1 x10E3/uL    ABS. MONOCYTES 0.3 0.1 - 0.9 x10E3/uL    ABS. EOSINOPHILS 0.0 0.0 - 0.4 x10E3/uL    ABS. BASOPHILS 0.0 0.0 - 0.2 x10E3/uL    IMMATURE GRANULOCYTES 1 Not Estab. %    ABS. IMM. GRANS. 0.0 0.0 - 0.1 V58Q2/RX   METABOLIC PANEL, COMPREHENSIVE   Result Value Ref Range    Glucose 111 (H) 65 - 99 mg/dL    BUN 12 8 - 27 mg/dL    Creatinine 0.95 0.57 - 1.00 mg/dL    GFR est non-AA 62 >59 mL/min/1.73    GFR est AA 72 >59 mL/min/1.73    BUN/Creatinine ratio 13 12 - 28    Sodium 137 134 - 144 mmol/L    Potassium 4.4 3.5 - 5.2 mmol/L    Chloride 102 96 - 106 mmol/L    CO2 22 20 - 29 mmol/L    Calcium 9.9 8.7 - 10.3 mg/dL    Protein, total 7.5 6.0 - 8.5 g/dL    Albumin 4.5 3.8 - 4.8 g/dL    GLOBULIN, TOTAL 3.0 1.5 - 4.5 g/dL    A-G Ratio 1.5 1.2 - 2.2    Bilirubin, total 0.2 0.0 - 1.2 mg/dL    Alk. phosphatase 56 39 - 117 IU/L    AST (SGOT) 64 (H) 0 - 40 IU/L    ALT (SGPT) 57 (H) 0 - 32 IU/L   LIPID PANEL   Result Value Ref Range    Cholesterol, total 109 100 - 199 mg/dL    Triglyceride 74 0 - 149 mg/dL    HDL Cholesterol 40 >39 mg/dL    VLDL, calculated 15 5 - 40 mg/dL    LDL, calculated 54 0 - 99 mg/dL   HEMOGLOBIN A1C WITH EAG   Result Value Ref Range    Hemoglobin A1c 7.3 (H) 4.8 - 5.6 %    Estimated average glucose 163 mg/dL   TSH 3RD GENERATION   Result Value Ref Range    TSH 3.420 0.450 - 4.500 uIU/mL   HEPATITIS C AB   Result Value Ref Range    Hep C Virus Ab <0.1 0.0 - 0.9 s/co ratio         ASSESSMENT AND PLAN    ICD-10-CM ICD-9-CM    1.  Type 2 diabetes mellitus without complication, without long-term current use of insulin (HCC)  E11.9 250.00 HEMOGLOBIN A1C WITH EAG HEMOGLOBIN A1C WITH EAG   2. Hypertension, essential  L88 549.1 METABOLIC PANEL, COMPREHENSIVE      METABOLIC PANEL, COMPREHENSIVE   3. Hypercholesterolemia  E78.00 272.0    4. Acquired hypothyroidism  E03.9 244.9    5. Spinal stenosis of cervical region  M48.02 723.0    6. Spinal stenosis of lumbar region with neurogenic claudication  M48.062 724.03    7. Obesity (BMI 30-39. 9)  E66.9 278.00    8. Elevated liver enzymes  R74.8 790.5      Discussed lab results from 5/13/21 with patient. A1c 7.3%, was the same on 1/19/21.  Two liver tests were mildly high.  May be due to medications (Crestor or metformin) or to fat build-up in the liver. Other labs all normal (kidney tests, blood counts, cholesterol, thyroid, and hep C screening test). Diagnoses and all orders for this visit:    1. Type 2 diabetes mellitus without complication, without long-term current use of insulin ([facility])  A1c 7.3% on 5/131/21, unchanged from 4 months ago despite Trulicity dose increase. She is reluctant to increase Trulicity dose more due to nausea. Continue glipizide and metformin. Counseled on diabetic diet, exercise, and weight loss. -     HEMOGLOBIN A1C WITH EAG; Future    2. Hypertension, essential  Controlled. Continue lisinopril and amlodipine.   -     METABOLIC PANEL, COMPREHENSIVE; Future    3. Hypercholesterolemia  Controlled. Continue Crestor 10 mg nightly. 4. Acquired hypothyroidism  Asymptomatic. Controlled on present dose of levothyroxine    5. Spinal stenosis of cervical region  Stable. Continue present management. 6. Spinal stenosis of lumbar region with neurogenic claudication  Stable. Continue present management. 7. Obesity (BMI 30-39. 9)  Congratulated on weight loss. Continue working on diet and exercise. 8. Elevated liver enzymes  Mostly likely due to NAFLD (fatty liver). Doubt due to medications (metformin or Crestor) since she has been on these a long time. Will recheck liver tests in 6 months.   If remains elevated, plan to order RUQ US. Follow-up and Dispositions    · Return in about 6 months (around 11/21/2021), or if symptoms worsen or fail to improve, for Medicare AW; schedule labs 1 week prior to appointment (call clinic in Oct to schedule). I have discussed the diagnosis with the patient and the intended plan as seen in the above orders. Patient is in agreement. The patient has received an after-visit summary and questions were answered concerning future plans. I have discussed medication side effects and warnings with the patient as well.

## 2021-06-02 ENCOUNTER — HOSPITAL ENCOUNTER (OUTPATIENT)
Dept: MAMMOGRAPHY | Age: 66
Discharge: HOME | End: 2021-06-02
Attending: INTERNAL MEDICINE
Payer: MEDICARE

## 2021-06-02 DIAGNOSIS — Z12.31 VISIT FOR SCREENING MAMMOGRAM: ICD-10-CM

## 2021-06-02 PROCEDURE — 77063 BREAST TOMOSYNTHESIS BI: CPT

## 2021-07-06 ENCOUNTER — TELEPHONE (OUTPATIENT)
Dept: NEUROLOGY | Age: 66
End: 2021-07-06

## 2021-07-09 ENCOUNTER — TELEPHONE (OUTPATIENT)
Dept: NEUROLOGY | Age: 66
End: 2021-07-09

## 2021-08-09 DIAGNOSIS — E11.9 TYPE 2 DIABETES MELLITUS WITHOUT COMPLICATION, WITHOUT LONG-TERM CURRENT USE OF INSULIN (HCC): Primary | ICD-10-CM

## 2021-08-09 RX ORDER — CALCIUM CITRATE/VITAMIN D3 200MG-6.25
TABLET ORAL
Qty: 200 STRIP | Refills: 3 | Status: SHIPPED | OUTPATIENT
Start: 2021-08-09 | End: 2022-02-24

## 2021-08-09 NOTE — TELEPHONE ENCOUNTER
PCP: Levon Lagos MD     Last appt: 5/21/2021     Future Appointments   Date Time Provider Chidi Unger   9/28/2021 11:00 AM [facility]   11/24/2021  9:30 AM Levon Lagos MD [facility] AMB          Requested Prescriptions     Pending Prescriptions Disp Refills    glucose blood VI test strips (True Metrix Glucose Test Strip) strip 200 Strip 11

## 2021-08-10 NOTE — TELEPHONE ENCOUNTER
Generic orders for glucometer, test strips and lancets pended. PCP: Libertad Bustamante MD     Last appt: 5/21/2021   Future Appointments   Date Time Provider Chidi Unger   9/28/2021 11:00 AM [facility]   11/24/2021  9:30 AM Libertad Bustamante MD [facility] BS AMB        Requested Prescriptions     Pending Prescriptions Disp Refills    lancets misc 1 Each 11    glucose blood VI test strips (ASCENSIA AUTODISC VI, ONE TOUCH ULTRA TEST VI) strip      Blood-Glucose Meter monitoring kit 1 Kit 0     Signed Prescriptions Disp Refills    glucose blood VI test strips (True Metrix Glucose Test Strip) strip 200 Strip 3     Sig: Use to check blood glucose up to twice a day.  Dx: E11.9     Authorizing Provider: Lauren Zuleta

## 2021-08-10 NOTE — TELEPHONE ENCOUNTER
----- Message from Sary Crook sent at 8/10/2021 11:18 AM EDT -----  Regarding: Dr. Davis Host Message/Vendor Calls    Caller's first and last name: [facility]       Reason for call: Rx called in for True Metrix Glucose Test Strip but [employer] is not wanting to cover this for patient. They will however cover the One Touch or Accucheck. The pharmacy will need a prescription for the glucose meter, lancets, and test strips for either one of these.        Callback required yes/no and why: yes, to discuss       Best contact number(s): [phone]      Details to clarify the request: N/A       Sary Crook

## 2021-08-11 RX ORDER — INSULIN PUMP SYRINGE, 3 ML
EACH MISCELLANEOUS
Qty: 1 KIT | Refills: 0 | Status: SHIPPED | OUTPATIENT
Start: 2021-08-11

## 2021-08-11 RX ORDER — LANCETS
EACH MISCELLANEOUS
Qty: 200 EACH | Refills: 3 | Status: SHIPPED | OUTPATIENT
Start: 2021-08-11

## 2021-08-18 ENCOUNTER — TRANSCRIBE ORDER (OUTPATIENT)
Dept: SCHEDULING | Age: 66
End: 2021-08-18

## 2021-08-18 DIAGNOSIS — M54.50 LUMBAGO: Primary | ICD-10-CM

## 2021-08-18 DIAGNOSIS — M47.817 LUMBOSACRAL SPONDYLOSIS WITHOUT MYELOPATHY: ICD-10-CM

## 2021-08-20 ENCOUNTER — TELEPHONE (OUTPATIENT)
Dept: INTERNAL MEDICINE CLINIC | Age: 66
End: 2021-08-20

## 2021-08-20 DIAGNOSIS — E11.9 TYPE 2 DIABETES MELLITUS WITHOUT COMPLICATION, WITHOUT LONG-TERM CURRENT USE OF INSULIN (HCC): ICD-10-CM

## 2021-08-20 RX ORDER — DULAGLUTIDE 3 MG/.5ML
0.5 INJECTION, SOLUTION SUBCUTANEOUS
Qty: 12 ML | Refills: 1 | Status: SHIPPED | OUTPATIENT
Start: 2021-08-20 | End: 2022-02-14 | Stop reason: SDUPTHER

## 2021-08-20 NOTE — TELEPHONE ENCOUNTER
Pt called and stated that her bg levels have been high again and all over the place. 150-180 in July, 160's -208 in August. Pt is concerned and wanted to speak to the provider about this and stated that Dr. Heath Gongora said that she might increase her trulicity.

## 2021-08-20 NOTE — TELEPHONE ENCOUNTER
Tell patient that Dr. Elizabeth Crisostomo has increased her Trulicity from 1.5 mg to 3 mg a week. She should let us know if she has any problems with it, including abdominal pain or nausea.

## 2021-08-25 ENCOUNTER — HOSPITAL ENCOUNTER (OUTPATIENT)
Dept: MRI IMAGING | Age: 66
Discharge: HOME | End: 2021-08-25
Attending: ORTHOPAEDIC SURGERY
Payer: MEDICARE

## 2021-08-25 DIAGNOSIS — M47.817 LUMBOSACRAL SPONDYLOSIS WITHOUT MYELOPATHY: ICD-10-CM

## 2021-08-25 DIAGNOSIS — M54.50 LUMBAGO: ICD-10-CM

## 2021-08-25 PROCEDURE — 72148 MRI LUMBAR SPINE W/O DYE: CPT

## 2021-08-30 NOTE — PROGRESS NOTES
Radha Chaudhary is a 77 y.o. female who was seen by synchronous (real-time) audio-video technology on 8/31/2021 for Cyst (on left arm // noticed a bruise last week // sometime wobbles and walks into the door // )        Assessment & Plan:   Diagnoses and all orders for this visit:    1. Arm bruise, left, initial encounter  Appears to be a bruise on limited video exam  Pt not having any pain, heat, redness or edema. Discussed low suspicion for blood clot. She will monitor and call back if sx worsen or doesn't heal              Subjective:     Pt c/o bruise to left arm x a week or so. Doesn't remember injuring herself. Has chronic reduced mobility on her lower left side due to back pain, seeing specialist, sometimes trips/is clumsy due to that. States arm feels like a swollen vein, oval shaped. Felt a knot yesterday for first time. Denies pain to the touch. Area hasn't been getting bigger or smaller, staying the same. Area doesn't feel warm or hot to the touch. Had her COVID shot in the other arm on the 13th of august.      Lab Results   Component Value Date/Time    WBC 3.5 05/13/2021 12:15 PM    HGB 11.7 05/13/2021 12:15 PM    HCT 36.6 05/13/2021 12:15 PM    PLATELET 754 83/22/5094 12:15 PM    MCV 87 05/13/2021 12:15 PM             Prior to Admission medications    Medication Sig Start Date End Date Taking? Authorizing Provider   dulaglutide (Trulicity) 3 CI/2.0 mL pnij 0.5 mL by SubCUTAneous route every seven (7) days. 8/20/21   Salinas Pichardo MD   lancets misc Use to check blood glucose up to twice a day. Dx: E11.9 8/11/21   Salinas Pichardo MD   glucose blood VI test strips (ASCENSIA AUTODISC VI, ONE TOUCH ULTRA TEST VI) strip Use to check blood glucose up to twice a day. Dx: E11.9 8/11/21   Salinas Pichardo MD   Blood-Glucose Meter monitoring kit Use to check blood glucose up to twice a day.  Dx: E11.9 8/11/21   Salinas Pichardo MD   glucose blood VI test strips (True Metrix Glucose Test Strip) strip Use to check blood glucose up to twice a day. Dx: E11.9 8/9/21   Nadeem Villatoro MD   levothyroxine (SYNTHROID) 88 mcg tablet TAKE 1/2 (ONE-HALF) TABLET BY MOUTH ONCE DAILY ON  MONDAY,  AND  ONE  TABLET  ALL  OTHER  DAYS 8/2/21   Nadeem Villatoro MD   rosuvastatin (CRESTOR) 10 mg tablet Take 1 Tab by mouth nightly. 5/6/21   Ramila Peralta PA-C   lisinopriL (PRINIVIL, ZESTRIL) 40 mg tablet Take 1 tablet by mouth once daily 3/3/21   Nadeem Villatoro MD   metFORMIN (GLUCOPHAGE) 1,000 mg tablet TAKE 1 TABLET BY MOUTH TWICE DAILY WITH MEALS 1/19/21   Nadeem Villatoro MD   amLODIPine (NORVASC) 5 mg tablet Take 1 Tab by mouth daily. 10/6/20   Nadeem Villatoro MD   glipiZIDE SR (GLUCOTROL XL) 10 mg CR tablet Take 1 Tab by mouth daily. Indications: type 2 diabetes mellitus 10/6/20   Nadeem Villatoro MD   cyanocobalamin (VITAMIN B-12) 500 mcg tablet Take 500 mcg by mouth daily. Provider, Historical   aspirin delayed-release 81 mg tablet Take  by mouth daily. Provider, Historical   acetaminophen (TYLENOL EXTRA STRENGTH) 500 mg tablet Take 1,000 mg by mouth daily. Provider, Historical   omega 3-dha-epa-fish oil 100-160-1,000 mg cap Take 2 Caps by mouth daily. Provider, Historical   magnesium oxide 500 mg tab Take 500 mg by mouth daily. Provider, Historical   OTHER energy greens 1 scoop with 8oz water daily    Provider, Historical   fluticasone (FLONASE ALLERGY RELIEF) 50 mcg/actuation nasal spray 2 Sprays by Both Nostrils route as needed. Patient not taking: Reported on 5/21/2021    Provider, Historical   ascorbic acid, vitamin C, (VITAMIN C) 500 mg tablet Take  by mouth. Provider, Historical   multivitamin with iron tablet Take 1 Tab by mouth daily. Provider, Historical   vit B Cmplx 3-FA-Vit C-Biotin (NEPHRO MANINDER RX) 1- mg-mg-mcg tablet Take 1 Tab by mouth daily. Provider, Historical         ROS  See hpi   This visit was completed virtually using [email or web address] me     Objective:     Patient-Reported Vitals 9/29/2020   Patient-Reported Weight 215lb   Patient-Reported Pulse 90   Patient-Reported Temperature 98.1   Patient-Reported SpO2 100   Patient-Reported Systolic  585   Patient-Reported Diastolic 72        [INSTRUCTIONS:  \"[x]\" Indicates a positive item  \"[]\" Indicates a negative item  -- DELETE ALL ITEMS NOT EXAMINED]    Constitutional: [x] Appears well-developed and well-nourished [x] No apparent distress      [] Abnormal -     Mental status: [x] Alert and awake  [x] Oriented to person/place/time [x] Able to follow commands    [] Abnormal -     Eyes:   EOM    [x]  Normal    [] Abnormal -   Sclera  [x]  Normal    [] Abnormal -          Discharge [x]  None visible   [] Abnormal -     HENT: [x] Normocephalic, atraumatic  [] Abnormal -   [x] Mouth/Throat: Mucous membranes are moist    External Ears [x] Normal  [] Abnormal -    Neck: [x] No visualized mass [] Abnormal -     Pulmonary/Chest: [x] Respiratory effort normal   [x] No visualized signs of difficulty breathing or respiratory distress        [] Abnormal -      Musculoskeletal:   [x] Normal gait with no signs of ataxia         [x] Normal range of motion of neck        [] Abnormal -     Neurological:        [x] No Facial Asymmetry (Cranial nerve 7 motor function) (limited exam due to video visit)          [x] No gaze palsy        [] Abnormal -          Skin:        [] No significant exanthematous lesions or discoloration noted on facial skin         [x] Abnormal -  Small area of ecchymosis to left bicep, limited exam due to video. No visible edema. Psychiatric:       [x] Normal Affect [] Abnormal -        [x] No Hallucinations    Other pertinent observable physical exam findings:-        We discussed the expected course, resolution and complications of the diagnosis(es) in detail. Medication risks, benefits, costs, interactions, and alternatives were discussed as indicated.   I advised her to contact the office if her condition worsens, changes or fails to improve as anticipated. She expressed understanding with the diagnosis(es) and plan. Rasrigo Guerrero, was evaluated through a synchronous (real-time) audio-video encounter. The patient (or guardian if applicable) is aware that this is a billable service. Verbal consent to proceed has been obtained within the past 12 months. The visit was conducted pursuant to the emergency declaration under the [address] [place] [address] and the [employer] General Act. Patient identification was verified, and a caregiver was present when appropriate. The patient was located in a state where the provider was credentialed to provide care.       Blake Rivear NP

## 2021-08-31 ENCOUNTER — VIRTUAL VISIT (OUTPATIENT)
Dept: INTERNAL MEDICINE CLINIC | Age: 66
End: 2021-08-31
Payer: MEDICARE

## 2021-08-31 DIAGNOSIS — S40.022A ARM BRUISE, LEFT, INITIAL ENCOUNTER: Primary | ICD-10-CM

## 2021-08-31 PROCEDURE — 99213 OFFICE O/P EST LOW 20 MIN: CPT | Performed by: NURSE PRACTITIONER

## 2021-08-31 PROCEDURE — 3017F COLORECTAL CA SCREEN DOC REV: CPT | Performed by: NURSE PRACTITIONER

## 2021-08-31 PROCEDURE — G8399 PT W/DXA RESULTS DOCUMENT: HCPCS | Performed by: NURSE PRACTITIONER

## 2021-08-31 PROCEDURE — G8756 NO BP MEASURE DOC: HCPCS | Performed by: NURSE PRACTITIONER

## 2021-08-31 PROCEDURE — G8432 DEP SCR NOT DOC, RNG: HCPCS | Performed by: NURSE PRACTITIONER

## 2021-08-31 PROCEDURE — G9899 SCRN MAM PERF RSLTS DOC: HCPCS | Performed by: NURSE PRACTITIONER

## 2021-08-31 PROCEDURE — 1090F PRES/ABSN URINE INCON ASSESS: CPT | Performed by: NURSE PRACTITIONER

## 2021-08-31 PROCEDURE — 1101F PT FALLS ASSESS-DOCD LE1/YR: CPT | Performed by: NURSE PRACTITIONER

## 2021-08-31 PROCEDURE — G8427 DOCREV CUR MEDS BY ELIG CLIN: HCPCS | Performed by: NURSE PRACTITIONER

## 2021-08-31 NOTE — PATIENT INSTRUCTIONS
Bruises: Care Instructions  Your Care Instructions     Bruises occur when small blood vessels under the skin tear or rupture, most often from a twist, bump, or fall. Blood leaks into tissues under the skin and causes a black-and-blue spot that often turns colors, including purplish black, reddish blue, or yellowish green, as the bruise heals. Bruises hurt, but most are not serious and will go away on their own within 2 to 4 weeks. Sometimes, gravity causes them to spread down the body. A leg bruise usually will take longer to heal than a bruise on the face or arms. Follow-up care is a key part of your treatment and safety. Be sure to make and go to all appointments, and call your doctor if you are having problems. It's also a good idea to know your test results and keep a list of the medicines you take. How can you care for yourself at home? · Take pain medicines exactly as directed. ? If the doctor gave you a prescription medicine for pain, take it as prescribed. ? If you are not taking a prescription pain medicine, ask your doctor if you can take an over-the-counter medicine. · Put ice or a cold pack on the area for 10 to 20 minutes at a time. Put a thin cloth between the ice and your skin. · If you can, prop up the bruised area on pillows as much as possible for the next few days. Try to keep the bruise above the level of your heart. When should you call for help? Call your doctor now or seek immediate medical care if:    · You have signs of infection, such as:  ? Increased pain, swelling, warmth, or redness. ? Red streaks leading from the bruise. ? Pus draining from the bruise. ? A fever.     · You have a bruise on your leg and signs of a blood clot, such as:  ? Increasing redness and swelling along with warmth, tenderness, and pain in the bruised area. ? Pain in your calf, back of the knee, thigh, or groin. ? Redness and swelling in your leg or groin.     · Your pain gets worse.    Watch closely for changes in your health, and be sure to contact your doctor if:    · You do not get better as expected. Where can you learn more? Go to http://www.gray.com/  Enter T177 in the search box to learn more about \"Bruises: Care Instructions. \"  Current as of: February 26, 2020               Content Version: 12.8  © 2006-2021 [employer]. Care instructions adapted under license by [employer] (which disclaims liability or warranty for this information). If you have questions about a medical condition or this instruction, always ask your healthcare professional. [place] [address] any warranty or liability for your use of this information.

## 2021-08-31 NOTE — PROGRESS NOTES
Duane Showman  Identified pt with two pt identifiers(name and ). Chief Complaint   Patient presents with    Cyst     on left arm // noticed a bruise last week // sometime wobbles and walks into the door //        Reviewed record In preparation for visit and have obtained necessary documentation. 1. Have you been to the ER, urgent care clinic or hospitalized since your last visit? No     2. Have you seen or consulted any other health care providers outside of the [address] [place] [address] since your last visit? Include any pap smears or colon screening. No    Patient has an advance directive. Vitals reviewed with provider. Health Maintenance reviewed:     Health Maintenance Due   Topic    COVID-19 Vaccine (1)    Shingrix Vaccine Age 49> (1 of 2)    Pneumococcal 65+ years (2 of 2 - PPSV23)          Wt Readings from Last 3 Encounters:   21 213 lb (96.6 kg)   21 218 lb (98.9 kg)   10/06/20 216 lb 3.2 oz (98.1 kg)        Temp Readings from Last 3 Encounters:   21 98.6 °F (37 °C) (Oral)   21 98.4 °F (36.9 °C) (Oral)   10/06/20 98.7 °F (37.1 °C) (Oral)        BP Readings from Last 3 Encounters:   21 133/81   21 137/84   10/06/20 138/68        Pulse Readings from Last 3 Encounters:   21 85   21 83   10/06/20 92      There were no vitals filed for this visit.        Learning Assessment:   :       Learning Assessment 2018   PRIMARY LEARNER Patient   HIGHEST LEVEL OF EDUCATION - PRIMARY LEARNER  SOME COLLEGE   BARRIERS PRIMARY LEARNER NONE   CO-LEARNER CAREGIVER No   PRIMARY LANGUAGE ENGLISH   LEARNER PREFERENCE PRIMARY DEMONSTRATION   ANSWERED BY patient   RELATIONSHIP SELF        Depression Screening:   :       3 most recent PHQ Screens 2021   Little interest or pleasure in doing things Not at all   Feeling down, depressed, irritable, or hopeless Not at all   Total Score PHQ 2 0        Fall Risk Assessment:   :       Fall Risk Assessment, last 12 mths 5/21/2021   Able to walk? Yes   Fall in past 12 months? 0   Do you feel unsteady? 0   Are you worried about falling 0        Abuse Screening:   :       Abuse Screening Questionnaire 10/6/2020 3/3/2020 12/28/2018   Do you ever feel afraid of your partner? N N N   Are you in a relationship with someone who physically or mentally threatens you? N N N   Is it safe for you to go home?  Y Y Y        ADL Screening:   :       ADL Assessment 10/6/2020   Feeding yourself No Help Needed   Getting from bed to chair No Help Needed   Getting dressed No Help Needed   Bathing or showering No Help Needed   Walk across the room (includes cane/walker) No Help Needed   Using the telphone No Help Needed   Taking your medications No Help Needed   Preparing meals No Help Needed   Managing money (expenses/bills) No Help Needed   Moderately strenuous housework (laundry) No Help Needed   Shopping for personal items (toiletries/medicines) No Help Needed   Shopping for groceries No Help Needed   Driving No Help Needed   Climbing a flight of stairs No Help Needed   Getting to places beyond walking distances No Help Needed

## 2021-09-07 DIAGNOSIS — I10 HYPERTENSION, ESSENTIAL: ICD-10-CM

## 2021-09-08 RX ORDER — LISINOPRIL 40 MG/1
TABLET ORAL
Qty: 90 TABLET | Refills: 0 | Status: SHIPPED | OUTPATIENT
Start: 2021-09-08 | End: 2021-12-09

## 2021-09-27 ENCOUNTER — OFFICE VISIT (OUTPATIENT)
Dept: INTERNAL MEDICINE CLINIC | Age: 66
End: 2021-09-27
Payer: MEDICARE

## 2021-09-27 VITALS
WEIGHT: 219.4 LBS | TEMPERATURE: 98.2 F | BODY MASS INDEX: 35.26 KG/M2 | HEART RATE: 83 BPM | SYSTOLIC BLOOD PRESSURE: 154 MMHG | DIASTOLIC BLOOD PRESSURE: 70 MMHG | RESPIRATION RATE: 16 BRPM | HEIGHT: 66 IN | OXYGEN SATURATION: 99 %

## 2021-09-27 DIAGNOSIS — M79.89 LEG SWELLING: ICD-10-CM

## 2021-09-27 DIAGNOSIS — L98.9 SKIN LESION: ICD-10-CM

## 2021-09-27 DIAGNOSIS — E03.9 ACQUIRED HYPOTHYROIDISM: ICD-10-CM

## 2021-09-27 DIAGNOSIS — I10 HYPERTENSION, ESSENTIAL: Primary | ICD-10-CM

## 2021-09-27 DIAGNOSIS — R00.2 PALPITATIONS: ICD-10-CM

## 2021-09-27 PROCEDURE — G8536 NO DOC ELDER MAL SCRN: HCPCS | Performed by: INTERNAL MEDICINE

## 2021-09-27 PROCEDURE — 1090F PRES/ABSN URINE INCON ASSESS: CPT | Performed by: INTERNAL MEDICINE

## 2021-09-27 PROCEDURE — G8427 DOCREV CUR MEDS BY ELIG CLIN: HCPCS | Performed by: INTERNAL MEDICINE

## 2021-09-27 PROCEDURE — G8432 DEP SCR NOT DOC, RNG: HCPCS | Performed by: INTERNAL MEDICINE

## 2021-09-27 PROCEDURE — G9899 SCRN MAM PERF RSLTS DOC: HCPCS | Performed by: INTERNAL MEDICINE

## 2021-09-27 PROCEDURE — G8417 CALC BMI ABV UP PARAM F/U: HCPCS | Performed by: INTERNAL MEDICINE

## 2021-09-27 PROCEDURE — 3017F COLORECTAL CA SCREEN DOC REV: CPT | Performed by: INTERNAL MEDICINE

## 2021-09-27 PROCEDURE — G8754 DIAS BP LESS 90: HCPCS | Performed by: INTERNAL MEDICINE

## 2021-09-27 PROCEDURE — G8399 PT W/DXA RESULTS DOCUMENT: HCPCS | Performed by: INTERNAL MEDICINE

## 2021-09-27 PROCEDURE — 99214 OFFICE O/P EST MOD 30 MIN: CPT | Performed by: INTERNAL MEDICINE

## 2021-09-27 PROCEDURE — 1101F PT FALLS ASSESS-DOCD LE1/YR: CPT | Performed by: INTERNAL MEDICINE

## 2021-09-27 PROCEDURE — G8753 SYS BP > OR = 140: HCPCS | Performed by: INTERNAL MEDICINE

## 2021-09-27 PROCEDURE — 93000 ELECTROCARDIOGRAM COMPLETE: CPT | Performed by: INTERNAL MEDICINE

## 2021-09-27 RX ORDER — PREGABALIN 50 MG/1
50 CAPSULE ORAL 2 TIMES DAILY
COMMUNITY

## 2021-09-27 RX ORDER — HYDROCHLOROTHIAZIDE 25 MG/1
25 TABLET ORAL DAILY
Qty: 30 TABLET | Refills: 1 | Status: SHIPPED | OUTPATIENT
Start: 2021-09-27 | End: 2021-11-24 | Stop reason: ALTCHOICE

## 2021-09-27 RX ORDER — LORAZEPAM 1 MG/1
TABLET ORAL
COMMUNITY
Start: 2021-09-23 | End: 2021-11-22

## 2021-09-27 NOTE — PROGRESS NOTES
Colette Patel  Identified pt with two pt identifiers(name and ). Chief Complaint   Patient presents with    Leg Swelling     Ongoing for about 3 weeks     Ankle swelling       Reviewed record In preparation for visit and have obtained necessary documentation. 1. Have you been to the ER, urgent care clinic or hospitalized since your last visit? No     2. Have you seen or consulted any other health care providers outside of the [address] [place] [address] since your last visit? Include any pap smears or colon screening. Vascular surgery for swelling, no blood clots located. Patient has an advance directive. Vitals reviewed with provider.     Health Maintenance reviewed:     Health Maintenance Due   Topic    COVID-19 Vaccine (1)    Shingrix Vaccine Age 49> (1 of 2)    Pneumococcal 65+ years (2 of 2 - PPSV23)    Flu Vaccine (1)    MICROALBUMIN Q1     Foot Exam Q1     Medicare Yearly Exam     Colorectal Cancer Screening Combo           Wt Readings from Last 3 Encounters:   21 219 lb 6.4 oz (99.5 kg)   21 213 lb (96.6 kg)   21 218 lb (98.9 kg)        Temp Readings from Last 3 Encounters:   21 98.2 °F (36.8 °C) (Oral)   21 98.6 °F (37 °C) (Oral)   21 98.4 °F (36.9 °C) (Oral)        BP Readings from Last 3 Encounters:   21 (!) 145/86   21 133/81   21 137/84        Pulse Readings from Last 3 Encounters:   21 83   21 85   21 83        Vitals:    21 1111   BP: (!) 145/86   Pulse: 83   Resp: 16   Temp: 98.2 °F (36.8 °C)   TempSrc: Oral   SpO2: 99%   Weight: 219 lb 6.4 oz (99.5 kg)   Height: 5' 5.5\" (1.664 m)   PainSc:   1   PainLoc: Leg          Learning Assessment:   :       Learning Assessment 2018   PRIMARY LEARNER Patient   HIGHEST LEVEL OF EDUCATION - PRIMARY LEARNER  SOME COLLEGE   BARRIERS PRIMARY LEARNER NONE   CO-LEARNER CAREGIVER No   PRIMARY LANGUAGE ENGLISH   LEARNER PREFERENCE PRIMARY DEMONSTRATION ANSWERED BY patient   RELATIONSHIP SELF        Depression Screening:   :       3 most recent PHQ Screens 8/31/2021   Little interest or pleasure in doing things Not at all   Feeling down, depressed, irritable, or hopeless Not at all   Total Score PHQ 2 0        Fall Risk Assessment:   :       Fall Risk Assessment, last 12 mths 9/27/2021   Able to walk? Yes   Fall in past 12 months? 0   Do you feel unsteady? 0   Are you worried about falling 0        Abuse Screening:   :       Abuse Screening Questionnaire 10/6/2020 3/3/2020 12/28/2018   Do you ever feel afraid of your partner? N N N   Are you in a relationship with someone who physically or mentally threatens you? N N N   Is it safe for you to go home?  Y Y Y        ADL Screening:   :       ADL Assessment 10/6/2020   Feeding yourself No Help Needed   Getting from bed to chair No Help Needed   Getting dressed No Help Needed   Bathing or showering No Help Needed   Walk across the room (includes cane/walker) No Help Needed   Using the telphone No Help Needed   Taking your medications No Help Needed   Preparing meals No Help Needed   Managing money (expenses/bills) No Help Needed   Moderately strenuous housework (laundry) No Help Needed   Shopping for personal items (toiletries/medicines) No Help Needed   Shopping for groceries No Help Needed   Driving No Help Needed   Climbing a flight of stairs No Help Needed   Getting to places beyond walking distances No Help Needed

## 2021-09-27 NOTE — PROGRESS NOTES
HPI  Ms. Mackenzie Leija is a 77y.o. year old female, she is seen today for ankle and leg swelling - getting worse since May. Says Dr. Aleksandra Rosario sent her for dopplers b/l LE last week, negative. No chest pain or sob. Sometimes legs will itch, sometimes mild pain left lateral calf better with massage. No PND or orthopnea  Had flat mole on the back of her left leg which is now raised when previously flat. Says nothing has changed since May had been eating mike about once per week. No clear increase in sodium in her diet. Swelling gets worse as day goes on. Chief Complaint   Patient presents with    Leg Swelling     Ongoing for about 3 weeks     Ankle swelling        Prior to Admission medications    Medication Sig Start Date End Date Taking? Authorizing Provider   LORazepam (ATIVAN) 1 mg tablet TAKE ONE TABLET BY MOUTH 45 MINUTES PRIOR TO INJECTION REPEAT IF NEEDED 9/23/21  Yes Provider, Historical   pregabalin (LYRICA) 25 mg capsule Take  by mouth. Yes Provider, Historical   hydroCHLOROthiazide (HYDRODIURIL) 25 mg tablet Take 1 Tablet by mouth daily for 360 days. 9/27/21 9/22/22 Yes John Galindo MD   lisinopriL (PRINIVIL, ZESTRIL) 40 mg tablet Take 1 tablet by mouth once daily 9/8/21  Yes Koki Hernandez PA-C   dulaglutide (Trulicity) 3 RS/4.6 mL pnij 0.5 mL by SubCUTAneous route every seven (7) days. 8/20/21  Yes Aissatou Stringer MD   lancets misc Use to check blood glucose up to twice a day. Dx: E11.9 8/11/21  Yes Aissatou Stringer MD   glucose blood VI test strips (ASCENSIA AUTODISC VI, ONE TOUCH ULTRA TEST VI) strip Use to check blood glucose up to twice a day. Dx: E11.9 8/11/21  Yes Aissatou Stringer MD   Blood-Glucose Meter monitoring kit Use to check blood glucose up to twice a day. Dx: E11.9 8/11/21  Yes Aissatou Stringer MD   glucose blood VI test strips (True Metrix Glucose Test Strip) strip Use to check blood glucose up to twice a day.  Dx: E11.9 8/9/21  Yes Aissatou Stringer MD levothyroxine (SYNTHROID) 88 mcg tablet TAKE 1/2 (ONE-HALF) TABLET BY MOUTH ONCE DAILY ON  MONDAY,  AND  ONE  TABLET  ALL  OTHER  DAYS 8/2/21  Yes Doran Burkitt, MD   rosuvastatin (CRESTOR) 10 mg tablet Take 1 Tab by mouth nightly. 5/6/21  Yes Yaima Belle PA-C   metFORMIN (GLUCOPHAGE) 1,000 mg tablet TAKE 1 TABLET BY MOUTH TWICE DAILY WITH MEALS 1/19/21  Yes Natalie Alonzo MD   glipiZIDE SR (GLUCOTROL XL) 10 mg CR tablet Take 1 Tab by mouth daily. Indications: type 2 diabetes mellitus 10/6/20  Yes Kvng Alonzo MD   cyanocobalamin (VITAMIN B-12) 500 mcg tablet Take 500 mcg by mouth daily. Yes Provider, Historical   aspirin delayed-release 81 mg tablet Take  by mouth daily. Yes Provider, Historical   acetaminophen (TYLENOL EXTRA STRENGTH) 500 mg tablet Take 1,000 mg by mouth daily. Yes Provider, Historical   omega 3-dha-epa-fish oil 100-160-1,000 mg cap Take 2 Caps by mouth daily. Yes Provider, Historical   magnesium oxide 500 mg tab Take 500 mg by mouth daily. Yes Provider, Historical   OTHER energy greens 1 scoop with 8oz water daily   Yes Provider, Historical   fluticasone (FLONASE ALLERGY RELIEF) 50 mcg/actuation nasal spray 2 Sprays by Both Nostrils route as needed. Yes Provider, Historical   ascorbic acid, vitamin C, (VITAMIN C) 500 mg tablet Take  by mouth. Yes Provider, Historical   multivitamin with iron tablet Take 1 Tab by mouth daily. Yes Provider, Historical   vit B Cmplx 3-FA-Vit C-Biotin (NEPHRO MANINDER RX) 1- mg-mg-mcg tablet Take 1 Tab by mouth daily. Yes Provider, Historical   amLODIPine (NORVASC) 5 mg tablet Take 1 Tab by mouth daily.  10/6/20 9/27/21  Doran Burkitt, MD         Allergies   Allergen Reactions    Gabapentin Other (comments)     Hot flashes    Topiramate Other (comments)     Hot flashes         REVIEW OF SYSTEMS:  Per HPI    PHYSICAL EXAM:  Visit Vitals  BP (!) 154/70   Pulse 83   Temp 98.2 °F (36.8 °C) (Oral)   Resp 16   Ht 5' 5.5\" (1.664 m)   Wt 219 lb 6.4 oz (99.5 kg)   SpO2 99%   BMI 35.95 kg/m²     Constitutional: Appears well-developed and well-nourished. No distress. HENT:   Head: Normocephalic and atraumatic. Eyes: No scleral icterus. Neck: no lad, no tm, supple   Cardiovascular: Normal S1/S2, regular rhythm. No murmurs, rubs, or gallops. Pulmonary/Chest: Effort normal and breath sounds normal. No respiratory distress. No wheezes, rhonchi, or rales. Abdomen: obese, soft, NT/ND, +BS, no rebound or guarding, no masses, no HSM appreciated. Ext: 2+ edema left foot to trace mid lower leg, 1+ edema right foot to trace mid leg. Skin: multiple seborrheic keratosis on legs, dark papule approx 2cm left leg below knee which appears to be seborrheic keratosis   Neurological: Alert. Psychiatric: Normal mood and affect. Behavior is normal.     Lab Results   Component Value Date/Time    Sodium 137 05/13/2021 12:15 PM    Potassium 4.4 05/13/2021 12:15 PM    Chloride 102 05/13/2021 12:15 PM    CO2 22 05/13/2021 12:15 PM    Glucose 111 (H) 05/13/2021 12:15 PM    BUN 12 05/13/2021 12:15 PM    Creatinine 0.95 05/13/2021 12:15 PM    BUN/Creatinine ratio 13 05/13/2021 12:15 PM    GFR est AA 72 05/13/2021 12:15 PM    GFR est non-AA 62 05/13/2021 12:15 PM    Calcium 9.9 05/13/2021 12:15 PM    Bilirubin, total 0.2 05/13/2021 12:15 PM    Alk.  phosphatase 56 05/13/2021 12:15 PM    Protein, total 7.5 05/13/2021 12:15 PM    Albumin 4.5 05/13/2021 12:15 PM    A-G Ratio 1.5 05/13/2021 12:15 PM    ALT (SGPT) 57 (H) 05/13/2021 12:15 PM     Lab Results   Component Value Date/Time    Hemoglobin A1c 7.3 (H) 05/13/2021 12:15 PM    Hemoglobin A1c 7.4 (H) 10/01/2020 08:51 AM    Hemoglobin A1c 6.4 (H) 09/04/2019 08:33 AM    Hemoglobin A1c, External 6.4 08/04/2018 12:00 AM      Lab Results   Component Value Date/Time    Cholesterol, total 109 05/13/2021 12:15 PM    HDL Cholesterol 40 05/13/2021 12:15 PM    LDL, calculated 54 05/13/2021 12:15 PM    LDL, calculated 64 09/04/2019 08:33 AM    VLDL, calculated 15 05/13/2021 12:15 PM    VLDL, calculated 25 09/04/2019 08:33 AM    Triglyceride 74 05/13/2021 12:15 PM          ASSESSMENT/PLAN  Diagnoses and all orders for this visit:    1. Hypertension, essential  -     METABOLIC PANEL, COMPREHENSIVE; Future  -     CBC WITH AUTOMATED DIFF; Future  -     hydroCHLOROthiazide (HYDRODIURIL) 25 mg tablet; Take 1 Tablet by mouth daily for 360 days. 2. Palpitations  -     AMB POC EKG ROUTINE W/ 12 LEADS, INTER & REP    3. Skin lesion  -     REFERRAL TO DERMATOLOGY    4. Acquired hypothyroidism  -     TSH 3RD GENERATION; Future    5. Leg swelling    reportedly negative LE dopplers recently  Suspect edema related to amlodipine  Stop amlodipine, start hctz, get labs now and again in 2-4 prior to follow up appt  Suspect seborrheic keratosis - refer to derm for skin exam  ekg nsr      Health Maintenance Due   Topic Date Due    COVID-19 Vaccine (1) Never done    Shingrix Vaccine Age 50> (1 of 2) Never done    Pneumococcal 65+ years (2 of 2 - PPSV23) 02/05/2021    Flu Vaccine (1) 09/01/2021    MICROALBUMIN Q1  10/01/2021    Foot Exam Q1  10/06/2021    Medicare Yearly Exam  10/07/2021    Colorectal Cancer Screening Combo  10/08/2021        Follow-up and Dispositions    · Return in about 1 month (around 10/27/2021), or if symptoms worsen or fail to improve, for bp, leg swelling. Reviewed plan of care. Patient has provided input and agrees with goals. The nurse provided the patient and/or family with advanced directive information if needed and encouraged the patient to provide a copy to the office when available.

## 2021-09-28 ENCOUNTER — DOCUMENTATION ONLY (OUTPATIENT)
Dept: SLEEP MEDICINE | Age: 66
End: 2021-09-28

## 2021-09-28 ENCOUNTER — OFFICE VISIT (OUTPATIENT)
Dept: SLEEP MEDICINE | Age: 66
End: 2021-09-28
Payer: MEDICARE

## 2021-09-28 VITALS
WEIGHT: 221.2 LBS | DIASTOLIC BLOOD PRESSURE: 89 MMHG | TEMPERATURE: 98.9 F | OXYGEN SATURATION: 97 % | BODY MASS INDEX: 36.25 KG/M2 | RESPIRATION RATE: 20 BRPM | HEART RATE: 72 BPM | SYSTOLIC BLOOD PRESSURE: 154 MMHG

## 2021-09-28 DIAGNOSIS — I10 HYPERTENSION, ESSENTIAL: ICD-10-CM

## 2021-09-28 DIAGNOSIS — G47.33 OBSTRUCTIVE SLEEP APNEA (ADULT) (PEDIATRIC): Primary | ICD-10-CM

## 2021-09-28 LAB
ALBUMIN SERPL-MCNC: 4.5 G/DL (ref 3.8–4.8)
ALBUMIN/GLOB SERPL: 1.5 {RATIO} (ref 1.2–2.2)
ALP SERPL-CCNC: 55 IU/L (ref 44–121)
ALT SERPL-CCNC: 49 IU/L (ref 0–32)
AST SERPL-CCNC: 59 IU/L (ref 0–40)
BASOPHILS # BLD AUTO: 0 X10E3/UL (ref 0–0.2)
BASOPHILS NFR BLD AUTO: 1 %
BILIRUB SERPL-MCNC: 0.2 MG/DL (ref 0–1.2)
BUN SERPL-MCNC: 14 MG/DL (ref 8–27)
BUN/CREAT SERPL: 14 (ref 12–28)
CALCIUM SERPL-MCNC: 9.3 MG/DL (ref 8.7–10.3)
CHLORIDE SERPL-SCNC: 105 MMOL/L (ref 96–106)
CO2 SERPL-SCNC: 21 MMOL/L (ref 20–29)
CREAT SERPL-MCNC: 0.97 MG/DL (ref 0.57–1)
EOSINOPHIL # BLD AUTO: 0 X10E3/UL (ref 0–0.4)
EOSINOPHIL NFR BLD AUTO: 0 %
ERYTHROCYTE [DISTWIDTH] IN BLOOD BY AUTOMATED COUNT: 14.6 % (ref 11.7–15.4)
GLOBULIN SER CALC-MCNC: 3.1 G/DL (ref 1.5–4.5)
GLUCOSE SERPL-MCNC: 134 MG/DL (ref 65–99)
HCT VFR BLD AUTO: 34.4 % (ref 34–46.6)
HGB BLD-MCNC: 11 G/DL (ref 11.1–15.9)
IMM GRANULOCYTES # BLD AUTO: 0 X10E3/UL (ref 0–0.1)
IMM GRANULOCYTES NFR BLD AUTO: 1 %
LYMPHOCYTES # BLD AUTO: 0.9 X10E3/UL (ref 0.7–3.1)
LYMPHOCYTES NFR BLD AUTO: 28 %
MCH RBC QN AUTO: 28.1 PG (ref 26.6–33)
MCHC RBC AUTO-ENTMCNC: 32 G/DL (ref 31.5–35.7)
MCV RBC AUTO: 88 FL (ref 79–97)
MONOCYTES # BLD AUTO: 0.4 X10E3/UL (ref 0.1–0.9)
MONOCYTES NFR BLD AUTO: 14 %
NEUTROPHILS # BLD AUTO: 1.8 X10E3/UL (ref 1.4–7)
NEUTROPHILS NFR BLD AUTO: 56 %
PLATELET # BLD AUTO: 231 X10E3/UL (ref 150–450)
POTASSIUM SERPL-SCNC: 4.6 MMOL/L (ref 3.5–5.2)
PROT SERPL-MCNC: 7.6 G/DL (ref 6–8.5)
RBC # BLD AUTO: 3.92 X10E6/UL (ref 3.77–5.28)
SODIUM SERPL-SCNC: 140 MMOL/L (ref 134–144)
TSH SERPL DL<=0.005 MIU/L-ACNC: 5.3 UIU/ML (ref 0.45–4.5)
WBC # BLD AUTO: 3.3 X10E3/UL (ref 3.4–10.8)

## 2021-09-28 PROCEDURE — G8754 DIAS BP LESS 90: HCPCS | Performed by: INTERNAL MEDICINE

## 2021-09-28 PROCEDURE — G8427 DOCREV CUR MEDS BY ELIG CLIN: HCPCS | Performed by: INTERNAL MEDICINE

## 2021-09-28 PROCEDURE — G9899 SCRN MAM PERF RSLTS DOC: HCPCS | Performed by: INTERNAL MEDICINE

## 2021-09-28 PROCEDURE — G8536 NO DOC ELDER MAL SCRN: HCPCS | Performed by: INTERNAL MEDICINE

## 2021-09-28 PROCEDURE — G8753 SYS BP > OR = 140: HCPCS | Performed by: INTERNAL MEDICINE

## 2021-09-28 PROCEDURE — 1101F PT FALLS ASSESS-DOCD LE1/YR: CPT | Performed by: INTERNAL MEDICINE

## 2021-09-28 PROCEDURE — 3017F COLORECTAL CA SCREEN DOC REV: CPT | Performed by: INTERNAL MEDICINE

## 2021-09-28 PROCEDURE — 99213 OFFICE O/P EST LOW 20 MIN: CPT | Performed by: INTERNAL MEDICINE

## 2021-09-28 PROCEDURE — G8417 CALC BMI ABV UP PARAM F/U: HCPCS | Performed by: INTERNAL MEDICINE

## 2021-09-28 PROCEDURE — 1090F PRES/ABSN URINE INCON ASSESS: CPT | Performed by: INTERNAL MEDICINE

## 2021-09-28 PROCEDURE — G8432 DEP SCR NOT DOC, RNG: HCPCS | Performed by: INTERNAL MEDICINE

## 2021-09-28 PROCEDURE — G8399 PT W/DXA RESULTS DOCUMENT: HCPCS | Performed by: INTERNAL MEDICINE

## 2021-09-28 NOTE — PROGRESS NOTES
[address] [facility]. [place], [address]  Tel.  [phone]  Fax. [address] [facility] [address]  [place], [address] [facility]  Tel.  [phone]  Fax. [phone] [address]   Tel.  [phone]  Fax. [phone]     S>Sumi Nuñez is a 77 y.o. female seen for a positive airway pressure follow-up. She reports no problems using the device. The following problems are identified:    Drowsiness no Problems exhaling no   Snoring no Forget to put on no   Mask Comfortable yes Can't fall asleep no   Dry Mouth no Mask falls off no   Air Leaking no Frequent awakenings no     Download reviewed. She admits that her sleep has improved. Therapy Apnea Index averaged over PAP use: 5 /hr which reflects improved sleep breathing condition. Allergies   Allergen Reactions    Gabapentin Other (comments)     Hot flashes    Topiramate Other (comments)     Hot flashes       She has a current medication list which includes the following prescription(s): lorazepam, pregabalin, hydrochlorothiazide, lisinopril, trulicity, lancets, glucose blood vi test strips, blood-glucose meter, true metrix glucose test strip, levothyroxine, rosuvastatin, glipizide sr, cyanocobalamin, aspirin delayed-release, acetaminophen, omega 3-dha-epa-fish oil, magnesium oxide, OTHER, fluticasone propionate, ascorbic acid (vitamin c), multivitamin with iron, vit b cmplx 3-fa-vit c-biotin, and metformin. .      She  has a past medical history of Diabetes ([address] Hypercholesterolemia, Hypertension, Sleep apnea, Thyroid disease, and TIA (transient ischemic attack). [place] Sleepiness Score: 6   and Modified F.O.S.Q. Score Total / 2: 20   which reflect improved sleep quality over therapy time.     O>    Visit Vitals  BP (!) 154/89 (BP 1 Location: Right arm, BP Patient Position: Sitting, BP Cuff Size: Large adult)   Pulse 72   Temp 98.9 °F (37.2 °C) (Temporal)   Resp 20   Wt 221 lb 3.2 oz (100.3 kg) SpO2 97%   BMI 36.25 kg/m²           General:   Alert, oriented, not in distress   Neck:   No JVD    Chest/Lungs:  symetrical lung expansion , no accessory muscle use    Extremities:  no obvious rashes , negative edema    Neuro:  No focal deficits ; No obvious tremor    Psych:  Normal affect ,  Normal countenance ;         A>    ICD-10-CM ICD-9-CM    1. Obstructive sleep apnea (adult) (pediatric)  G47.33 327.23 AMB SUPPLY ORDER   2. Hypertension, essential  I10 401.9      AHI = 34(4-19). On CPAP, [employer] :  13-16 cmH2O. Compliant:      yes    Therapeutic Response:  Positive    P>      *   Follow-up and Dispositions    · Return in about 1 year (around 9/28/2022). she is compliant with PAP therapy and PAP continues to benefit patient and remains necessary for control of her sleep apnea. I have ordered replacement supplies      * She was asked to contact our office for any problems regarding PAP therapy. * Counseling was provided regarding the importance of regular PAP use and on proper sleep hygiene and safe driving. I reviewed the Bl[employer]tr recall. We discussed the problem of possible sound abatement foam breakdown. she does not use a So-clean device or other commercial PAP . she understands the use of those devices may increase likelihood of the foam breakdown. she was advised of the 's recommendation to stop use of these PAP devices until the problem is rectified. We discussed the pros and cons of withholding PAP therapy. she was informed that [employer] is working on a solution and updating their website daily. she was encouraged to check the site daily. she was advised to get on the recall register. Should she decide to discontinue PAP device, she should sleep with head of bed elevated or avoid sleeping on her back to help minimize respiratory events.    she was advised that if/when she receives the replacement PAP device from [employer], she should contact his medical supply company so they can set the PAP to her previous settings and nubia the [facility] access to the modem. Previous hose and mask should be compatible with the new device. * Re-enforced proper and regular cleaning for the device. 2. Hypertension - she continues on her current regimen. I have reviewed the relationship between hypertension as it relates to sleep-disordered breathing.      Electronically signed by    Angie Gaitan MD  Diplomate in Sleep Medicine  [employer]    9/28/2021

## 2021-09-28 NOTE — PATIENT INSTRUCTIONS
[address] [place]. [place], [address]  Tel.  [phone]  Fax. [address] [facility] [address]  [place], [address] [facility]  Tel.  [phone]  Fax. [phone] [address] [facility] [address]  Tel.  [phone]  Fax. [phone]     PROPER SLEEP HYGIENE    What to avoid  · Do not have drinks with caffeine, such as coffee or black tea, for 8 hours before bed. · Do not smoke or use other types of tobacco near bedtime. Nicotine is a stimulant and can keep you awake. · Avoid drinking alcohol late in the evening, because it can cause you to wake in the middle of the night. · Do not eat a big meal close to bedtime. If you are hungry, eat a light snack. · Do not drink a lot of water close to bedtime, because the need to urinate may wake you up during the night. · Do not read or watch TV in bed. Use the bed only for sleeping and sexual activity. What to try  · Go to bed at the same time every night, and wake up at the same time every morning. Do not take naps during the day. · Keep your bedroom quiet, dark, and cool. · Get regular exercise, but not within 3 to 4 hours of your bedtime. .  · Sleep on a comfortable pillow and mattress. · If watching the clock makes you anxious, turn it facing away from you so you cannot see the time. · If you worry when you lie down, start a worry book. Well before bedtime, write down your worries, and then set the book and your concerns aside. · Try meditation or other relaxation techniques before you go to bed. · If you cannot fall asleep, get up and go to another room until you feel sleepy. Do something relaxing. Repeat your bedtime routine before you go to bed again. · Make your house quiet and calm about an hour before bedtime. Turn down the lights, turn off the TV, log off the computer, and turn down the volume on music. This can help you relax after a busy day.     Drowsy Driving  The [address] [place] [address] Road Traffic Safety Administration cites drowsiness as a causing factor in more than 430,904 police reported crashes annually, resulting in 76,000 injuries and 1,500 deaths. Other surveys suggest 55% of people polled have driven while drowsy in the past year, 23% had fallen asleep but not crashed, 3% crashed, and 2% had and accident due to drowsy driving. Who is at risk? Young Drivers: One study of drowsy driving accidents states that 55% of the drivers were under 25 years. Of those, 75% were male. Shift Workers and Travelers: People who work overnight or travel across time zones frequently are at higher risk of experiencing Circadian Rhythm Disorders. They are trying to work and function when their body is programed to sleep. Sleep Deprived: Lack of sleep has a serious impact on your ability to pay attention or focus on a task. Consistently getting less than the average of 8 hours your body needs creates partial or cumulative sleep deprivation. Untreated Sleep Disorders: Sleep Apnea, Narcolepsy, R.L.S., and other sleep disorders (untreated) prevent a person from getting enough restful sleep. This leads to excessive daytime sleepiness and increases the risk for drowsy driving accidents by up to 7 times. Medications / Alcohol: Even over the counter medications can cause drowsiness. Medications that impair a drivers attention should have a warning label. Alcohol naturally makes you sleepy and on its own can cause accidents. Combined with excessive drowsiness its effects are amplified. Signs of Drowsy Driving:   * You don't remember driving the last few miles   * You may drift out of your tyrese   * You are unable to focus and your thoughts wander   * You may yawn more often than normal   * You have difficulty keeping your eyes open / nodding off   * Missing traffic signs, speeding, or tailgating  Prevention-   Good sleep hygiene, lifestyle and behavioral choices have the most impact on drowsy driving.  There is no substitute for sleep and the average person requires 8 hours nightly. If you find yourself driving drowsy, stop and sleep. Consider the sleep hygiene tips provided during your visit as well. Medication Refill Policy: Refills for all medications require 1 week advance notice. Please have your pharmacy fax a refill request. We are unable to fax, or call in \"controled substance\" medications and you will need to pick these prescriptions up from our office. [employer] Activation    Thank you for requesting access to [facility]. Please follow the instructions below to securely access and download your online medical record. [facility] allows you to send messages to your doctor, view your test results, renew your prescriptions, schedule appointments, and more. How Do I Sign Up? 1. In your internet browser, go to [email or web address]. 2. Click on the First Time User? Click Here link in the Sign In box. You will see the New Member Sign Up page. 3. Enter your [facility] Access Code exactly as it appears below. You will not need to use this code after youve completed the sign-up process. If you do not sign up before the expiration date, you must request a new code. [facility] Access Code: Activation code not generated  Current [facility] Status: Active (This is the date your [facility] access code will )    4. Enter the last four digits of your Social Security Number (xxxx) and Date of Birth (mm/dd/yyyy) as indicated and click Submit. You will be taken to the next sign-up page. 5. Create a [facility] ID. This will be your [facility] login ID and cannot be changed, so think of one that is secure and easy to remember. 6. Create a [facility] password. You can change your password at any time. 7. Enter your Password Reset Question and Answer. This can be used at a later time if you forget your password. 8. Enter your e-mail address. You will receive e-mail notification when new information is available in [address]   9. Click Sign Up. You can now view and download portions of your medical record. 10. Click the Download Summary menu link to download a portable copy of your medical information. Additional Information    If you have questions, please call [phone]. Remember, [employer] is NOT to be used for urgent needs. For medical emergencies, dial 911.

## 2021-10-06 DIAGNOSIS — R80.9 CONTROLLED TYPE 2 DIABETES MELLITUS WITH MICROALBUMINURIA, WITHOUT LONG-TERM CURRENT USE OF INSULIN (HCC): ICD-10-CM

## 2021-10-06 DIAGNOSIS — E11.29 CONTROLLED TYPE 2 DIABETES MELLITUS WITH MICROALBUMINURIA, WITHOUT LONG-TERM CURRENT USE OF INSULIN (HCC): ICD-10-CM

## 2021-10-06 RX ORDER — GLIPIZIDE 10 MG/1
10 TABLET, FILM COATED, EXTENDED RELEASE ORAL DAILY
Qty: 90 TABLET | Refills: 3 | Status: SHIPPED | OUTPATIENT
Start: 2021-10-06 | End: 2022-09-20

## 2021-10-06 NOTE — TELEPHONE ENCOUNTER
PCP: Jo Mao MD     Last appt: 9/27/2021   Future Appointments   Date Time Provider Chidi Unger   11/3/2021 11:10 AM Jo Mao MD [facility] AMB   11/24/2021  9:30 AM MD [facility]  AMB   9/27/2022 11:00 AM Addis Gongora MD [facility] AMB        Requested Prescriptions     Pending Prescriptions Disp Refills    glipiZIDE SR (GLUCOTROL XL) 10 mg CR tablet 90 Tablet 3     Sig: Take 1 Tablet by mouth daily.  Indications: type 2 diabetes mellitus

## 2021-10-06 NOTE — TELEPHONE ENCOUNTER
[facility] requests refill of   Requested Prescriptions     Pending Prescriptions Disp Refills    glipiZIDE SR (GLUCOTROL XL) 10 mg CR tablet 90 Tablet 3     Sig: Take 1 Tablet by mouth daily.  Indications: type 2 diabetes mellitus

## 2021-10-11 ENCOUNTER — TELEPHONE (OUTPATIENT)
Dept: INTERNAL MEDICINE CLINIC | Age: 66
End: 2021-10-11

## 2021-10-11 RX ORDER — FUROSEMIDE 20 MG/1
20 TABLET ORAL
Qty: 30 TABLET | Refills: 0 | Status: SHIPPED | OUTPATIENT
Start: 2021-10-11 | End: 2022-02-24

## 2021-10-11 NOTE — TELEPHONE ENCOUNTER
I called the patient and verified them by name and date of birth. Saw Dr. Ector Rodrigues on 09.27 for leg swelling. It is still swelling and will go down a little at night. Once up in the morning and starts walking swells back up. Dr. Ector Rodrigues took her off of Amlodopine and put on HCTZ. Has been taken that since, drinking plenty of water and using the bathroom as well. Saw CPAP provider on Friday and they noticed the leg swelling. Mentioned that it might be thyroid. Is getting cortisone injection into L3 on Friday. Patient would also like to know lab results.

## 2021-10-11 NOTE — TELEPHONE ENCOUNTER
tsh is minimally elevated, not in a range to start medications and not cause of swelling. Recheck tsh in about 3-6 mos. No anemia. Normal kidney and liver labs. May take lasix 20mg daily as needed for swelling, get compression stockings for legs which should help.

## 2021-10-11 NOTE — TELEPHONE ENCOUNTER
Patient is still having leg swelling. Saw Dr. Danyell Kaur last. Skye Grimes is primary care provider. Also would like to know lab results.

## 2021-10-13 NOTE — TELEPHONE ENCOUNTER
I called the patient and verified them by name and date of birth. I informed the patient on the message from the provider. To wear compression stockings throughout the day, can take off at night. They stated understanding and had no further questions.

## 2021-10-26 ENCOUNTER — TELEPHONE (OUTPATIENT)
Dept: INTERNAL MEDICINE CLINIC | Age: 66
End: 2021-10-26

## 2021-10-26 DIAGNOSIS — E03.9 ACQUIRED HYPOTHYROIDISM: ICD-10-CM

## 2021-10-26 DIAGNOSIS — E11.9 TYPE 2 DIABETES MELLITUS WITHOUT COMPLICATION, WITHOUT LONG-TERM CURRENT USE OF INSULIN (HCC): Primary | ICD-10-CM

## 2021-10-26 DIAGNOSIS — E78.00 HYPERCHOLESTEROLEMIA: ICD-10-CM

## 2021-10-26 NOTE — TELEPHONE ENCOUNTER
----- Message from Pancho Villalpando sent at 10/26/2021  4:57 PM EDT -----  Subject: Referral Request    QUESTIONS   Reason for referral request? Pt is requesting to have labs put in before   her appt on 11/14; Has the physician seen you for this condition before? No   Preferred Specialist (if applicable)? Do you already have an appointment scheduled? Yes  Select Scheduled Date? 2021-11-24  Select Scheduled Physician? Natalie Alonzo   Additional Information for Provider?   ---------------------------------------------------------------------------  --------------  Gene Mick BAUER  What is the best way for the office to contact you? OK to leave message on   voicemail  Preferred Call Back Phone Number?  [phone]

## 2021-10-27 NOTE — TELEPHONE ENCOUNTER
Lab orders signed and printed. To be mailed to patient to have done at [facility] 1 week before appointment.

## 2021-11-20 LAB
ALBUMIN SERPL-MCNC: 4.5 G/DL (ref 3.8–4.8)
ALBUMIN/GLOB SERPL: 1.7 {RATIO} (ref 1.2–2.2)
ALP SERPL-CCNC: 54 IU/L (ref 44–121)
ALT SERPL-CCNC: 39 IU/L (ref 0–32)
AST SERPL-CCNC: 52 IU/L (ref 0–40)
BILIRUB SERPL-MCNC: <0.2 MG/DL (ref 0–1.2)
BUN SERPL-MCNC: 22 MG/DL (ref 8–27)
BUN/CREAT SERPL: 18 (ref 12–28)
CALCIUM SERPL-MCNC: 9.3 MG/DL (ref 8.7–10.3)
CHLORIDE SERPL-SCNC: 98 MMOL/L (ref 96–106)
CHOLEST SERPL-MCNC: 116 MG/DL (ref 100–199)
CO2 SERPL-SCNC: 22 MMOL/L (ref 20–29)
CREAT SERPL-MCNC: 1.2 MG/DL (ref 0.57–1)
EST. AVERAGE GLUCOSE BLD GHB EST-MCNC: 180 MG/DL
GLOBULIN SER CALC-MCNC: 2.7 G/DL (ref 1.5–4.5)
GLUCOSE SERPL-MCNC: 141 MG/DL (ref 65–99)
HBA1C MFR BLD: 7.9 % (ref 4.8–5.6)
HDLC SERPL-MCNC: 34 MG/DL
LDLC SERPL CALC-MCNC: 61 MG/DL (ref 0–99)
POTASSIUM SERPL-SCNC: 4.3 MMOL/L (ref 3.5–5.2)
PROT SERPL-MCNC: 7.2 G/DL (ref 6–8.5)
SODIUM SERPL-SCNC: 134 MMOL/L (ref 134–144)
T4 FREE SERPL-MCNC: 0.86 NG/DL (ref 0.82–1.77)
TRIGL SERPL-MCNC: 114 MG/DL (ref 0–149)
TSH SERPL DL<=0.005 MIU/L-ACNC: 5.98 UIU/ML (ref 0.45–4.5)
VLDLC SERPL CALC-MCNC: 21 MG/DL (ref 5–40)

## 2021-11-21 NOTE — TELEPHONE ENCOUNTER
Will discuss at 11/24/21 appt. A1c 7.9%; was 7.3% on 5/13/21. Consider increasing Trulicity to 4.5 mg weekly or increasing glipizide SR to 20 mg daily. Mildly abnormal kidney tests for the first time (creat 1.20, GFR 54). Avoid NSAID's. Liver enzymes mildly elevated; started 6 months ago. Thyroid hormone level a little too low. Change to take levothyroxine 88 mcg 1 tablet daily. Plan to order liver US. Rest of labs normal, including blood counts and cholesterol (tot chol 116, LDL 61).

## 2021-11-22 ENCOUNTER — HOSPITAL ENCOUNTER (OUTPATIENT)
Dept: GENERAL RADIOLOGY | Age: 66
Discharge: HOME | End: 2021-11-22
Attending: ORTHOPAEDIC SURGERY
Payer: MEDICARE

## 2021-11-22 ENCOUNTER — HOSPITAL ENCOUNTER (OUTPATIENT)
Dept: PREADMISSION TESTING | Age: 66
Discharge: HOME | End: 2021-11-22
Attending: ORTHOPAEDIC SURGERY
Payer: MEDICARE

## 2021-11-22 ENCOUNTER — HOSPITAL ENCOUNTER (OUTPATIENT)
Dept: PHYSICAL THERAPY | Age: 66
Discharge: HOME | End: 2021-11-22
Attending: ORTHOPAEDIC SURGERY
Payer: MEDICARE

## 2021-11-22 VITALS
WEIGHT: 223.11 LBS | OXYGEN SATURATION: 98 % | HEIGHT: 66 IN | BODY MASS INDEX: 35.86 KG/M2 | DIASTOLIC BLOOD PRESSURE: 70 MMHG | HEART RATE: 88 BPM | RESPIRATION RATE: 18 BRPM | SYSTOLIC BLOOD PRESSURE: 142 MMHG | TEMPERATURE: 98.1 F

## 2021-11-22 LAB
25(OH)D3 SERPL-MCNC: 92.5 NG/ML (ref 30–100)
ABO + RH BLD: NORMAL
ALBUMIN SERPL-MCNC: 3.9 G/DL (ref 3.5–5)
ALBUMIN/GLOB SERPL: 1 {RATIO} (ref 1.1–2.2)
ALP SERPL-CCNC: 53 U/L (ref 45–117)
ALT SERPL-CCNC: 58 U/L (ref 12–78)
AMPHET UR QL SCN: NEGATIVE
ANION GAP SERPL CALC-SCNC: 4 MMOL/L (ref 5–15)
APPEARANCE UR: CLEAR
AST SERPL-CCNC: 60 U/L (ref 15–37)
BACTERIA URNS QL MICRO: ABNORMAL /HPF
BARBITURATES UR QL SCN: NEGATIVE
BENZODIAZ UR QL: NEGATIVE
BILIRUB SERPL-MCNC: 0.3 MG/DL (ref 0.2–1)
BILIRUB UR QL: NEGATIVE
BLOOD GROUP ANTIBODIES SERPL: NORMAL
BUN SERPL-MCNC: 15 MG/DL (ref 6–20)
BUN/CREAT SERPL: 14 (ref 12–20)
CALCIUM SERPL-MCNC: 9.6 MG/DL (ref 8.5–10.1)
CANNABINOIDS UR QL SCN: NEGATIVE
CHLORIDE SERPL-SCNC: 106 MMOL/L (ref 97–108)
CO2 SERPL-SCNC: 26 MMOL/L (ref 21–32)
COCAINE UR QL SCN: NEGATIVE
COLOR UR: ABNORMAL
CREAT SERPL-MCNC: 1.11 MG/DL (ref 0.55–1.02)
DRUG SCRN COMMENT,DRGCM: NORMAL
EPITH CASTS URNS QL MICRO: ABNORMAL /LPF
ERYTHROCYTE [DISTWIDTH] IN BLOOD BY AUTOMATED COUNT: 14.6 % (ref 11.5–14.5)
GLOBULIN SER CALC-MCNC: 4 G/DL (ref 2–4)
GLUCOSE SERPL-MCNC: 93 MG/DL (ref 65–100)
GLUCOSE UR STRIP.AUTO-MCNC: NEGATIVE MG/DL
HCT VFR BLD AUTO: 30.5 % (ref 35–47)
HGB BLD-MCNC: 9.8 G/DL (ref 11.5–16)
HGB UR QL STRIP: NEGATIVE
HYALINE CASTS URNS QL MICRO: ABNORMAL /LPF (ref 0–5)
INR PPP: 1 (ref 0.9–1.1)
KETONES UR QL STRIP.AUTO: NEGATIVE MG/DL
LEUKOCYTE ESTERASE UR QL STRIP.AUTO: NEGATIVE
MCH RBC QN AUTO: 28.3 PG (ref 26–34)
MCHC RBC AUTO-ENTMCNC: 32.1 G/DL (ref 30–36.5)
MCV RBC AUTO: 88.2 FL (ref 80–99)
METHADONE UR QL: NEGATIVE
NITRITE UR QL STRIP.AUTO: NEGATIVE
NRBC # BLD: 0 K/UL (ref 0–0.01)
NRBC BLD-RTO: 0 PER 100 WBC
OPIATES UR QL: NEGATIVE
PCP UR QL: NEGATIVE
PH UR STRIP: 6 [PH] (ref 5–8)
PLATELET # BLD AUTO: 238 K/UL (ref 150–400)
PMV BLD AUTO: 9.7 FL (ref 8.9–12.9)
POTASSIUM SERPL-SCNC: 4.1 MMOL/L (ref 3.5–5.1)
PREALB SERPL-MCNC: 35.2 MG/DL (ref 20–40)
PROT SERPL-MCNC: 7.9 G/DL (ref 6.4–8.2)
PROT UR STRIP-MCNC: 30 MG/DL
PROTHROMBIN TIME: 10.3 SEC (ref 9–11.1)
RBC # BLD AUTO: 3.46 M/UL (ref 3.8–5.2)
RBC #/AREA URNS HPF: ABNORMAL /HPF (ref 0–5)
SODIUM SERPL-SCNC: 136 MMOL/L (ref 136–145)
SP GR UR REFRACTOMETRY: 1.01 (ref 1–1.03)
SPECIMEN EXP DATE BLD: NORMAL
UA: UC IF INDICATED,UAUC: ABNORMAL
UROBILINOGEN UR QL STRIP.AUTO: 0.2 EU/DL (ref 0.2–1)
WBC # BLD AUTO: 2.6 K/UL (ref 3.6–11)
WBC URNS QL MICRO: ABNORMAL /HPF (ref 0–4)

## 2021-11-22 PROCEDURE — 80053 COMPREHEN METABOLIC PANEL: CPT

## 2021-11-22 PROCEDURE — 81001 URINALYSIS AUTO W/SCOPE: CPT

## 2021-11-22 PROCEDURE — 97161 PT EVAL LOW COMPLEX 20 MIN: CPT

## 2021-11-22 PROCEDURE — 85610 PROTHROMBIN TIME: CPT

## 2021-11-22 PROCEDURE — 86901 BLOOD TYPING SEROLOGIC RH(D): CPT

## 2021-11-22 PROCEDURE — 71046 X-RAY EXAM CHEST 2 VIEWS: CPT

## 2021-11-22 PROCEDURE — 82306 VITAMIN D 25 HYDROXY: CPT

## 2021-11-22 PROCEDURE — 84134 ASSAY OF PREALBUMIN: CPT

## 2021-11-22 PROCEDURE — 85027 COMPLETE CBC AUTOMATED: CPT

## 2021-11-22 PROCEDURE — 97116 GAIT TRAINING THERAPY: CPT

## 2021-11-22 PROCEDURE — 80307 DRUG TEST PRSMV CHEM ANLYZR: CPT

## 2021-11-22 RX ORDER — ACETAMINOPHEN 500 MG
5000 TABLET ORAL DAILY
COMMUNITY

## 2021-11-22 RX ORDER — CHOLECALCIFEROL (VITAMIN D3) 125 MCG
220 CAPSULE ORAL
COMMUNITY
End: 2021-12-02

## 2021-11-22 RX ORDER — ACETAMINOPHEN 500 MG
1000 TABLET ORAL
COMMUNITY

## 2021-11-22 RX ORDER — ZINC GLUCONATE 10 MG
250 LOZENGE ORAL DAILY
COMMUNITY

## 2021-11-22 RX ORDER — MENTHOL
1000 GEL (GRAM) TOPICAL DAILY
COMMUNITY

## 2021-11-22 RX ORDER — OMEGA-3-ACID ETHYL ESTERS 1 G/1
2 CAPSULE, LIQUID FILLED ORAL 2 TIMES DAILY
COMMUNITY

## 2021-11-22 RX ORDER — CETIRIZINE HYDROCHLORIDE 10 MG/1
10 CAPSULE, LIQUID FILLED ORAL DAILY
COMMUNITY
End: 2022-06-24 | Stop reason: ALTCHOICE

## 2021-11-22 NOTE — PROGRESS NOTES
[facility]  Physical Therapy Pre-surgery evaluation  [address], [address]    PHYSICAL THERAPY PRE SPINE SURGERY EVALUATION  Joe Clayton (76 y.o. female)  Date: 11/22/2021    PAT  Procedure(s) (LRB):  L3-5 LATERAL FUSION FROM  LEFT SIDED APPROACH (ANES CHOICE) (Left)     Precautions: spinal         ASSESSMENT :  Based on the objective data described below, the patient presents with impaired tolerance of activity, altered gait mechanics, and pain in left glutes and lateral calf due to end stage degenerative joint disease in the spinal level: lumbar spine. Discussed anticipated disposition to home with possible discharge within a 1 to 2 day time frame post-surgery. Patient  in agreement, scheduled for a two part surgery. Anticipate patient will need acute PT and OT orders based on expected deficits post surgery. GOALS: (Goals have been discussed and agreed upon with patient.)  DISCHARGE GOALS: Time Frame: 1 DAY  1. Patient will demonstrate increased strength, range of motion, and pain control via a home exercise program in order to minimize functional deficits in preparation for their upcoming surgery. This will be achieved by using education, demonstration and through the use of an informational handout including a home exercise program.  REHABILITATION POTENTIAL FOR STATED GOALS: Good     RECOMMENDATIONS AND PLANNED INTERVENTIONS: (Benefits and precautions of physical therapy have been discussed with the patient.)  · Home Exercise Program  TREATMENT PLAN EFFECTIVE DATES: 11/22/2021 to 11/22/2021   FREQUENCY/DURATION: Patient to continue to perform home exercise program at least twice daily until surgery. SUBJECTIVE:   Patient stated It's hard to walk with left leg.     OBJECTIVE DATA SUMMARY:   HISTORY:      Past Medical History:   Diagnosis Date    Diabetes ([facility] [address]     DM2-trulicity, PCP treats    Hypercholesterolemia     Hypertension     Sarcoidosis     brain    Sleep apnea     CPAP complient , Dr. Monty Lopez post epidural steroid injection 10/15/2021    Thyroid disease     TIA (transient ischemic attack)     Questionable per patient left side defecits (due to spinal stenosis)     Past Surgical History:   Procedure Laterality Date    HX BREAST BIOPSY Right     Benign (per patient)    HX CARPAL TUNNEL RELEASE Right     HX  SECTION       x 2    HX CHOLECYSTECTOMY      HX HEENT      parathyroidectomy    HX HYSTERECTOMY         Prior Level of Function/Home Situation: Patient ambulates indep in community and driving, no falls, no AD  Personal factors and/or comorbidities impacting plan of care: Patient lives with , indep with ADLs      Home Situation  Home Environment: Private residence  # Steps to Enter: 1  One/Two Story Residence: One story  Living Alone: No  Support Systems: Spouse/Significant Other  Current DME Used/Available at Home: Shower chair  Tub or Shower Type: Shower           EXAMINATION/PRESENTATION/DECISION MAKING:     ADLs (Current Functional Status): Bathing/Showering:   [x] Independent  [] Requires Assistance from Someone  [] Sponge Bath Only   Ambulation:  [x] Independent  [] Walk Indoors Only  [] Walk Outdoors  [] Use Assistive Device  [] Use Wheelchair Only     Dressing:  [x] Independent    Requires Assistance from Someone for:  [] Sock/Shoes  [] Pants  [] Everything   Household Activities:  [x] Routine house and yard work  [] Light Housework Only  [] None       Critical Behavior:                Strength:     Strength:  Within functional limits                       Tone & Sensation:                                     Range Of Motion:     AROM: Within functional limits                            Coordination:        Functional Mobility:  Transfers:  Sit to Stand: Modified independent  Stand to Sit: Modified independent                       Balance:   Sitting: Intact  Standing: Impaired  Standing - Static: Good  Standing - Dynamic : Occasional  Ambulation/Gait Training:  Distance (ft): 100 Feet (ft)     Ambulation - Level of Assistance: Modified independent        Gait Abnormalities: Decreased step clearance, Trunk sway increased        Base of Support: Widened     Speed/Ada: Pace decreased (<100 feet/min)  Step Length: Left shortened, Right shortened                      Functional Measure:  10 Meter walk test:  (Specify if any supplemental oxygen is used, the type, pre, during and post sats.)       Trial 1 (Time to Walk 10 Meters): 13.3 Seconds  Trial 2 (Time to Walk 10 Meters): 19.4 Seconds  Trial 3 (Time to Walk 10 Meters): 14.4 Seconds  Average : 15.7 Seconds  Score (Meters/Second): 0.6 Meters/Second             Walking Speed (m/s)  Modifier Scale Age 52-63 Age 61-76 Age 66-77 Age 80-80    Male Female Male Female Male Female Male Female   CH   0% Impaired ? 1.39 ? 1.40 ? 1.36 ? 1.30 ? 1.33 ? 1.27 ? 1.21 ? 1.15   CI   1-19% Impaired 1.11-1.38 1.12-1.39 1.09-1.35 1.04-1.29 1.06-1.32 1.01-1.26 0.96-1.20 0.92-1.14   CJ   20-39% Impaired 0.83-1.10 0.84-1.11 0.82-1.08 0.78-1.03 0.80-1.05 0.76-1.00 0.72-0.95 0.69-0.91   CK   40-59% Impaired 0.56-0.82 0.57-0.83 0.54-0.81 0.52-0.77 0.53-0.79 0.51-0.75 0.48-0.71 0.46-0.68   CL   60-79% Impaired 0.28-0.55 0.28-0.56 0.27-0.53 0.26-0.51 0.27-0.52 0.25-0.50 0.24-0.49 0.23-0.45   CM   80-99% Impaired 0.01-0.28 < 0.01-0.28 < 0.01-0.27 < 0.01-0.26 0.01-0.27 0.01-0.24 0.01-0.23 0.01-0.22   CN   100% Impaired Cannot Perform   Minimal Detectable Change (MDC-90) = 0.1 m/s  Lynne HERNADNEZ \"Comfortable and maximum walking speed of adults aged 20-79 years: reference values and determinants. \" Age and Agin Volume 26(1):15-9. Jayjay Lee. \"Age- and gender-related test performance in community-dwelling elderly people: Six-Minute Walk Test, Diaz Balance Scale, Timed Up & Go Test, and gait speeds. \" Physical Therapy: 2002 Volume 82(2):128-37. Zane RANDLE, Carlos PW, Sierra PERALTAD, [facility] KEENA. \"Assessing stability and change of four performance measures: a longitudinal study evaluating outcome following total hip and knee arthroplasty. \" [facility] Musculoskeletal Disorders: 2005 Volume 6(3). Crys Dsouza, PhD; Satinder Ventura, PhD. Scott Mejia Paper: \"Walking Speed: the Sixth Vital Sign\" Journal of Geriatric Physical Therapy: 2009 - Volume 32 - Issue 2 - p 2-5 . Pain:  Pain Scale 1: Numeric (0 - 10)  Pain Intensity 1: 5  Pain Location 1: Back  Pain Orientation 1: Lower  Pain Description 1: Aching; Intermittent       Radicular pain:   Pain in left gluteal region to lateral thigh and lateral calf, no numbness or paresthesia    Activity Tolerance:   good  Patient []   does  [x]   does not demonstrate signs/symptoms of shortness of breath/dyspnea on exertion/respiratory distress. COMMUNICATION/EDUCATION:   The patient was educated on:  [x]         Early post operative mobility is imperative to achieve a patient's desired outcomes and to restore biological function. [x]         Post operative spinal precautions may/may not be applicable. These precautions are based on the patient's physician and the procedure(s) performed.     [x]         Spinal precautions including:  ·   No bending forward, sideways, or backwards  ·   No twisting   ·   No lifting more than 5-10 pounds  ·   No sitting longer than 30-60 minutes at a time  ·   Hong brace when out of bed and mobilizing    The patients plan of care was discussed as follows:   [x]         The patient verbalized understanding of his/her plan in preparation for their upcoming surgery  []         The patient's  was present for this session  []        The patient reports that he/she does not have a  identified at this time  []         The  verbalized understanding of the education regarding the patient's upcoming surgery  [x]         Patient/family agree to work toward stated goals and plan of care. []         Patient understands intent and goals of therapy, but is neutral about his/her participation. []         Patient is unable to participate in goal setting and plan of care.       Thank you for this referral.  Aiyana Feldman, PT    Time Calculation: 17 mins

## 2021-11-22 NOTE — PERIOP NOTES
[facility]  Joint/Spine Preoperative Instructions    Surgery Date 11/29/2021          Time of Arrival 1230  Contact # [phone]    1. On the day of your surgery, please report to the Surgical Services Registration Desk and sign in at your designated time. The Surgery Center is located to the right of the Emergency Room. 2. You must have someone with you to drive you home. You should not drive a car for 24 hours following surgery. Please make arrangements for a friend or family member to stay with you for the first 24 hours after your surgery. 3. No food after midnight 11/28/2021. Medications morning of surgery should be taken with a sip of water. Please follow pre-surgery drink instructions that were given at your Pre Admission Testing appointment. 4. We recommend you do not drink any alcoholic beverages for 24 hours before and after your surgery. 5. Contact your surgeons office for instructions on the following medications: non-steroidal anti-inflammatory drugs (i.e. Advil, Aleve), vitamins, and supplements. (Some surgeons will want you to stop these medications prior to surgery and others may allow you to take them)  **If you are currently taking Plavix, Coumadin, Aspirin and/or other blood-thinning agents, contact your surgeon for instructions. ** Your surgeon will partner with the physician prescribing these medications to determine if it is safe to stop or if you need to continue taking. Please do not stop taking these medications without instructions from your surgeon    6. Wear comfortable clothes. Wear glasses instead of contacts. Do not bring any money or jewelry. Please bring picture ID, insurance card, and any prearranged co-payment or hospital payment. Do not wear make-up, particularly mascara the morning of your surgery. Do not wear nail polish, particularly if you are having foot /hand surgery.   Wear your hair loose or down, no ponytails, buns, xochitl pins or clips. All body piercings must be removed. Please shower with antibacterial soap for three consecutive days before and on the morning of surgery, but do not apply any lotions, powders or deodorants after the shower on the day of surgery. Please use a fresh towels after each shower. Please sleep in clean clothes and change bed linens the night before surgery. Please do not shave for 48 hours prior to surgery. Shaving of the face is acceptable. 7. You should understand that if you do not follow these instructions your surgery may be cancelled. If your physical condition changes (I.e. fever, cold or flu) please contact your surgeon as soon as possible. 8. It is important that you be on time. If a situation occurs where you may be late, please call [phone] (OR Holding Area). 9. If you have any questions and or problems, please call [phone] (Pre-admission Testing). 10. Your surgery time may be subject to change. You will receive a phone call the evening prior if your time changes. 11.  If having outpatient surgery, you must have someone to drive you here, stay with you during the duration of your stay, and to drive you home at time of discharge. 12. The following link is for the educational video for patients and/or families. http://howard-munoz.org/. com/locations/nkynktiku-bmkxxvo-rcikagc/[place]/[facility]/educational-materials    Special Instructions:   Patient is scheduled for a Covid-19 test @ [facility] between 7am - 12/noon on Wednesday, 11/24/2021; patient was instructed to self quarantine after test through day of surgery/procedure. please see attached directions/location. Please bring your CPAP machine to hospital on day of surgery. Check your blood sugar morning of surgery, if abnormal or your are feeling symptoms of low or high blood sugar please call the OR Holding area at this phone # above.     TAKE ALL MEDICATIONS THE DAY OF SURGERY EXCEPT: Pregabalin, Metformin, Glipizide, vitamins/supplements      I understand a pre-operative phone call will be made to verify my surgery time. In the event that I am not available, I give permission for a message to be left on my answering service and/or with another person?   yes         ___________________        __________   11/22/2021 @ 4616    (Signature of Patient)             (Witness)                (Date and Time)

## 2021-11-22 NOTE — PERIOP NOTES
[facility]  \"We've Got Your Back! Caring For Yourself Before and After Spine Surgery\" handbook was provided & reviewed with the patient during the pre-admission testing (PAT) appointment. An opportunity for questions was provided, patient verbalized understanding.

## 2021-11-22 NOTE — PERIOP NOTES
Hibiclens/Chlorhexidine    Preventing Infections Before and After - Your Surgery    IMPORTANT INSTRUCTIONS    Please read and follow these instructions carefully. If you are unable to comply with the below instructions your procedure will be cancelled. Every Night for Three (3) nights before your surgery:  1. Shower with an antibacterial soap, such as Dial, or the soap provided at your preassessment appointment. A shower is better than a bath for cleaning your skin. 2. If needed, ask someone to help you reach all areas of your body. Dont forget to clean your belly button with every shower. The night before your surgery: If you lose your Hibiclens/chlorhexidine please contact surgery center or you can purchase it at a local pharmacy  1. On the night before your surgery, shower with an antibacterial soap, such as Dial, or the soap provided at your preassessment appointment. 2. With one packet of Hibiclens/Chlorhexidine in hand, turn water off.  3. Apply Hibiclens antiseptic skin cleanser with a clean, freshly washed washcloth. ? Gently apply to your body from chin to toes (except the genital area) and especially the area(s) where your incision(s) will be. ? Leave Hibiclens/Chlorhexidine on your skin for at least 20 seconds. CAUTION: If needed, Hibiclens/chlorhexidine may be used to clean the folds of skin of the legs (such as in the area of the groin) and on your buttocks and hips. However, do not use Hibiclens/Chlorhexidine above the neck or in the genital area (your bottom) or put inside any area of your body. 4. Turn the water back on and rinse. 5. Dry gently with a clean, freshly washed towel. 6. After your shower, do not use any powder, deodorant, perfumes or lotion. 7. Use clean, freshly washed towels and washcloths every time you shower. 8. Wear clean, freshly washed pajamas to bed the night before surgery. 9. Sleep on clean, freshly washed sheets.   10. Do not allow pets to sleep in your bed with you. The Morning of your surgery:  1. Shower again thoroughly with an antibacterial soap, such as Dial or the soap provided at your preassessment appointment. If needed, ask someone for help to reach all areas of your body. Dont forget to clean your belly button! Rinse. 2. Dry gently with a clean, freshly washed towel. 3. After your shower, do not use any powder, deodorant, perfumes or lotion prior to surgery. 4. Put on clean, freshly washed clothing. Tips to help prevent infections after your surgery:  1. Protect your surgical wound from germs:  ? Hand washing is the most important thing you and your caregivers can do to prevent infections. ? Keep your bandage clean and dry! ? Do not touch your surgical wound. 2. Use clean, freshly washed towels and washcloths every time you shower; do not share bath linens with others. 3. Until your surgical wound is healed, wear clothing and sleep on bed linens each day that are clean and freshly washed. 4. Do not allow pets to sleep in your bed with you or touch your surgical wound. 5. Do not smoke - smoking delays wound healing. This may be a good time to stop smoking. 6. If you have diabetes, it is important for you to manage your blood sugar levels properly before your surgery as well as after your surgery. Poorly managed blood sugar levels slow down wound healing and prevent you from healing completely. If you lose your Hibiclens/chlorhexidine, please call the [facility], or it is available for purchase at your pharmacy.                ___________________      ___________________      11/22/2021 @ 8821  (Signature of Patient)          (Witness)                   (Date and Time)

## 2021-11-22 NOTE — PERIOP NOTES
Incentive Spirometer        Using the incentive spirometer helps expand the small air sacs of your lungs, helps you breathe deeply, and helps improve your lung function. Use your incentive spirometer twice a day (10 breaths each time) prior to surgery. How to Use Your Incentive Spirometer:  1. Hold the incentive spirometer in an upright position. 2. Breathe out as usual.   3. Place the mouthpiece in your mouth and seal your lips tightly around it. 4. Take a deep breath. Breathe in slowly and as deeply as possible. Keep the blue flow rate guide between the arrows. 5. Hold your breath as long as possible. Then exhale slowly and allow the piston to fall to the bottom of the column. 6. Rest for a few seconds and repeat steps one through five at least 10 times. PAT Tidal Volume___1250, 1500_______________  x___2_____________  Date___11/22/2021____    Hoda Jallohidens THE INCENTIVE SPIROMETER WITH YOU TO THE HOSPITAL ON THE DAY OF YOUR SURGERY. Opportunity given to ask and answer questions as well as to observe return demonstration.     Patient signature_____________________________          Witness____________________________

## 2021-11-23 LAB
BACTERIA SPEC CULT: NORMAL
BACTERIA SPEC CULT: NORMAL
SERVICE CMNT-IMP: NORMAL

## 2021-11-23 RX ORDER — SODIUM CHLORIDE, SODIUM LACTATE, POTASSIUM CHLORIDE, CALCIUM CHLORIDE 600; 310; 30; 20 MG/100ML; MG/100ML; MG/100ML; MG/100ML
25 INJECTION, SOLUTION INTRAVENOUS CONTINUOUS
Status: CANCELLED | OUTPATIENT
Start: 2021-11-29

## 2021-11-23 RX ORDER — ACETAMINOPHEN 500 MG
1000 TABLET ORAL ONCE
Status: CANCELLED | OUTPATIENT
Start: 2021-11-29 | End: 2021-11-29

## 2021-11-24 ENCOUNTER — TELEPHONE (OUTPATIENT)
Dept: INTERNAL MEDICINE CLINIC | Age: 66
End: 2021-11-24

## 2021-11-24 ENCOUNTER — OFFICE VISIT (OUTPATIENT)
Dept: INTERNAL MEDICINE CLINIC | Age: 66
End: 2021-11-24
Payer: MEDICARE

## 2021-11-24 ENCOUNTER — HOSPITAL ENCOUNTER (OUTPATIENT)
Dept: PREADMISSION TESTING | Age: 66
Discharge: HOME | End: 2021-11-24
Payer: MEDICARE

## 2021-11-24 VITALS
HEART RATE: 77 BPM | WEIGHT: 219 LBS | HEIGHT: 66 IN | TEMPERATURE: 98.6 F | BODY MASS INDEX: 35.2 KG/M2 | SYSTOLIC BLOOD PRESSURE: 135 MMHG | DIASTOLIC BLOOD PRESSURE: 85 MMHG | OXYGEN SATURATION: 98 % | RESPIRATION RATE: 16 BRPM

## 2021-11-24 DIAGNOSIS — E03.9 ACQUIRED HYPOTHYROIDISM: ICD-10-CM

## 2021-11-24 DIAGNOSIS — E11.9 TYPE 2 DIABETES MELLITUS WITHOUT COMPLICATION, WITHOUT LONG-TERM CURRENT USE OF INSULIN (HCC): ICD-10-CM

## 2021-11-24 DIAGNOSIS — R93.89 ABNORMAL CXR: ICD-10-CM

## 2021-11-24 DIAGNOSIS — N17.9 AKI (ACUTE KIDNEY INJURY) (HCC): ICD-10-CM

## 2021-11-24 DIAGNOSIS — Z00.00 MEDICARE ANNUAL WELLNESS VISIT, SUBSEQUENT: Primary | ICD-10-CM

## 2021-11-24 DIAGNOSIS — M48.062 SPINAL STENOSIS OF LUMBAR REGION WITH NEUROGENIC CLAUDICATION: ICD-10-CM

## 2021-11-24 DIAGNOSIS — Z01.818 PRE-OP EVALUATION: ICD-10-CM

## 2021-11-24 DIAGNOSIS — I10 HYPERTENSION, ESSENTIAL: ICD-10-CM

## 2021-11-24 DIAGNOSIS — E66.01 SEVERE OBESITY (HCC): ICD-10-CM

## 2021-11-24 DIAGNOSIS — D64.9 NORMOCYTIC ANEMIA: ICD-10-CM

## 2021-11-24 DIAGNOSIS — Z13.31 SCREENING FOR DEPRESSION: ICD-10-CM

## 2021-11-24 DIAGNOSIS — Z12.11 SCREENING FOR COLON CANCER: ICD-10-CM

## 2021-11-24 PROCEDURE — G9899 SCRN MAM PERF RSLTS DOC: HCPCS | Performed by: INTERNAL MEDICINE

## 2021-11-24 PROCEDURE — G0439 PPPS, SUBSEQ VISIT: HCPCS | Performed by: INTERNAL MEDICINE

## 2021-11-24 PROCEDURE — G8427 DOCREV CUR MEDS BY ELIG CLIN: HCPCS | Performed by: INTERNAL MEDICINE

## 2021-11-24 PROCEDURE — G8417 CALC BMI ABV UP PARAM F/U: HCPCS | Performed by: INTERNAL MEDICINE

## 2021-11-24 PROCEDURE — 99215 OFFICE O/P EST HI 40 MIN: CPT | Performed by: INTERNAL MEDICINE

## 2021-11-24 PROCEDURE — G8754 DIAS BP LESS 90: HCPCS | Performed by: INTERNAL MEDICINE

## 2021-11-24 PROCEDURE — 1101F PT FALLS ASSESS-DOCD LE1/YR: CPT | Performed by: INTERNAL MEDICINE

## 2021-11-24 PROCEDURE — G8510 SCR DEP NEG, NO PLAN REQD: HCPCS | Performed by: INTERNAL MEDICINE

## 2021-11-24 PROCEDURE — 2022F DILAT RTA XM EVC RTNOPTHY: CPT | Performed by: INTERNAL MEDICINE

## 2021-11-24 PROCEDURE — 1090F PRES/ABSN URINE INCON ASSESS: CPT | Performed by: INTERNAL MEDICINE

## 2021-11-24 PROCEDURE — G8536 NO DOC ELDER MAL SCRN: HCPCS | Performed by: INTERNAL MEDICINE

## 2021-11-24 PROCEDURE — G8399 PT W/DXA RESULTS DOCUMENT: HCPCS | Performed by: INTERNAL MEDICINE

## 2021-11-24 PROCEDURE — 3017F COLORECTAL CA SCREEN DOC REV: CPT | Performed by: INTERNAL MEDICINE

## 2021-11-24 PROCEDURE — U0005 INFEC AGEN DETEC AMPLI PROBE: HCPCS

## 2021-11-24 PROCEDURE — G8752 SYS BP LESS 140: HCPCS | Performed by: INTERNAL MEDICINE

## 2021-11-24 PROCEDURE — 3051F HG A1C>EQUAL 7.0%<8.0%: CPT | Performed by: INTERNAL MEDICINE

## 2021-11-24 RX ORDER — PREGABALIN 150 MG/1
150 CAPSULE ORAL ONCE
Status: CANCELLED | OUTPATIENT
Start: 2021-11-29 | End: 2021-11-29

## 2021-11-24 RX ORDER — LEVOTHYROXINE SODIUM 88 UG/1
88 TABLET ORAL
Qty: 90 TABLET | Refills: 2
Start: 2021-11-24 | End: 2022-05-07

## 2021-11-24 NOTE — PROGRESS NOTES
Identified pt with two pt identifiers. Reviewed record in preparation for visit and have obtained necessary documentation. All patient medications has been reviewed. Chief Complaint   Patient presents with    Annual Wellness Visit    Pre-op Exam     Additional information about chief complaint:    Visit Vitals  BP (!) 144/86 (BP 1 Location: Left upper arm, BP Patient Position: Sitting, BP Cuff Size: Large adult)   Pulse 77   Temp 98.6 °F (37 °C) (Oral)   Resp 16   Ht 5' 5.5\" (1.664 m)   Wt 219 lb (99.3 kg)   SpO2 98%   BMI 35.89 kg/m²       Health Maintenance Due   Topic    Shingrix Vaccine Age 50> (1 of 2)    Pneumococcal 65+ years (2 of 2 - PPSV23)    Flu Vaccine (1)    MICROALBUMIN Q1     Foot Exam Q1     Medicare Yearly Exam     Colorectal Cancer Screening Combo        1. Have you been to the ER, urgent care clinic since your last visit? Hospitalized since your last visit? No     2. Have you seen or consulted any other health care providers outside of the [address] [place] [address] since your last visit? Include any pap smears or colon screening. Dermatology for benign mole     deniseg

## 2021-11-24 NOTE — PERIOP NOTES
Spoke to Virgilio Meyers at Dr Jeanie Le office regarding abnormal CXR. Dr Jeanie Le to order CT Chest. Per Virgilio Meyers, this would not postpone her surgery. Asked Virgilio Meyers in Dr Jeanie Le office to follow up on PCP clearance.

## 2021-11-24 NOTE — TELEPHONE ENCOUNTER
Per MD check out note: Return in about 3 months (around 2/24/2022), or if symptoms worsen or fail to improve, for DM, KOKI, anemia; have labs 1 week prior to 3 month follow up visit. Called and LVM for the patient to call office and schedule.

## 2021-11-24 NOTE — PROGRESS NOTES
This is the Subsequent Medicare Annual Wellness Exam, performed 12 months or more after the Initial AWV or the last Subsequent AWV    I have reviewed the patient's medical history in detail and updated the computerized patient record. Assessment/Plan   Education and counseling provided:  Are appropriate based on today's review and evaluation  End-of-Life planning (with patient's consent)  Influenza Vaccine    1. Medicare annual wellness visit, subsequent  2. Type 2 diabetes mellitus without complication, without long-term current use of insulin ([facility] [address]  3. Screening for depression  -     DEPRESSION SCREEN ANNUAL       Depression Risk Factor Screening     3 most recent PHQ Screens 11/24/2021   Little interest or pleasure in doing things Not at all   Feeling down, depressed, irritable, or hopeless Not at all   Total Score PHQ 2 0       Alcohol Risk Screen    Do you average more than 1 drink per night or more than 7 drinks a week:  No    On any one occasion in the past three months have you have had more than 3 drinks containing alcohol:  No        Functional Ability and Level of Safety    Hearing: Hearing is good. Activities of Daily Living: The home contains: no safety equipment. Patient does total self care      Ambulation: with no difficulty     Fall Risk:  Fall Risk Assessment, last 12 mths 11/24/2021   Able to walk? Yes   Fall in past 12 months? 0   Do you feel unsteady?  0   Are you worried about falling 0      Abuse Screen:  Patient is not abused       Cognitive Screening    Has your family/caregiver stated any concerns about your memory: no     Cognitive Screening: normal on exam    Health Maintenance Due     Health Maintenance Due   Topic Date Due    Shingrix Vaccine Age 49> (1 of 2) Never done    Pneumococcal 65+ years (2 of 2 - PPSV23) 02/05/2021    Flu Vaccine (1) 09/01/2021    MICROALBUMIN Q1  10/01/2021    Foot Exam Q1  10/06/2021    Colorectal Cancer Screening Combo  10/08/2021 Patient Care Team   Patient Care Team:  Soledad Danielle MD as PCP - General (Internal Medicine)  Soledad Danielle [facility]  Reggie Vasquez MD as Physician (Neurology)  Jean Us MD (Sleep Medicine)    History     Patient Active Problem List   Diagnosis Code    Hypertension, essential I10    Hypercholesterolemia E78.00    Acquired hypothyroidism E03.9    MANUEL (obstructive sleep apnea) G47.33    Carpal tunnel syndrome of right wrist G56.01    Spinal stenosis of cervical region M48.02    Spinal stenosis of lumbar region with neurogenic claudication M48.062    Osteopenia of multiple sites M85.89    Severe obesity ([address] E66.01    Type 2 diabetes mellitus without complication, without long-term current use of insulin ([address] E11.9     Past Medical History:   Diagnosis Date    Diabetes ([address]     DM2-trulicity, PCP treats    Hypercholesterolemia     Hypertension     Sarcoidosis     brain    Sleep apnea     CPAP complient , Dr. Tamar Phan post epidural steroid injection 10/15/2021    Thyroid disease     TIA (transient ischemic attack)     Questionable per patient left side defecits (due to spinal stenosis)      Past Surgical History:   Procedure Laterality Date    HX BREAST BIOPSY Right     Benign (per patient)    HX CARPAL TUNNEL RELEASE Right     HX  SECTION       x 2    HX CHOLECYSTECTOMY      HX HEENT      parathyroidectomy    HX HYSTERECTOMY       Current Outpatient Medications   Medication Sig Dispense Refill    vitamin e (E GEMS) 1,000 unit capsule Take 1,000 Units by mouth daily.  TURMERIC PO Take 800 mg by mouth daily.  OTHER Take  by mouth daily. Immuneti supplement (Vitamin C, Zinc, Vitamin D3, Garlic bulb, Elderberry, Echinacea)      ferrous fumarate/vit Bcomp,C (SUPER B COMPLEX PO) Take 1 Capsule by mouth daily.  magnesium 250 mg tab Take 250 mg by mouth daily.       cholecalciferol (Vitamin D3) (5000 Units/125 mcg) tab tablet Take 5,000 Units by mouth two (2) times a day.  acetaminophen (Acetaminophen Extra Strength) 500 mg tablet Take 1,000 mg by mouth every six (6) hours as needed for Pain.  naproxen sodium (Aleve) 220 mg cap Take 220 mg by mouth three (3) times daily as needed.  Cetirizine (ZyrTEC) 10 mg cap Take 10 mg by mouth daily.  OTHER Blood sugar support      OTHER Liver clear      APPLE CIDER VINEGAR PO Take 2 Capsules by mouth daily.  omega-3 acid ethyl esters (LOVAZA) 1 gram capsule Take 2 g by mouth two (2) times a day.  glipiZIDE SR (GLUCOTROL XL) 10 mg CR tablet Take 1 Tablet by mouth daily. Indications: type 2 diabetes mellitus 90 Tablet 3    pregabalin (LYRICA) 25 mg capsule Take 50 mg by mouth two (2) times a day.  hydroCHLOROthiazide (HYDRODIURIL) 25 mg tablet Take 1 Tablet by mouth daily for 360 days. 30 Tablet 1    lisinopriL (PRINIVIL, ZESTRIL) 40 mg tablet Take 1 tablet by mouth once daily (Patient taking differently: Take 40 mg by mouth daily.) 90 Tablet 0    dulaglutide (Trulicity) 3 DI/1.9 mL pnij 0.5 mL by SubCUTAneous route every seven (7) days. (Patient taking differently: 3 mg by SubCUTAneous route every seven (7) days. Wednesdays) 12 mL 1    lancets misc Use to check blood glucose up to twice a day. Dx: E11.9 200 Each 3    glucose blood VI test strips (ASCENSIA AUTODISC VI, ONE TOUCH ULTRA TEST VI) strip Use to check blood glucose up to twice a day. Dx: E11.9 200 Strip 3    Blood-Glucose Meter monitoring kit Use to check blood glucose up to twice a day. Dx: E11.9 1 Kit 0    glucose blood VI test strips (True Metrix Glucose Test Strip) strip Use to check blood glucose up to twice a day. Dx: E11.9 200 Strip 3    levothyroxine (SYNTHROID) 88 mcg tablet TAKE 1/2 (ONE-HALF) TABLET BY MOUTH ONCE DAILY ON  MONDAY,  AND  ONE  TABLET  ALL  OTHER  DAYS (Patient taking differently: Take 88 mcg by mouth Daily (before breakfast).  TAKE 1/2 (ONE-HALF) TABLET BY MOUTH ONCE DAILY ON  MONDAY,  AND  ONE  TABLET  ALL  OTHER  DAYS) 90 Tablet 2    rosuvastatin (CRESTOR) 10 mg tablet Take 1 Tab by mouth nightly. 90 Tab 3    metFORMIN (GLUCOPHAGE) 1,000 mg tablet TAKE 1 TABLET BY MOUTH TWICE DAILY WITH MEALS (Patient taking differently: Take 1,000 mg by mouth two (2) times daily (with meals). Hold dose if CrCl is below 30 mL/min. Hold dose prior to contrast and for 48 hours after receiving contrast if any of the following apply:    - CrCl is below 60 mL/min,   - History of liver disease,   - Alcoholism,   - Heart failure,   - Iodinated contrast will be administered via intra-arterial.   ) 180 Tab 3    cyanocobalamin (VITAMIN B-12) 500 mcg tablet Take 500 mcg by mouth daily.  aspirin delayed-release 81 mg tablet Take 81 mg by mouth daily.  fluticasone (FLONASE ALLERGY RELIEF) 50 mcg/actuation nasal spray 2 Sprays by Both Nostrils route as needed.  ascorbic acid, vitamin C, (VITAMIN C) 500 mg tablet Take  by mouth.  furosemide (LASIX) 20 mg tablet Take 1 Tablet by mouth daily as needed (swelling).  (Patient not taking: Reported on 11/24/2021) 30 Tablet 0     Allergies   Allergen Reactions    Gabapentin Other (comments)     Hot flashes    Topiramate Other (comments)     Hot flashes       Family History   Problem Relation Age of Onset    Cancer Mother         bone    Diabetes Mother     Hypertension Mother     Hypertension Father     Cancer Father         blood    Hypertension Sister     Sleep Apnea Brother     No Known Problems Brother      Social History     Tobacco Use    Smoking status: Never Smoker    Smokeless tobacco: Never Used   Substance Use Topics    Alcohol use: Never         Luis Alberto Coronado MD

## 2021-11-24 NOTE — PERIOP NOTES
Per Polo Garcia RN- rec'd from [address] [place] [address], states to hold ASA 5-7 days prior to surgery and ok to give Lyrica due to only reaction reported to Gabapentin was \"hot flashes\".

## 2021-11-24 NOTE — PROGRESS NOTES
CC:   Chief Complaint   Patient presents with    Annual Wellness Visit    Pre-op Exam       HISTORY OF PRESENT ILLNESS  Renetta Haines is a 77 y.o. female. Presents for Medicare AWV and pre-operative consultation requested by Dr. Dakota Henriquez prior to L3-5 lateral fusion surgery and L3-S1 posterior decompression and fusion surgery scheduled on 11/29 and 11/30/21. She is to have surgery for lumbar spinal stenosis. She has type 2 DM, HTN, hyperlipidemia, hypothyroidism, MANUEL on CPAP, cervical and lumbar spinal stenosis with left-sided sciatica, and obesity. Complains of left lower leg swelling. Reports she had bilateral LE Dopplers that were negative. Saw Dr. Roberto Garcia at this clinic on 9/27/21 for leg swelling. Started on HCTZ 25 mg daily. Helped swelling a little. Complains of elevated BS readings since getting epidural steroid injection for back pain in Oct. On 1015/21, . More recently -160's but was 387 a few days ago. A1c 7.9% on 11/19/21. Pre-op labs showed worsening anemia. Pre-op CXR showed rounded opacity at RUL. Denies CP, SOB, heart palpitations, or dizziness. Hx of Preoperative Complications  Anesthesia Reactions: no  Malignant Hypertension: no  Bleeding excessively: no  Transfusion: no  DVT/PE Hx: no     Latex allergy: no     Physical Activity Capacity:   Greater than 4 METS (e.g., walking > 4 mph, 1 flight of stairs, moderate housework or exercise)     Medication Considerations:  Patient takes aspirin but is presently on hold for surgery. Soc Hx   (: Marta Laureano). Has 2 sons and 9 grandchildren. 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 Never smoker. Denies alcohol. Does stretching exercises regularly. Advance Care Planning   Healthcare Decision Maker:     Primary Decision Maker: Yamila Kevinconstantino Spouse - [phone]  ACP on file. ROS  A complete review of systems was performed and is negative except for those mentioned in the HPI.     Patient Active Problem List   Diagnosis Code    Hypertension, essential I10    Hypercholesterolemia E78.00    Acquired hypothyroidism E03.9    MANUEL (obstructive sleep apnea) G47.33    Carpal tunnel syndrome of right wrist G56.01    Spinal stenosis of cervical region M48.02    Spinal stenosis of lumbar region with neurogenic claudication M48.062    Osteopenia of multiple sites M85.89    Severe obesity (HCC) E66.01    Type 2 diabetes mellitus without complication, without long-term current use of insulin (HCC) E11.9     Past Medical History:   Diagnosis Date    Diabetes ([facility] [address]     DM2-trulicity, PCP treats    Hypercholesterolemia     Hypertension     Sarcoidosis     brain    Sleep apnea     CPAP complient , Dr. Yumiko Becker post epidural steroid injection 10/15/2021    Thyroid disease     TIA (transient ischemic attack)     Questionable per patient left side defecits (due to spinal stenosis)     Past Surgical History:   Procedure Laterality Date    HX BREAST BIOPSY Right     Benign (per patient)    HX CARPAL TUNNEL RELEASE Right     HX  SECTION       x 2    HX CHOLECYSTECTOMY      HX HEENT      parathyroidectomy    HX HYSTERECTOMY       Allergies   Allergen Reactions    Gabapentin Other (comments)     Hot flashes    Topiramate Other (comments)     Hot flashes       Current Outpatient Medications   Medication Sig Dispense Refill    vitamin e (E GEMS) 1,000 unit capsule Take 1,000 Units by mouth daily.  TURMERIC PO Take 800 mg by mouth daily.  OTHER Take  by mouth daily. Immuneti supplement (Vitamin C, Zinc, Vitamin D3, Garlic bulb, Elderberry, Echinacea)      ferrous fumarate/vit Bcomp,C (SUPER B COMPLEX PO) Take 1 Capsule by mouth daily.  magnesium 250 mg tab Take 250 mg by mouth daily.  cholecalciferol (Vitamin D3) (5000 Units/125 mcg) tab tablet Take 5,000 Units by mouth two (2) times a day.       acetaminophen (Acetaminophen Extra Strength) 500 mg tablet Take 1,000 mg by mouth every six (6) hours as needed for Pain.  naproxen sodium (Aleve) 220 mg cap Take 220 mg by mouth three (3) times daily as needed.  Cetirizine (ZyrTEC) 10 mg cap Take 10 mg by mouth daily.  OTHER Blood sugar support      OTHER Liver clear      APPLE CIDER VINEGAR PO Take 2 Capsules by mouth daily.  omega-3 acid ethyl esters (LOVAZA) 1 gram capsule Take 2 g by mouth two (2) times a day.  glipiZIDE SR (GLUCOTROL XL) 10 mg CR tablet Take 1 Tablet by mouth daily. Indications: type 2 diabetes mellitus 90 Tablet 3    pregabalin (LYRICA) 25 mg capsule Take 50 mg by mouth two (2) times a day.  hydroCHLOROthiazide (HYDRODIURIL) 25 mg tablet Take 1 Tablet by mouth daily for 360 days. 30 Tablet 1    lisinopriL (PRINIVIL, ZESTRIL) 40 mg tablet Take 1 tablet by mouth once daily (Patient taking differently: Take 40 mg by mouth daily.) 90 Tablet 0    dulaglutide (Trulicity) 3 SB/4.7 mL pnij 0.5 mL by SubCUTAneous route every seven (7) days. (Patient taking differently: 3 mg by SubCUTAneous route every seven (7) days. Wednesdays) 12 mL 1    lancets misc Use to check blood glucose up to twice a day. Dx: E11.9 200 Each 3    glucose blood VI test strips (ASCENSIA AUTODISC VI, ONE TOUCH ULTRA TEST VI) strip Use to check blood glucose up to twice a day. Dx: E11.9 200 Strip 3    Blood-Glucose Meter monitoring kit Use to check blood glucose up to twice a day. Dx: E11.9 1 Kit 0    glucose blood VI test strips (True Metrix Glucose Test Strip) strip Use to check blood glucose up to twice a day. Dx: E11.9 200 Strip 3    levothyroxine (SYNTHROID) 88 mcg tablet TAKE 1/2 (ONE-HALF) TABLET BY MOUTH ONCE DAILY ON  MONDAY,  AND  ONE  TABLET  ALL  OTHER  DAYS (Patient taking differently: Take 88 mcg by mouth Daily (before breakfast).  TAKE 1/2 (ONE-HALF) TABLET BY MOUTH ONCE DAILY ON  MONDAY,  AND  ONE  TABLET  ALL  OTHER  DAYS) 90 Tablet 2    rosuvastatin (CRESTOR) 10 mg tablet Take 1 Tab by mouth nightly. 90 Tab 3    metFORMIN (GLUCOPHAGE) 1,000 mg tablet TAKE 1 TABLET BY MOUTH TWICE DAILY WITH MEALS (Patient taking differently: Take 1,000 mg by mouth two (2) times daily (with meals). Hold dose if CrCl is below 30 mL/min. Hold dose prior to contrast and for 48 hours after receiving contrast if any of the following apply:    - CrCl is below 60 mL/min,   - History of liver disease,   - Alcoholism,   - Heart failure,   - Iodinated contrast will be administered via intra-arterial.   ) 180 Tab 3    cyanocobalamin (VITAMIN B-12) 500 mcg tablet Take 500 mcg by mouth daily.  aspirin delayed-release 81 mg tablet Take 81 mg by mouth daily.  fluticasone (FLONASE ALLERGY RELIEF) 50 mcg/actuation nasal spray 2 Sprays by Both Nostrils route as needed.  ascorbic acid, vitamin C, (VITAMIN C) 500 mg tablet Take  by mouth.  furosemide (LASIX) 20 mg tablet Take 1 Tablet by mouth daily as needed (swelling). (Patient not taking: Reported on 11/24/2021) 30 Tablet 0         PHYSICAL EXAM  Visit Vitals  /85 (BP 1 Location: Left upper arm, BP Patient Position: Sitting, BP Cuff Size: Large adult)   Pulse 77   Temp 98.6 °F (37 °C) (Oral)   Resp 16   Ht 5' 5.5\" (1.664 m)   Wt 219 lb (99.3 kg)   SpO2 98%   BMI 35.89 kg/m²       General: Obese, no distress. HEENT:  Head normocephalic/atraumatic, no scleral icterus  Neck: Supple. No JVD, lymphadenopathy, or thyromegaly. Lungs:  Clear to ausculation bilaterally. Good air movement. Heart:  Regular rate and rhythm, normal S1 and S2, no murmur, gallop, or rub  Abdomen: Soft, non-distended, normal bowel sounds, no tenderness, no guarding, masses, rebound tenderness, or HSM. Extremities: No clubbing or cyanosis. 1+ pitting edema at bilateral LE's (left <right). Neurological: Alert and oriented x 3. Psychiatric: Normal mood and affect.  Behavior is normal.   Diabetic foot exam:     Left Foot:   Visual Exam: normal    Pulse DP: 1+ (weak)   Filament test: normal sensation    Vibratory sensation: normal      Right Foot:   Visual Exam: normal    Pulse DP: 1+ (weak)   Filament test: normal sensation    Vibratory sensation: normal        ASSESSMENT AND PLAN    ICD-10-CM ICD-9-CM    1. Medicare annual wellness visit, subsequent  Z00.00 V70.0    2. Type 2 diabetes mellitus without complication, without long-term current use of insulin (HCC)  E11.9 250.00  DIABETES FOOT EXAM      MICROALBUMIN, UR, RAND W/ MICROALB/CREAT RATIO      MICROALBUMIN, UR, RAND W/ MICROALB/CREAT RATIO      HEMOGLOBIN A1C WITH EAG      HEMOGLOBIN A1C WITH EAG   3. Pre-op evaluation  Z01.818 V72.84    4. Spinal stenosis of lumbar region with neurogenic claudication  M48.062 724.03    5. Hypertension, essential  D30 609.9 METABOLIC PANEL, COMPREHENSIVE      METABOLIC PANEL, COMPREHENSIVE   6. Acquired hypothyroidism  E03.9 244.9 levothyroxine (SYNTHROID) 88 mcg tablet      TSH 3RD GENERATION      TSH 3RD GENERATION   7. KOKI (acute kidney injury) ([facility] [address]  N17.9 584.9    8. Normocytic anemia  D64.9 285.9 CBC WITH AUTOMATED DIFF      IRON PROFILE      FERRITIN      CBC WITH AUTOMATED DIFF      IRON PROFILE      FERRITIN   9. Abnormal CXR  R93.89 793.2    10. Severe obesity (HCC)  E66.01 278.01    11. Screening for depression  Z13.31 V79.0 [facility] 68   12. Screening for colon cancer  Z12.11 V76.51 COLOGUARD TEST (FECAL DNA COLORECTAL CANCER SCREENING)     Discussed lab results from 11/9/21 with patient. Diagnoses and all orders for this visit:    1. Medicare annual wellness visit, subsequent    2. Type 2 diabetes mellitus without complication, without long-term current use of insulin ([facility])  A1c 7.9% on 11/19/21. Not controlled. Continue present management for now. -      DIABETES FOOT EXAM  -     MICROALBUMIN, UR, RAND W/ MICROALB/CREAT RATIO; Future  -     HEMOGLOBIN A1C WITH EAG; Future    3. Pre-op evaluation  At acceptable risk for planned surgery.  Hold glipizide morning of surgery. Monitor , blood glucose, renal function and Hg/Hct closely shelby-operatively. 4. Spinal stenosis of lumbar region with neurogenic claudication    5. Hypertension, essential  Controlled. Continue lisinopril 40 mg daily. Plan to add indapamide or Bystolic if needed for BP control.  -     METABOLIC PANEL, COMPREHENSIVE; Future    6. Acquired hypothyroidism  -     Start levothyroxine (SYNTHROID) 88 mcg tablet; Take 1 Tablet by mouth Daily (before breakfast). -     TSH 3RD GENERATION; Future    7. KOKI (acute kidney injury) ([facility]  Likely due to HCTZ. Stop HCTZ. 8. Normocytic anemia  -     CBC WITH AUTOMATED DIFF; Future  -     IRON PROFILE; Future  -     FERRITIN; Future    9. Abnormal CXR  Plan to order CT chest without contrast in the near future. This does not need to be done before surgery since she is asymptomatic. 10. [facility] ([facility]    11. Screening for depression  -     DEPRESSION SCREEN ANNUAL    12. Screening for colon cancer  -     COLOGUARD TEST (FECAL DNA COLORECTAL CANCER SCREENING)    Time Spent: 45 mins on medical record review, history, exam, discussing problems and plan, counseling, and placing orders. Follow-up and Dispositions    · Return in about 3 months (around 2/24/2022), or if symptoms worsen or fail to improve, for DM, KOKI, anemia; have labs 1 week prior to 3 month follow up visit. I have discussed the diagnosis with the patient and the intended plan as seen in the above orders. Patient is in agreement. The patient has received an after-visit summary and questions were answered concerning future plans. I have discussed medication side effects and warnings with the patient as well.

## 2021-11-25 LAB
ALBUMIN/CREAT UR: 334 MG/G CREAT (ref 0–29)
CREAT UR-MCNC: 142.4 MG/DL
MICROALBUMIN UR-MCNC: 475.5 UG/ML
SARS-COV-2, XPLCVT: NOT DETECTED
SOURCE, COVRS: NORMAL
SPECIMEN STATUS REPORT, ROLRST: NORMAL

## 2021-11-26 ENCOUNTER — TELEPHONE (OUTPATIENT)
Dept: INTERNAL MEDICINE CLINIC | Age: 66
End: 2021-11-26

## 2021-11-26 NOTE — TELEPHONE ENCOUNTER
----- Message from Kamryn Vivianedelaney sent at 11/24/2021  3:10 PM EST -----  Subject: Message to Provider    QUESTIONS  Information for Provider? Rafi Beltran from Dr. Ita Lozano office called to see about   surgery clearance for Ms. Ford. Can you please call Rafi Beltran [phone] to   give ok, Surgery is scheduled for Monday 11/29/2021. Thanks  ---------------------------------------------------------------------------  --------------  Rosalia BAUER  What is the best way for the office to contact you? OK to leave message on   voicemail  Preferred Call Back Phone Number? [phone]  ---------------------------------------------------------------------------  --------------  SCRIPT ANSWERS  Relationship to Patient? Third Party  Representative Name?  Nel at Dr. Nacho Tavares office

## 2021-11-26 NOTE — TELEPHONE ENCOUNTER
I have faxed the clearance form and pre-op note to Dr. Jenny Nichols. I also called and left a voicemail for Essence Ricardo letting her know it has been faxed.

## 2021-11-28 NOTE — PROGRESS NOTES
Message sent to patient by My Chart:  Your urine microalbumin test was high, meaning you are spilling protein into the urine. This is a sign of possible chronic kidney disease.      Dr. Joelle Chow

## 2021-11-29 ENCOUNTER — ANESTHESIA (OUTPATIENT)
Dept: SURGERY | Age: 66
DRG: 455 | End: 2021-11-29
Payer: MEDICARE

## 2021-11-29 ENCOUNTER — APPOINTMENT (OUTPATIENT)
Dept: CT IMAGING | Age: 66
DRG: 455 | End: 2021-11-29
Attending: ORTHOPAEDIC SURGERY
Payer: MEDICARE

## 2021-11-29 ENCOUNTER — HOSPITAL ENCOUNTER (INPATIENT)
Age: 66
LOS: 3 days | Discharge: HOME HEALTH | DRG: 455 | End: 2021-12-02
Attending: ORTHOPAEDIC SURGERY | Admitting: ORTHOPAEDIC SURGERY
Payer: MEDICARE

## 2021-11-29 ENCOUNTER — ANESTHESIA EVENT (OUTPATIENT)
Dept: SURGERY | Age: 66
DRG: 455 | End: 2021-11-29
Payer: MEDICARE

## 2021-11-29 ENCOUNTER — APPOINTMENT (OUTPATIENT)
Dept: GENERAL RADIOLOGY | Age: 66
DRG: 455 | End: 2021-11-29
Attending: ORTHOPAEDIC SURGERY
Payer: MEDICARE

## 2021-11-29 DIAGNOSIS — Z98.1 S/P LUMBAR SPINAL FUSION: Primary | ICD-10-CM

## 2021-11-29 LAB
GLUCOSE BLD STRIP.AUTO-MCNC: 135 MG/DL (ref 65–117)
GLUCOSE BLD STRIP.AUTO-MCNC: 144 MG/DL (ref 65–117)
GLUCOSE BLD STRIP.AUTO-MCNC: 84 MG/DL (ref 65–117)
SERVICE CMNT-IMP: ABNORMAL
SERVICE CMNT-IMP: ABNORMAL
SERVICE CMNT-IMP: NORMAL

## 2021-11-29 PROCEDURE — C1713 ANCHOR/SCREW BN/BN,TIS/BN: HCPCS | Performed by: ORTHOPAEDIC SURGERY

## 2021-11-29 PROCEDURE — 77030034479 HC ADH SKN CLSR PRINEO J&J -B: Performed by: ORTHOPAEDIC SURGERY

## 2021-11-29 PROCEDURE — 77030020704 HC DISECT ENDOSC BLNT J&J -B: Performed by: ORTHOPAEDIC SURGERY

## 2021-11-29 PROCEDURE — 77030013079 HC BLNKT BAIR HGGR 3M -A: Performed by: ANESTHESIOLOGY

## 2021-11-29 PROCEDURE — 76060000034 HC ANESTHESIA 1.5 TO 2 HR: Performed by: ORTHOPAEDIC SURGERY

## 2021-11-29 PROCEDURE — 74011250636 HC RX REV CODE- 250/636: Performed by: ANESTHESIOLOGY

## 2021-11-29 PROCEDURE — 74011250636 HC RX REV CODE- 250/636: Performed by: ORTHOPAEDIC SURGERY

## 2021-11-29 PROCEDURE — 77030008462 HC STPLR SKN PROX J&J -A: Performed by: ORTHOPAEDIC SURGERY

## 2021-11-29 PROCEDURE — 77030019908 HC STETH ESOPH SIMS -A: Performed by: ANESTHESIOLOGY

## 2021-11-29 PROCEDURE — 65270000029 HC RM PRIVATE

## 2021-11-29 PROCEDURE — 74011250637 HC RX REV CODE- 250/637: Performed by: ORTHOPAEDIC SURGERY

## 2021-11-29 PROCEDURE — 0ST20ZZ RESECTION OF LUMBAR VERTEBRAL DISC, OPEN APPROACH: ICD-10-PCS | Performed by: ORTHOPAEDIC SURGERY

## 2021-11-29 PROCEDURE — 74011250636 HC RX REV CODE- 250/636

## 2021-11-29 PROCEDURE — 77030008684 HC TU ET CUF COVD -B: Performed by: ANESTHESIOLOGY

## 2021-11-29 PROCEDURE — 74011000250 HC RX REV CODE- 250: Performed by: NURSE ANESTHETIST, CERTIFIED REGISTERED

## 2021-11-29 PROCEDURE — 77030003029 HC SUT VCRL J&J -B: Performed by: ORTHOPAEDIC SURGERY

## 2021-11-29 PROCEDURE — C1821 INTERSPINOUS IMPLANT: HCPCS | Performed by: ORTHOPAEDIC SURGERY

## 2021-11-29 PROCEDURE — 77030014647 HC SEAL FBRN TISSL BAXT -D: Performed by: ORTHOPAEDIC SURGERY

## 2021-11-29 PROCEDURE — 77030003028 HC SUT VCRL J&J -A: Performed by: ORTHOPAEDIC SURGERY

## 2021-11-29 PROCEDURE — 72100 X-RAY EXAM L-S SPINE 2/3 VWS: CPT

## 2021-11-29 PROCEDURE — 82962 GLUCOSE BLOOD TEST: CPT

## 2021-11-29 PROCEDURE — 77030026438 HC STYL ET INTUB CARD -A: Performed by: ANESTHESIOLOGY

## 2021-11-29 PROCEDURE — 74011250636 HC RX REV CODE- 250/636: Performed by: NURSE ANESTHETIST, CERTIFIED REGISTERED

## 2021-11-29 PROCEDURE — 77030020268 HC MISC GENERAL SUPPLY: Performed by: ORTHOPAEDIC SURGERY

## 2021-11-29 PROCEDURE — 77030033138 HC SUT PGA STRATFX J&J -B: Performed by: ORTHOPAEDIC SURGERY

## 2021-11-29 PROCEDURE — 76010000162 HC OR TIME 1.5 TO 2 HR INTENSV-TIER 1: Performed by: ORTHOPAEDIC SURGERY

## 2021-11-29 PROCEDURE — 77030018723 HC ELCTRD BLD COVD -A: Performed by: ORTHOPAEDIC SURGERY

## 2021-11-29 PROCEDURE — 72131 CT LUMBAR SPINE W/O DYE: CPT

## 2021-11-29 PROCEDURE — 74011000250 HC RX REV CODE- 250: Performed by: ORTHOPAEDIC SURGERY

## 2021-11-29 PROCEDURE — 2709999900 HC NON-CHARGEABLE SUPPLY: Performed by: ORTHOPAEDIC SURGERY

## 2021-11-29 PROCEDURE — 77030005513 HC CATH URETH FOL11 MDII -B: Performed by: ORTHOPAEDIC SURGERY

## 2021-11-29 PROCEDURE — 76210000017 HC OR PH I REC 1.5 TO 2 HR: Performed by: ORTHOPAEDIC SURGERY

## 2021-11-29 PROCEDURE — 0SG10A0 FUSION OF 2 OR MORE LUMBAR VERTEBRAL JOINTS WITH INTERBODY FUSION DEVICE, ANTERIOR APPROACH, ANTERIOR COLUMN, OPEN APPROACH: ICD-10-PCS | Performed by: ORTHOPAEDIC SURGERY

## 2021-11-29 PROCEDURE — 77030040361 HC SLV COMPR DVT MDII -B: Performed by: ORTHOPAEDIC SURGERY

## 2021-11-29 DEVICE — GRAFT BONE 10 CC: Type: IMPLANTABLE DEVICE | Site: SPINE LUMBAR | Status: FUNCTIONAL

## 2021-11-29 DEVICE — RISE-L SPACER 22 X 50MM, 7-14MM, 3-15&DEG;
Type: IMPLANTABLE DEVICE | Site: SPINE LUMBAR | Status: FUNCTIONAL
Brand: RISE-L

## 2021-11-29 DEVICE — PLYMOUTH THORACOLUMBAR PLATE, 21MM
Type: IMPLANTABLE DEVICE | Site: SPINE LUMBAR | Status: FUNCTIONAL
Brand: PLYMOUTH

## 2021-11-29 DEVICE — 5.5MM VARIABLE ANGLE SCREW, 30MM
Type: IMPLANTABLE DEVICE | Site: SPINE LUMBAR | Status: FUNCTIONAL
Brand: TRUSS

## 2021-11-29 RX ORDER — HYDROMORPHONE HYDROCHLORIDE 1 MG/ML
1 INJECTION, SOLUTION INTRAMUSCULAR; INTRAVENOUS; SUBCUTANEOUS
Status: ACTIVE | OUTPATIENT
Start: 2021-11-29 | End: 2021-11-30

## 2021-11-29 RX ORDER — SODIUM CHLORIDE 0.9 % (FLUSH) 0.9 %
5-40 SYRINGE (ML) INJECTION EVERY 8 HOURS
Status: DISCONTINUED | OUTPATIENT
Start: 2021-11-29 | End: 2021-12-02 | Stop reason: HOSPADM

## 2021-11-29 RX ORDER — HYDROXYZINE HYDROCHLORIDE 10 MG/1
10 TABLET, FILM COATED ORAL
Status: DISCONTINUED | OUTPATIENT
Start: 2021-11-29 | End: 2021-12-02 | Stop reason: HOSPADM

## 2021-11-29 RX ORDER — ROSUVASTATIN CALCIUM 10 MG/1
10 TABLET, COATED ORAL
Status: DISCONTINUED | OUTPATIENT
Start: 2021-11-29 | End: 2021-12-02 | Stop reason: HOSPADM

## 2021-11-29 RX ORDER — LANOLIN ALCOHOL/MO/W.PET/CERES
500 CREAM (GRAM) TOPICAL DAILY
Status: DISCONTINUED | OUTPATIENT
Start: 2021-11-30 | End: 2021-12-02 | Stop reason: HOSPADM

## 2021-11-29 RX ORDER — CYCLOBENZAPRINE HCL 10 MG
10 TABLET ORAL
Status: DISCONTINUED | OUTPATIENT
Start: 2021-11-29 | End: 2021-12-02 | Stop reason: HOSPADM

## 2021-11-29 RX ORDER — LANOLIN ALCOHOL/MO/W.PET/CERES
800 CREAM (GRAM) TOPICAL DAILY
Status: DISCONTINUED | OUTPATIENT
Start: 2021-11-30 | End: 2021-12-02 | Stop reason: HOSPADM

## 2021-11-29 RX ORDER — OXYCODONE HYDROCHLORIDE 5 MG/1
5 TABLET ORAL
Status: DISCONTINUED | OUTPATIENT
Start: 2021-11-29 | End: 2021-12-02 | Stop reason: HOSPADM

## 2021-11-29 RX ORDER — GLYCOPYRROLATE 0.2 MG/ML
INJECTION INTRAMUSCULAR; INTRAVENOUS AS NEEDED
Status: DISCONTINUED | OUTPATIENT
Start: 2021-11-29 | End: 2021-11-29 | Stop reason: HOSPADM

## 2021-11-29 RX ORDER — CETIRIZINE HCL 10 MG
10 TABLET ORAL DAILY
Status: DISCONTINUED | OUTPATIENT
Start: 2021-11-30 | End: 2021-12-02 | Stop reason: HOSPADM

## 2021-11-29 RX ORDER — FENTANYL CITRATE 50 UG/ML
50 INJECTION, SOLUTION INTRAMUSCULAR; INTRAVENOUS AS NEEDED
Status: DISCONTINUED | OUTPATIENT
Start: 2021-11-29 | End: 2021-11-30 | Stop reason: HOSPADM

## 2021-11-29 RX ORDER — FENTANYL CITRATE 50 UG/ML
25 INJECTION, SOLUTION INTRAMUSCULAR; INTRAVENOUS
Status: DISCONTINUED | OUTPATIENT
Start: 2021-11-29 | End: 2021-11-29 | Stop reason: HOSPADM

## 2021-11-29 RX ORDER — ONDANSETRON 2 MG/ML
4 INJECTION INTRAMUSCULAR; INTRAVENOUS
Status: ACTIVE | OUTPATIENT
Start: 2021-11-29 | End: 2021-11-30

## 2021-11-29 RX ORDER — PROPOFOL 10 MG/ML
INJECTION, EMULSION INTRAVENOUS
Status: DISCONTINUED | OUTPATIENT
Start: 2021-11-29 | End: 2021-11-29 | Stop reason: HOSPADM

## 2021-11-29 RX ORDER — LANOLIN ALCOHOL/MO/W.PET/CERES
200 CREAM (GRAM) TOPICAL DAILY
Status: DISCONTINUED | OUTPATIENT
Start: 2021-11-30 | End: 2021-12-02 | Stop reason: HOSPADM

## 2021-11-29 RX ORDER — LEVOTHYROXINE SODIUM 88 UG/1
88 TABLET ORAL
Status: DISCONTINUED | OUTPATIENT
Start: 2021-11-30 | End: 2021-12-02 | Stop reason: HOSPADM

## 2021-11-29 RX ORDER — FENTANYL CITRATE 50 UG/ML
INJECTION, SOLUTION INTRAMUSCULAR; INTRAVENOUS AS NEEDED
Status: DISCONTINUED | OUTPATIENT
Start: 2021-11-29 | End: 2021-11-29 | Stop reason: HOSPADM

## 2021-11-29 RX ORDER — AMOXICILLIN 250 MG
1 CAPSULE ORAL 2 TIMES DAILY
Status: DISCONTINUED | OUTPATIENT
Start: 2021-11-29 | End: 2021-12-02 | Stop reason: HOSPADM

## 2021-11-29 RX ORDER — LIDOCAINE HYDROCHLORIDE 20 MG/ML
INJECTION, SOLUTION EPIDURAL; INFILTRATION; INTRACAUDAL; PERINEURAL AS NEEDED
Status: DISCONTINUED | OUTPATIENT
Start: 2021-11-29 | End: 2021-11-29 | Stop reason: HOSPADM

## 2021-11-29 RX ORDER — FAMOTIDINE 20 MG/1
20 TABLET, FILM COATED ORAL 2 TIMES DAILY
Status: DISCONTINUED | OUTPATIENT
Start: 2021-11-29 | End: 2021-12-02 | Stop reason: HOSPADM

## 2021-11-29 RX ORDER — FACIAL-BODY WIPES
10 EACH TOPICAL DAILY PRN
Status: DISCONTINUED | OUTPATIENT
Start: 2021-12-01 | End: 2021-12-02 | Stop reason: HOSPADM

## 2021-11-29 RX ORDER — GLIPIZIDE 5 MG/1
10 TABLET, FILM COATED, EXTENDED RELEASE ORAL DAILY
Status: DISCONTINUED | OUTPATIENT
Start: 2021-11-30 | End: 2021-12-02 | Stop reason: HOSPADM

## 2021-11-29 RX ORDER — HYDROMORPHONE HYDROCHLORIDE 2 MG/ML
INJECTION, SOLUTION INTRAMUSCULAR; INTRAVENOUS; SUBCUTANEOUS AS NEEDED
Status: DISCONTINUED | OUTPATIENT
Start: 2021-11-29 | End: 2021-11-29 | Stop reason: HOSPADM

## 2021-11-29 RX ORDER — MAGNESIUM SULFATE 100 %
4 CRYSTALS MISCELLANEOUS AS NEEDED
Status: DISCONTINUED | OUTPATIENT
Start: 2021-11-29 | End: 2021-12-02 | Stop reason: HOSPADM

## 2021-11-29 RX ORDER — POLYETHYLENE GLYCOL 3350 17 G/17G
17 POWDER, FOR SOLUTION ORAL DAILY
Status: DISCONTINUED | OUTPATIENT
Start: 2021-11-30 | End: 2021-12-02 | Stop reason: HOSPADM

## 2021-11-29 RX ORDER — MENTHOL
1000 GEL (GRAM) TOPICAL DAILY
Status: DISCONTINUED | OUTPATIENT
Start: 2021-11-30 | End: 2021-11-29 | Stop reason: CLARIF

## 2021-11-29 RX ORDER — MIDAZOLAM HYDROCHLORIDE 1 MG/ML
1 INJECTION, SOLUTION INTRAMUSCULAR; INTRAVENOUS AS NEEDED
Status: DISCONTINUED | OUTPATIENT
Start: 2021-11-29 | End: 2021-11-30 | Stop reason: HOSPADM

## 2021-11-29 RX ORDER — DEXAMETHASONE SODIUM PHOSPHATE 4 MG/ML
INJECTION, SOLUTION INTRA-ARTICULAR; INTRALESIONAL; INTRAMUSCULAR; INTRAVENOUS; SOFT TISSUE AS NEEDED
Status: DISCONTINUED | OUTPATIENT
Start: 2021-11-29 | End: 2021-11-29 | Stop reason: HOSPADM

## 2021-11-29 RX ORDER — MELATONIN
5000 2 TIMES DAILY
Status: DISCONTINUED | OUTPATIENT
Start: 2021-11-29 | End: 2021-12-02 | Stop reason: HOSPADM

## 2021-11-29 RX ORDER — LIDOCAINE HYDROCHLORIDE 10 MG/ML
0.1 INJECTION, SOLUTION EPIDURAL; INFILTRATION; INTRACAUDAL; PERINEURAL AS NEEDED
Status: DISCONTINUED | OUTPATIENT
Start: 2021-11-29 | End: 2021-12-02 | Stop reason: HOSPADM

## 2021-11-29 RX ORDER — SODIUM CHLORIDE 0.9 % (FLUSH) 0.9 %
5-40 SYRINGE (ML) INJECTION AS NEEDED
Status: DISCONTINUED | OUTPATIENT
Start: 2021-11-29 | End: 2021-12-02 | Stop reason: HOSPADM

## 2021-11-29 RX ORDER — PROPOFOL 10 MG/ML
INJECTION, EMULSION INTRAVENOUS AS NEEDED
Status: DISCONTINUED | OUTPATIENT
Start: 2021-11-29 | End: 2021-11-29 | Stop reason: HOSPADM

## 2021-11-29 RX ORDER — NEOSTIGMINE METHYLSULFATE 1 MG/ML
INJECTION, SOLUTION INTRAVENOUS AS NEEDED
Status: DISCONTINUED | OUTPATIENT
Start: 2021-11-29 | End: 2021-11-29 | Stop reason: HOSPADM

## 2021-11-29 RX ORDER — OMEGA-3-ACID ETHYL ESTERS 1 G/1
2 CAPSULE, LIQUID FILLED ORAL 2 TIMES DAILY
Status: DISCONTINUED | OUTPATIENT
Start: 2021-11-29 | End: 2021-12-02 | Stop reason: HOSPADM

## 2021-11-29 RX ORDER — ONDANSETRON 2 MG/ML
INJECTION INTRAMUSCULAR; INTRAVENOUS AS NEEDED
Status: DISCONTINUED | OUTPATIENT
Start: 2021-11-29 | End: 2021-11-29 | Stop reason: HOSPADM

## 2021-11-29 RX ORDER — NALOXONE HYDROCHLORIDE 0.4 MG/ML
0.4 INJECTION, SOLUTION INTRAMUSCULAR; INTRAVENOUS; SUBCUTANEOUS AS NEEDED
Status: DISCONTINUED | OUTPATIENT
Start: 2021-11-29 | End: 2021-12-02 | Stop reason: HOSPADM

## 2021-11-29 RX ORDER — ROCURONIUM BROMIDE 10 MG/ML
INJECTION, SOLUTION INTRAVENOUS AS NEEDED
Status: DISCONTINUED | OUTPATIENT
Start: 2021-11-29 | End: 2021-11-29 | Stop reason: HOSPADM

## 2021-11-29 RX ORDER — LISINOPRIL 20 MG/1
40 TABLET ORAL DAILY
Status: DISCONTINUED | OUTPATIENT
Start: 2021-11-30 | End: 2021-12-02 | Stop reason: HOSPADM

## 2021-11-29 RX ORDER — ACETAMINOPHEN 500 MG
1000 TABLET ORAL EVERY 6 HOURS
Status: DISCONTINUED | OUTPATIENT
Start: 2021-11-30 | End: 2021-12-02 | Stop reason: HOSPADM

## 2021-11-29 RX ORDER — ASCORBIC ACID 500 MG
500 TABLET ORAL DAILY
Status: DISCONTINUED | OUTPATIENT
Start: 2021-11-30 | End: 2021-12-02 | Stop reason: HOSPADM

## 2021-11-29 RX ORDER — INSULIN LISPRO 100 [IU]/ML
INJECTION, SOLUTION INTRAVENOUS; SUBCUTANEOUS
Status: DISCONTINUED | OUTPATIENT
Start: 2021-11-30 | End: 2021-12-02 | Stop reason: HOSPADM

## 2021-11-29 RX ORDER — DIAZEPAM 5 MG/1
5 TABLET ORAL
Status: DISCONTINUED | OUTPATIENT
Start: 2021-11-29 | End: 2021-12-02 | Stop reason: HOSPADM

## 2021-11-29 RX ORDER — SODIUM CHLORIDE, SODIUM LACTATE, POTASSIUM CHLORIDE, CALCIUM CHLORIDE 600; 310; 30; 20 MG/100ML; MG/100ML; MG/100ML; MG/100ML
25 INJECTION, SOLUTION INTRAVENOUS CONTINUOUS
Status: DISPENSED | OUTPATIENT
Start: 2021-11-29 | End: 2021-11-30

## 2021-11-29 RX ORDER — SODIUM CHLORIDE 9 MG/ML
125 INJECTION, SOLUTION INTRAVENOUS CONTINUOUS
Status: DISCONTINUED | OUTPATIENT
Start: 2021-11-29 | End: 2021-11-30

## 2021-11-29 RX ORDER — ACETAMINOPHEN 500 MG
1000 TABLET ORAL ONCE
Status: COMPLETED | OUTPATIENT
Start: 2021-11-29 | End: 2021-11-29

## 2021-11-29 RX ORDER — FUROSEMIDE 20 MG/1
20 TABLET ORAL
Status: DISCONTINUED | OUTPATIENT
Start: 2021-11-29 | End: 2021-12-02 | Stop reason: HOSPADM

## 2021-11-29 RX ORDER — DEXTROSE 50 % IN WATER (D50W) INTRAVENOUS SYRINGE
12.5-25 AS NEEDED
Status: DISCONTINUED | OUTPATIENT
Start: 2021-11-29 | End: 2021-12-02 | Stop reason: HOSPADM

## 2021-11-29 RX ORDER — ONDANSETRON 2 MG/ML
4 INJECTION INTRAMUSCULAR; INTRAVENOUS AS NEEDED
Status: DISCONTINUED | OUTPATIENT
Start: 2021-11-29 | End: 2021-11-29 | Stop reason: HOSPADM

## 2021-11-29 RX ORDER — HYDROMORPHONE HYDROCHLORIDE 1 MG/ML
.2-.5 INJECTION, SOLUTION INTRAMUSCULAR; INTRAVENOUS; SUBCUTANEOUS
Status: DISCONTINUED | OUTPATIENT
Start: 2021-11-29 | End: 2021-11-29 | Stop reason: HOSPADM

## 2021-11-29 RX ORDER — SODIUM CHLORIDE, SODIUM LACTATE, POTASSIUM CHLORIDE, CALCIUM CHLORIDE 600; 310; 30; 20 MG/100ML; MG/100ML; MG/100ML; MG/100ML
25 INJECTION, SOLUTION INTRAVENOUS CONTINUOUS
Status: DISCONTINUED | OUTPATIENT
Start: 2021-11-29 | End: 2021-11-29 | Stop reason: HOSPADM

## 2021-11-29 RX ORDER — PREGABALIN 75 MG/1
150 CAPSULE ORAL ONCE
Status: COMPLETED | OUTPATIENT
Start: 2021-11-29 | End: 2021-11-29

## 2021-11-29 RX ORDER — FENTANYL CITRATE 50 UG/ML
INJECTION, SOLUTION INTRAMUSCULAR; INTRAVENOUS
Status: COMPLETED
Start: 2021-11-29 | End: 2021-11-29

## 2021-11-29 RX ORDER — PREGABALIN 25 MG/1
50 CAPSULE ORAL 2 TIMES DAILY
Status: DISCONTINUED | OUTPATIENT
Start: 2021-11-29 | End: 2021-12-02 | Stop reason: HOSPADM

## 2021-11-29 RX ORDER — FLUTICASONE PROPIONATE 50 MCG
2 SPRAY, SUSPENSION (ML) NASAL AS NEEDED
Status: DISCONTINUED | OUTPATIENT
Start: 2021-11-29 | End: 2021-12-02 | Stop reason: HOSPADM

## 2021-11-29 RX ORDER — OXYCODONE HYDROCHLORIDE 5 MG/1
10-15 TABLET ORAL
Status: DISCONTINUED | OUTPATIENT
Start: 2021-11-29 | End: 2021-12-02 | Stop reason: HOSPADM

## 2021-11-29 RX ADMIN — Medication 3 AMPULE: at 16:14

## 2021-11-29 RX ADMIN — Medication 5000 UNITS: at 22:26

## 2021-11-29 RX ADMIN — PROPOFOL 75 MCG/KG/MIN: 10 INJECTION, EMULSION INTRAVENOUS at 17:01

## 2021-11-29 RX ADMIN — FAMOTIDINE 20 MG: 20 TABLET ORAL at 22:26

## 2021-11-29 RX ADMIN — GLYCOPYRROLATE 0.6 MG: 0.2 INJECTION, SOLUTION INTRAMUSCULAR; INTRAVENOUS at 18:20

## 2021-11-29 RX ADMIN — FENTANYL CITRATE 25 MCG: 50 INJECTION INTRAMUSCULAR; INTRAVENOUS at 19:05

## 2021-11-29 RX ADMIN — Medication 1000 MG: at 16:11

## 2021-11-29 RX ADMIN — PREGABALIN 150 MG: 75 CAPSULE ORAL at 16:11

## 2021-11-29 RX ADMIN — WATER 2 G: 1 INJECTION INTRAMUSCULAR; INTRAVENOUS; SUBCUTANEOUS at 16:56

## 2021-11-29 RX ADMIN — PREGABALIN 50 MG: 25 CAPSULE ORAL at 22:26

## 2021-11-29 RX ADMIN — SODIUM CHLORIDE, POTASSIUM CHLORIDE, SODIUM LACTATE AND CALCIUM CHLORIDE 25 ML/HR: 600; 310; 30; 20 INJECTION, SOLUTION INTRAVENOUS at 16:38

## 2021-11-29 RX ADMIN — FENTANYL CITRATE 25 MCG: 50 INJECTION INTRAMUSCULAR; INTRAVENOUS at 18:53

## 2021-11-29 RX ADMIN — LIDOCAINE HYDROCHLORIDE 100 MG: 20 INJECTION, SOLUTION INTRAVENOUS at 16:49

## 2021-11-29 RX ADMIN — ROCURONIUM BROMIDE 40 MG: 10 INJECTION INTRAVENOUS at 16:49

## 2021-11-29 RX ADMIN — ONDANSETRON HYDROCHLORIDE 4 MG: 2 INJECTION, SOLUTION INTRAMUSCULAR; INTRAVENOUS at 17:05

## 2021-11-29 RX ADMIN — FENTANYL CITRATE 100 MCG: 50 INJECTION, SOLUTION INTRAMUSCULAR; INTRAVENOUS at 16:49

## 2021-11-29 RX ADMIN — Medication 10 ML: at 22:31

## 2021-11-29 RX ADMIN — SODIUM CHLORIDE, POTASSIUM CHLORIDE, SODIUM LACTATE AND CALCIUM CHLORIDE: 600; 310; 30; 20 INJECTION, SOLUTION INTRAVENOUS at 18:06

## 2021-11-29 RX ADMIN — DOCUSATE SODIUM 50MG AND SENNOSIDES 8.6MG 1 TABLET: 8.6; 5 TABLET, FILM COATED ORAL at 22:26

## 2021-11-29 RX ADMIN — OXYCODONE 5 MG: 5 TABLET ORAL at 22:26

## 2021-11-29 RX ADMIN — FENTANYL CITRATE 25 MCG: 50 INJECTION INTRAMUSCULAR; INTRAVENOUS at 18:56

## 2021-11-29 RX ADMIN — FENTANYL CITRATE 25 MCG: 50 INJECTION INTRAMUSCULAR; INTRAVENOUS at 19:44

## 2021-11-29 RX ADMIN — HYDROMORPHONE HYDROCHLORIDE 1 MG: 2 INJECTION, SOLUTION INTRAMUSCULAR; INTRAVENOUS; SUBCUTANEOUS at 17:36

## 2021-11-29 RX ADMIN — ROCURONIUM BROMIDE 10 MG: 10 INJECTION INTRAVENOUS at 17:35

## 2021-11-29 RX ADMIN — PROPOFOL 50 MG: 10 INJECTION, EMULSION INTRAVENOUS at 17:30

## 2021-11-29 RX ADMIN — DEXAMETHASONE SODIUM PHOSPHATE 8 MG: 4 INJECTION, SOLUTION INTRAMUSCULAR; INTRAVENOUS at 17:05

## 2021-11-29 RX ADMIN — SODIUM CHLORIDE 20 MCG/MIN: 900 INJECTION, SOLUTION INTRAVENOUS at 17:16

## 2021-11-29 RX ADMIN — FENTANYL CITRATE 25 MCG: 50 INJECTION INTRAMUSCULAR; INTRAVENOUS at 19:19

## 2021-11-29 RX ADMIN — SODIUM CHLORIDE 125 ML/HR: 9 INJECTION, SOLUTION INTRAVENOUS at 22:30

## 2021-11-29 RX ADMIN — ROSUVASTATIN CALCIUM 10 MG: 10 TABLET, COATED ORAL at 22:26

## 2021-11-29 RX ADMIN — DIAZEPAM 5 MG: 5 TABLET ORAL at 19:26

## 2021-11-29 RX ADMIN — FENTANYL CITRATE 25 MCG: 50 INJECTION INTRAMUSCULAR; INTRAVENOUS at 19:00

## 2021-11-29 RX ADMIN — NEOSTIGMINE METHYLSULFATE 4 MG: 1 INJECTION, SOLUTION INTRAVENOUS at 18:20

## 2021-11-29 RX ADMIN — PROPOFOL 150 MG: 10 INJECTION, EMULSION INTRAVENOUS at 16:49

## 2021-11-29 RX ADMIN — OMEGA-3-ACID ETHYL ESTERS 2000 MG: 1 CAPSULE, LIQUID FILLED ORAL at 22:26

## 2021-11-29 RX ADMIN — HYDROMORPHONE HYDROCHLORIDE 1 MG: 2 INJECTION, SOLUTION INTRAMUSCULAR; INTRAVENOUS; SUBCUTANEOUS at 17:22

## 2021-11-29 NOTE — PERIOP NOTES
Negative covid test result on 11/24/2021. Denies S/S. + vaccination. belongings to PACU, 3 bags, 2 green, 1 gray CPAP CASE.   in waiting area or via cell. Remained alert and oriented throughout preop stay.

## 2021-11-29 NOTE — BRIEF OP NOTE
Brief Postoperative Note    Patient: Alee Prieto  YOB: 1955  MRN: [id number]    Date of Procedure: 11/29/2021     Pre-Op Diagnosis: LOW BACK PAIN  LUMBOSACRAL SPONDYLOSIS W/O MYELOPATHY  SCIATICA OF LEFT SIDE  SPINAL STENOSIS, LUMBAR REGION, NEUROGENIC CLAUDICATION    Post-Op Diagnosis: Same as preoperative diagnosis. Procedure(s):  L3-5 LATERAL FUSION FROM  LEFT SIDED APPROACH (ANES CHOICE)    Surgeon(s):  Angel Vincent MD    Surgical Assistant: Physician Assistant: VICK Juárez  Surg Asst-1: Tori Craig    Anesthesia: General     Estimated Blood Loss (mL): less than 50     Complications: None    Specimens: * No specimens in log *     Implants:   Implant Name Type Inv.  Item Serial No.  Lot No. LRB No. Used Action   GRAFT BONE 10 CC - [id number]  GRAFT BONE 10 CC [id number] [employer]WD N/A Left 1 Implanted   SPACER SPNL [id number] 3-15DEG LAT LUM INTBDY FUS TI - SN/A  SPACER SPNL [id number] 3-15DEG LAT LUM INTBDY FUS TI N/A [employer] N/A Left 2 Implanted   PLATE SPNL [id number] THORLUM LAT [place]BL [place] - SN/A  PLATE SPNL [id number] THORLUM LAT STBL [place] N/A [employer] N/A Left 2 Implanted       Drains: * No LDAs found *    Findings: Stenosis    Electronically Signed by Ellen Cramer MD on 11/29/2021 at 6:22 PM

## 2021-11-29 NOTE — H&P
Progress Notes  Davonte Finch (Angela) [facility] Orthopedic Surgery  Cosigned by: Nayeli Deras MD at 11/4/2021  6:18 PM   Expand All Collapse All  ASSESSMENT:  (M54.50) Low back pain, unspecified back pain laterality, unspecified chronicity, unspecified whether sciatica present  (primary encounter diagnosis)  (M47.817) Lumbosacral spondylosis without myelopathy  (M43.10) Acquired spondylolisthesis  (M54.32) Sciatica of left side  (M48.062) Spinal stenosis, lumbar region, with neurogenic claudication  (M48.061) Foraminal stenosis of lumbar region         Patient Active Problem List   Diagnosis    Acquired hypothyroidism    Carpal tunnel syndrome of right wrist    Controlled type 2 diabetes mellitus with microalbuminuria, without long-term current use of insulin    Hypercholesterolemia    Hypertension, essential    MANUEL (obstructive sleep apnea)    Osteopenia of multiple sites    Spinal stenosis of lumbar region with neurogenic claudication    Demyelinating disease of the spinal cord    Chronic bilateral low back pain without sciatica    Chronic neck pain    Severe obesity         Impression: L3-L5 spondylolisthesis with stenosis. Stenosis L5-S1; low back pain with LLE sciatica. Significant relief with ESIs      PLAN:  The patient verbalized understanding of our findings and treatment plan. We engaged in the shared decision-making process and treatment options, along with risks and benefits, were discussed at length with the patient.     Ms. Ford experienced over 50% relief from the L L3-L4 L4-L5 ESIs with an improvement in pain and ambulation. She may call to schedule repeat injections at any time vs consider surgical intervention. She is a candidate for an L3-L5 lateral and L3-S1 posterior decompression and fusion. We discussed the surgery in detail and answered her questions. She is agreeable and would like to proceed.  I increased her lyrica to 50 mg BID.      I have discussed the procedure in detail with the patient and mentioned complications, including but not limited to: death, permanent disability, heart attack, stroke, lung injury or infection, blindness, ileus, bladder or bowel problems, ureter injury, bleeding, nerve injury (including numbness, pain and weakness), paralysis (which may be permanent), failure to heal, failure to fuse bone together in fusion procedures, failure to relief symptoms, failure to relief pain, increased pain, need for further surgeries, failure or breakage or hardware, malpositioning of hardware, need to fuse or operate on additional levels determined either during or after surgery, destabilization of the spine (which may require fusion or later surgery), infections (which may or may not require additional surgery), dural tears (tears of the sac holding in nerves and spinal fluid), meningitis, voice changes, blood clots, pulmonary embolus, recurrent disc herniation, diaphragm paralysis, and anesthetic complications. Comorbidities such as obesity, smoking, rheumatoid arthritis, chronic steroid use and diabetes increase these risks. The patient understands and wants to proceed.      The patient has been prescribed a LSO spinal orthosis pre-operatively for pain relief. The orthosis is medically necessary to reduce pain by restricting mobility of the trunk and to otherwise support weak spinal muscles and/or deformed spine. The patient will meet with our bracing coordinator to be fit for the brace. Follow up after surgery.               Treatment Plan:       Orders Placed This Encounter    Surgical Posting Sheet    Surgical Posting Sheet    BMI >=25 PATIENT INSTRUCTIONS & EDUCATION    pregabalin (LYRICA) 50 MG capsule         Follow-up: No follow-ups on file.         HISTORY OF PRESENT ILLNESS:  Louise Quiles; [id number]   Age: 77 y.o.  Sex: female   Pain score: Pain rating =   5 out of 10      Chief Complaint: Pain of the Lower Back        History of present illness:  Louise Quiles is a 77 y.o. female s/p L L3-L4 L4-L5 ESIs with complaints of low back pain radiating into the left buttock down the lateral LLE to the ankle. She experienced over 50% relief from the injections. Her symptoms are chronic and are worse with walking and standing for prolonged periods. Neymar Montgomery has tried 25 mg lyrica BID and an HEP. The pain is rated 5 out of 10 on the VAS.            Patient Active Problem List     Diagnosis Date Noted    Severe obesity 10/06/2020    Demyelinating disease of the spinal cord 09/05/2019    Osteopenia of multiple sites 04/06/2019    Acquired hypothyroidism 12/28/2018    Carpal tunnel syndrome of right wrist 12/28/2018    Controlled type 2 diabetes mellitus with microalbuminuria, without long-term current use of insulin 12/28/2018    Hypercholesterolemia 12/28/2018    Hypertension, essential 12/28/2018    MANUEL (obstructive sleep apnea) 12/28/2018    Spinal stenosis of lumbar region with neurogenic claudication 12/28/2018    Chronic bilateral low back pain without sciatica 07/03/2017    Chronic neck pain 07/03/2017               Family History   Problem Relation Age of Onset    Diabetes Mother           Social History           Tobacco Use    Smoking status: Never Smoker    Smokeless tobacco: Never Used   Substance Use Topics    Alcohol use: Never         Medical History        Past Medical History:   Diagnosis Date    Diabetes mellitus      Hyperlipidemia      Hypertension      Osteoarthritis      Osteoporosis      Sleep apnea              Surgical History   History reviewed.  No pertinent surgical history.            Current Outpatient Medications:     acetaminophen (TYLENOL) 500 MG tablet, Take 1,000 mg by mouth daily, Disp: , Rfl:     aspirin 81 MG EC tablet, Take 81 mg by mouth, Disp: , Rfl:     fluticasone (FLONASE) 50 MCG/ACT nasal spray, Administer 2 sprays into affected nostril(s) daily, Disp: , Rfl:     glipiZIDE (GLUCOTROL) 10 MG 24 hr tablet, Take 10 mg by mouth once daily, Disp: , Rfl:     glipiZIDE (GLUCOTROL) 5 MG 24 hr tablet, Take 5 mg by mouth daily, Disp: , Rfl:     hydrochlorothiazide (HYDRODIURIL) 25 MG tablet, , Disp: , Rfl:     levothyroxine (SYNTHROID, LEVOTHROID) 88 MCG tablet, Half tablet on Monday and 1 tablet all other days, Disp: , Rfl:     lisinopril (PRINIVIL,ZESTRIL) 40 MG tablet, Take 40 mg by mouth daily, Disp: , Rfl:     losartan (COZAAR) 100 MG tablet, Take 100 mg by mouth, Disp: , Rfl:     metFORMIN (GLUCOPHAGE) 1000 MG tablet, Take 1,000 mg by mouth, Disp: , Rfl:     rosuvastatin (CRESTOR) 10 MG tablet, Take 10 mg by mouth daily, Disp: , Rfl:     sitaGLIPtin-metFORMIN (JANUMET)  MG per tablet, Take by mouth, Disp: , Rfl:     Trulicity 1.5 SC/0.6BO solution pen-injector, INJECT CONTENTS OF 1 PEN (0.5ML) SUBCUTANEOUSLY ONCE A WEEK (EVERY 7 DAYS), Disp: , Rfl:     pregabalin (LYRICA) 50 MG capsule, Take 1 capsule (50 mg total) by mouth 2 (two) times a day, Disp: 60 capsule, Rfl: 5           Allergies   Allergen Reactions    Gabapentin         Other reaction(s): Other (comments)  Hot flashes    Topiramate         Other reaction(s): Other (comments)  Hot flashes         ROS:   No new bowel or bladder incontinence. No fever. No saddle anesthesia.     OBJECTIVE:      Vitals:     11/04/21 1120   BP: (!) 160/88         Body mass index is 34.09 kg/m². , a BMI over 30 is considered obese and a BMI over 40 has been associated with a higher risk of surgical complications.     Constitutional: No acute distress. Well nourished. Respiratory:  No labored breathing. Cardiovascular:  No marked cyanosis. Skin:  No marked skin ulcers/lesions on bilateral upper or lower extremities. Psychiatric: Alert and oriented x3. Inspection: No gross deformity of bilateral upper or lower extremities. Musculoskeletal/Neurological:   Gait/balance:  - Normal. Able to walk on heels and toes.   Thoracolumbar spine:  - No tenderness to palpation  - Full range of motion. Right lower extremity:  - No tenderness to palpation   - Full range of motion  - No pain with internal/external rotation of the hip  - Strength:  - 5 out of 5 to hip flexors  - 5 out of 5 to quads  - 5 out of 5 to TA  - 5 out of 5 to EHL  - 5 out of 5 to Gastroc/Soleus  - Negative straight leg raise  Left lower extremity:  - No tenderness to palpation   - Full range of motion  - No pain with internal/external rotation of the hip  - Strength:  - 5 out of 5 to hip flexors  - 5 out of 5 to quads  - 5 out of 5 to TA  - 5 out of 5 to EHL  - 5 out of 5 to Gastroc/Soleus  - Negative straight leg raise  Sensation:  - Intact to light touch  Reflexes:  - +2 Patellar tendon   - +2 Achilles tendon         RESULTS REVIEWED:          No imaging obtained             I have instructed the patient to continue to work on their physician supervised home exercise program.      This note has been transcribed electronically using voice recognition and a trained scribe. It is believed to be accurate, but may contain errors secondary to technological limitations and other factors.     I, Priyanka Ratliff MD, personally, performed the services described in this documentation, as scribed in my presence, and it is both accurate and complete.   Scribed by: Ivan Guerrero

## 2021-11-29 NOTE — ANESTHESIA PREPROCEDURE EVALUATION
Relevant Problems   RESPIRATORY SYSTEM   (+) MANUEL (obstructive sleep apnea)      CARDIOVASCULAR   (+) Hypertension, essential      ENDOCRINE   (+) Acquired hypothyroidism   (+) Severe obesity (HCC)   (+) Type 2 diabetes mellitus without complication, without long-term current use of insulin (HCC)       Anesthetic History   No history of anesthetic complications            Review of Systems / Medical History  Patient summary reviewed, nursing notes reviewed and pertinent labs reviewed    Pulmonary        Sleep apnea: CPAP           Neuro/Psych         TIA (?)     Cardiovascular    Hypertension: well controlled          Hyperlipidemia    Exercise tolerance: >4 METS     GI/Hepatic/Renal  Within defined limits              Endo/Other    Diabetes: type 2  Hypothyroidism  Obesity and anemia     Other Findings   Comments: Sarcoidosis           Physical Exam    Airway  Mallampati: III  TM Distance: 4 - 6 cm  Neck ROM: normal range of motion   Mouth opening: Normal     Cardiovascular    Rhythm: regular  Rate: normal         Dental    Dentition: Lower partial plate     Pulmonary  Breath sounds clear to auscultation               Abdominal  GI exam deferred       Other Findings            Anesthetic Plan    ASA: 3  Anesthesia type: general    Monitoring Plan: BIS      Induction: Intravenous  Anesthetic plan and risks discussed with: Patient

## 2021-11-29 NOTE — DISCHARGE INSTRUCTIONS
[address] [facility]    Discharge Instruction Sheet: POSTERIOR SPINAL FUSION    DR. Addis Gongora    Pain control:   Typically, we will prescribe a narcotic usually 1-2 tabs every four hours is    sufficient for the pain. Most patients need this only for the first few weeks. You   should discontinue this as the pain decreases. You should not drive while taking any narcotic pain medications. Constipation  Pain medicines and anesthesia can be constipating-this can be prevented by gentle physical activity and drinking plenty of fluid. It should be treated with over-the-counter medications such as Miralax or suppositories, and/or Fleets enema. You should have a bowel movement at least every other day following surgery. Incision care     Keep this area clean and dry. Your dressing is designed to stay in place for 5-7 days. You will be sent home with one additional dressing to change at that time. Leave this new dressing in place until our follow up visit in the office in about 10-14 days. If staples are in place, they should be removed about 14-20 days after surgery. You may shower with this impermeable dressing in place. DO NOT take a tub bath or go swimming until cleared by your doctor. DO NOT apply lotions, oils, or creams to incision. To increase and promote healing:   Stop Smoking (or at least cut back on smoking).  Eat a well-balanced diet (high in protein and vitamin C)   If your appetite is poor, consider nutritional supplements like Ensure, Glucerna, or Gulliver Instant Breakfast.   If you are diabetic, controlling you blood sugars is very important to prevent infection and promote wound healing. Nutrition:   If you were on a supplement such as Ensure or Glucerna) while in the hospital, please continue using them with each meal for the next 30 days.    Eat a well-balanced diet - High in protein, high in vitamins and minerals, especially vitamin C and zinc.     Restrictions:   Remember your \"BLT's\"    1. Limited bending at waist    2. Lift no more than 10 pounds    3. No Twisting     If you were given a brace, wear it when out of bed. Warning signs : Please call your physician immediately at [phone] if you have   Bleeding from incision that is constant.  Change in mental status (unusual behavior or confusion)   If your incision develops redness or swelling   Change in wound drainage (increase in amount, color, or foul odor)   [place] over 101.5 degrees Fahrenheit    Headache that is not relieved with pain medication   Tenderness or redness in the calf of your leg    Emergency: CALL 911 if you have   Shortness of breath   Chest pain   Localized chest pain when coughing or taking a deep breath    Follow-up  Please call Dr. Baltazar Galan office for a follow up appointment in 2-3 weeks at [phone]. You can return to work when cleared by a physician. During normal business hours you may reach Dr. Camelia Kent' team directly at [phone] if you have concerns or questions.     Carolina Silva

## 2021-11-29 NOTE — OP NOTES
[facility]  OPERATIVE REPORT    Name:  Makenzie Romo  MR#:  [id number]  :  1955  DATE OF SERVICE:  2021    PREOPERATIVE DIAGNOSES:  1. Low back pain, unspecified back pain laterality, unspecified chronicity, unspecified whether sciatica present    2. Lumbosacral spondylosis without myelopathy    3. Acquired spondylolisthesis    4. Foraminal stenosis of lumbar region    5. Spinal stenosis, lumbar region, with neurogenic claudication    6. Sciatica of left side    7. Lumbar radiculopathy    8. DDD (degenerative disc disease), lumbar        POSTOPERATIVE DIAGNOSES:  1. Low back pain, unspecified back pain laterality, unspecified chronicity, unspecified whether sciatica present    2. Lumbosacral spondylosis without myelopathy    3. Acquired spondylolisthesis    4. Foraminal stenosis of lumbar region    5. Spinal stenosis, lumbar region, with neurogenic claudication    6. Sciatica of left side    7. Lumbar radiculopathy    8. DDD (degenerative disc disease), lumbar        PROCEDURES PERFORMED:  1. L3-5 anterior arthrodesis. 2. L3-5 anterior instrumentation. 3. L3-5 insertion of Interbody biomechanical device. 4. Bone marrow aspirate through separate fascial incision for spinal fusion augmentation. 5. Use of allograft bone for spinal fusion. SURGEON:  Ge Licona MD    FIRST ASSISTANT:  VICK Hood  Ms. Ford has undergone a major surgery. Vijay Bran knowledge about the pertinent anatomy, procedural steps and equipment requirent in this procedure was medically necessary to ensure the safety of the patient. His assistance has helped reduce surgical time by 35%. Surgical tasks included, but are not limited to:  1) Safe and proper retraction. 2) Maintenance of visualization during surgery by helping with tasks such as suctioning.   3) Multiple tasks throughout the decompression and instrumentation to allow for timely and safe completion of the procedure. 4) Overall assistance helped decrease the risk complications such as infection, bleeding, hardware malposition, and damage to surrounding structures. ANESTHESIA:  GETA. COMPLICATIONS:  None. SPECIMENS:  None. IMPLANTS:    1. Globus Comal anterior lumbar plate. 2. Globus RISE-L interbody biomechanical device. Please note that the anterior lumbar plate and the Interbody biomechanical device are completely separate and independent devices that function autonomously from each other. ESTIMATED BLOOD LOSS:  50 mL. DRAINS:  None. PRE-OP ANTIBIOTICS: Ancef. INDICATIONS FOR PROCEDURE:    Georgie Nguyen is a 77 y.o. female who has the above listed diagnosis. Ms. Fred Fierro has failed to improve with non-operative treatment and has chosen to proceed with surgical intervention. We had a long conversation about pros and cons of surgery, risks, possible complications, alternative treatments, and Georgie Nguyen had an opportunity to ask questions and is satisfied with my answers and explanations.   I have personally given warnings about possible complications including but not limited to death, permanent disability, heart attack, stroke, lung injury or infection, blindness, ileus, bladder or bowel problems, ureter injury, bleeding, blood vessel injury, nerve injury (including numbness, pain and weakness), paralysis (which may be permanent), failure to heal, failure to fuse bone together in fusion procedures, failure to relief symptoms, failure to relief pain, increased pain, need for further surgeries, failure or breakage or hardware, malpositioning of hardware, need to fuse or operate on additional levels determined either during or after surgery, destabilization of the spine (which may require fusion or later surgery), infections (which may or may not require additional surgery), dural tears (tears of the sac holding in nerves and spinal fluid), meningitis, blood clots, pulmonary embolus, recurrent disc herniation, and anesthetic complications. Comorbidities such as obesity, smoking, rheumatoid arthritis, chronic steroid use and diabetes increase these risks. .  Ms. Ford  understands and wants to proceed. NARRATIVE OF THE PROCEDURE:    After informed consent was obtained and Indira Yañez had a chance to ask any last minute questions, the patient was transported to the operating room and underwent general endotracheal anesthesia. Neuromonitoring placed their leads and obtained baseline signals. Indira Yañez was positioned on the lateral decubitus on the Stanislaw table and the body was properly padded. An axillary roll was place and Ms. Ford was properly secured to the table. Fluoroscopy was used to sade the level of the incision and the site was prepped and draped in the usual manner. A time out was obtained and we verified that we had the correct patient the correct site and that the proper IV antibiotics had been administered in accordance with SCIP protocol. A standard anterior approach for a L3-5 OLIF was performed. The abdominal musculature was split in line with its fibers and the retroperitoneal space was entered. The peritoneal contents were retracted anteriorly and a moist lap sponge was used for protection/retraction. The Psoas muscle was exposed and retracted posteriorly. The L3-4 disc was exposed and fluoroscopy verified the correct level. A Jamshidi needle was inserted through a separate fascial incision into the ASIS and 20 mL of bone marrow were aspirated. The aspirate was used to augment all the bone graft used in this surgical procedure. The self retaining retractor was placed at the L3-4 level and a box annulotomy was performed with a #15 blade on the L3-4 disc. The cartilaginous end plates were detached with a Bautista elevator. A box shaver was used to complete the discectomy. A trial was used to determine the size of the interbody biomechanical device. While the interbody biomechanical device was being packed with allograft bone soaked in bone marrow aspirate, the discectomy was completed with a pituitary and a curette. The interbody biomechanical device was inserted at the L3-4 level. Once in the proper position it was deployed to its final height. A funnel was used to backfill the interbody biomechanical device with allograft bone soaked in bone marrow aspirate to complete the anterior fusion at the L3-4 level. A completely separate and independent plate was placed at the L3-4 level for the anterior instrumentation (please note that this plate functions independently from the interbody biomechanical device). The awl was used to create a  hole and the appropriate screws were used to secure the plate to the Z6-9 level completing the anterior instrumentation. The discectomy, insertion of interbody biomechanical device, anterior fusion and anterior instrumentation were performed in the exact same manner from L4 to L5. Once the procedure was completed, final AP and lateral fluoroscopic images were obtained and saved to PACS. The abdominal musculature was re-approximated with 2-0 vicryl, the subcutaneous layer was closed with 3-0 vicryl, the skin was closed with a 3-0 stratafix and dermabond. Once the procedure was finished the patient was awoken and transferred to PACU in stable condition. POSTOPERATIVE PLAN:  The patient will have SCDs for DVT prophylaxis and IV antibiotics for infection prophylaxis. COUNTS: Sponge and needle counts were correct.     SIGNED BY:  Kathryn Richmond MD     November 29, 2021

## 2021-11-30 ENCOUNTER — ANESTHESIA (OUTPATIENT)
Dept: SURGERY | Age: 66
DRG: 455 | End: 2021-11-30
Payer: MEDICARE

## 2021-11-30 ENCOUNTER — APPOINTMENT (OUTPATIENT)
Dept: GENERAL RADIOLOGY | Age: 66
DRG: 455 | End: 2021-11-30
Attending: ORTHOPAEDIC SURGERY
Payer: MEDICARE

## 2021-11-30 ENCOUNTER — ANESTHESIA EVENT (OUTPATIENT)
Dept: SURGERY | Age: 66
DRG: 455 | End: 2021-11-30
Payer: MEDICARE

## 2021-11-30 LAB
GLUCOSE BLD STRIP.AUTO-MCNC: 116 MG/DL (ref 65–117)
GLUCOSE BLD STRIP.AUTO-MCNC: 122 MG/DL (ref 65–117)
GLUCOSE BLD STRIP.AUTO-MCNC: 131 MG/DL (ref 65–117)
GLUCOSE BLD STRIP.AUTO-MCNC: 142 MG/DL (ref 65–117)
GLUCOSE BLD STRIP.AUTO-MCNC: 150 MG/DL (ref 65–117)
HGB BLD-MCNC: 9.7 G/DL (ref 11.5–16)
SERVICE CMNT-IMP: ABNORMAL
SERVICE CMNT-IMP: NORMAL

## 2021-11-30 PROCEDURE — 77030003445 HC NDL BIOP BN BD -B: Performed by: ORTHOPAEDIC SURGERY

## 2021-11-30 PROCEDURE — 2709999900 HC NON-CHARGEABLE SUPPLY: Performed by: ORTHOPAEDIC SURGERY

## 2021-11-30 PROCEDURE — 74011250636 HC RX REV CODE- 250/636: Performed by: STUDENT IN AN ORGANIZED HEALTH CARE EDUCATION/TRAINING PROGRAM

## 2021-11-30 PROCEDURE — 76060000035 HC ANESTHESIA 2 TO 2.5 HR: Performed by: ORTHOPAEDIC SURGERY

## 2021-11-30 PROCEDURE — 74011000258 HC RX REV CODE- 258: Performed by: NURSE ANESTHETIST, CERTIFIED REGISTERED

## 2021-11-30 PROCEDURE — 76010000171 HC OR TIME 2 TO 2.5 HR INTENSV-TIER 1: Performed by: ORTHOPAEDIC SURGERY

## 2021-11-30 PROCEDURE — 0SG10K1 FUSION OF 2 OR MORE LUMBAR VERTEBRAL JOINTS WITH NONAUTOLOGOUS TISSUE SUBSTITUTE, POSTERIOR APPROACH, POSTERIOR COLUMN, OPEN APPROACH: ICD-10-PCS | Performed by: ORTHOPAEDIC SURGERY

## 2021-11-30 PROCEDURE — 77030003666 HC NDL SPINAL BD -A: Performed by: ORTHOPAEDIC SURGERY

## 2021-11-30 PROCEDURE — 77030003028 HC SUT VCRL J&J -A: Performed by: ORTHOPAEDIC SURGERY

## 2021-11-30 PROCEDURE — 01NB0ZZ RELEASE LUMBAR NERVE, OPEN APPROACH: ICD-10-PCS | Performed by: ORTHOPAEDIC SURGERY

## 2021-11-30 PROCEDURE — 85018 HEMOGLOBIN: CPT

## 2021-11-30 PROCEDURE — 77030032806 HC CAP SPN LOK CREO GLBM -B: Performed by: ORTHOPAEDIC SURGERY

## 2021-11-30 PROCEDURE — 77030026438 HC STYL ET INTUB CARD -A: Performed by: NURSE ANESTHETIST, CERTIFIED REGISTERED

## 2021-11-30 PROCEDURE — 77030038692 HC WND DEB SYS IRMX -B: Performed by: ORTHOPAEDIC SURGERY

## 2021-11-30 PROCEDURE — 74011250636 HC RX REV CODE- 250/636: Performed by: NURSE PRACTITIONER

## 2021-11-30 PROCEDURE — 77010033678 HC OXYGEN DAILY

## 2021-11-30 PROCEDURE — 77030013079 HC BLNKT BAIR HGGR 3M -A: Performed by: NURSE ANESTHETIST, CERTIFIED REGISTERED

## 2021-11-30 PROCEDURE — 77030008462 HC STPLR SKN PROX J&J -A: Performed by: ORTHOPAEDIC SURGERY

## 2021-11-30 PROCEDURE — 77030022704 HC SUT VLOC COVD -B: Performed by: ORTHOPAEDIC SURGERY

## 2021-11-30 PROCEDURE — 74011000250 HC RX REV CODE- 250: Performed by: ANESTHESIOLOGY

## 2021-11-30 PROCEDURE — 74011000250 HC RX REV CODE- 250: Performed by: ORTHOPAEDIC SURGERY

## 2021-11-30 PROCEDURE — 74011000250 HC RX REV CODE- 250: Performed by: NURSE ANESTHETIST, CERTIFIED REGISTERED

## 2021-11-30 PROCEDURE — 77030004402 HC BUR NEUR STRY -C: Performed by: ORTHOPAEDIC SURGERY

## 2021-11-30 PROCEDURE — 76000 FLUOROSCOPY <1 HR PHYS/QHP: CPT

## 2021-11-30 PROCEDURE — C1713 ANCHOR/SCREW BN/BN,TIS/BN: HCPCS | Performed by: ORTHOPAEDIC SURGERY

## 2021-11-30 PROCEDURE — C1821 INTERSPINOUS IMPLANT: HCPCS | Performed by: ORTHOPAEDIC SURGERY

## 2021-11-30 PROCEDURE — 36415 COLL VENOUS BLD VENIPUNCTURE: CPT

## 2021-11-30 PROCEDURE — 77030014647 HC SEAL FBRN TISSL BAXT -D: Performed by: ORTHOPAEDIC SURGERY

## 2021-11-30 PROCEDURE — 82962 GLUCOSE BLOOD TEST: CPT

## 2021-11-30 PROCEDURE — 0SG30AJ FUSION OF LUMBOSACRAL JOINT WITH INTERBODY FUSION DEVICE, POSTERIOR APPROACH, ANTERIOR COLUMN, OPEN APPROACH: ICD-10-PCS | Performed by: ORTHOPAEDIC SURGERY

## 2021-11-30 PROCEDURE — 74011250637 HC RX REV CODE- 250/637: Performed by: ORTHOPAEDIC SURGERY

## 2021-11-30 PROCEDURE — 77030020268 HC MISC GENERAL SUPPLY: Performed by: ORTHOPAEDIC SURGERY

## 2021-11-30 PROCEDURE — 97116 GAIT TRAINING THERAPY: CPT

## 2021-11-30 PROCEDURE — 77030008684 HC TU ET CUF COVD -B: Performed by: NURSE ANESTHETIST, CERTIFIED REGISTERED

## 2021-11-30 PROCEDURE — 0SB40ZZ EXCISION OF LUMBOSACRAL DISC, OPEN APPROACH: ICD-10-PCS | Performed by: ORTHOPAEDIC SURGERY

## 2021-11-30 PROCEDURE — 77030018723 HC ELCTRD BLD COVD -A: Performed by: ORTHOPAEDIC SURGERY

## 2021-11-30 PROCEDURE — 94760 N-INVAS EAR/PLS OXIMETRY 1: CPT

## 2021-11-30 PROCEDURE — 77030040179 HC DEV DRSG WND PICO S&N -C: Performed by: ORTHOPAEDIC SURGERY

## 2021-11-30 PROCEDURE — 74011250636 HC RX REV CODE- 250/636: Performed by: NURSE ANESTHETIST, CERTIFIED REGISTERED

## 2021-11-30 PROCEDURE — 77030019908 HC STETH ESOPH SIMS -A: Performed by: NURSE ANESTHETIST, CERTIFIED REGISTERED

## 2021-11-30 PROCEDURE — 72100 X-RAY EXAM L-S SPINE 2/3 VWS: CPT

## 2021-11-30 PROCEDURE — 74011250636 HC RX REV CODE- 250/636: Performed by: ANESTHESIOLOGY

## 2021-11-30 PROCEDURE — 77030014007 HC SPNG HEMSTAT J&J -B: Performed by: ORTHOPAEDIC SURGERY

## 2021-11-30 PROCEDURE — 65270000029 HC RM PRIVATE

## 2021-11-30 PROCEDURE — 97530 THERAPEUTIC ACTIVITIES: CPT

## 2021-11-30 PROCEDURE — 07DR3ZZ EXTRACTION OF ILIAC BONE MARROW, PERCUTANEOUS APPROACH: ICD-10-PCS | Performed by: ORTHOPAEDIC SURGERY

## 2021-11-30 PROCEDURE — 74011250636 HC RX REV CODE- 250/636: Performed by: ORTHOPAEDIC SURGERY

## 2021-11-30 PROCEDURE — 8E0W0CZ ROBOTIC ASSISTED PROCEDURE OF TRUNK REGION, OPEN APPROACH: ICD-10-PCS | Performed by: ORTHOPAEDIC SURGERY

## 2021-11-30 PROCEDURE — 97162 PT EVAL MOD COMPLEX 30 MIN: CPT

## 2021-11-30 PROCEDURE — 76210000017 HC OR PH I REC 1.5 TO 2 HR: Performed by: ORTHOPAEDIC SURGERY

## 2021-11-30 DEVICE — IMPLANTABLE DEVICE: Type: IMPLANTABLE DEVICE | Site: SPINE LUMBAR | Status: FUNCTIONAL

## 2021-11-30 DEVICE — CREO MIS MODULAR POLYAXIAL TULIP, 30MM REDUCTION
Type: IMPLANTABLE DEVICE | Site: SPINE LUMBAR | Status: FUNCTIONAL
Brand: CREO

## 2021-11-30 DEVICE — 7.5 X 45MM CANNULATED SCREW, MODULAR, CREO AMP
Type: IMPLANTABLE DEVICE | Site: SPINE LUMBAR | Status: FUNCTIONAL
Brand: CREO

## 2021-11-30 DEVICE — 5.5 X 35MM CANNULATED SCREW, MODULAR, CREO AMP
Type: IMPLANTABLE DEVICE | Site: SPINE LUMBAR | Status: FUNCTIONAL
Brand: CREO

## 2021-11-30 DEVICE — 6.5 X 40MM CANNULATED SCREW, MODULAR, CREO AMP
Type: IMPLANTABLE DEVICE | Site: SPINE LUMBAR | Status: FUNCTIONAL
Brand: CREO

## 2021-11-30 DEVICE — CREO MIS LOCKING CAP
Type: IMPLANTABLE DEVICE | Site: SPINE LUMBAR | Status: FUNCTIONAL
Brand: CREO

## 2021-11-30 DEVICE — 6.5 X 35MM CANNULATED SCREW, MODULAR, CREO AMP
Type: IMPLANTABLE DEVICE | Site: SPINE LUMBAR | Status: FUNCTIONAL
Brand: CREO

## 2021-11-30 DEVICE — GRAFT BNE FIBER 15 CC OSTEOAMP SEL: Type: IMPLANTABLE DEVICE | Site: SPINE LUMBAR | Status: FUNCTIONAL

## 2021-11-30 DEVICE — ALTERA SPACER, 10 X 36, 9-13MM, 15&DEG;
Type: IMPLANTABLE DEVICE | Site: SPINE LUMBAR | Status: FUNCTIONAL
Brand: ALTERA

## 2021-11-30 RX ORDER — ONDANSETRON 2 MG/ML
4 INJECTION INTRAMUSCULAR; INTRAVENOUS AS NEEDED
Status: DISCONTINUED | OUTPATIENT
Start: 2021-11-30 | End: 2021-11-30 | Stop reason: HOSPADM

## 2021-11-30 RX ORDER — ONDANSETRON 2 MG/ML
INJECTION INTRAMUSCULAR; INTRAVENOUS AS NEEDED
Status: DISCONTINUED | OUTPATIENT
Start: 2021-11-30 | End: 2021-11-30 | Stop reason: HOSPADM

## 2021-11-30 RX ORDER — CEFAZOLIN SODIUM/WATER 2 G/20 ML
SYRINGE (ML) INTRAVENOUS AS NEEDED
Status: DISCONTINUED | OUTPATIENT
Start: 2021-11-30 | End: 2021-11-30 | Stop reason: HOSPADM

## 2021-11-30 RX ORDER — ROCURONIUM BROMIDE 10 MG/ML
INJECTION, SOLUTION INTRAVENOUS AS NEEDED
Status: DISCONTINUED | OUTPATIENT
Start: 2021-11-30 | End: 2021-11-30 | Stop reason: HOSPADM

## 2021-11-30 RX ORDER — HYDROMORPHONE HYDROCHLORIDE 2 MG/ML
INJECTION, SOLUTION INTRAMUSCULAR; INTRAVENOUS; SUBCUTANEOUS AS NEEDED
Status: DISCONTINUED | OUTPATIENT
Start: 2021-11-30 | End: 2021-11-30 | Stop reason: HOSPADM

## 2021-11-30 RX ORDER — PROPOFOL 10 MG/ML
INJECTION, EMULSION INTRAVENOUS AS NEEDED
Status: DISCONTINUED | OUTPATIENT
Start: 2021-11-30 | End: 2021-11-30 | Stop reason: HOSPADM

## 2021-11-30 RX ORDER — GLYCOPYRROLATE 0.2 MG/ML
INJECTION INTRAMUSCULAR; INTRAVENOUS AS NEEDED
Status: DISCONTINUED | OUTPATIENT
Start: 2021-11-30 | End: 2021-11-30 | Stop reason: HOSPADM

## 2021-11-30 RX ORDER — ROPIVACAINE HYDROCHLORIDE 5 MG/ML
INJECTION, SOLUTION EPIDURAL; INFILTRATION; PERINEURAL AS NEEDED
Status: DISCONTINUED | OUTPATIENT
Start: 2021-11-30 | End: 2021-11-30 | Stop reason: HOSPADM

## 2021-11-30 RX ORDER — MIDAZOLAM HYDROCHLORIDE 1 MG/ML
INJECTION, SOLUTION INTRAMUSCULAR; INTRAVENOUS AS NEEDED
Status: DISCONTINUED | OUTPATIENT
Start: 2021-11-30 | End: 2021-11-30 | Stop reason: HOSPADM

## 2021-11-30 RX ORDER — LIDOCAINE HYDROCHLORIDE 20 MG/ML
INJECTION, SOLUTION EPIDURAL; INFILTRATION; INTRACAUDAL; PERINEURAL AS NEEDED
Status: DISCONTINUED | OUTPATIENT
Start: 2021-11-30 | End: 2021-11-30 | Stop reason: HOSPADM

## 2021-11-30 RX ORDER — FENTANYL CITRATE 50 UG/ML
25 INJECTION, SOLUTION INTRAMUSCULAR; INTRAVENOUS
Status: DISCONTINUED | OUTPATIENT
Start: 2021-11-30 | End: 2021-11-30 | Stop reason: HOSPADM

## 2021-11-30 RX ORDER — NEOSTIGMINE METHYLSULFATE 1 MG/ML
INJECTION, SOLUTION INTRAVENOUS AS NEEDED
Status: DISCONTINUED | OUTPATIENT
Start: 2021-11-30 | End: 2021-11-30 | Stop reason: HOSPADM

## 2021-11-30 RX ORDER — FENTANYL CITRATE 50 UG/ML
INJECTION, SOLUTION INTRAMUSCULAR; INTRAVENOUS AS NEEDED
Status: DISCONTINUED | OUTPATIENT
Start: 2021-11-30 | End: 2021-11-30 | Stop reason: HOSPADM

## 2021-11-30 RX ORDER — SODIUM CHLORIDE 9 MG/ML
125 INJECTION, SOLUTION INTRAVENOUS CONTINUOUS
Status: DISPENSED | OUTPATIENT
Start: 2021-11-30 | End: 2021-12-01

## 2021-11-30 RX ORDER — SODIUM CHLORIDE, SODIUM LACTATE, POTASSIUM CHLORIDE, CALCIUM CHLORIDE 600; 310; 30; 20 MG/100ML; MG/100ML; MG/100ML; MG/100ML
INJECTION, SOLUTION INTRAVENOUS
Status: DISCONTINUED | OUTPATIENT
Start: 2021-11-30 | End: 2021-11-30 | Stop reason: HOSPADM

## 2021-11-30 RX ORDER — HYDROMORPHONE HYDROCHLORIDE 1 MG/ML
0.2 INJECTION, SOLUTION INTRAMUSCULAR; INTRAVENOUS; SUBCUTANEOUS
Status: DISCONTINUED | OUTPATIENT
Start: 2021-11-30 | End: 2021-11-30 | Stop reason: HOSPADM

## 2021-11-30 RX ORDER — DEXAMETHASONE SODIUM PHOSPHATE 4 MG/ML
INJECTION, SOLUTION INTRA-ARTICULAR; INTRALESIONAL; INTRAMUSCULAR; INTRAVENOUS; SOFT TISSUE AS NEEDED
Status: DISCONTINUED | OUTPATIENT
Start: 2021-11-30 | End: 2021-11-30 | Stop reason: HOSPADM

## 2021-11-30 RX ORDER — OXYCODONE HYDROCHLORIDE 5 MG/1
5 TABLET ORAL AS NEEDED
Status: DISCONTINUED | OUTPATIENT
Start: 2021-11-30 | End: 2021-11-30 | Stop reason: HOSPADM

## 2021-11-30 RX ORDER — SUCCINYLCHOLINE CHLORIDE 20 MG/ML
INJECTION INTRAMUSCULAR; INTRAVENOUS AS NEEDED
Status: DISCONTINUED | OUTPATIENT
Start: 2021-11-30 | End: 2021-11-30 | Stop reason: HOSPADM

## 2021-11-30 RX ADMIN — CEFAZOLIN SODIUM 2 G: 1 INJECTION, POWDER, FOR SOLUTION INTRAMUSCULAR; INTRAVENOUS at 09:52

## 2021-11-30 RX ADMIN — ROSUVASTATIN CALCIUM 10 MG: 10 TABLET, COATED ORAL at 22:07

## 2021-11-30 RX ADMIN — CYANOCOBALAMIN TAB 500 MCG 500 MCG: 500 TAB at 09:52

## 2021-11-30 RX ADMIN — FENTANYL CITRATE 25 MCG: 50 INJECTION INTRAMUSCULAR; INTRAVENOUS at 19:30

## 2021-11-30 RX ADMIN — Medication 200 MG: at 09:51

## 2021-11-30 RX ADMIN — SODIUM CHLORIDE, POTASSIUM CHLORIDE, SODIUM LACTATE AND CALCIUM CHLORIDE: 600; 310; 30; 20 INJECTION, SOLUTION INTRAVENOUS at 16:22

## 2021-11-30 RX ADMIN — PHENYLEPHRINE HYDROCHLORIDE 80 MCG: 10 INJECTION INTRAVENOUS at 17:07

## 2021-11-30 RX ADMIN — ROCURONIUM BROMIDE 20 MG: 10 INJECTION INTRAVENOUS at 17:18

## 2021-11-30 RX ADMIN — SODIUM CHLORIDE, POTASSIUM CHLORIDE, SODIUM LACTATE AND CALCIUM CHLORIDE 25 ML/HR: 600; 310; 30; 20 INJECTION, SOLUTION INTRAVENOUS at 15:40

## 2021-11-30 RX ADMIN — HYDROMORPHONE HYDROCHLORIDE 0.6 MG: 2 INJECTION, SOLUTION INTRAMUSCULAR; INTRAVENOUS; SUBCUTANEOUS at 16:42

## 2021-11-30 RX ADMIN — DEXMEDETOMIDINE HYDROCHLORIDE 4 MCG: 100 INJECTION, SOLUTION, CONCENTRATE INTRAVENOUS at 16:54

## 2021-11-30 RX ADMIN — PHENYLEPHRINE HYDROCHLORIDE 80 MCG/MIN: 10 INJECTION INTRAVENOUS at 17:39

## 2021-11-30 RX ADMIN — CEFAZOLIN 2 G: 1 INJECTION, POWDER, FOR SOLUTION INTRAVENOUS at 16:27

## 2021-11-30 RX ADMIN — PHENYLEPHRINE HYDROCHLORIDE 80 MCG: 10 INJECTION INTRAVENOUS at 17:02

## 2021-11-30 RX ADMIN — PHENYLEPHRINE HYDROCHLORIDE 120 MCG: 10 INJECTION INTRAVENOUS at 17:28

## 2021-11-30 RX ADMIN — ROCURONIUM BROMIDE 10 MG: 10 INJECTION INTRAVENOUS at 16:23

## 2021-11-30 RX ADMIN — ACETAMINOPHEN 1000 MG: 500 TABLET ORAL at 00:46

## 2021-11-30 RX ADMIN — Medication 10 ML: at 22:19

## 2021-11-30 RX ADMIN — PREGABALIN 50 MG: 25 CAPSULE ORAL at 09:51

## 2021-11-30 RX ADMIN — CEFAZOLIN SODIUM 2 G: 1 INJECTION, POWDER, FOR SOLUTION INTRAMUSCULAR; INTRAVENOUS at 00:46

## 2021-11-30 RX ADMIN — ACETAMINOPHEN 1000 MG: 500 TABLET ORAL at 06:37

## 2021-11-30 RX ADMIN — PHENYLEPHRINE HYDROCHLORIDE 80 MCG: 10 INJECTION INTRAVENOUS at 17:16

## 2021-11-30 RX ADMIN — PHENYLEPHRINE HYDROCHLORIDE 120 MCG: 10 INJECTION INTRAVENOUS at 17:20

## 2021-11-30 RX ADMIN — MIDAZOLAM HYDROCHLORIDE 2 MG: 1 INJECTION, SOLUTION INTRAMUSCULAR; INTRAVENOUS at 16:20

## 2021-11-30 RX ADMIN — FENTANYL CITRATE 100 MCG: 50 INJECTION, SOLUTION INTRAMUSCULAR; INTRAVENOUS at 16:23

## 2021-11-30 RX ADMIN — SODIUM CHLORIDE 125 ML/HR: 9 INJECTION, SOLUTION INTRAVENOUS at 12:08

## 2021-11-30 RX ADMIN — SUCCINYLCHOLINE CHLORIDE 120 MG: 20 INJECTION, SOLUTION INTRAMUSCULAR; INTRAVENOUS at 16:23

## 2021-11-30 RX ADMIN — LEVOTHYROXINE SODIUM 88 MCG: 0.09 TABLET ORAL at 09:52

## 2021-11-30 RX ADMIN — HYDROMORPHONE HYDROCHLORIDE 0.4 MG: 2 INJECTION, SOLUTION INTRAMUSCULAR; INTRAVENOUS; SUBCUTANEOUS at 17:40

## 2021-11-30 RX ADMIN — PHENYLEPHRINE HYDROCHLORIDE 80 MCG: 10 INJECTION INTRAVENOUS at 17:09

## 2021-11-30 RX ADMIN — ROCURONIUM BROMIDE 40 MG: 10 INJECTION INTRAVENOUS at 16:41

## 2021-11-30 RX ADMIN — OXYCODONE 10 MG: 5 TABLET ORAL at 01:55

## 2021-11-30 RX ADMIN — ONDANSETRON HYDROCHLORIDE 4 MG: 2 INJECTION, SOLUTION INTRAMUSCULAR; INTRAVENOUS at 16:31

## 2021-11-30 RX ADMIN — Medication 800 UNITS: at 09:53

## 2021-11-30 RX ADMIN — NEOSTIGMINE METHYLSULFATE 4 MG: 1 INJECTION, SOLUTION INTRAVENOUS at 18:10

## 2021-11-30 RX ADMIN — OXYCODONE 10 MG: 5 TABLET ORAL at 10:01

## 2021-11-30 RX ADMIN — LISINOPRIL 40 MG: 20 TABLET ORAL at 09:50

## 2021-11-30 RX ADMIN — FENTANYL CITRATE 25 MCG: 50 INJECTION INTRAMUSCULAR; INTRAVENOUS at 18:51

## 2021-11-30 RX ADMIN — OXYCODONE HYDROCHLORIDE AND ACETAMINOPHEN 500 MG: 500 TABLET ORAL at 09:51

## 2021-11-30 RX ADMIN — ACETAMINOPHEN 1000 MG: 500 TABLET ORAL at 11:43

## 2021-11-30 RX ADMIN — PHENYLEPHRINE HYDROCHLORIDE 120 MCG: 10 INJECTION INTRAVENOUS at 17:24

## 2021-11-30 RX ADMIN — Medication 5000 UNITS: at 09:50

## 2021-11-30 RX ADMIN — OXYCODONE 10 MG: 5 TABLET ORAL at 22:07

## 2021-11-30 RX ADMIN — LIDOCAINE HYDROCHLORIDE 100 MG: 20 INJECTION, SOLUTION INTRAVENOUS at 16:23

## 2021-11-30 RX ADMIN — Medication 10 ML: at 14:12

## 2021-11-30 RX ADMIN — DEXAMETHASONE SODIUM PHOSPHATE 8 MG: 4 INJECTION, SOLUTION INTRAMUSCULAR; INTRAVENOUS at 16:31

## 2021-11-30 RX ADMIN — GLYCOPYRROLATE 0.6 MG: 0.2 INJECTION, SOLUTION INTRAMUSCULAR; INTRAVENOUS at 18:10

## 2021-11-30 RX ADMIN — PROPOFOL 150 MG: 10 INJECTION, EMULSION INTRAVENOUS at 16:23

## 2021-11-30 RX ADMIN — HYDROMORPHONE HYDROCHLORIDE 0.5 MG: 2 INJECTION, SOLUTION INTRAMUSCULAR; INTRAVENOUS; SUBCUTANEOUS at 18:14

## 2021-11-30 RX ADMIN — DEXMEDETOMIDINE HYDROCHLORIDE 4 MCG: 100 INJECTION, SOLUTION, CONCENTRATE INTRAVENOUS at 16:48

## 2021-11-30 RX ADMIN — FENTANYL CITRATE 25 MCG: 50 INJECTION INTRAMUSCULAR; INTRAVENOUS at 19:00

## 2021-11-30 NOTE — PROGRESS NOTES
Herbal or dietary supplement products    [facility] [place] [facility] does not stock herbal or dietary supplement products. The use of such is strongly discouraged due to the lack of regulated consistency of products. These products do not meet FDA or USP standards.     I removed APPLE CIDER,  and TURMERIC   from the patient's MAR    Thanks  Rhiannon Smith, [facility]

## 2021-11-30 NOTE — ANESTHESIA POSTPROCEDURE EVALUATION
Procedure(s):  L3-5 LATERAL FUSION FROM  LEFT SIDED APPROACH (ANES CHOICE). general    Anesthesia Post Evaluation      Multimodal analgesia: multimodal analgesia used between 6 hours prior to anesthesia start to PACU discharge  Patient location during evaluation: PACU  Level of consciousness: sleepy but conscious  Pain management: adequate  Airway patency: patent  Anesthetic complications: no  Cardiovascular status: acceptable  Respiratory status: acceptable  Hydration status: acceptable  Comments: +Post-Anesthesia Evaluation and Assessment    Patient: Renetta Haines MRN: [id number]  SSN: [id number]   YOB: 1955  Age: 77 y.o. Sex: female      Cardiovascular Function/Vital Signs    BP (!) 154/73   Pulse 91   Temp 36.6 °C (97.8 °F)   Resp 18   Ht 5' 5.5\" (1.664 m)   Wt 97.2 kg (214 lb 4.6 oz)   SpO2 98%   BMI 35.12 kg/m²     Patient is status post Procedure(s):  L3-5 LATERAL FUSION FROM  LEFT SIDED APPROACH (ANES CHOICE). Nausea/Vomiting: Controlled. Postoperative hydration reviewed and adequate. Pain:  Pain Scale 1: Visual (11/29/21 1950)  Pain Intensity 1: 5 (11/29/21 1944)   Managed. Neurological Status:   Neuro (WDL): Exceptions to WDL (11/29/21 1841)   At baseline. Mental Status and Level of Consciousness: Arousable. Pulmonary Status:   O2 Device: Nasal cannula (11/29/21 1945)   Adequate oxygenation and airway patent. Complications related to anesthesia: None    Post-anesthesia assessment completed. No concerns. Signed By: Jossy Healy MD    11/29/2021  Post anesthesia nausea and vomiting:  controlled  Final Post Anesthesia Temperature Assessment:  Normothermia (36.0-37.5 degrees C)      INITIAL Post-op Vital signs:   Vitals Value Taken Time   /77 11/29/21 2000   Temp 36.6 °C (97.8 °F) 11/29/21 1841   Pulse 88 11/29/21 2003   Resp 12 11/29/21 2003   SpO2 97 % 11/29/21 2003   Vitals shown include unvalidated device data.

## 2021-11-30 NOTE — PERIOP NOTES
Irrisept Wound Debridement and Cleansing System  Ref: [id number]  LOT: [id number] Expiration Date: 08/31/2023    Floseal Hemostatic Matrix: 10mL   Reference number: [id number]   Lot Number: [id number]   Expiration Date: 07/16/2023

## 2021-11-30 NOTE — PROGRESS NOTES
PT eval completed. Patient was able to ambulate in the barrios with RW and light intermittent assistance. Only mild L flank pain reported with no pain down LLE. Full eval to follow.     Ron Gayle, PT

## 2021-11-30 NOTE — PROGRESS NOTES
End of Shift Note    Bedside shift change report given to [facility] (oncoming nurse) by Natalia Holden RN (offgoing nurse). Report included the following information SBAR, Kardex, OR Summary, Procedure Summary, Intake/Output, MAR, Recent Results and Med Rec Status    Shift worked:  7p-7a     Shift summary and any significant changes:          Concerns for physician to address:       Zone phone for oncoming shift:   [phone]       Activity:  Activity Level: Up with Assistance  Number times ambulated in hallways past shift: 0  Number of times OOB to chair past shift: 0    Cardiac:   Cardiac Monitoring: No      Cardiac Rhythm: Sinus Rhythm    Access:   Current line(s): PIV     Genitourinary:   Urinary status: carmen    Respiratory:   O2 Device: Nasal cannula  Chronic home O2 use?: NO  Incentive spirometer at bedside: YES     GI:  Last Bowel Movement Date: 11/29/21  Current diet:  DIET NPO  Passing flatus: NO  Tolerating current diet: YES       Pain Management:   Patient states pain is manageable on current regimen: YES    Skin:  Peter Score: 19  Interventions: float heels, increase time out of bed, foam dressing and PT/OT consult    Patient Safety:  Fall Score:  Total Score: 3  Interventions: assistive device (walker, cane, etc), gripper socks, pt to call before getting OOB and stay with me (per policy)  High Fall Risk: Yes    Length of Stay:  Expected LOS: - - -  Actual LOS: 1      Juany Castaneda RN

## 2021-11-30 NOTE — PERIOP NOTES
[address] from Operating Room to PACU    Report received from [address] and Sánchez Miranda CRNA regarding Alee Husbands. Surgeon(s):  Angel Vincent MD  And Procedure(s) (LRB):  L3-S1 POSTERIOR DECOMPRESSION AND FUSION (Left)  confirmed   with allergies and dressings discussed. Anesthesia type, drugs, patient history, complications, estimated blood loss, vital signs, intake and output, and last pain medication, lines and temperature were reviewed. 1950 TRANSFER - OUT REPORT:    Verbal report given to Kerline WINKLER(name) on Alee Husbands  being transferred to Ortho(unit) for routine post - op       Report consisted of patients Situation, Background, Assessment and   Recommendations(SBAR). Information from the following report(s) SBAR, Kardex, OR Summary, Intake/Output and MAR was reviewed with the receiving nurse. Lines:   Peripheral IV 11/29/21 Right Antecubital (Active)   Site Assessment Clean, dry, & intact 11/30/21 1838   Phlebitis Assessment 0 11/30/21 1838   Infiltration Assessment 0 11/30/21 1838   Dressing Status Clean, dry, & intact 11/30/21 1838   Dressing Type Transparent; Tape 11/30/21 1838   Hub Color/Line Status Pink; Capped 11/30/21 1838   Action Taken Open ports on tubing capped 11/30/21 0740   Alcohol Cap Used Yes 11/30/21 0740       Peripheral IV 11/29/21 Right Forearm (Active)   Site Assessment Clean, dry, & intact 11/30/21 1838   Phlebitis Assessment 0 11/30/21 1838   Infiltration Assessment 0 11/30/21 1838   Dressing Status Clean, dry, & intact 11/30/21 1838   Dressing Type Transparent; Tape 11/30/21 1838   Hub Color/Line Status Pink; Infusing 11/30/21 1838   Action Taken Open ports on tubing capped 11/30/21 0740   Alcohol Cap Used Yes 11/30/21 0740        Opportunity for questions and clarification was provided.       Patient transported with:   O2 @ 1 liters  Registered Nurse

## 2021-11-30 NOTE — PERIOP NOTES
1844 Handoff Report from Operating Room to PACU    Report received from Kacy Brito RN and [address] [place] [address] Avenue regarding Mat Arellano. Surgeon(s):  Harsha Askew MD  And Procedure(s) (LRB):  L3-5 LATERAL FUSION FROM  LEFT SIDED APPROACH (ANES CHOICE) (Left)  confirmed   with allergies, drains and dressings discussed. Anesthesia type, drugs, patient history, complications, estimated blood loss, vital signs, intake and output, and last pain medication, lines, reversal medications and temperature were reviewed. 1904  updated via phone. 200  called back for update. He was updated and made him aware of room assignment 3257.    2029 TRANSFER - OUT REPORT:    Verbal report given to Juany WINKLER(name) on Mat Arellano  being transferred to [facility]) for routine post - op       Report consisted of patients Situation, Background, Assessment and   Recommendations(SBAR). Information from the following report(s) SBAR, Kardex, OR Summary, Procedure Summary, Intake/Output and MAR was reviewed with the receiving nurse. Opportunity for questions and clarification was provided.       Patient transported with:   O2 @ 2 liters  Registered Nurse   Patient chart  Patient belongings

## 2021-11-30 NOTE — PROGRESS NOTES
PT eval completed. Patient was able to ambulate up/down the barrios with RW and pain only in L flank and L glut. No pain or paresthesias down either leg. Full eval to follow.     Joann Powers, PT

## 2021-11-30 NOTE — PROGRESS NOTES
Occupational Therapy    Chart reviewed and orders acknowledged. Pt scheduled for 2nd portion of 2-stage back surgery today. Will defer OT eval on this date and continue to follow.      Wilman Conrad, OT

## 2021-11-30 NOTE — PROGRESS NOTES
Problem: Diabetes Self-Management  Goal: *Disease process and treatment process  Description: Define diabetes and identify own type of diabetes; list 3 options for treating diabetes. Outcome: Progressing Towards Goal  Goal: *Incorporating nutritional management into lifestyle  Description: Describe effect of type, amount and timing of food on blood glucose; list 3 methods for planning meals. Outcome: Progressing Towards Goal  Goal: *Incorporating physical activity into lifestyle  Description: State effect of exercise on blood glucose levels. Outcome: Progressing Towards Goal  Goal: *Developing strategies to promote health/change behavior  Description: Define the ABC's of diabetes; identify appropriate screenings, schedule and personal plan for screenings. Outcome: Progressing Towards Goal  Goal: *Using medications safely  Description: State effect of diabetes medications on diabetes; name diabetes medication taking, action and side effects. Outcome: Progressing Towards Goal  Goal: *Monitoring blood glucose, interpreting and using results  Description: Identify recommended blood glucose targets  and personal targets. Outcome: Progressing Towards Goal  Goal: *Prevention, detection, treatment of acute complications  Description: List symptoms of hyper- and hypoglycemia; describe how to treat low blood sugar and actions for lowering  high blood glucose level. Outcome: Progressing Towards Goal  Goal: *Prevention, detection and treatment of chronic complications  Description: Define the natural course of diabetes and describe the relationship of blood glucose levels to long term complications of diabetes.   Outcome: Progressing Towards Goal  Goal: *Developing strategies to address psychosocial issues  Description: Describe feelings about living with diabetes; identify support needed and support network  Outcome: Progressing Towards Goal  Goal: *Insulin pump training  Outcome: Progressing Towards Goal  Goal: *Sick day guidelines  Outcome: Progressing Towards Goal  Goal: *Patient Specific Goal (EDIT GOAL, INSERT TEXT)  Outcome: Progressing Towards Goal     Problem: Patient Education: Go to Patient Education Activity  Goal: Patient/Family Education  Outcome: Progressing Towards Goal     Problem: Falls - Risk of  Goal: *Absence of Falls  Description: Document Gio [place] Fall Risk and appropriate interventions in the flowsheet.   Outcome: Progressing Towards Goal  Note: Fall Risk Interventions:  Mobility Interventions: Communicate number of staff needed for ambulation/transfer         Medication Interventions: Patient to call before getting OOB, Teach patient to arise slowly    Elimination Interventions: Call light in reach              Problem: Patient Education: Go to Patient Education Activity  Goal: Patient/Family Education  Outcome: Progressing Towards Goal     Problem: Infection - Risk of, Surgical Site Infection  Goal: *Absence of surgical site infection signs and symptoms  Outcome: Progressing Towards Goal     Problem: Patient Education: Go to Patient Education Activity  Goal: Patient/Family Education  Outcome: Progressing Towards Goal

## 2021-11-30 NOTE — BRIEF OP NOTE
Brief Postoperative Note    Patient: Pat Wray  YOB: 1955  MRN: [id number]    Date of Procedure: 11/30/2021     Pre-Op Diagnosis: LOW BACK PAIN  LUMBOSACRAL SPONDYLOSIS W/O MYELOPATHY  SCIATICA OF LEFT SIDE  SPINAL STENOSIS, LUMBAR REGION, NEUROGENIC CLAUDICATION    Post-Op Diagnosis: Same as preoperative diagnosis. Procedure(s):  L3-S1 POSTERIOR DECOMPRESSION AND FUSION    Surgeon(s):  Tatianna Ward MD    Surgical Assistant: Physician Assistant: VICK Sweet  Surg Asst-1: Prince Rabago    Anesthesia: Other     Estimated Blood Loss (mL): less than 861     Complications: None    Specimens: * No specimens in log *     Implants:   Implant Name Type Inv.  Item Serial No.  Lot No. LRB No. Used Action   GRAFT BNE FIBER 15 CC OSTEOAMP INOCENCIO - [id number]  Bone GRAFT BNE FIBER 15 CC OSTEOAMP INOCENCIO [id number]  [employer] NA Left 1 Implanted       Drains: * No LDAs found *    Findings: Stenosis    Electronically Signed by Nisha Shelton MD on 11/30/2021 at 6:11 PM

## 2021-11-30 NOTE — PROGRESS NOTES
Problem: Mobility Impaired (Adult and Pediatric)  Goal: *Acute Goals and Plan of Care (Insert Text)  Description: FUNCTIONAL STATUS PRIOR TO ADMISSION: Patient was modified independent using a furniture for functional mobility. Patient required minimal assistance for basic and instrumental ADLs intermittently. HOME SUPPORT PRIOR TO ADMISSION: The patient lived with spouse and required minimal assistance/contact guard assist for lower body dressing. Physical Therapy Goals  Initiated 11/30/2021    1. Patient will move from supine to sit and sit to supine , scoot up and down, and roll side to side in bed with standby assistance within 4 days. 2. Patient will perform sit to stand with modified independent within 4 days. 3. Patient will ambulate with supervision/set-up for 350 feet with the least restrictive device within 4 days. 4. Patient will ascend/descend 4 stairs with 1 handrail(s) with minimal assistance/contact guard assist within 4 days. 5. Patient will verbalize and demonstrate understanding of spinal precautions (No bending, lifting greater than 5 lbs, or twisting; log-roll technique; frequent repositioning as instructed) within 4 days. Outcome: Progressing Towards Goal  PHYSICAL THERAPY EVALUATION  Patient: Renetta Haines (5 y.o. female)  Date: 11/30/2021  Primary Diagnosis: Spinal stenosis of lumbar region with neurogenic claudication [M48.062]  Procedure(s) (LRB):  L3-S1 POSTERIOR DECOMPRESSION AND FUSION (ANES CHOICE) (Left) Day of Surgery   Precautions:  Spinal (BLT)    ASSESSMENT  Based on the objective data described below, the patient presents with mild L flank and lower back pain only with not pain down LLE, decreased balance, decreased skill/knowledge of BLT precautions and log roll technique, decreased mobility skills below baseline. She was able to ambulate down the barrios with RW and cg/min a for almost 200 feet.  Gait speed was slow with small step lengths initially which improved as training progressed. Will re-assess post 2nd lumbar surgery today. Current Level of Function Impacting Discharge (mobility/balance): mod a supine to sit with log roll technique, min a sit to stand, min/cg a ambulation with  feet; mod a sit to supine using log roll technique. Functional Outcome Measure: The patient scored 40/100 on the Barthel outcome measure which is indicative of 60% functional impairment. Other factors to consider for discharge: lives with spouse who will assist once home; independent PLOF; 2nd surgery today     Patient will benefit from skilled therapy intervention to address the above noted impairments. PLAN :  Recommendations and Planned Interventions: bed mobility training, transfer training, gait training, therapeutic exercises, neuromuscular re-education, modalities, patient and family training/education, and therapeutic activities      Frequency/Duration: Patient will be followed by physical therapy:  twice daily to address goals. Recommendation for discharge: (in order for the patient to meet his/her long term goals)  To be determined: following 2nd surgery    This discharge recommendation:  Has been made in collaboration with the attending provider and/or case management    IF patient discharges home will need the following DME: rolling walker       SUBJECTIVE:   Patient stated  it feels weird for my leg not to hurt.      OBJECTIVE DATA SUMMARY:   HISTORY:    Past Medical History:   Diagnosis Date    Diabetes ([facility] [address]     DM2-trulicity, PCP treats    Hypercholesterolemia     Hypertension     Sarcoidosis     brain    Sleep apnea     CPAP complient , Dr. Jhonathan Huitron    Status post epidural steroid injection 10/15/2021    Thyroid disease     TIA (transient ischemic attack)     Questionable per patient left side defecits (due to spinal stenosis)     Past Surgical History:   Procedure Laterality Date    HX BREAST BIOPSY Right 1990s    Benign (per patient)    HX CARPAL TUNNEL RELEASE Right 2020    HX  SECTION       x 2    HX CHOLECYSTECTOMY      HX HEENT      parathyroidectomy    HX HYSTERECTOMY         Personal factors and/or comorbidities impacting plan of care: dm2, sarcoidosis of brain, h/o tia, maricarmen/cpap. Home Situation  Home Environment: Private residence  # Steps to Enter: 1  Wheelchair Ramp: No  One/Two Story Residence: One story  Living Alone: No  Support Systems: Spouse/Significant Other  Patient Expects to be Discharged to[de-identified] House  Current DME Used/Available at Home: Shower chair, CPAP, Glucometer  Tub or Shower Type: Shower    EXAMINATION/PRESENTATION/DECISION MAKING:   Critical Behavior:  Neurologic State: Alert  Orientation Level: Oriented X4  Cognition: Appropriate for age attention/concentration  Safety/Judgement: Awareness of environment, Fall prevention, Decreased insight into deficits, Good awareness of safety precautions  Hearing: Auditory  Auditory Impairment: None  Hearing Aids/Status: Does not own  Skin:  L flank incsion is cdi  Edema: none  Range Of Motion:  AROM: Within functional limits           PROM: Within functional limits           Strength:    Strength: Within functional limits                    Tone & Sensation:   Tone: Normal              Sensation: Intact               Coordination:  Coordination: Within functional limits  Vision:   Acuity: Within Defined Limits  Functional Mobility:  Bed Mobility:  Rolling: Minimum assistance (log roll)  Supine to Sit: Moderate assistance (log roll)  Sit to Supine: Moderate assistance (log roll)  Scooting: Stand-by assistance;  Additional time  Transfers:  Sit to Stand: Minimum assistance  Stand to Sit: Contact guard assistance                       Balance:   Sitting: Impaired  Sitting - Static: Good (unsupported)  Sitting - Dynamic: Fair (occasional)  Standing: Impaired  Standing - Static: Fair; Constant support  Standing - Dynamic : Fair; Constant support  Ambulation/Gait Training:  Distance (ft): 190 Feet (ft)     Ambulation - Level of Assistance: Contact guard assistance; Minimal assistance        Gait Abnormalities: Decreased step clearance; Path deviations; Step to gait (step lengths improved as gait progressed)        Base of Support: Widened     Speed/Ada: Slow; Pace decreased (<100 feet/min) (speed improved as gait progressed)  Step Length: Right shortened; Left shortened                       Functional Measure:  Barthel Index:    Bathin  Bladder: 0 (carmen)  Bowels: 10  Groomin  Dressin  Feeding: 10  Mobility: 10  Stairs: 0  Toilet Use: 0  Transfer (Bed to Chair and Back): 10  Total: 40/100       The Barthel ADL Index: Guidelines  1. The index should be used as a record of what a patient does, not as a record of what a patient could do. 2. The main aim is to establish degree of independence from any help, physical or verbal, however minor and for whatever reason. 3. The need for supervision renders the patient not independent. 4. A patient's performance should be established using the best available evidence. Asking the patient, friends/relatives and nurses are the usual sources, but direct observation and common sense are also important. However direct testing is not needed. 5. Usually the patient's performance over the preceding 24-48 hours is important, but occasionally longer periods will be relevant. 6. Middle categories imply that the patient supplies over 50 per cent of the effort. 7. Use of aids to be independent is allowed. Score Interpretation (from [address] [address])    Independent   60-79 Minimally independent   40-59 Partially dependent   20-39 Very dependent   <20 Totally dependent     -Lyssa Vasquez., Barthel, D.W. (1965). Functional evaluation: the Barthel Index. [address] [facility] ([address] (1997). The Barthel activities of daily living index: self-reporting versus actual performance in the old (> or = 75 years).  Journal of American Geriatric Society 45(7), [address] [facility], [facility] Center[facility], Chano Mendenhall., Mabel Felty. (1999). Measuring the change in disability after inpatient rehabilitation; comparison of the responsiveness of the Barthel Index and Functional Sawyer Measure. Journal of Neurology, Neurosurgery, and Psychiatry, 66(4), 977-641. LOUIS Perez, JANI Cisneros, & Obed Lorenzana M.A. (2004) Assessment of post-stroke quality of life in cost-effectiveness studies: The usefulness of the Barthel Index and the EuroQoL-5D. Quality of Life Research, 15, 260-97        Physical Therapy Evaluation Charge Determination   History Examination Presentation Decision-Making   HIGH Complexity :3+ comorbidities / personal factors will impact the outcome/ POC  MEDIUM Complexity : 3 Standardized tests and measures addressing body structure, function, activity limitation and / or participation in recreation  MEDIUM Complexity : Evolving with changing characteristics  Other outcome measures Barthel  HIGH       Based on the above components, the patient evaluation is determined to be of the following complexity level: MEDIUM    Pain Rating:  3/10 L flank and lower back    Activity Tolerance:   Good and SpO2 stable on RA    After treatment patient left in no apparent distress:   Supine in bed, Call bell within reach, Side rails x 3, and semi-devlin position    COMMUNICATION/EDUCATION:   The patients plan of care was discussed with: Registered nurse, Rehabilitation technician, and NP . Fall prevention education was provided and the patient/caregiver indicated understanding., Patient/family have participated as able in goal setting and plan of care. , and Patient/family agree to work toward stated goals and plan of care.     Thank you for this referral.  Maximus Pelletier, PT   Time Calculation: 31 mins

## 2021-11-30 NOTE — ANESTHESIA PREPROCEDURE EVALUATION
Relevant Problems   RESPIRATORY SYSTEM   (+) MANUEL (obstructive sleep apnea)      CARDIOVASCULAR   (+) Hypertension, essential      ENDOCRINE   (+) Acquired hypothyroidism   (+) Severe obesity (HCC)   (+) Type 2 diabetes mellitus without complication, without long-term current use of insulin (HCC)       Anesthetic History   No history of anesthetic complications            Review of Systems / Medical History  Patient summary reviewed, nursing notes reviewed and pertinent labs reviewed    Pulmonary        Sleep apnea: CPAP      Pertinent negatives: No COPD and asthma     Neuro/Psych         TIA (?)  Pertinent negatives: No seizures   Cardiovascular    Hypertension: well controlled          Hyperlipidemia  Pertinent negatives: No past MI and CHF  Exercise tolerance: >4 METS     GI/Hepatic/Renal  Within defined limits              Endo/Other    Diabetes: type 2  Hypothyroidism: well controlled  Obesity and anemia     Other Findings   Comments: Sarcoidosis, no symptoms since mid 1990s         Physical Exam    Airway  Mallampati: III  TM Distance: 4 - 6 cm  Neck ROM: normal range of motion   Mouth opening: Normal     Cardiovascular    Rhythm: regular  Rate: normal         Dental    Dentition: Lower partial plate     Pulmonary  Breath sounds clear to auscultation               Abdominal  GI exam deferred       Other Findings            Anesthetic Plan    ASA: 3  Anesthesia type: general    Monitoring Plan: BIS      Induction: Intravenous  Anesthetic plan and risks discussed with: Patient

## 2021-11-30 NOTE — OP NOTES
[facility]  OPERATIVE REPORT    Name:  Daria Monte  MR#:  [id number]  :  1955  DATE OF SERVICE:  2021    PREOPERATIVE DIAGNOSES:  1. Low back pain, unspecified back pain laterality, unspecified chronicity, unspecified whether sciatica present    2. Lumbosacral spondylosis without myelopathy    3. Acquired spondylolisthesis    4. Sciatica of left side    5. Spinal stenosis, lumbar region, with neurogenic claudication    6. Foraminal stenosis of lumbar region    7. Lumbar radiculopathy    8. DDD (degenerative disc disease), lumbar        POSTOPERATIVE DIAGNOSES:  1. Low back pain, unspecified back pain laterality, unspecified chronicity, unspecified whether sciatica present    2. Lumbosacral spondylosis without myelopathy    3. Acquired spondylolisthesis    4. Sciatica of left side    5. Spinal stenosis, lumbar region, with neurogenic claudication    6. Foraminal stenosis of lumbar region    7. Lumbar radiculopathy    8. DDD (degenerative disc disease), lumbar        PROCEDURES PERFORMED:  1. L5-S1 posterior arthrodesis with TLIF.  2. L3-S1 posterior instrumentation. 3. Bone marrow aspirate through separate fascial incision for spinal fusion augmentation. 4. Use of [employer] robotic system (Stereotactic computer-assisted system) for placement of pedicle screws. 5. Use of allograft bone for spinal fusion. 6. Insertion of interbody biomechanical device. 7. L4-5 laminotomy and medial facetectomy. 8. Use of local autograft bone for spinal fusion. 9. L3-5 posterior arthrodesis. SURGEON:  Tucker Swann MD    FIRST ASSISTANT:  VICK Pedraza  Ms. Ford has undergone a major surgery. Skip Dyke knowledge about the pertinent anatomy, procedural steps and equipment requirent in this procedure was medically necessary to ensure the safety of the patient. His assistance has helped reduce surgical time by 35%.   Surgical tasks included, but are not limited to:  1) Safe and proper retraction. 2) Maintenance of visualization during surgery by helping with tasks such as suctioning. 3) Multiple tasks throughout the decompression and instrumentation to allow for timely and safe completion of the procedure. 4) Overall assistance helped decrease the risk complications such as infection, bleeding, hardware malposition, and damage to surrounding structures. ANESTHESIA:  GETA. COMPLICATIONS:  None. SPECIMENS:  None. IMPLANTS:    1. Globus CREO pedicle screw system  2. Globus Altera TLIF cage (interbody biomechanical device). ESTIMATED BLOOD LOSS:  100 mL. DRAINS:  None. PRE-OP ANTIBIOTICS: Ancef. INDICATIONS FOR PROCEDURE:    Daria Monte is a 77 y.o. female who has the above listed diagnosis. Ms. Nicole Mendes has failed to improve with non-operative treatment and has chosen to proceed with surgical intervention. We had a long conversation about pros and cons of surgery, risks, possible complications, alternative treatments, and Daria Monte had an opportunity to ask questions and is satisfied with my answers and explanations.   I have personally given warnings about possible complications including but not limited to death, permanent disability, heart attack, stroke, lung injury or infection, blindness, ileus, bladder or bowel problems, ureter injury, bleeding, blood vessel injury, nerve injury (including numbness, pain and weakness), paralysis (which may be permanent), failure to heal, failure to fuse bone together in fusion procedures, failure to relief symptoms, failure to relief pain, increased pain, need for further surgeries, failure or breakage or hardware, malpositioning of hardware, need to fuse or operate on additional levels determined either during or after surgery, destabilization of the spine (which may require fusion or later surgery), infections (which may or may not require additional surgery), dural tears (tears of the sac holding in nerves and spinal fluid), meningitis, blood clots, pulmonary embolus, recurrent disc herniation, and anesthetic complications. Comorbidities such as obesity, smoking, rheumatoid arthritis, chronic steroid use and diabetes increase these risks. .  Ms. Ford  understands and wants to proceed. NARRATIVE OF THE PROCEDURE:    After informed consent was obtained and Stephanie Quinn had a chance to ask any last minute questions, the patient was transported to the operating room and underwent general endotracheal anesthesia. Stephanie Quinn was positioned prone on the [facility] table and the body was properly padded. Fluoroscopy was used to sade the level of the incision and the site was prepped and draped in the usual manner. A time out was obtained and we verified that we had the correct patient the correct site and that the proper IV antibiotics had been administered in accordance with SCIP protocol. I proceeded to perform a stab incision over the left iliac crest.  A Jamshidi needle was inserted in the left iliac crest and 20 mL os bone marrow was aspirated. The aspirate was mixed with the allograft bone and used for the fusion later in the procedure. The [employer] robotic navigation marker was placed through the same incision and a secondary marker was placed on the contralateral side. Fluoroscopy was brought into the field and the spinal levels from L3 through S1 were registered on the AP and lateral views. The [employer] robotic system merged the images from fluoroscopy with the pre-operative CT and guided us for the placement of Jose approaches. Subsequently, screws were placed with the following technique; the robotic arm guided the proper trajectory, the soft tissue was cleared with a dilator, a high speed drill was used to create an opening on the cortical bone in line with the pedicle, the pedicle was cannulated with a tap and finally the pre-measured screw was inserted.   The placement of pedicle screws was repeated in the exact same manner from L3 through S1 bilaterally. Once the screw placement had been completed, the [employer] robotic system was removed and fluoroscopy was used on AP and lateral views to confirm proper placement of the screws. With the screws in place I turned my attention to the TLIF part of the procedure. I exposed the S1 screw on the left and the surrounding faced and lamina. I used a high speed drill to create a partial facetectomy by drilling superior to the pedicle screw between the 11 o'clock and 1 o'clock positions. The facetectomy borders are; medially it's the medial wall of the pedicle, lateral it's the lateral border of the facet, inferiorly it's the superior wall of the pedicle and superiorly it's the superior aspect of the disc. The superior and medial aspects of the facet remained in place and served as neural retractors for the exiting and traversing nerve roots respectively. The disc was instrumented with blunt halle/trials to determine the size of the interbody biomechanical device. Once the size was determined the device was packed with allograft bone soaked in bone marrow aspirate. The disc preparation was completed with sharp halle, curettes and pituitaries. The interbody space at L5-S1 was packed with allograft bone soaked in bone marrow aspirate for the anterior fusion (TLIF) and the interbody biomechanical device was placed on the anterior column going across the midline at the L5-S1 level. Once in place it was deployed to it's final height. The lamina and facet of this level were removed with a Kerrison #3 and the exiting nerve root was visualized by completing the foraminotomy. Once the L5-S1 level was fully decompressed, I inspected epidural space with a Eldridge ball and noticed nerve compression proximally medial to the pedicle at the adjacent level.   I made the decision to extend the decompression proximally by entering the left L4-5 interspace with a laminotomy/medial facetectomy. The lamina medial to the L5 pedicle was removed with a high speed drill and a Kerrison #3. The L5 nerve root was exposed and a medial facetectomy was achieved with the Kerrison #3. Repeat inspection of the area with a Eldridge ball revealed adequate decompression. The posterior screw heights were adjusted and a caliper was used to measure the nisreen length. The nisreen was selected and placed across the posterior screws from L5 to S1 bilaterally. Locking caps were placed at every level and final tightened with the final tightening device (completing the posterior instrumentation). The posterior elements were decorticated from L3 to S1. Allograft bone soaked in bone marrow aspirate was placed from L3 to S1 to complete the posterior fusion. Final fluoroscopy images were taken and saved to PACS. The fascia was closed with #1 vicryl, the subcutaneous tissues with 2-0 vicryl and the skin with staples. The patient was then awakened and transfer to PACU in stable condition. POSTOPERATIVE PLAN:  The patient will have SCDs for DVT prophylaxis and IV antibiotics for infection prophylaxis. COUNTS: Sponge and needle counts were correct.     SIGNED BY:  Quinten Torres MD     November 30, 2021

## 2021-11-30 NOTE — PROGRESS NOTES
Ortho / Neurosurgery NP Note    POD# 1  s/p L3-5 LATERAL FUSION FROM  LEFT SIDED APPROACH (ANES CHOICE)   Pt seen with no visitor present. Pt resting in bed, in NAD. Complaints of low back pain and left lateral incisional pain - relieved by oxycodone   Has not been oob yet but currently denies leg pain or radicular symptoms at rest.   NPO for Stage 2 today. VSS Afebrile. Room air. Visit Vitals  BP (!) 143/72 (BP 1 Location: Left upper arm, BP Patient Position: At rest)   Pulse 92   Temp 98.3 °F (36.8 °C)   Resp 14   Ht 5' 5.5\" (1.664 m)   Wt 97.2 kg (214 lb 4.6 oz)   SpO2 99%   BMI 35.12 kg/m²       Voiding status: [place] - d/c after Stage 2   Output (mL)  Last Bowel Movement Date: 11/29/21 (11/29/21 2104)      Labs    Lab Results   Component Value Date/Time    HGB 9.7 (L) 11/30/2021 03:15 AM      Lab Results   Component Value Date/Time    INR 1.0 11/22/2021 02:46 PM      Lab Results   Component Value Date/Time    Sodium 136 11/22/2021 02:46 PM    Potassium 4.1 11/22/2021 02:46 PM    Chloride 106 11/22/2021 02:46 PM    CO2 26 11/22/2021 02:46 PM    Glucose 93 11/22/2021 02:46 PM    BUN 15 11/22/2021 02:46 PM    Creatinine 1.11 (H) 11/22/2021 02:46 PM    Calcium 9.6 11/22/2021 02:46 PM     Recent Glucose Results:   Lab Results   Component Value Date/Time    GLUCPOC 122 (H) 11/30/2021 11:12 AM    GLUCPOC 142 (H) 11/30/2021 07:15 AM    GLUCPOC 135 (H) 11/29/2021 09:31 PM           Body mass index is 35.12 kg/m². : A BMI > 30 is classified as obesity and > 40 is classified as morbid obesity. Lateral prineo dressing c.d.i  Calves soft and supple; No pain with passive stretch  Sensation and motor intact - LLE PF/DF 4+/5. +EHL. Expected weakness left hip flexor 3/5. RLE PF/DF 5/5. +EHL.  Hip flexor 5/5  SCDs for mechanical DVT proph while in bed     PLAN:  1) Neurovascular assessment q4 hours   2) PT/OT - LSO   3) Pain control - scheduled tylenol, prn oxycodone   4) Readniess for discharge:     [x] Vital Signs stable    [x] Hgb stable    [] + Voiding    [x] Wound intact, drainage minimal    [] Tolerating PO intake     [] Cleared by PT (OT if applicable)     [] Stair training completed (if applicable)    [] Independent / Contact Guard Assist (household distance)     [] Bed mobility     [] Car transfers     [] ADLs    [x] Adequate pain control on oral medication alone     OR for Stage 2. Plans to return home with 's support once medically stable.      Geneva Rooney NP

## 2021-11-30 NOTE — PROGRESS NOTES
TRANSFER - OUT REPORT:    Verbal report given to Keisha Mooney RN(name) on Diane London  being transferred to PreOp(unit) for ordered procedure       Report consisted of patients Situation, Background, Assessment and   Recommendations(SBAR). Information from the following report(s) SBAR, Kardex, Intake/Output, MAR and Recent Results was reviewed with the receiving nurse. Lines:   Peripheral IV 11/29/21 Right Antecubital (Active)   Site Assessment Clean, dry, & intact 11/29/21 2104   Phlebitis Assessment 0 11/29/21 2104   Infiltration Assessment 0 11/29/21 2104   Dressing Status Clean, dry, & intact 11/29/21 2104   Dressing Type Tape; Transparent 11/29/21 2104   Hub Color/Line Status Pink 11/29/21 2104       Peripheral IV 11/29/21 Right Forearm (Active)   Site Assessment Clean, dry, & intact 11/29/21 2104   Phlebitis Assessment 0 11/29/21 2104   Infiltration Assessment 0 11/29/21 2104   Dressing Status Clean, dry, & intact 11/29/21 2104   Dressing Type Tape; Transparent 11/29/21 2104   Hub Color/Line Status Pink; Infusing 11/29/21 2104        Opportunity for questions and clarification was provided. Patient transported with:  transport    End of Shift Note    Bedside shift change report given to Leora De Jesus LPN (oncoming nurse) by Nyasia Norton RN (offgoing nurse). Report included the following information SBAR, Kardex, Intake/Output, MAR and Recent Results    Shift worked:  Day     Shift summary and any significant changes:    Patient in PACU   Concerns for physician to address:  n/a     [facility] phone for oncoming shift:  [phone]     Activity:  Activity Level:  Up with Assistance  Number times ambulated in hallways past shift: 0  Number of times OOB to chair past shift: 0    Cardiac:   Cardiac Monitoring: No      Cardiac Rhythm: Sinus Rhythm    Access:   Current line(s): PIV     Genitourinary:   Urinary status: voiding    Respiratory:   O2 Device: Nasal cannula  Chronic home O2 use?: NO  Incentive spirometer at bedside: YES  Actual Volume (ml): 1750 ml  GI:  Last Bowel Movement Date: 11/29/21  Current diet:  DIET NPO  Passing flatus: YES  Tolerating current diet: YES       Pain Management:   Patient states pain is manageable on current regimen: YES    Skin:  Peter Score: 18  Interventions: float heels and increase time out of bed    Patient Safety:  Fall Score:  Total Score: 3  Interventions: gripper socks and pt to call before getting OOB  High Fall Risk: Yes    Length of Stay:  Expected LOS: - - -  Actual LOS: [address], RN

## 2021-12-01 LAB
GLUCOSE BLD STRIP.AUTO-MCNC: 135 MG/DL (ref 65–117)
GLUCOSE BLD STRIP.AUTO-MCNC: 140 MG/DL (ref 65–117)
GLUCOSE BLD STRIP.AUTO-MCNC: 171 MG/DL (ref 65–117)
GLUCOSE BLD STRIP.AUTO-MCNC: 199 MG/DL (ref 65–117)
HGB BLD-MCNC: 8.4 G/DL (ref 11.5–16)
SERVICE CMNT-IMP: ABNORMAL

## 2021-12-01 PROCEDURE — 82962 GLUCOSE BLOOD TEST: CPT

## 2021-12-01 PROCEDURE — 36415 COLL VENOUS BLD VENIPUNCTURE: CPT

## 2021-12-01 PROCEDURE — 74011250637 HC RX REV CODE- 250/637: Performed by: ORTHOPAEDIC SURGERY

## 2021-12-01 PROCEDURE — 97535 SELF CARE MNGMENT TRAINING: CPT

## 2021-12-01 PROCEDURE — 65270000029 HC RM PRIVATE

## 2021-12-01 PROCEDURE — 97530 THERAPEUTIC ACTIVITIES: CPT

## 2021-12-01 PROCEDURE — 97165 OT EVAL LOW COMPLEX 30 MIN: CPT

## 2021-12-01 PROCEDURE — 97116 GAIT TRAINING THERAPY: CPT

## 2021-12-01 PROCEDURE — 94760 N-INVAS EAR/PLS OXIMETRY 1: CPT

## 2021-12-01 PROCEDURE — 85018 HEMOGLOBIN: CPT

## 2021-12-01 PROCEDURE — 77010033678 HC OXYGEN DAILY

## 2021-12-01 RX ORDER — OXYCODONE HYDROCHLORIDE 5 MG/1
5-10 TABLET ORAL
Qty: 40 TABLET | Refills: 0 | Status: SHIPPED | OUTPATIENT
Start: 2021-12-01 | End: 2021-12-08

## 2021-12-01 RX ORDER — AMOXICILLIN 250 MG
1 CAPSULE ORAL 2 TIMES DAILY
Qty: 14 TABLET | Refills: 0 | Status: SHIPPED | OUTPATIENT
Start: 2021-12-01 | End: 2021-12-08

## 2021-12-01 RX ORDER — POLYETHYLENE GLYCOL 3350 17 G/17G
17 POWDER, FOR SOLUTION ORAL DAILY
Qty: 7 PACKET | Refills: 0 | Status: SHIPPED | OUTPATIENT
Start: 2021-12-02 | End: 2021-12-09

## 2021-12-01 RX ADMIN — FAMOTIDINE 20 MG: 20 TABLET ORAL at 17:15

## 2021-12-01 RX ADMIN — ACETAMINOPHEN 1000 MG: 500 TABLET ORAL at 17:14

## 2021-12-01 RX ADMIN — ACETAMINOPHEN 1000 MG: 500 TABLET ORAL at 06:21

## 2021-12-01 RX ADMIN — OXYCODONE HYDROCHLORIDE AND ACETAMINOPHEN 500 MG: 500 TABLET ORAL at 08:00

## 2021-12-01 RX ADMIN — CYANOCOBALAMIN TAB 500 MCG 500 MCG: 500 TAB at 08:02

## 2021-12-01 RX ADMIN — PREGABALIN 50 MG: 25 CAPSULE ORAL at 17:14

## 2021-12-01 RX ADMIN — ACETAMINOPHEN 1000 MG: 500 TABLET ORAL at 23:26

## 2021-12-01 RX ADMIN — ACETAMINOPHEN 1000 MG: 500 TABLET ORAL at 11:18

## 2021-12-01 RX ADMIN — ACETAMINOPHEN 1000 MG: 500 TABLET ORAL at 01:03

## 2021-12-01 RX ADMIN — OMEGA-3-ACID ETHYL ESTERS 2000 MG: 1 CAPSULE, LIQUID FILLED ORAL at 17:13

## 2021-12-01 RX ADMIN — DOCUSATE SODIUM 50MG AND SENNOSIDES 8.6MG 1 TABLET: 8.6; 5 TABLET, FILM COATED ORAL at 08:00

## 2021-12-01 RX ADMIN — Medication 800 UNITS: at 08:02

## 2021-12-01 RX ADMIN — DOCUSATE SODIUM 50MG AND SENNOSIDES 8.6MG 1 TABLET: 8.6; 5 TABLET, FILM COATED ORAL at 17:14

## 2021-12-01 RX ADMIN — MULTIPLE VITAMINS W/ MINERALS TAB 1 TABLET: TAB at 08:02

## 2021-12-01 RX ADMIN — ROSUVASTATIN CALCIUM 10 MG: 10 TABLET, COATED ORAL at 22:29

## 2021-12-01 RX ADMIN — OMEGA-3-ACID ETHYL ESTERS 2000 MG: 1 CAPSULE, LIQUID FILLED ORAL at 08:02

## 2021-12-01 RX ADMIN — CETIRIZINE HYDROCHLORIDE 10 MG: 10 TABLET, FILM COATED ORAL at 08:02

## 2021-12-01 RX ADMIN — LEVOTHYROXINE SODIUM 88 MCG: 0.09 TABLET ORAL at 08:00

## 2021-12-01 RX ADMIN — FAMOTIDINE 20 MG: 20 TABLET ORAL at 08:01

## 2021-12-01 RX ADMIN — Medication 5000 UNITS: at 17:14

## 2021-12-01 RX ADMIN — OXYCODONE 10 MG: 5 TABLET ORAL at 08:01

## 2021-12-01 RX ADMIN — PREGABALIN 50 MG: 25 CAPSULE ORAL at 08:02

## 2021-12-01 RX ADMIN — Medication 200 MG: at 08:03

## 2021-12-01 RX ADMIN — Medication 10 ML: at 22:29

## 2021-12-01 RX ADMIN — OXYCODONE 10 MG: 5 TABLET ORAL at 11:18

## 2021-12-01 RX ADMIN — LISINOPRIL 40 MG: 20 TABLET ORAL at 08:04

## 2021-12-01 RX ADMIN — Medication 10 ML: at 05:31

## 2021-12-01 RX ADMIN — Medication 5000 UNITS: at 08:01

## 2021-12-01 RX ADMIN — POLYETHYLENE GLYCOL 3350 17 G: 17 POWDER, FOR SOLUTION ORAL at 08:02

## 2021-12-01 RX ADMIN — Medication 10 ML: at 14:00

## 2021-12-01 RX ADMIN — OXYCODONE 5 MG: 5 TABLET ORAL at 22:39

## 2021-12-01 RX ADMIN — OXYCODONE 5 MG: 5 TABLET ORAL at 15:36

## 2021-12-01 RX ADMIN — GLIPIZIDE 10 MG: 5 TABLET, FILM COATED, EXTENDED RELEASE ORAL at 11:18

## 2021-12-01 NOTE — PROGRESS NOTES
[facility]TH[facility] POST OP SPINE PROGRESS NOTE    2021  Admit Date: 2021  Admit Diagnosis: Spinal stenosis of lumbar region with neurogenic claudication [M48.062]  Procedure: Procedure(s):  L3-S1 POSTERIOR DECOMPRESSION AND FUSION  Post Op day: 1 Day Post-Op    Subjective:     Jade Edouard is a patient who has complaints of low back pain. improved rle pain and some continued l leg weakness but states that is somewhat improved s/p L4-S1 lat and post fusion staged. tolerating po and able to void. Review of Systems: Pertinent items are noted in HPI. Objective:     PT/OT:   Distance Ambulated:           Time Ambulated (min):        Ambulation Response: Activity Response: Fairly tolerated  Assistive Device:              Assistive Device: Fall prevention device    Vital Signs:    Blood pressure (!) 141/66, pulse 98, temperature 98.4 °F (36.9 °C), resp. rate 18, height 5' 5.5\" (1.664 m), weight 97.2 kg (214 lb 4.6 oz), SpO2 98 %. Temp (24hrs), Av.5 °F (36.9 °C), Min:97.9 °F (36.6 °C), Max:99.2 °F (37.3 °C)      No intake/output data recorded.    1901 -  0700  In: 1300 [I.V.:1300]  Out: 2030 [Urine:1980]    LAB:    Recent Labs     21  0058   HGB 8.4*       Wound/Drain Assessment:  Drain:      Dressing:     Physical Exam:  Neurological: no deficit  Incision clean, dry, and intact  5/5 BLE except L HF 3/5    Assessment:      Patient Active Problem List   Diagnosis Code    Hypertension, essential I10    Hypercholesterolemia E78.00    Acquired hypothyroidism E03.9    MANUEL (obstructive sleep apnea) G47.33    Carpal tunnel syndrome of right wrist G56.01    Spinal stenosis of cervical region M48.02    Spinal stenosis of lumbar region with neurogenic claudication M48.062    Osteopenia of multiple sites M85.89    Severe obesity (HCC) E66.01    Type 2 diabetes mellitus without complication, without long-term current use of insulin ([facility] [address] E11.9       Plan:     Continue PT/OT/Rehab  Discontinue:  Consult: PT  and OT    Discharge To: home likely.  tomorrow pending progress

## 2021-12-01 NOTE — PROGRESS NOTES
Problem: Mobility Impaired (Adult and Pediatric)  Goal: *Acute Goals and Plan of Care (Insert Text)  Description: FUNCTIONAL STATUS PRIOR TO ADMISSION: Patient was modified independent using a furniture for functional mobility. Patient required minimal assistance for basic and instrumental ADLs intermittently. HOME SUPPORT PRIOR TO ADMISSION: The patient lived with spouse and required minimal assistance/contact guard assist for lower body dressing. Physical Therapy Goals  Initiated 11/30/2021    1. Patient will move from supine to sit and sit to supine , scoot up and down, and roll side to side in bed with standby assistance within 4 days. 2. Patient will perform sit to stand with modified independent within 4 days. 3. Patient will ambulate with supervision/set-up for 350 feet with the least restrictive device within 4 days. 4. Patient will ascend/descend 4 stairs with 1 handrail(s) with minimal assistance/contact guard assist within 4 days. 5. Patient will verbalize and demonstrate understanding of spinal precautions (No bending, lifting greater than 5 lbs, or twisting; log-roll technique; frequent repositioning as instructed) within 4 days. 12/1/2021 1705 by Luisito Snyder, PT  Outcome: Progressing Towards Goal  12/1/2021 1652 by Luisito Snyder, PT  Outcome: Progressing Towards Goal    PHYSICAL THERAPY TREATMENT  Patient: Alee Prieto (89 y.o. female)  Date: 12/1/2021  Diagnosis: Spinal stenosis of lumbar region with neurogenic claudication [M48.062]   <principal problem not specified>  Procedure(s) (LRB):  L3-S1 POSTERIOR DECOMPRESSION AND FUSION (Left) 1 Day Post-Op  Precautions: Fall, Spinal No bending, no lifting greater than 5 lbs, no twisting, log-roll technique, repositioning every 20-30 min except when sleeping, brace when OOB (if ordered)  Chart, physical therapy assessment, plan of care and goals were reviewed.     ASSESSMENT  Patient continues with skilled PT services and is progressing towards goals. Reports feeling better since morning session. Ambulated to rehab gym for stair and car transfer training. Needed less assistance with correcting sit to stand transfer and posture during ambulation with patient self correcting 75% of the time. Still needs RW due to decreased standing balance and intermittent L foot drag. L ankle strength is good, but L hip becomes fatigued as gait progresses leading to decreased foot clearance. She needed assistance with car transfers with getting legs into car and cues to prevent twisting. Stairs was cg with step to step technique. She should clear PT after the morning session. Current Level of Function Impacting Discharge (mobility/balance): sba sit to standing; cg ambulation with  feet; cg up/down 4 steps with rails; min a car transfers. Other factors to consider for discharge: moderate risk for falls; L hip weakness; good home support with spouse. PLAN :  Patient continues to benefit from skilled intervention to address the above impairments. Continue treatment per established plan of care. to address goals. Recommendation for discharge: (in order for the patient to meet his/her long term goals)  Physical therapy at least 2 days/week in the home AND ensure assist and/or supervision for safety with mobility and ADLs. This discharge recommendation:  Has been made in collaboration with the attending provider and/or case management    IF patient discharges home will need the following DME: rolling walker       SUBJECTIVE:   Patient stated  I feel like I'm doing better this afternoon.      OBJECTIVE DATA SUMMARY:   Critical Behavior:  Neurologic State: Alert  Orientation Level: Oriented X4  Cognition: Appropriate decision making, Appropriate for age attention/concentration, Appropriate safety awareness, Follows commands  Safety/Judgement: Awareness of environment, Fall prevention, Decreased insight into deficits, Good awareness of safety precautions    Spinal diagnosis intervention:  The patient stated 3/3 back precautions when prompted. Reviewed all 3 back precautions, log roll technique, and sitting for 30 minutes at a time. The patient required intermittent cues to maintain back precautions during functional activity. Reviewed back brace application and wear schedule. Brace donned with minimal assistance/contact guard assist .    Functional Mobility Training:    Bed Mobility:  Log Rolling:  (up in chair when PT arrived)                 Transfers:  Sit to Stand: Stand-by assistance  Stand to Sit: Stand-by assistance        Bed to Chair: Stand-by assistance; Adaptive equipment (RW)                    Balance:  Sitting: Intact  Sitting - Static: Good (unsupported)  Sitting - Dynamic: Good (unsupported)  Standing: Impaired  Standing - Static: Good; Constant support  Standing - Dynamic : Fair; Constant support  Ambulation/Gait Training:  Distance (ft): 350 Feet (ft) (175 x 2)  Assistive Device: Brace/Splint; Gait belt; Walker, rolling  Ambulation - Level of Assistance: Contact guard assistance     Gait Description (WDL): Exceptions to WDL  Gait Abnormalities: Decreased step clearance; Path deviations (intermittent L foot drag)        Base of Support: Widened     Speed/Ada: Pace decreased (<100 feet/min)  Step Length: Right shortened; Left shortened        Interventions: Safety awareness training; Tactile cues; Verbal cues (relax shoulders, lower chin)         Stairs:  Number of Stairs Trained: 4  Stairs - Level of Assistance: Contact guard assistance   Rail Use: Both    Pain Ratin/10 lower back only    Activity Tolerance:   Good and SpO2 stable on RA    After treatment patient left in no apparent distress:   Sitting in chair and Call bell within reach    COMMUNICATION/COLLABORATION:   The patients plan of care was discussed with: Registered nurse and Rehabilitation technician.      Elizabeth Patel, PT   Time Calculation: 24 mins

## 2021-12-01 NOTE — PROGRESS NOTES
End of Shift Note    Bedside shift change report given to Airam Dozier RN (oncoming nurse) by Venkat Russell LPN (offgoing nurse). Report included the following information SBAR, Kardex, Procedure Summary, Intake/Output, MAR and Recent Results    Shift worked:  7p-7:30a     Shift summary and any significant changes:          Concerns for physician to address:       [employer] phone for oncoming shift:   [phone]       Activity:  Activity Level: Up with Assistance  Number times ambulated in hallways past shift: 0  Number of times OOB to chair past shift: 0    Cardiac:   Cardiac Monitoring: No      Cardiac Rhythm: Sinus Rhythm    Access:   Current line(s): PIV     Genitourinary:   Urinary status: voiding    Respiratory:   O2 Device: Nasal cannula  Chronic home O2 use?: NO  Incentive spirometer at bedside: YES  Actual Volume (ml): 1200 ml  GI:  Last Bowel Movement Date: 11/29/21  Current diet:  DIET NPO  Passing flatus: YES  Tolerating current diet: YES       Pain Management:   Patient states pain is manageable on current regimen: YES    Skin:  Peter Score: 18  Interventions: float heels, increase time out of bed and nutritional support     Patient Safety:  Fall Score:  Total Score: 3  Interventions: assistive device (walker, cane, etc), gripper socks, pt to call before getting OOB and stay with me (per policy)  High Fall Risk: Yes    Length of Stay:  Expected LOS: - - -  Actual LOS: 2      Venkat Russell LPN

## 2021-12-01 NOTE — PROGRESS NOTES
Problem: Mobility Impaired (Adult and Pediatric)  Goal: *Acute Goals and Plan of Care (Insert Text)  Description: FUNCTIONAL STATUS PRIOR TO ADMISSION: Patient was modified independent using a furniture for functional mobility. Patient required minimal assistance for basic and instrumental ADLs intermittently. HOME SUPPORT PRIOR TO ADMISSION: The patient lived with spouse and required minimal assistance/contact guard assist for lower body dressing. Physical Therapy Goals  Initiated 11/30/2021    1. Patient will move from supine to sit and sit to supine , scoot up and down, and roll side to side in bed with standby assistance within 4 days. 2. Patient will perform sit to stand with modified independent within 4 days. 3. Patient will ambulate with supervision/set-up for 350 feet with the least restrictive device within 4 days. 4. Patient will ascend/descend 4 stairs with 1 handrail(s) with minimal assistance/contact guard assist within 4 days. 5. Patient will verbalize and demonstrate understanding of spinal precautions (No bending, lifting greater than 5 lbs, or twisting; log-roll technique; frequent repositioning as instructed) within 4 days. 12/1/2021 1636 by Delfina Avina, PT  Outcome: Progressing Towards Goal   PHYSICAL THERAPY TREATMENT  Patient: David Record (2 y.o. female)  Date: 12/1/2021  Diagnosis: Spinal stenosis of lumbar region with neurogenic claudication [M48.062]   <principal problem not specified>  Procedure(s) (LRB):  L3-S1 POSTERIOR DECOMPRESSION AND FUSION (Left) 1 Day Post-Op  Precautions: Fall, Spinal  Chart, physical therapy assessment, plan of care and goals were reviewed. ASSESSMENT  Patient continues with skilled PT services and is progressing towards goals. She was able to ambulate from room the rehab gym using walker and light intermittent assistance. Needed a sitting rest prior to returning to room. Deferred stair and car transfers due to patient fatigue.  Spent increased time on correct motor planning for sit to standing transfers. During ambulation needed cues to relax shoulders and lower chin to improve posture and improve step through gait pattern. Per NP, patient to stay until tomorrow. Will work on car transfers and stair training this afternoon. Current Level of Function Impacting Discharge (mobility/balance): sba sit to stand to sitting post training; cg ambulation 350 feet with sitting rest.    Other factors to consider for discharge: supportive spouse; will need RW for home use. PLAN :  Patient continues to benefit from skilled intervention to address the above impairments. Continue treatment per established plan of care. to address goals. Recommendation for discharge: (in order for the patient to meet his/her long term goals)  Physical therapy at least 2 days/week in the home AND ensure assist and/or supervision for safety with mobility and ADLs. This discharge recommendation:  Has been made in collaboration with the attending provider and/or case management    IF patient discharges home will need the following DME: rolling walker       SUBJECTIVE:   Patient stated  I feel weaker this morning.     OBJECTIVE DATA SUMMARY:   Critical Behavior:  Neurologic State: Alert  Orientation Level: Oriented X4  Cognition: Appropriate decision making, Appropriate for age attention/concentration, Appropriate safety awareness, Follows commands  Safety/Judgement: Awareness of environment, Fall prevention, Decreased insight into deficits, Good awareness of safety precautions  Functional Mobility Training:  Bed Mobility:  Rolling:  (up in chair when PT arrived)                 Transfers:  Sit to Stand: Stand-by assistance  Stand to Sit: Stand-by assistance        Bed to Chair: Stand-by assistance;  Adaptive equipment (RW)                    Balance:  Sitting: Intact  Sitting - Static: Good (unsupported)  Sitting - Dynamic: Good (unsupported)  Standing: Impaired  Standing - Static: Good; Constant support  Standing - Dynamic : Fair; Constant support  Ambulation/Gait Training:  Distance (ft): 350 Feet (ft) (175 x 2)  Assistive Device: Brace/Splint; Gait belt; Walker, rolling  Ambulation - Level of Assistance: Contact guard assistance     Gait Description (WDL): Exceptions to WDL  Gait Abnormalities: Decreased step clearance; Path deviations (intermittent L foot drag)        Base of Support: Widened     Speed/Ada: Pace decreased (<100 feet/min)  Step Length: Right shortened; Left shortened        Interventions: Safety awareness training; Tactile cues; Verbal cues (relax shoulders, lower chin)        Pain Ratin/10 lower back only post PT session    Activity Tolerance:   Fair, SpO2 stable on RA, and requires rest breaks    After treatment patient left in no apparent distress:   Sitting in chair and Call bell within reach    COMMUNICATION/COLLABORATION:   The patients plan of care was discussed with: Occupational therapist, Registered nurse, Case management, Rehabilitation technician, and NP .      Beatriz Dunlap, PT   Time Calculation: 24 mins

## 2021-12-01 NOTE — ANESTHESIA POSTPROCEDURE EVALUATION
Procedure(s):  L3-S1 POSTERIOR DECOMPRESSION AND FUSION. general    Anesthesia Post Evaluation        Patient location during evaluation: PACU  Note status: Adequate. Level of consciousness: responsive to verbal stimuli and sleepy but conscious  Pain management: satisfactory to patient  Airway patency: patent  Anesthetic complications: no  Cardiovascular status: acceptable  Respiratory status: acceptable  Hydration status: acceptable  Comments: +Post-Anesthesia Evaluation and Assessment    Patient: Molly Singh MRN: [id number]  SSN: [id number]   YOB: 1955  Age: 77 y.o. Sex: female      Cardiovascular Function/Vital Signs    BP (!) 154/67   Pulse 85   Temp 37.2 °C (99 °F)   Resp 16   Ht 5' 5.5\" (1.664 m)   Wt 97.2 kg (214 lb 4.6 oz)   SpO2 94%   BMI 35.12 kg/m²     Patient is status post Procedure(s):  L3-S1 POSTERIOR DECOMPRESSION AND FUSION. Nausea/Vomiting: Controlled. Postoperative hydration reviewed and adequate. Pain:  Pain Scale 1: Visual (11/30/21 1905)  Pain Intensity 1: 0 (11/30/21 1905)   Managed. Neurological Status:   Neuro (WDL): Exceptions to WDL (11/30/21 1838)   At baseline. Mental Status and Level of Consciousness: Arousable. Pulmonary Status:   O2 Device: Nasal cannula (11/30/21 1900)   Adequate oxygenation and airway patent. Complications related to anesthesia: None    Post-anesthesia assessment completed. No concerns. Signed By: Kelly Michael MD    11/30/2021  Post anesthesia nausea and vomiting:  controlled      INITIAL Post-op Vital signs:   Vitals Value Taken Time   /56 11/30/21 1915   Temp 37.2 °C (99 °F) 11/30/21 1838   Pulse 95 11/30/21 1925   Resp 20 11/30/21 1925   SpO2 98 % 11/30/21 1925   Vitals shown include unvalidated device data.

## 2021-12-01 NOTE — PROGRESS NOTES
Problem: Self Care Deficits Care Plan (Adult)  Goal: *Acute Goals and Plan of Care (Insert Text)  Description: Description: FUNCTIONAL STATUS PRIOR TO ADMISSION: Patient was modified independent using a furniture for functional mobility. Patient required minimal assistance for basic and instrumental ADLs intermittently. HOME SUPPORT PRIOR TO ADMISSION: The patient lived with spouse and required minimal assistance/contact guard assist for lower body dressing. Occupational Therapy Goals  Initiated 12/1/2021    1. Patient will perform lower body dressing with modified independence using AE PRN within 4 days. 2.  Patient will perform toileting with modified independence using most appropriate DME within 4 days. 3.  Patient will toileting at modified independence within 4 days. 4.  Patient will don/doff back brace at independence within 4 days. 5.  Patient will verbalize/demonstrate 3/3 back precautions during ADL tasks without cues within 4 days. Outcome: Progressing Towards Goal     OCCUPATIONAL THERAPY EVALUATION  Patient: Reg Juarez (73 y.o. female)  Date: 12/1/2021  Primary Diagnosis: Spinal stenosis of lumbar region with neurogenic claudication [M48.062]  Procedure(s) (LRB):  L3-S1 POSTERIOR DECOMPRESSION AND FUSION (Left) 1 Day Post-Op   Precautions:   Fall, Spinal    ASSESSMENT  Based on the objective data described below, the patient presents with increased drowsiness, decreased balance/coordination, and impaired independence levels compared to baseline s/p L3-S1 fusion POD #1. Pt rc'd asleep in chair w/o LSO donned, once aroused agreeable to work with therapy. Required min A to don LSO, pt required educated and assist for positioning  Pt educated on reacher and sock aide and agreeable to trial for LB dressing - Educated pt on long handled shoe horn, long handled sponge, and dressing stick following. Overall pt required min A to don underwear, pants w/ elastic waist band, and socks. Pt had 2 significant LOBs when attempting to manage clothing over hips that required therapist intervention. Pt endorsing increased lethargy following receiving pain meds, assisted back to bed. Anticipate pt will progress well toward set goals pending pain mgmt and require HHOT at [place] with 24/7 assist from family. Current Level of Function Impacting Discharge (ADLs/self-care): up to min A for LB dressing, min A for bathing,     Functional Outcome Measure: The patient scored 60/100 on the barthel outcome measure which is indicative of minimally independent. Other factors to consider for discharge: pt required assist at baseline from       Patient will benefit from skilled therapy intervention to address the above noted impairments. PLAN :  Recommendations and Planned Interventions: self care training, functional mobility training, therapeutic exercise, balance training, therapeutic activities, endurance activities, patient education, home safety training, and family training/education    Frequency/Duration: Patient will be followed by occupational therapy 5 times a week to address goals. Recommendation for discharge: (in order for the patient to meet his/her long term goals)  Occupational therapy at least 2 days/week in the home with 24/7 family assist     This discharge recommendation:  Has been made in collaboration with the attending provider and/or case management    IF patient discharges home will need the following DME: AE: long handled bathing, AE: long handled dressing, and shower chair       SUBJECTIVE:   Patient stated I just feel this pain medicine hitting me I am dizzy I need to lay down .     OBJECTIVE DATA SUMMARY:   HISTORY:   Past Medical History:   Diagnosis Date    Diabetes ([facility] [address]     DM2-trulicity, PCP treats    Hypercholesterolemia     Hypertension     Sarcoidosis     brain    Sleep apnea     CPAP complient , Dr. Ernestina Dockery post epidural steroid injection 10/15/2021    Thyroid disease     TIA (transient ischemic attack)     Questionable per patient left side defecits (due to spinal stenosis)     Past Surgical History:   Procedure Laterality Date    HX BREAST BIOPSY Right     Benign (per patient)    HX CARPAL TUNNEL RELEASE Right     HX  SECTION       x 2    HX CHOLECYSTECTOMY      HX HEENT      parathyroidectomy    HX HYSTERECTOMY         Expanded or extensive additional review of patient history:     Home Situation  Home Environment: Private residence  # Steps to Enter: 1  Wheelchair Ramp: No  One/Two Story Residence: One story  Living Alone: No  Support Systems: Spouse/Significant Other  Patient Expects to be Discharged to[de-identified] [employer]  Current DME Used/Available at Home: Shower chair, CPAP, Glucometer  Tub or Shower Type: Shower    Hand dominance: Right    EXAMINATION OF PERFORMANCE DEFICITS:  Cognitive/Behavioral Status:  Neurologic State: Alert  Orientation Level: Oriented X4  Cognition: Appropriate decision making; Appropriate for age attention/concentration; Appropriate safety awareness; Follows commands  Perception: Appears intact  Perseveration: No perseveration noted  Safety/Judgement: Awareness of environment; Decreased insight into deficits; Fall prevention; Good awareness of safety precautions    Skin: intact    Edema: no edema noted; SCDs donned at conclusion of session     Hearing: Auditory  Auditory Impairment: None  Hearing Aids/Status: Does not own    Vision/Perceptual:                           Acuity: Within Defined Limits    Corrective Lenses: Glasses    Range of Motion:    AROM: Within functional limits  PROM: Within functional limits                      Strength:    Strength: Within functional limits                Coordination:  Coordination: Within functional limits  Fine Motor Skills-Upper: Left Intact; Right Intact    Gross Motor Skills-Upper: Left Intact;  Right Intact    Tone & Sensation:    Tone: Normal  Sensation: Intact                      Balance:  Sitting: Impaired  Sitting - Static: Good (unsupported)  Sitting - Dynamic: Fair (occasional)  Standing: Impaired  Standing - Static: Good; Constant support  Standing - Dynamic : Fair; Constant support    Functional Mobility and Transfers for ADLs:  Bed Mobility:  Rolling:  (up in chair when PT arrived)    Transfers:  Sit to Stand: Contact guard assistance  Stand to Sit: Contact guard assistance  Bed to Chair: Contact guard assistance; Adaptive equipment (RW)    ADL Assessment:  Feeding: Independent    Oral Facial Hygiene/Grooming: Independent    Bathing: Moderate assistance    Upper Body Dressing: Minimum assistance    Lower Body Dressing: Minimum assistance    Toileting: Minimum assistance                ADL Intervention and task modifications:  Patient instructed and demonstrated 3/3 back precautions with min A and verbal cue  cues. Upper Body Dressing Assistance  Orthotics(Brace): Minimum assistance    Lower Body Dressing Assistance  Underpants: Minimum assistance  Pants With Elastic Waist: Minimum assistance  Socks: Minimum assistance  Adaptive Equipment Used: Dressing stick; Long handled shoe horn; Reacher; Sock aid         Cognitive Retraining  Safety/Judgement: Awareness of environment; Decreased insight into deficits; Fall prevention; Good awareness of safety precautions    Bathing: Patient instructed and indicated understanding when bathing to not submerge wound in water, stand to shower or sponge bathe, cover wound with plastic and tape to ensure no water reaches bandage/wound without cues. Dressing brace: Patient instructed and demonstrated to don/doff velcro on brace using dominant side, keeping non-dominant side intact. Patient instructed and demonstrated in meantime of being able to stand with back against wall to don/doff brace, to don/doff seated using lap and bed/chair surface to support brace while manipulating.   Dressing lower body: Patient instructed to don brace first and on the benefits to remain seated to don all clothing to increase independence with precautions and pain management. Patient instructed and demonstrated tailor sitting for lower body dressing with minimal assist and verbal cues . Functional Measure:    Barthel Index:  Bathin  Bladder: 10  Bowels: 10  Groomin  Dressin  Feeding: 10  Mobility: 10  Stairs: 0  Toilet Use: 5  Transfer (Bed to Chair and Back): 10  Total: 60/100      The Barthel ADL Index: Guidelines  1. The index should be used as a record of what a patient does, not as a record of what a patient could do. 2. The main aim is to establish degree of independence from any help, physical or verbal, however minor and for whatever reason. 3. The need for supervision renders the patient not independent. 4. A patient's performance should be established using the best available evidence. Asking the patient, friends/relatives and nurses are the usual sources, but direct observation and common sense are also important. However direct testing is not needed. 5. Usually the patient's performance over the preceding 24-48 hours is important, but occasionally longer periods will be relevant. 6. Middle categories imply that the patient supplies over 50 per cent of the effort. 7. Use of aids to be independent is allowed. Score Interpretation (from [address] [address])    Independent   60-79 Minimally independent   40-59 Partially dependent   20-39 Very dependent   <20 Totally dependent     -Lyssa Vasquez., Barthel, D.W. (1965). Functional evaluation: the Barthel Index. [facility] ([address] (). The Barthel activities of daily living index: self-reporting versus actual performance in the old (> or = 75 years). Journal of [address] [place] [address] 45(7), [facility], .MEG, Kurt Olivares., Eber Qiu (1999).  Measuring the change in disability after inpatient rehabilitation; comparison of the responsiveness of the Barthel Index and Functional [place] Measure. Journal of Neurology, Neurosurgery, and Psychiatry, 66(4), 4-462. Christie Phoenix, N.J.A, JANI Cisneros, & Lord Lius M.A. (2004) Assessment of post-stroke quality of life in cost-effectiveness studies: The usefulness of the Barthel Index and the EuroQoL-5D. Quality of Life Research, 15, 645-        Occupational Therapy Evaluation Charge Determination   History Examination Decision-Making   LOW Complexity : Brief history review  LOW Complexity : 1-3 performance deficits relating to physical, cognitive , or psychosocial skils that result in activity limitations and / or participation restrictions  LOW Complexity : No comorbidities that affect functional and no verbal or physical assistance needed to complete eval tasks       Based on the above components, the patient evaluation is determined to be of the following complexity level: LOW   Pain Ratin/10 pain in back     Activity Tolerance:   Fair and requires rest breaks    After treatment patient left in no apparent distress:    Supine in bed, Call bell within reach, Bed / chair alarm activated, and Side rails x 3    COMMUNICATION/EDUCATION:   The patients plan of care was discussed with: Occupational therapist and Registered nurse. Patient/family have participated as able in goal setting and plan of care. This patients plan of care is appropriate for delegation to [facility].     Thank you for this referral.  Vonda Woodruff OT  Time Calculation: 34 mins

## 2021-12-02 VITALS
BODY MASS INDEX: 34.44 KG/M2 | SYSTOLIC BLOOD PRESSURE: 141 MMHG | TEMPERATURE: 98.5 F | HEIGHT: 66 IN | WEIGHT: 214.29 LBS | OXYGEN SATURATION: 95 % | RESPIRATION RATE: 18 BRPM | DIASTOLIC BLOOD PRESSURE: 69 MMHG | HEART RATE: 95 BPM

## 2021-12-02 LAB
GLUCOSE BLD STRIP.AUTO-MCNC: 156 MG/DL (ref 65–117)
GLUCOSE BLD STRIP.AUTO-MCNC: 191 MG/DL (ref 65–117)
SERVICE CMNT-IMP: ABNORMAL
SERVICE CMNT-IMP: ABNORMAL

## 2021-12-02 PROCEDURE — 77010033678 HC OXYGEN DAILY

## 2021-12-02 PROCEDURE — 94760 N-INVAS EAR/PLS OXIMETRY 1: CPT

## 2021-12-02 PROCEDURE — 90686 IIV4 VACC NO PRSV 0.5 ML IM: CPT | Performed by: ORTHOPAEDIC SURGERY

## 2021-12-02 PROCEDURE — 97116 GAIT TRAINING THERAPY: CPT

## 2021-12-02 PROCEDURE — 74011250637 HC RX REV CODE- 250/637: Performed by: ORTHOPAEDIC SURGERY

## 2021-12-02 PROCEDURE — 74011250636 HC RX REV CODE- 250/636: Performed by: ORTHOPAEDIC SURGERY

## 2021-12-02 PROCEDURE — 90471 IMMUNIZATION ADMIN: CPT

## 2021-12-02 PROCEDURE — 82962 GLUCOSE BLOOD TEST: CPT

## 2021-12-02 PROCEDURE — 97530 THERAPEUTIC ACTIVITIES: CPT

## 2021-12-02 RX ADMIN — DOCUSATE SODIUM 50MG AND SENNOSIDES 8.6MG 1 TABLET: 8.6; 5 TABLET, FILM COATED ORAL at 08:32

## 2021-12-02 RX ADMIN — LISINOPRIL 40 MG: 20 TABLET ORAL at 08:32

## 2021-12-02 RX ADMIN — Medication 200 MG: at 08:32

## 2021-12-02 RX ADMIN — ACETAMINOPHEN 1000 MG: 500 TABLET ORAL at 11:22

## 2021-12-02 RX ADMIN — GLIPIZIDE 10 MG: 5 TABLET, FILM COATED, EXTENDED RELEASE ORAL at 08:41

## 2021-12-02 RX ADMIN — OMEGA-3-ACID ETHYL ESTERS 2000 MG: 1 CAPSULE, LIQUID FILLED ORAL at 08:32

## 2021-12-02 RX ADMIN — LEVOTHYROXINE SODIUM 88 MCG: 0.09 TABLET ORAL at 08:32

## 2021-12-02 RX ADMIN — PREGABALIN 50 MG: 25 CAPSULE ORAL at 08:32

## 2021-12-02 RX ADMIN — POLYETHYLENE GLYCOL 3350 17 G: 17 POWDER, FOR SOLUTION ORAL at 08:33

## 2021-12-02 RX ADMIN — Medication 5000 UNITS: at 08:31

## 2021-12-02 RX ADMIN — Medication 10 ML: at 05:24

## 2021-12-02 RX ADMIN — MULTIPLE VITAMINS W/ MINERALS TAB 1 TABLET: TAB at 08:32

## 2021-12-02 RX ADMIN — OXYCODONE 10 MG: 5 TABLET ORAL at 08:32

## 2021-12-02 RX ADMIN — FAMOTIDINE 20 MG: 20 TABLET ORAL at 08:33

## 2021-12-02 RX ADMIN — INFLUENZA VIRUS VACCINE 0.5 ML: 15; 15; 15; 15 SUSPENSION INTRAMUSCULAR at 12:45

## 2021-12-02 RX ADMIN — CYANOCOBALAMIN TAB 500 MCG 500 MCG: 500 TAB at 08:33

## 2021-12-02 RX ADMIN — Medication 800 UNITS: at 08:32

## 2021-12-02 RX ADMIN — OXYCODONE HYDROCHLORIDE AND ACETAMINOPHEN 500 MG: 500 TABLET ORAL at 08:33

## 2021-12-02 RX ADMIN — ACETAMINOPHEN 1000 MG: 500 TABLET ORAL at 05:23

## 2021-12-02 NOTE — PROGRESS NOTES
Transition of Care Plan:  RUR: 6% low   Disposition: Home with  - David  Follow up appointments: PCP, Ortho  DME needed: RW - approved through [employer] and provided to patient at bedside   Transportation at Discharge:   Anant Lew or means to access home: Yes     IM Medicare Letter: provided   Is patient a BCPI-A Bundle: No          Is patient a  and connected with the [place]? No  Caregiver Contact: , Drake Chaney ([phone])  Discharge Caregiver contacted prior to discharge? Unit CM to contact    [facility] and [facility] are the only two agencies that accepted patient, all others declined. However, both [facility] and [facility] are unable to see patient until Monday 12/6. CM has informed ortho NP who agreed with this plan as there is no other option. Pt to d/c home with [facility] services. [employer] [facility] approved RW and has been provided to patient at bedside. AVS updated for d/c. Medicare pt has received, reviewed, and signed IM letter informing them of their right to appeal the discharge. Signed copy has been placed on pt bedside chart.     Caden Smith, [address], [address]

## 2021-12-02 NOTE — PROGRESS NOTES
End of Shift Note    Bedside shift change report given to  Avenue Jaz Ivey (oncoming nurse) by Grzegorz Mohan RN (offgoing nurse). Report included the following information SBAR, Kardex, Intake/Output and MAR    Shift worked:  7P-7A     Shift summary and any significant changes:          Concerns for physician to address:       Zone phone for oncoming shift:   [phone]       Activity:  Activity Level: Up with Assistance  Number times ambulated in hallways past shift: 0  Number of times OOB to chair past shift: 0    Cardiac:   Cardiac Monitoring: No      Cardiac Rhythm: Sinus Rhythm    Access:   Current line(s): PIV     Genitourinary:   Urinary status: voiding    Respiratory:   O2 Device: None (Room air)  Chronic home O2 use?: NO  Incentive spirometer at bedside: YES  Actual Volume (ml): 1200 ml  GI:  Last Bowel Movement Date: 11/29/21  Current diet:  ADULT DIET Regular  Passing flatus: YES  Tolerating current diet: YES       Pain Management:   Patient states pain is manageable on current regimen: YES    Skin:  Peter Score: 18  Interventions: float heels and increase time out of bed    Patient Safety:  Fall Score:  Total Score: 3  Interventions: assistive device (walker, cane, etc), gripper socks and pt to call before getting OOB  High Fall Risk: Yes    Length of Stay:  Expected LOS: - - -  Actual LOS: 3      Azael Rogers RN

## 2021-12-02 NOTE — PROGRESS NOTES
Transition of Care Plan:    RUR: 7%  Disposition: Home with [facility] - referrals pending  Follow up appointments: PCP, Ortho  DME needed: RW - ordered through [employer]  Transportation at Discharge:   Tam Meredith or means to access home:   Yes     IM Medicare Letter: Unit CM to provide  Is patient a BCPI-A Bundle: No          Is patient a [place] and connected with the [facility]? No  Caregiver Contact: , Sabrina Huff ([phone])  Discharge Caregiver contacted prior to discharge? Unit CM to contact      Reason for Admission:  Elective surgery - L3-S1 POSTERIOR DECOMPRESSION AND FUSION                   RUR Score:   7%                Plan for utilizing home health:  [facility] PT/OT is recommended. Pt is amendable to services and gave CM permission to send referrals to agencies that are within the [address] [place] [address] service area and that accept [employer]. PCP: First and Last name:  Maria Teresa Calvert MD   Name of Practice: [facility]   Are you a current patient: Yes/No: Yes   Approximate date of last visit: 11/24/2021   Can you participate in a virtual visit with your PCP: No                    Current Advanced Directive/Advance Care Plan: No Order. CM placed FYI on chart for Attending to address code status with patient. AD on file dated 03/20/2007. Narendra Prince, and Estefanía Arnold are listed as healthcare decision makers. Healthcare Decision Maker:     Primary Decision Maker: Shailesh Ford - Spouse - [phone]                  Transition of Care Plan:   Home health      Pt is a 76 yo female admitted to [address] [place] [address] for elective surgery, L3-S1 POSTERIOR DECOMPRESSION AND FUSION. PMHx includes DM2, HTN, sarcoidosis, TIA, etc. Pt and  reside together in a single story home with one step to enter in [address] [place] [address]. Pt reports to be independent with ADL/IADLs though sometimes needs assistance with lower body dressing.  Pt \"furniture walks\" and is being recommended for a RW (ordered through [employer]). Pt has a shower chair and CPAP at home. Pt is also being recommended for [facility] PT/OT of which she is agreeable. Pt gave CM permission to send referrals to agencies that are within the [facility] and that accept [employer]. No reported hx of HH/SNF/IPR. Preferred pharmacy is [facility] [address] in [place]. [facility] referrals pending:  [address]  [facility] [address] [facility] [address] [facility]      Care Management Interventions  PCP Verified by CM: Yes (Dr. Cam Salcido)  Last Visit to PCP: 11/24/21  Mode of Transport at Discharge:  Other (see comment) ()  Transition of Care Consult (CM Consult): Discharge Planning,  Place [facility], [facility]/Supply Assistance  Discharge Durable Medical Equipment: Yes (RW)  Physical Therapy Consult: Yes  Occupational Therapy Consult: Yes  Speech Therapy Consult: No  Support Systems: Spouse/Significant Other  Confirm Follow Up Transport: Family  The Plan for Transition of Care is Related to the Following Treatment Goals : home health  The Patient and/or Patient Representative was Provided with a Choice of Provider and Agrees with the Discharge Plan?: Yes  Fre[employer] of Choice List was Provided with Basic Dialogue that Supports the Patient's Individualized Plan of Care/Goals, Treatment Preferences and Shares the Quality Data Associated with the Providers?: Yes  Discharge Location  Discharge Placement: Home with home health    ----------------------------------------------------------  Humberto Santiago (ACP) Conversation      Date of Conversation: 12/01/2021  Conducted with: Patient with Decision Making Capacity    Healthcare Decision Maker: Primary Decision MakerAbdirahmananthony Luis - Spouse - [phone]  Today we documented Decision Maker(s) consistent with ACP documents on file. Content/Action Overview:    Has ACP document(s) on file - reflects the patient's care preferences    Length of Voluntary ACP Conversation in minutes:  <16 minutes (Non-Billable)      [facility] Elizondo[facility]

## 2021-12-02 NOTE — PROGRESS NOTES
Problem: Mobility Impaired (Adult and Pediatric)  Goal: *Acute Goals and Plan of Care (Insert Text)  Description: FUNCTIONAL STATUS PRIOR TO ADMISSION: Patient was modified independent using a furniture for functional mobility. Patient required minimal assistance for basic and instrumental ADLs intermittently. HOME SUPPORT PRIOR TO ADMISSION: The patient lived with spouse and required minimal assistance/contact guard assist for lower body dressing. Physical Therapy Goals  Initiated 11/30/2021    1. Patient will move from supine to sit and sit to supine , scoot up and down, and roll side to side in bed with standby assistance within 4 days. 2. Patient will perform sit to stand with modified independent within 4 days. 3. Patient will ambulate with supervision/set-up for 350 feet with the least restrictive device within 4 days. 4. Patient will ascend/descend 4 stairs with 1 handrail(s) with minimal assistance/contact guard assist within 4 days. 5. Patient will verbalize and demonstrate understanding of spinal precautions (No bending, lifting greater than 5 lbs, or twisting; log-roll technique; frequent repositioning as instructed) within 4 days. Outcome: Progressing Towards Goal   PHYSICAL THERAPY TREATMENT  Patient: Damaris Bernstein (12 y.o. female)  Date: 12/2/2021  Diagnosis: Spinal stenosis of lumbar region with neurogenic claudication [M48.062]   <principal problem not specified>  Procedure(s) (LRB):  L3-S1 POSTERIOR DECOMPRESSION AND FUSION (Left) 2 Days Post-Op  Precautions: Fall, Spinal No bending, no lifting greater than 5 lbs, no twisting, log-roll technique, repositioning every 20-30 min except when sleeping, brace when OOB (if ordered)  Chart, physical therapy assessment, plan of care and goals were reviewed. ASSESSMENT  Patient continues with skilled PT services and is progressing towards goals.  Gait tolerance progressed with less assistance needed for ambulation, transfers and bed mobility. Good compliance with BLT precautions observed. She cleared stairs again and feels confident about car transfers done yesterday, cleared for discharge home from PT standpoint. Current Level of Function Impacting Discharge (mobility/balance): min a supine to sit to supine transfers using log roll technique. Modified independent sit to stand transfers. Sba ambulation with  feet x 2 and up/down 4 steps. Other factors to consider for discharge: good home support; good pain control         PLAN :  Patient continues to benefit from skilled intervention to address the above impairments. Continue treatment per established plan of care. to address goals. Recommendation for discharge: (in order for the patient to meet his/her long term goals)  Physical therapy at least 2 days/week in the home AND ensure assist and/or supervision for safety with mobility and ADLs. This discharge recommendation:  Has been made in collaboration with the attending provider and/or case management    IF patient discharges home will need the following DME: RW has been delivered for discharge and adjusted by PT       SUBJECTIVE:   Patient stated  I'm hoping to go home today.     OBJECTIVE DATA SUMMARY:   Critical Behavior:  Neurologic State: Alert  Orientation Level: Oriented X4  Cognition: Appropriate decision making, Appropriate for age attention/concentration, Appropriate safety awareness, Follows commands  Safety/Judgement: Awareness of environment, Good awareness of safety precautions    Spinal diagnosis intervention:  The patient stated 3/3 back precautions when prompted. Reviewed all 3 back precautions, log roll technique, and sitting for 30 minutes at a time. The patient required no cues to maintain back precautions during functional activity. Reviewed back brace application and wear schedule. Brace donned with supervision/set-up .     Functional Mobility Training:    Bed Mobility:  Log Rolling: Minimum assistance (log roll)  Supine to Sit: Minimum assistance (log roll)  Sit to Supine: Minimum assistance (log roll)  Scooting: Modified independent        Transfers:  Sit to Stand: Modified independent  Stand to Sit: Modified independent        Bed to Chair: Supervision; Adaptive equipment (RW)                    Balance:  Sitting: Intact  Sitting - Static: Good (unsupported)  Sitting - Dynamic: Good (unsupported)  Standing: Impaired  Standing - Static: Good; Occasional  Standing - Dynamic : Good; Constant support  Ambulation/Gait Training:  Distance (ft): 700 Feet (ft) (350 x 2)  Assistive Device: Brace/Splint; Gait belt; Walker, rolling  Ambulation - Level of Assistance: Stand-by assistance        Gait Abnormalities: Decreased step clearance        Base of Support: Widened     Speed/Ada: Pace decreased (<100 feet/min)  Step Length: Right shortened; Left shortened        Interventions: Safety awareness training           Stairs:  Number of Stairs Trained: 4  Stairs - Level of Assistance: Stand-by assistance   Rail Use: Right         Pain Rating:  3/10 lower back incisional pain only    Activity Tolerance:   Good and SpO2 stable on RA    After treatment patient left in no apparent distress:   Sitting in chair and Call bell within reach    COMMUNICATION/COLLABORATION:   The patients plan of care was discussed with: Occupational therapist, Registered nurse, Case management, Rehabilitation technician, and NP .      Unknown Frederic, PT   Time Calculation: 31 mins

## 2021-12-02 NOTE — DISCHARGE SUMMARY
Spine Discharge Summary    Patient ID:  Carolina Silva  [id number]  female  77 y.o.  1955    Admit date: 11/29/2021    Discharge date: 12/2/2021    Admitting Physician: Chelo Castillo MD     Consulting Physician(s):   Treatment Team: Attending Provider: Joshua Hartman MD; Care Manager: Rocío Joaquin; Primary Nurse: Elma Juarez; Utilization Review: Meme Lopez    Date of Surgery:   11/29/2021 and 11/30/2021    Preoperative Diagnosis:  LOW BACK PAIN  LUMBOSACRAL SPONDYLOSIS W/O MYELOPATHY  SCIATICA OF LEFT SIDE  SPINAL STENOSIS, LUMBAR REGION, NEUROGENIC CLAUDICATION    Postoperative Diagnosis:   LOW BACK PAIN  LUMBOSACRAL SPONDYLOSIS W/O MYELOPATHY  SCIATICA OF LEFT SIDE  SPINAL STENOSIS, LUMBAR REGION, NEUROGENIC CLAUDICATION    Procedure(s):  L3-5 LATERAL FUSION FROM  LEFT SIDED APPROACH (ANES CHOICE)  And L3-S1 POSTERIOR DECOMPRESSION AND FUSION     Anesthesia Type: Other     Surgeon: Joshua Hartman MD                            HPI:  Pt is a 77 y.o. female who has a history of LOW BACK PAIN  LUMBOSACRAL SPONDYLOSIS W/O MYELOPATHY  SCIATICA OF LEFT SIDE  SPINAL STENOSIS, LUMBAR REGION, NEUROGENIC CLAUDICATION  with pain and limitations of activities of daily living who presents at this time for a L3-5 LATERAL FUSION FROM  LEFT SIDED APPROACH (ANES CHOICE) and L3-S1 POSTERIOR DECOMPRESSION AND FUSION  following the failure of conservative management. PMH:   Past Medical History:   Diagnosis Date    Diabetes ([facility] [address]     DM2-trulicity, PCP treats    Hypercholesterolemia     Hypertension     Sarcoidosis     brain    Sleep apnea     CPAP complient , Dr. Lanza Score post epidural steroid injection 10/15/2021    Thyroid disease     TIA (transient ischemic attack)     Questionable per patient left side defecits (due to spinal stenosis)       Body mass index is 35.12 kg/m². : A BMI > 30 is classified as obesity and > 40 is classified as morbid obesity.      Medications upon admission : Prior to Admission Medications   Prescriptions Last Dose Informant Patient Reported? Taking? APPLE CIDER VINEGAR PO Not Taking at Unknown time  Yes No   Sig: Take 2 Capsules by mouth daily. Patient not taking: Reported on 2021   Blood-Glucose Meter monitoring kit 2021 at Unknown time  No Yes   Sig: Use to check blood glucose up to twice a day. Dx: E11.9   Cetirizine (ZyrTEC) 10 mg cap 2021 at Unknown time  Yes Yes   Sig: Take 10 mg by mouth daily. OTHER 2021 at Unknown time  Yes Yes   Sig: Take  by mouth daily. Immuneti supplement (Vitamin C, Zinc, Vitamin D3, Garlic bulb, Elderberry, Echinacea)   OTHER Not Taking at Unknown time  Yes No   Sig: Blood sugar support   Patient not taking: Reported on 2021   OTHER 2021 at Unknown time  Yes Yes   Sig: Liver clear   TURMERIC PO 2021 at Unknown time  Yes Yes   Sig: Take 800 mg by mouth daily. acetaminophen (Acetaminophen Extra Strength) 500 mg tablet 2021 at Unknown time  Yes Yes   Sig: Take 1,000 mg by mouth every six (6) hours as needed for Pain. ascorbic acid, vitamin C, (VITAMIN C) 500 mg tablet 2021 at Unknown time  Yes Yes   Sig: Take  by mouth. aspirin delayed-release 81 mg tablet 2021 at Unknown time  Yes Yes   Sig: Take 81 mg by mouth daily. cholecalciferol (Vitamin D3) (5000 Units/125 mcg) tab tablet 2021 at Unknown time  Yes Yes   Sig: Take 5,000 Units by mouth two (2) times a day. cyanocobalamin (VITAMIN B-12) 500 mcg tablet 2021 at Unknown time  Yes Yes   Sig: Take 500 mcg by mouth daily. dulaglutide (Trulicity) 3 MX/4.0 mL pnij 2021 at Unknown time  No Yes   Si.5 mL by SubCUTAneous route every seven (7) days. Patient taking differently: 3 mg by SubCUTAneous route every seven (7) days.    ferrous fumarate/vit Bcomp,C (SUPER B COMPLEX PO) 2021 at Unknown time  Yes Yes   Sig: Take 1 Capsule by mouth daily.    fluticasone (FLONASE ALLERGY RELIEF) 50 mcg/actuation nasal spray 10/30/2021 at Unknown time  Yes Yes   Si Sprays by Both Nostrils route as needed. furosemide (LASIX) 20 mg tablet Not Taking at Unknown time  No No   Sig: Take 1 Tablet by mouth daily as needed (swelling). Patient not taking: Reported on 2021   glipiZIDE SR (GLUCOTROL XL) 10 mg CR tablet 2021 at Unknown time  No Yes   Sig: Take 1 Tablet by mouth daily. Indications: type 2 diabetes mellitus   glucose blood VI test strips (ASCENSIA AUTODISC VI, ONE TOUCH ULTRA TEST VI) strip 2021 at Unknown time  No Yes   Sig: Use to check blood glucose up to twice a day. Dx: E11.9   glucose blood VI test strips (True Metrix Glucose Test Strip) strip 2021 at Unknown time  No Yes   Sig: Use to check blood glucose up to twice a day. Dx: E11.9   lancets misc 2021 at Unknown time  No Yes   Sig: Use to check blood glucose up to twice a day. Dx: E11.9   levothyroxine (SYNTHROID) 88 mcg tablet 2021 at Unknown time  No Yes   Sig: Take 1 Tablet by mouth Daily (before breakfast). lisinopriL (PRINIVIL, ZESTRIL) 40 mg tablet 2021 at Unknown time  No Yes   Sig: Take 1 tablet by mouth once daily   Patient taking differently: Take 40 mg by mouth daily. magnesium 250 mg tab 2021 at Unknown time  Yes Yes   Sig: Take 250 mg by mouth daily. metFORMIN (GLUCOPHAGE) 1,000 mg tablet 2021 at Unknown time  No Yes   Sig: TAKE 1 TABLET BY MOUTH TWICE DAILY WITH MEALS   Patient taking differently: Take 1,000 mg by mouth two (2) times daily (with meals). Hold dose if CrCl is below 30 mL/min.     Hold dose prior to contrast and for 48 hours after receiving contrast if any of the following apply:    - CrCl is below 60 mL/min,   - History of liver disease,   - Alcoholism,   - Heart failure,   - Iodinated contrast will be administered via intra-arterial.      naproxen sodium (Aleve) 220 mg cap Not Taking at Unknown time  Yes No   Sig: Take 220 mg by mouth three (3) times daily as needed. Patient not taking: Reported on 11/30/2021   omega-3 acid ethyl esters (LOVAZA) 1 gram capsule 11/23/2021 at Unknown time  Yes Yes   Sig: Take 2 g by mouth two (2) times a day. pregabalin (LYRICA) 25 mg capsule 11/23/2021 at Unknown time  Yes Yes   Sig: Take 50 mg by mouth two (2) times a day. rosuvastatin (CRESTOR) 10 mg tablet 11/23/2021 at Unknown time  No Yes   Sig: Take 1 Tab by mouth nightly. vitamin e (E GEMS) 1,000 unit capsule 11/23/2021 at Unknown time  Yes Yes   Sig: Take 1,000 Units by mouth daily. Facility-Administered Medications: None        Allergies: Allergies   Allergen Reactions    Gabapentin Other (comments)     Hot flashes    Topiramate Other (comments)     Hot flashes        Hospital Course: The patient underwent surgery. Complications:  None; patient tolerated the procedure well. Was taken to the PACU in stable condition and then transferred to the ortho floor. Perioperative Antibiotics:  Ancef     Postoperative Pain Management:  Oxycodone & Tylenol     Postoperative transfusions:    Number of units banked PRBCs =   none     Post Op complications: none    Hemoglobin at discharge:    Lab Results   Component Value Date/Time    HGB 8.4 (L) 12/01/2021 12:58 AM    INR 1.0 11/22/2021 02:46 PM       Dressing was changed on POD # 2. ELVIS changed to optifoam for discharge. Incision - clean, dry and intact. No significant erythema or swelling. Wound appears to be healing without any evidence of infection. Neurovascular exam found to be within normal limits. Physical Therapy started following surgery and participated in bed mobility, transfers and ambulation.         Gait:  Gait  Base of Support: Widened  Speed/Ada: Pace decreased (<100 feet/min)  Step Length: Right shortened, Left shortened  Gait Abnormalities: Decreased step clearance, Path deviations (intermittent L foot drag)  Ambulation - Level of Assistance: Contact guard assistance  Distance (ft): 350 Feet (ft) (175 x 2)  Assistive Device: Brace/Splint, Gait belt, Walker, rolling  Rail Use: Both  Stairs - Level of Assistance: Contact guard assistance  Number of Stairs Trained: 4  Interventions: Safety awareness training, Tactile cues, Verbal cues (relax shoulders, lower chin)            Interventions: Safety awareness training, Tactile cues, Verbal cues (relax shoulders, lower chin)      Discharged to: Home with HH. Condition on Discharge:   stable    Discharge instructions:  - Take pain medications as prescribed  - Resume pre hospital diet      - Discharge activity: activity as tolerated  - Ambulate as tolerated  - Lumbar brace when oob  - Avoid bending, lifting and twisting  - Wound Care Keep wound clean and dry. See discharge instruction sheet. -DISCHARGE MEDICATION LIST     Current Discharge Medication List      START taking these medications    Details   oxyCODONE IR (ROXICODONE) 5 mg immediate release tablet Take 1-2 Tablets by mouth every four (4) hours as needed for Pain for up to 7 days. Max Daily Amount: 6 Tablets. One tab for mild to moderate pain level 1-6, or 2 tabs for severe pain level 7-10  Qty: 40 Tablet, Refills: 0  Start date: 12/1/2021, End date: 12/8/2021    Associated Diagnoses: S/P lumbar spinal fusion      polyethylene glycol (MIRALAX) 17 gram packet Take 1 Packet by mouth daily for 7 days. Qty: 7 Packet, Refills: 0  Start date: 12/2/2021, End date: 12/9/2021      senna-docusate (PERICOLACE) 8.6-50 mg per tablet Take 1 Tablet by mouth two (2) times a day for 7 days. Qty: 14 Tablet, Refills: 0  Start date: 12/1/2021, End date: 12/8/2021         CONTINUE these medications which have NOT CHANGED    Details   levothyroxine (SYNTHROID) 88 mcg tablet Take 1 Tablet by mouth Daily (before breakfast). Qty: 90 Tablet, Refills: 2    Associated Diagnoses: Acquired hypothyroidism      vitamin e (E GEMS) 1,000 unit capsule Take 1,000 Units by mouth daily.       TURMERIC PO Take 800 mg by mouth daily. !! OTHER Take  by mouth daily. Immuneti supplement (Vitamin C, Zinc, Vitamin D3, Garlic bulb, Elderberry, Echinacea)      ferrous fumarate/vit Bcomp,C (SUPER B COMPLEX PO) Take 1 Capsule by mouth daily. magnesium 250 mg tab Take 250 mg by mouth daily. cholecalciferol (Vitamin D3) (5000 Units/125 mcg) tab tablet Take 5,000 Units by mouth two (2) times a day. acetaminophen (Acetaminophen Extra Strength) 500 mg tablet Take 1,000 mg by mouth every six (6) hours as needed for Pain. Cetirizine (ZyrTEC) 10 mg cap Take 10 mg by mouth daily. !! OTHER Liver clear      omega-3 acid ethyl esters (LOVAZA) 1 gram capsule Take 2 g by mouth two (2) times a day. glipiZIDE SR (GLUCOTROL XL) 10 mg CR tablet Take 1 Tablet by mouth daily. Indications: type 2 diabetes mellitus  Qty: 90 Tablet, Refills: 3    Associated Diagnoses: Controlled type 2 diabetes mellitus with microalbuminuria, without long-term current use of insulin ([facility])      pregabalin (LYRICA) 25 mg capsule Take 50 mg by mouth two (2) times a day. lisinopriL (PRINIVIL, ZESTRIL) 40 mg tablet Take 1 tablet by mouth once daily  Qty: 90 Tablet, Refills: 0    Associated Diagnoses: Hypertension, essential      dulaglutide (Trulicity) 3 SR/7.7 mL pnij 0.5 mL by SubCUTAneous route every seven (7) days. Qty: 12 mL, Refills: 1    Associated Diagnoses: Type 2 diabetes mellitus without complication, without long-term current use of insulin ([facility])      lancets misc Use to check blood glucose up to twice a day. Dx: E11.9  Qty: 200 Each, Refills: 3    Comments: Dispense brand covered by insurance. Associated Diagnoses: Type 2 diabetes mellitus without complication, without long-term current use of insulin ([facility]      ! ! glucose blood VI test strips (ASCENSIA AUTODISC VI, ONE TOUCH ULTRA TEST VI) strip Use to check blood glucose up to twice a day.  Dx: E11.9  Qty: 200 Strip, Refills: 3    Comments: Dispense brand covered by insurance. Associated Diagnoses: Type 2 diabetes mellitus without complication, without long-term current use of insulin ([facility])      Blood-Glucose Meter monitoring kit Use to check blood glucose up to twice a day. Dx: E11.9  Qty: 1 Kit, Refills: 0    Comments: Dispense brand covered by insurance. Associated Diagnoses: Type 2 diabetes mellitus without complication, without long-term current use of insulin ([address]      ! ! glucose blood VI test strips (True Metrix Glucose Test Strip) strip Use to check blood glucose up to twice a day. Dx: E11.9  Qty: 200 Strip, Refills: 3    Associated Diagnoses: Type 2 diabetes mellitus without complication, without long-term current use of insulin ([facility])      rosuvastatin (CRESTOR) 10 mg tablet Take 1 Tab by mouth nightly. Qty: 90 Tab, Refills: 3    Associated Diagnoses: Hypercholesterolemia      metFORMIN (GLUCOPHAGE) 1,000 mg tablet TAKE 1 TABLET BY MOUTH TWICE DAILY WITH MEALS  Qty: 180 Tab, Refills: 3      cyanocobalamin (VITAMIN B-12) 500 mcg tablet Take 500 mcg by mouth daily. aspirin delayed-release 81 mg tablet Take 81 mg by mouth daily. fluticasone (FLONASE ALLERGY RELIEF) 50 mcg/actuation nasal spray 2 Sprays by Both Nostrils route as needed. ascorbic acid, vitamin C, (VITAMIN C) 500 mg tablet Take  by mouth. !! OTHER Blood sugar support      APPLE CIDER VINEGAR PO Take 2 Capsules by mouth daily. furosemide (LASIX) 20 mg tablet Take 1 Tablet by mouth daily as needed (swelling). Qty: 30 Tablet, Refills: 0       !! - Potential duplicate medications found. Please discuss with provider.       STOP taking these medications       naproxen sodium (Aleve) 220 mg cap Comments:   Reason for Stopping:            per medical continuation form      -Follow up in office in 2 weeks      Signed:  Rasheeda Ewing NP  Orthopaedic Nurse Practitioner    12/2/2021  12:10 PM

## 2021-12-02 NOTE — PROGRESS NOTES
Problem: Diabetes Self-Management  Goal: *Disease process and treatment process  Description: Define diabetes and identify own type of diabetes; list 3 options for treating diabetes. Outcome: Progressing Towards Goal  Goal: *Incorporating nutritional management into lifestyle  Description: Describe effect of type, amount and timing of food on blood glucose; list 3 methods for planning meals. Outcome: Progressing Towards Goal  Goal: *Incorporating physical activity into lifestyle  Description: State effect of exercise on blood glucose levels. Outcome: Progressing Towards Goal  Goal: *Developing strategies to promote health/change behavior  Description: Define the ABC's of diabetes; identify appropriate screenings, schedule and personal plan for screenings. Outcome: Progressing Towards Goal  Goal: *Using medications safely  Description: State effect of diabetes medications on diabetes; name diabetes medication taking, action and side effects. Outcome: Progressing Towards Goal  Goal: *Monitoring blood glucose, interpreting and using results  Description: Identify recommended blood glucose targets  and personal targets. Outcome: Progressing Towards Goal  Goal: *Prevention, detection, treatment of acute complications  Description: List symptoms of hyper- and hypoglycemia; describe how to treat low blood sugar and actions for lowering  high blood glucose level. Outcome: Progressing Towards Goal  Goal: *Prevention, detection and treatment of chronic complications  Description: Define the natural course of diabetes and describe the relationship of blood glucose levels to long term complications of diabetes.   Outcome: Progressing Towards Goal  Goal: *Developing strategies to address psychosocial issues  Description: Describe feelings about living with diabetes; identify support needed and support network  Outcome: Progressing Towards Goal  Goal: *Insulin pump training  Outcome: Progressing Towards Goal  Goal: *Sick day guidelines  Outcome: Progressing Towards Goal  Goal: *Patient Specific Goal (EDIT GOAL, INSERT TEXT)  Outcome: Progressing Towards Goal     Problem: Patient Education: Go to Patient Education Activity  Goal: Patient/Family Education  Outcome: Progressing Towards Goal     Problem: Falls - Risk of  Goal: *Absence of Falls  Description: Document Renata Starch Fall Risk and appropriate interventions in the flowsheet. Outcome: Progressing Towards Goal  Note: Fall Risk Interventions:  Mobility Interventions: Utilize gait belt for transfers/ambulation         Medication Interventions: Patient to call before getting OOB    Elimination Interventions: Call light in reach              Problem: Patient Education: Go to Patient Education Activity  Goal: Patient/Family Education  Outcome: Progressing Towards Goal     Problem: Infection - Risk of, Surgical Site Infection  Goal: *Absence of surgical site infection signs and symptoms  Outcome: Progressing Towards Goal     Problem: Patient Education: Go to Patient Education Activity  Goal: Patient/Family Education  Outcome: Progressing Towards Goal     Problem: Pressure Injury - Risk of  Goal: *Prevention of pressure injury  Description: Document Peter Scale and appropriate interventions in the flowsheet.   Outcome: Progressing Towards Goal  Note: Pressure Injury Interventions:  Sensory Interventions: Assess changes in LOC    Moisture Interventions: Minimize layers    Activity Interventions: PT/OT evaluation    Mobility Interventions: PT/OT evaluation    Nutrition Interventions: Document food/fluid/supplement intake    Friction and Shear Interventions: Minimize layers                Problem: Patient Education: Go to Patient Education Activity  Goal: Patient/Family Education  Outcome: Progressing Towards Goal     Problem: Patient Education: Go to Patient Education Activity  Goal: Patient/Family Education  Outcome: Progressing Towards Goal     Problem: Simple Spine Procedure:  Day of Surgery  Goal: Off Pathway (Use only if patient is Off Pathway)  Outcome: Resolved/Met  Goal: Activity/Safety  Outcome: Resolved/Met  Goal: Consults, if ordered  Outcome: Resolved/Met  Goal: Nutrition/Diet  Outcome: Resolved/Met  Goal: Discharge Planning  Outcome: Resolved/Met  Goal: Medications  Outcome: Resolved/Met  Goal: Respiratory  Outcome: Resolved/Met  Goal: Treatments/Interventions/Procedures  Outcome: Resolved/Met  Goal: Psychosocial  Outcome: Resolved/Met  Goal: *Verbalizes understanding of type and use of pain medication  Outcome: Resolved/Met  Goal: *Optimal pain control at patient's stated goal  Outcome: Resolved/Met  Goal: *Verbalizes/demonstrates understanding of proper body mechanics and use of stabilization device if ordered  Outcome: Resolved/Met  Goal: *Activity level attained as ordered  Outcome: Resolved/Met  Goal: *Active bowel sounds  Outcome: Resolved/Met  Goal: *Respiratory status stable  Outcome: Resolved/Met  Goal: *Adequate urinary output  Outcome: Resolved/Met  Goal: *Hemodynamically stable  Outcome: Resolved/Met

## 2021-12-02 NOTE — PROGRESS NOTES
Ortho / Neurosurgery NP Note    POD# 3  s/p L3-5 LATERAL FUSION FROM  LEFT SIDED APPROACH (ANES CHOICE)   Pt seen with no visitor present. POD# 2 s/p L3-5 PDF     Pt resting in chair, in NAD. Feeling better today  Complaints of expected low back pain and left lateral incisional pain  Pain improved from yesterday, using less oxycodone and well controlled. Moving well with therapy. Tolerating regular diet. No nausea, +flatus. VSS Afebrile. Room air. Visit Vitals  BP (!) 141/74   Pulse (!) 102   Temp 98.3 °F (36.8 °C)   Resp 18   Ht 5' 5.5\" (1.664 m)   Wt 97.2 kg (214 lb 4.6 oz)   SpO2 97%   BMI 35.12 kg/m²       Voiding status: Clitnon - d/c after Stage 2  - now voiding w/o difficulty   Output (mL)  Last Bowel Movement Date: 11/29/21 (12/01/21 2044)  Unmeasurable Output  Urine Occurrence(s): 1 (12/02/21 0649)      Labs    Lab Results   Component Value Date/Time    HGB 8.4 (L) 12/01/2021 12:58 AM      Lab Results   Component Value Date/Time    INR 1.0 11/22/2021 02:46 PM      Lab Results   Component Value Date/Time    Sodium 136 11/22/2021 02:46 PM    Potassium 4.1 11/22/2021 02:46 PM    Chloride 106 11/22/2021 02:46 PM    CO2 26 11/22/2021 02:46 PM    Glucose 93 11/22/2021 02:46 PM    BUN 15 11/22/2021 02:46 PM    Creatinine 1.11 (H) 11/22/2021 02:46 PM    Calcium 9.6 11/22/2021 02:46 PM     Recent Glucose Results:   Lab Results   Component Value Date/Time    GLUCPOC 156 (H) 12/02/2021 07:12 AM    GLUCPOC 199 (H) 12/01/2021 09:20 PM    GLUCPOC 140 (H) 12/01/2021 04:40 PM           Body mass index is 35.12 kg/m². : A BMI > 30 is classified as obesity and > 40 is classified as morbid obesity. Lateral prineo dressing c.d.i  Posterior ELVIS dressing c.d.i - will change to optifoam prior to discharge   Calves soft and supple; No pain with passive stretch  Sensation and motor intact. LLE PF/DF 4+/5. +EHL. Expected weakness left hip flexor, improving 4/5. RLE PF/DF 5/5. +EHL.  Hip flexor 5/5  SCDs for mechanical DVT proph while in bed     PLAN:  1) Neurovascular assessment q4 hours   2) PT/OT - LSO   3) Pain control - scheduled tylenol, prn oxycodone   4) Readniess for discharge:     [x] Vital Signs stable    [x] Hgb stable    [x] + Voiding    [x] Wound intact, drainage minimal    [x] Tolerating PO intake     [] Cleared by PT (OT if applicable)     [] Stair training completed (if applicable)    [] Independent / Contact Guard Assist (household distance)     [] Bed mobility     [] Car transfers     [] ADLs    [x] Adequate pain control on oral medication alone     Discharge home today pending therapy clearance.      Jess Garcia, NP

## 2021-12-20 NOTE — PATIENT INSTRUCTIONS
See scanned remote PM device check in chart. Chargeable visit. Stop amlodipine and start hydrochlorothiazide 25mg daily. Get labs now and again in 2-4 weeks.

## 2022-02-14 DIAGNOSIS — E11.9 TYPE 2 DIABETES MELLITUS WITHOUT COMPLICATION, WITHOUT LONG-TERM CURRENT USE OF INSULIN (HCC): ICD-10-CM

## 2022-02-14 RX ORDER — DULAGLUTIDE 3 MG/.5ML
0.5 INJECTION, SOLUTION SUBCUTANEOUS
Qty: 12 EACH | Refills: 3 | Status: SHIPPED | OUTPATIENT
Start: 2022-02-14

## 2022-02-14 NOTE — TELEPHONE ENCOUNTER
Requested Prescriptions     Pending Prescriptions Disp Refills    dulaglutide (Trulicity) 3 MU/9.5 mL pnij       Si.5 mL by SubCUTAneous route every seven (7) days.

## 2022-02-14 NOTE — TELEPHONE ENCOUNTER
PCP: Karel Akins MD     Last appt: 2021   Future Appointments   Date Time Provider Chidi Unger   2022  9:30 AM Karel Akins MD [facility] BS AMB   2022 11:00 AM Antonella Martinez MD [facility] AMB        Requested Prescriptions     Pending Prescriptions Disp Refills    dulaglutide (Trulicity) 3 EU/8.3 mL pnij 12 Each 3     Si.5 mL by SubCUTAneous route every seven (7) days.

## 2022-02-19 LAB
ALBUMIN SERPL-MCNC: 4.5 G/DL (ref 3.8–4.8)
ALBUMIN/GLOB SERPL: 1.4 {RATIO} (ref 1.2–2.2)
ALP SERPL-CCNC: 100 IU/L (ref 44–121)
ALT SERPL-CCNC: 46 IU/L (ref 0–32)
AST SERPL-CCNC: 57 IU/L (ref 0–40)
BASOPHILS # BLD AUTO: 0 X10E3/UL (ref 0–0.2)
BASOPHILS NFR BLD AUTO: 1 %
BILIRUB SERPL-MCNC: 0.3 MG/DL (ref 0–1.2)
BUN SERPL-MCNC: 14 MG/DL (ref 8–27)
BUN/CREAT SERPL: 15 (ref 12–28)
CALCIUM SERPL-MCNC: 9.4 MG/DL (ref 8.7–10.3)
CHLORIDE SERPL-SCNC: 100 MMOL/L (ref 96–106)
CO2 SERPL-SCNC: 21 MMOL/L (ref 20–29)
CREAT SERPL-MCNC: 0.96 MG/DL (ref 0.57–1)
EOSINOPHIL # BLD AUTO: 0 X10E3/UL (ref 0–0.4)
EOSINOPHIL NFR BLD AUTO: 1 %
ERYTHROCYTE [DISTWIDTH] IN BLOOD BY AUTOMATED COUNT: 16.2 % (ref 11.7–15.4)
EST. AVERAGE GLUCOSE BLD GHB EST-MCNC: 128 MG/DL
FERRITIN SERPL-MCNC: 27 NG/ML (ref 15–150)
GLOBULIN SER CALC-MCNC: 3.2 G/DL (ref 1.5–4.5)
GLUCOSE SERPL-MCNC: 115 MG/DL (ref 65–99)
HBA1C MFR BLD: 6.1 % (ref 4.8–5.6)
HCT VFR BLD AUTO: 31.5 % (ref 34–46.6)
HGB BLD-MCNC: 9.4 G/DL (ref 11.1–15.9)
IMM GRANULOCYTES # BLD AUTO: 0 X10E3/UL (ref 0–0.1)
IMM GRANULOCYTES NFR BLD AUTO: 1 %
IRON SATN MFR SERPL: 8 % (ref 15–55)
IRON SERPL-MCNC: 34 UG/DL (ref 27–139)
LYMPHOCYTES # BLD AUTO: 0.9 X10E3/UL (ref 0.7–3.1)
LYMPHOCYTES NFR BLD AUTO: 27 %
MCH RBC QN AUTO: 24 PG (ref 26.6–33)
MCHC RBC AUTO-ENTMCNC: 29.8 G/DL (ref 31.5–35.7)
MCV RBC AUTO: 80 FL (ref 79–97)
MONOCYTES # BLD AUTO: 0.5 X10E3/UL (ref 0.1–0.9)
MONOCYTES NFR BLD AUTO: 16 %
NEUTROPHILS # BLD AUTO: 1.7 X10E3/UL (ref 1.4–7)
NEUTROPHILS NFR BLD AUTO: 54 %
PLATELET # BLD AUTO: 306 X10E3/UL (ref 150–450)
POTASSIUM SERPL-SCNC: 4.5 MMOL/L (ref 3.5–5.2)
PROT SERPL-MCNC: 7.7 G/DL (ref 6–8.5)
RBC # BLD AUTO: 3.92 X10E6/UL (ref 3.77–5.28)
SODIUM SERPL-SCNC: 138 MMOL/L (ref 134–144)
TIBC SERPL-MCNC: 444 UG/DL (ref 250–450)
TSH SERPL DL<=0.005 MIU/L-ACNC: 2.66 UIU/ML (ref 0.45–4.5)
UIBC SERPL-MCNC: 410 UG/DL (ref 118–369)
WBC # BLD AUTO: 3.1 X10E3/UL (ref 3.4–10.8)

## 2022-02-24 ENCOUNTER — OFFICE VISIT (OUTPATIENT)
Dept: INTERNAL MEDICINE CLINIC | Age: 67
End: 2022-02-24
Payer: MEDICARE

## 2022-02-24 VITALS
RESPIRATION RATE: 12 BRPM | HEIGHT: 66 IN | OXYGEN SATURATION: 97 % | DIASTOLIC BLOOD PRESSURE: 87 MMHG | HEART RATE: 85 BPM | TEMPERATURE: 98.1 F | WEIGHT: 215 LBS | BODY MASS INDEX: 34.55 KG/M2 | SYSTOLIC BLOOD PRESSURE: 132 MMHG

## 2022-02-24 DIAGNOSIS — Z23 ENCOUNTER FOR IMMUNIZATION: ICD-10-CM

## 2022-02-24 DIAGNOSIS — K76.0 NAFLD (NONALCOHOLIC FATTY LIVER DISEASE): ICD-10-CM

## 2022-02-24 DIAGNOSIS — Z98.1 STATUS POST LUMBAR SPINAL FUSION: ICD-10-CM

## 2022-02-24 DIAGNOSIS — E03.9 ACQUIRED HYPOTHYROIDISM: ICD-10-CM

## 2022-02-24 DIAGNOSIS — E78.00 HYPERCHOLESTEROLEMIA: ICD-10-CM

## 2022-02-24 DIAGNOSIS — E66.01 SEVERE OBESITY (BMI 35.0-39.9) WITH COMORBIDITY (HCC): ICD-10-CM

## 2022-02-24 DIAGNOSIS — M48.062 SPINAL STENOSIS OF LUMBAR REGION WITH NEUROGENIC CLAUDICATION: ICD-10-CM

## 2022-02-24 DIAGNOSIS — E11.9 TYPE 2 DIABETES MELLITUS WITHOUT COMPLICATION, WITHOUT LONG-TERM CURRENT USE OF INSULIN (HCC): Primary | ICD-10-CM

## 2022-02-24 DIAGNOSIS — D50.9 IRON DEFICIENCY ANEMIA, UNSPECIFIED IRON DEFICIENCY ANEMIA TYPE: ICD-10-CM

## 2022-02-24 DIAGNOSIS — I10 HYPERTENSION, ESSENTIAL: ICD-10-CM

## 2022-02-24 PROCEDURE — G8427 DOCREV CUR MEDS BY ELIG CLIN: HCPCS | Performed by: INTERNAL MEDICINE

## 2022-02-24 PROCEDURE — 90732 PPSV23 VACC 2 YRS+ SUBQ/IM: CPT | Performed by: INTERNAL MEDICINE

## 2022-02-24 PROCEDURE — 1101F PT FALLS ASSESS-DOCD LE1/YR: CPT | Performed by: INTERNAL MEDICINE

## 2022-02-24 PROCEDURE — G0009 ADMIN PNEUMOCOCCAL VACCINE: HCPCS | Performed by: INTERNAL MEDICINE

## 2022-02-24 PROCEDURE — G8536 NO DOC ELDER MAL SCRN: HCPCS | Performed by: INTERNAL MEDICINE

## 2022-02-24 PROCEDURE — 1090F PRES/ABSN URINE INCON ASSESS: CPT | Performed by: INTERNAL MEDICINE

## 2022-02-24 PROCEDURE — 3017F COLORECTAL CA SCREEN DOC REV: CPT | Performed by: INTERNAL MEDICINE

## 2022-02-24 PROCEDURE — G9899 SCRN MAM PERF RSLTS DOC: HCPCS | Performed by: INTERNAL MEDICINE

## 2022-02-24 PROCEDURE — G8432 DEP SCR NOT DOC, RNG: HCPCS | Performed by: INTERNAL MEDICINE

## 2022-02-24 PROCEDURE — 99215 OFFICE O/P EST HI 40 MIN: CPT | Performed by: INTERNAL MEDICINE

## 2022-02-24 PROCEDURE — G8752 SYS BP LESS 140: HCPCS | Performed by: INTERNAL MEDICINE

## 2022-02-24 PROCEDURE — G8417 CALC BMI ABV UP PARAM F/U: HCPCS | Performed by: INTERNAL MEDICINE

## 2022-02-24 PROCEDURE — 2022F DILAT RTA XM EVC RTNOPTHY: CPT | Performed by: INTERNAL MEDICINE

## 2022-02-24 PROCEDURE — G8754 DIAS BP LESS 90: HCPCS | Performed by: INTERNAL MEDICINE

## 2022-02-24 PROCEDURE — G8399 PT W/DXA RESULTS DOCUMENT: HCPCS | Performed by: INTERNAL MEDICINE

## 2022-02-24 PROCEDURE — 3044F HG A1C LEVEL LT 7.0%: CPT | Performed by: INTERNAL MEDICINE

## 2022-02-24 NOTE — PROGRESS NOTES
CC:   Chief Complaint   Patient presents with    Diabetes     Room 3B    Anemia    Immunization/Injection    Leg Swelling     both       HISTORY OF PRESENT ILLNESS  Jose Catherine is a 74 y.o. female. Presents for 3 month follow up evaluation. She has type 2 DM, HTN, hyperlipidemia, hypothyroidism, MANUEL on CPAP, cervical and lumbar spinal stenosis with left-sided sciatica, and obesity. Since last clinic visit, had L3-S1 posterior/lateral decompression and fusion with Dr. Shanae Saldivar on 11/29/21. Last saw Dr. Shanae Saldivar on 1/26/22. His note stated: \"X-rays taken show her fusion is in good alignment and healing. All restrictions have been lifted. I offered PT but she declined in favor of continuing with an HEP. She will continue with bone stimulator as well. Follow up in 2 months to repeat x-rays. \"    Today she reports that the back surgery went well and she is no longer having back pain. Has finished PT and is doing exercises at home. Ambulates with cane when she goes out. Complains of persisting ankle and foot swelling but states that it is much better than before. Swelling worse at left side compared to right side. Wears compression socks daily. Denies polydipsia, polyuria, or hypoglycemia. Takes Trulicity, metformin, and glipizide. Home FBS:. She reports medication compliance: compliant all of the time. Medication side effects: none. Diabetic diet compliance: compliant most of the time. Lab review: A1c 6.1% on 2/18/22. Eye exam: UTD    Denies HA's, CP, SOB, dizziness, heart palpitations, or leg swelling. Home BP: 130/72, 132/80. She reports taking medications as instructed, no medication side effects noted. Diet and Lifestyle: generally follows a low fat low cholesterol diet, generally follows a low sodium diet, no formal exercise but active during the day. Lab review: labs reviewed and discussed with patient. Patient is seen for follow up of hypothyroidism.  Thyroid ROS: denies fatigue, weight changes, heat/cold intolerance, bowel/skin changes or CVS symptoms. Taking medication as prescribed. Last TSH was normal.    ROS  A complete review of systems was performed and is negative except for those mentioned in the HPI. Patient Active Problem List   Diagnosis Code    Hypertension, essential I10    Hypercholesterolemia E78.00    Acquired hypothyroidism E03.9    MANUEL (obstructive sleep apnea) G47.33    Carpal tunnel syndrome of right wrist G56.01    Spinal stenosis of cervical region M48.02    Spinal stenosis of lumbar region with neurogenic claudication M48.062    Osteopenia of multiple sites M85.89    Severe obesity (HCC) E66.01    Type 2 diabetes mellitus without complication, without long-term current use of insulin (HCC) E11.9     Past Medical History:   Diagnosis Date    Diabetes ([facility] [address]     DM2-trulicity, PCP treats    Hypercholesterolemia     Hypertension     Sarcoidosis     brain    Sleep apnea     CPAP complient , Dr. Nelida Hurley post epidural steroid injection 10/15/2021    Thyroid disease     TIA (transient ischemic attack)     Questionable per patient left side defecits (due to spinal stenosis)     Allergies   Allergen Reactions    Gabapentin Other (comments)     Hot flashes    Topiramate Other (comments)     Hot flashes       Current Outpatient Medications   Medication Sig Dispense Refill    dulaglutide (Trulicity) 3 EH/3.7 mL pnij 0.5 mL by SubCUTAneous route every seven (7) days. 12 Each 3    metFORMIN (GLUCOPHAGE) 1,000 mg tablet TAKE 1 TABLET BY MOUTH TWICE DAILY WITH MEALS 180 Tablet 0    lisinopriL (PRINIVIL, ZESTRIL) 40 mg tablet Take 1 tablet by mouth once daily 90 Tablet 1    levothyroxine (SYNTHROID) 88 mcg tablet Take 1 Tablet by mouth Daily (before breakfast). 90 Tablet 2    vitamin e (E GEMS) 1,000 unit capsule Take 1,000 Units by mouth daily.  TURMERIC PO Take 800 mg by mouth daily.  OTHER Take  by mouth daily.  Immuneti supplement (Vitamin C, Zinc, Vitamin D3, Garlic bulb, Elderberry, Echinacea)      ferrous fumarate/vit Bcomp,C (SUPER B COMPLEX PO) Take 1 Capsule by mouth daily.  magnesium 250 mg tab Take 250 mg by mouth daily.  cholecalciferol (Vitamin D3) (5000 Units/125 mcg) tab tablet Take 5,000 Units by mouth two (2) times a day.  acetaminophen (Acetaminophen Extra Strength) 500 mg tablet Take 1,000 mg by mouth every six (6) hours as needed for Pain.  Cetirizine (ZyrTEC) 10 mg cap Take 10 mg by mouth daily.  omega-3 acid ethyl esters (LOVAZA) 1 gram capsule Take 2 g by mouth two (2) times a day.  glipiZIDE SR (GLUCOTROL XL) 10 mg CR tablet Take 1 Tablet by mouth daily. Indications: type 2 diabetes mellitus 90 Tablet 3    pregabalin (LYRICA) 50 mg capsule Take 50 mg by mouth two (2) times a day.  lancets misc Use to check blood glucose up to twice a day. Dx: E11.9 200 Each 3    glucose blood VI test strips (ASCENSIA AUTODISC VI, ONE TOUCH ULTRA TEST VI) strip Use to check blood glucose up to twice a day. Dx: E11.9 200 Strip 3    Blood-Glucose Meter monitoring kit Use to check blood glucose up to twice a day. Dx: E11.9 1 Kit 0    rosuvastatin (CRESTOR) 10 mg tablet Take 1 Tab by mouth nightly. 90 Tab 3    cyanocobalamin (VITAMIN B-12) 500 mcg tablet Take 500 mcg by mouth daily.  aspirin delayed-release 81 mg tablet Take 81 mg by mouth daily.  fluticasone (FLONASE ALLERGY RELIEF) 50 mcg/actuation nasal spray 2 Sprays by Both Nostrils route as needed.  ascorbic acid, vitamin C, (VITAMIN C) 500 mg tablet Take  by mouth.  OTHER Blood sugar support (Patient not taking: Reported on 11/30/2021)      OTHER Liver clear      APPLE CIDER VINEGAR PO Take 2 Capsules by mouth daily. (Patient not taking: Reported on 11/30/2021)      furosemide (LASIX) 20 mg tablet Take 1 Tablet by mouth daily as needed (swelling).  30 Tablet 0    glucose blood VI test strips (True Metrix Glucose Test Strip) strip Use to check blood glucose up to twice a day. Dx: E11.9 200 Strip 3         PHYSICAL EXAM  Visit Vitals  /87 (BP 1 Location: Left upper arm, BP Patient Position: Sitting)   Pulse 85   Temp 98.1 °F (36.7 °C) (Oral)   Resp 12   Ht 5' 5.5\" (1.664 m)   Wt 215 lb (97.5 kg)   SpO2 97%   BMI 35.23 kg/m²       General: Obese, no distress. Ambulates with a cane. HEENT:  Head normocephalic/atraumatic, no scleral icterus  Neck: Supple. No JVD, lymphadenopathy, or thyromegaly. Lungs:  Clear to ausculation bilaterally. Good air movement. Heart:  Regular rate and rhythm, normal S1 and S2, no murmur, gallop, or rub  Extremities: No clubbing or cyanosis. Trace left ankle edema. No RLE edema. Neurological: Alert and oriented. Non-focal exam.  Psychiatric: Normal mood and affect. Behavior is normal.         ASSESSMENT AND PLAN    ICD-10-CM ICD-9-CM    1. Type 2 diabetes mellitus without complication, without long-term current use of insulin (HCC)  E11.9 250.00 glucose blood VI test strips (ASCENSIA AUTODISC VI, ONE TOUCH ULTRA TEST VI) strip      HEMOGLOBIN A1C WITH EAG      HEMOGLOBIN A1C WITH EAG   2. Hypertension, essential  T63 322.3 METABOLIC PANEL, COMPREHENSIVE      CBC WITH AUTOMATED DIFF      METABOLIC PANEL, COMPREHENSIVE      CBC WITH AUTOMATED DIFF   3. Hypercholesterolemia  E78.00 272.0 LIPID PANEL      LIPID PANEL   4. Iron deficiency anemia, unspecified iron deficiency anemia type  D50.9 280.9 IRON PROFILE      FERRITIN      IRON PROFILE      FERRITIN   5. NAFLD (nonalcoholic fatty liver disease)  K76.0 571.8    6. Acquired hypothyroidism  E03.9 244.9    7. Spinal stenosis of lumbar region with neurogenic claudication  M48.062 724.03    8. Status post lumbar spinal fusion  Z98.1 V45.4    9. Severe obesity (BMI 35.0-39. 9) with comorbidity ([address]  E66.01 278.01    10.  Encounter for immunization  Z23 V03.89 PNEUMOCOCCAL POLYSACCHARIDE VACCINE, 23-VALENT, ADULT OR IMMUNOSUPPRESSED PT DOSE,      ADMIN INFLUENZA VIRUS VAC     Discussed lab results from 2/18/2022. A1c 6.1%, meaning well-controlled DM, was 7.9% three months ago. LFT's mildly elevated. WBC ct low but better than 3 mons ago. Anemia with Hg 9.4, Hct 31.5, and MCV 80. Very low iron % saturation at 8 (nl 15-55%). Ferritin low normal. Normal kidney tests and TSH. Diagnoses and all orders for this visit:    1. Type 2 diabetes mellitus without complication, without long-term current use of insulin ([facility])  A1c 6.1% on 2/18/22. Well-controlled. Continue Trulicity, metformin, and glipizide. -     Refill glucose blood VI test strips (ASCENSIA AUTODISC VI, ONE TOUCH ULTRA TEST VI) strip; Use to check blood glucose up to twice a day. Dx: E11.9  -     HEMOGLOBIN A1C WITH EAG; Future    2. Hypertension, essential  Controlled. Continue lisinopril 40 mg daily.   -     METABOLIC PANEL, COMPREHENSIVE; Future  -     CBC WITH AUTOMATED DIFF; Future    3. Hypercholesterolemia  Controlled on Crestor 10 mg nightly and Lovaza 2 gm twice daily. Continue ASA 81 mg daily  -     LIPID PANEL; Future    4. Iron deficiency anemia, unspecified iron deficiency anemia type Instructed patient to start taking ferrous sulfate 325 mg 1 tablet once daily for 3 months.  -     IRON PROFILE; Future  -     FERRITIN; Future    5. NAFLD (nonalcoholic fatty liver disease)  Cause of mildly elevated liver enzymes. CT lumbar spine done 11/29/21 showed \"Diffuse fatty infiltration of the liver\". 6. Acquired hypothyroidism  Controlled on present dose of levothyroxine. 7. Spinal stenosis of lumbar region with neurogenic claudication  Improved symptoms since surgery. 8. Status post lumbar spinal fusion  Controlled on Lyrica. Follow-up with Dr. Macy Causey as scheduled. 9. Severe obesity (BMI 35.0-39. 9) with comorbidity ([address]  Counseled on diet, exercise, and weight loss.     10. Encounter for immunization  -     PNEUMOCOCCAL POLYSACCHARIDE VACCINE, 23-VALENT, ADULT OR IMMUNOSUPPRESSED PT DOSE,  -     ADMIN INFLUENZA VIRUS VAC    Time spent: 45 mins on medical record review, history, exam, discussing problems and plan, counseling, and placing orders. Follow-up and Dispositions    · Return in about 4 months (around 6/24/2022), or if symptoms worsen or fail to improve, for DM, HTN, anemial; have fasting labs 1 week prior to next clinic visit. I have discussed the diagnosis with the patient and the intended plan as seen in the above orders. Patient is in agreement. The patient has received an after-visit summary and questions were answered concerning future plans. I have discussed medication side effects and warnings with the patient as well.

## 2022-02-24 NOTE — PROGRESS NOTES
Will discuss at 2/24/22 appt. A1c 6.1%, meaning well-controlled DM, was 7.9% three months ago. LFT's mildly elevated. WBC ct low but better than 3 mons ago. Anemia with Hg 9.4, Hct 31.5, and MCV 80. Very low iron % saturation at 8 (nl 15-55%). Ferritin low normal. Normal kidney tests and TSH.

## 2022-02-24 NOTE — PATIENT INSTRUCTIONS
Vaccine Information Statement    Pneumococcal Polysaccharide Vaccine (PPSV23): What You Need to Know    Many Vaccine Information Statements are available in [place] and other languages. See www.immunize.org/vis  Hojas de información sobre vacunas están disponibles en español y en muchos otros idiomas. Visite www.immunize.org/vis    1. Why get vaccinated? Pneumococcal polysaccharide vaccine (PPSV23) can prevent pneumococcal disease. Pneumococcal disease refers to any illness caused by pneumococcal bacteria. These bacteria can cause many types of illnesses, including pneumonia, which is an infection of the lungs. Pneumococcal bacteria are one of the most common causes of pneumonia. Besides pneumonia, pneumococcal bacteria can also cause:   Ear infections   Sinus infections   Meningitis (infection of the tissue covering the brain and spinal cord)   Bacteremia (bloodstream infection)    Anyone can get pneumococcal disease, but children under 3years of age, people with certain medical conditions, adults 72 years or older, and cigarette smokers are at the highest risk. Most pneumococcal infections are mild. However, some can result in long-term problems, such as brain damage or hearing loss. Meningitis, bacteremia, and pneumonia caused by pneumococcal disease can be fatal.     2. PPSV23     PPSV23 protects against 23 types of bacteria that cause pneumococcal disease. PPSV23 is recommended for:   All adults 72 years or older,   Anyone 2 years or older with certain medical conditions that can lead to an increased risk for pneumococcal disease. Most people need only one dose of PPSV23. A second dose of PPSV23, and another type of pneumococcal vaccine called PCV13, are recommended for certain high-risk groups. Your health care provider can give you more information.     People 65 years or older should get a dose of PPSV23 even if they have already gotten one or more doses of the vaccine before they turned 72.    3. Talk with your health care provider    Tell your vaccine provider if the person getting the vaccine:   Has had an allergic reaction after a previous dose of PPSV23, or has any severe, life-threatening allergies. In some cases, your health care provider may decide to postpone PPSV23 vaccination to a future visit. People with minor illnesses, such as a cold, may be vaccinated. People who are moderately or severely ill should usually wait until they recover before getting PPSV23. Your health care provider can give you more information. 4. Risks of a vaccine reaction     Redness or pain where the shot is given, feeling tired, fever, or muscle aches can happen after PPSV23. People sometimes faint after medical procedures, including vaccination. Tell your provider if you feel dizzy or have vision changes or ringing in the ears. As with any medicine, there is a very remote chance of a vaccine causing a severe allergic reaction, other serious injury, or death. 5. What if there is a serious problem? An allergic reaction could occur after the vaccinated person leaves the clinic. If you see signs of a severe allergic reaction (hives, swelling of the face and throat, difficulty breathing, a fast heartbeat, dizziness, or weakness), call 9-1-1 and get the person to the nearest hospital.    For other signs that concern you, call your health care provider. Adverse reactions should be reported to the Vaccine Adverse Event Reporting System (VAERS). Your health care provider will usually file this report, or you can do it yourself. Visit the VAERS website at www.vaers. hhs.gov or call 4-184.883.3010. VAERS is only for reporting reactions, and VAERS staff do not give medical advice. 6. How can I learn more?  Ask your health care provider.  Call your local or state health department.    Contact the Centers for Disease Control and Prevention (CDC):  - Call 7-710.243.2988 (0-321-YTH-INFO) or  - Visit CDCs website at www.cdc.gov/vaccines    Vaccine Information Statement   PPSV23   10/30/2019    Formerly [facility], 1928–1983 and [facility] Disease [facility] and Prevention    Office Use Only

## 2022-02-24 NOTE — PROGRESS NOTES
Sebastien Simmons  Identified pt with two pt identifiers(name and ). Chief Complaint   Patient presents with    Diabetes     Room 3B    Anemia    Immunization/Injection    Leg Swelling     both       Reviewed record In preparation for visit and have obtained necessary documentation. 1. Have you been to the ER, urgent care clinic or hospitalized since your last visit? No     2. Have you seen or consulted any other health care providers outside of the [address] [place] [address] since your last visit? Include any pap smears or colon screening. No    Vitals reviewed with provider. Health Maintenance reviewed: . After verbal order read back of  Dr Susanne Beyer, patient received Pneumococcal 23 in left arm. Shireen Harman [address]:  [id number] Lot: [id number] Exp: 2023. Patient tolerated procedure without complaints and received VIS.       Health Maintenance Due   Topic    Shingrix Vaccine Age 49> (1 of 2)    Pneumococcal 65+ years (2 of 2 - PPSV23)          Wt Readings from Last 3 Encounters:   22 215 lb (97.5 kg)   21 214 lb 4.6 oz (97.2 kg)   21 219 lb (99.3 kg)        Temp Readings from Last 3 Encounters:   22 98.1 °F (36.7 °C) (Oral)   21 98.5 °F (36.9 °C)   21 98.6 °F (37 °C) (Oral)        BP Readings from Last 3 Encounters:   22 132/87   21 (!) 141/69   21 135/85        Pulse Readings from Last 3 Encounters:   22 85   21 95   21 77        Vitals:    22 0949   BP: 132/87   Pulse: 85   Resp: 12   Temp: 98.1 °F (36.7 °C)   TempSrc: Oral   SpO2: 97%   Weight: 215 lb (97.5 kg)   Height: 5' 5.5\" (1.664 m)   PainSc:   0 - No pain          Learning Assessment:   :       Learning Assessment 2018   PRIMARY LEARNER Patient   HIGHEST LEVEL OF EDUCATION - PRIMARY LEARNER  SOME COLLEGE   BARRIERS PRIMARY LEARNER NONE   CO-LEARNER CAREGIVER No   PRIMARY LANGUAGE ENGLISH   LEARNER PREFERENCE PRIMARY DEMONSTRATION   ANSWERED BY patient   RELATIONSHIP SELF        Depression Screening:   :       3 most recent PHQ Screens 11/24/2021   Little interest or pleasure in doing things Not at all   Feeling down, depressed, irritable, or hopeless Not at all   Total Score PHQ 2 0        Fall Risk Assessment:   :       Fall Risk Assessment, last 12 mths 11/24/2021   Able to walk? Yes   Fall in past 12 months? 0   Do you feel unsteady? 0   Are you worried about falling 0        Abuse Screening:   :       Abuse Screening Questionnaire 11/24/2021 10/6/2020 3/3/2020 12/28/2018   Do you ever feel afraid of your partner? N N N N   Are you in a relationship with someone who physically or mentally threatens you? N N N N   Is it safe for you to go home?  Y Y Y Y        ADL Screening:   :       ADL Assessment 11/24/2021   Feeding yourself No Help Needed   Getting from bed to chair No Help Needed   Getting dressed No Help Needed   Bathing or showering No Help Needed   Walk across the room (includes cane/walker) No Help Needed   Using the telphone No Help Needed   Taking your medications No Help Needed   Preparing meals No Help Needed   Managing money (expenses/bills) No Help Needed   Moderately strenuous housework (laundry) No Help Needed   Shopping for personal items (toiletries/medicines) No Help Needed   Shopping for groceries No Help Needed   Driving No Help Needed   Climbing a flight of stairs No Help Needed   Getting to places beyond walking distances No Help Needed

## 2022-03-16 ENCOUNTER — TRANSCRIBE ORDER (OUTPATIENT)
Dept: SCHEDULING | Age: 67
End: 2022-03-16

## 2022-03-16 DIAGNOSIS — R93.89 ABNORMAL CHEST X-RAY: Primary | ICD-10-CM

## 2022-03-18 ENCOUNTER — TELEPHONE (OUTPATIENT)
Dept: SLEEP MEDICINE | Age: 67
End: 2022-03-18

## 2022-03-18 PROBLEM — M85.89 OSTEOPENIA OF MULTIPLE SITES: Status: ACTIVE | Noted: 2019-04-06

## 2022-03-18 PROBLEM — D50.9 IRON DEFICIENCY ANEMIA: Status: ACTIVE | Noted: 2022-02-24

## 2022-03-18 PROBLEM — G56.01 CARPAL TUNNEL SYNDROME OF RIGHT WRIST: Status: ACTIVE | Noted: 2018-12-28

## 2022-03-18 PROBLEM — K76.0 NAFLD (NONALCOHOLIC FATTY LIVER DISEASE): Status: ACTIVE | Noted: 2022-02-24

## 2022-03-18 NOTE — TELEPHONE ENCOUNTER
Patient called into the office on 03/18/2022 at 10:50am stating that she was initially told by Shane Valero that her device wasn't on the recall list. Patient called back to Shane Valero and found that her device is on their listby another Rep. Patient would like to know what other treatments are available for her and if she is a candidate for inspire. Patient can be reached back at [phone].

## 2022-03-18 NOTE — TELEPHONE ENCOUNTER
She should register her machine. I had discussed the recall with her at last visit in September. Should she decide to sleep without it, she should sleep with head of bed elevated to help minimize apneas. By review, he device is not old (issued in 2019), and she said she did not use an ozone PAP  (so-clean or other), so her machine would be lower risk for the issue based on what Robert has said. However, she does need to register her current machine as Robert will replace it. With regards to [facility] therapy, she would not be a candidate at this time based on most current weight listed in chart. A BMI of 33 would be needed to move forward.      Oral appliances are for milder cases of sleep apnea  If she would like to discuss further, let's schedule her an appointment with NP or me

## 2022-03-18 NOTE — TELEPHONE ENCOUNTER
Detailed message was left for patient on 03/18/2022 at 3:05pm to follow back up with her about Dr. Yg Fortune recommendations regarding her sleep therapy. Should patient want to discuss other sleep treatment options she can be scheduled with one of the NP's or Dr. Yg Fortune.

## 2022-03-19 PROBLEM — E11.9 TYPE 2 DIABETES MELLITUS WITHOUT COMPLICATION, WITHOUT LONG-TERM CURRENT USE OF INSULIN (HCC): Status: ACTIVE | Noted: 2021-01-19

## 2022-03-19 PROBLEM — G47.33 OSA (OBSTRUCTIVE SLEEP APNEA): Status: ACTIVE | Noted: 2018-12-28

## 2022-03-19 PROBLEM — Z98.1 STATUS POST LUMBAR SPINAL FUSION: Status: ACTIVE | Noted: 2022-02-24

## 2022-03-19 PROBLEM — E03.9 ACQUIRED HYPOTHYROIDISM: Status: ACTIVE | Noted: 2018-12-28

## 2022-03-19 PROBLEM — E78.00 HYPERCHOLESTEROLEMIA: Status: ACTIVE | Noted: 2018-12-28

## 2022-03-20 PROBLEM — I10 HYPERTENSION, ESSENTIAL: Status: ACTIVE | Noted: 2018-12-28

## 2022-03-20 PROBLEM — E66.01 SEVERE OBESITY (HCC): Status: ACTIVE | Noted: 2020-10-06

## 2022-03-20 PROBLEM — M48.062 SPINAL STENOSIS OF LUMBAR REGION WITH NEUROGENIC CLAUDICATION: Status: ACTIVE | Noted: 2018-12-28

## 2022-03-20 PROBLEM — M48.02 SPINAL STENOSIS OF CERVICAL REGION: Status: ACTIVE | Noted: 2018-12-28

## 2022-03-22 ENCOUNTER — HOSPITAL ENCOUNTER (OUTPATIENT)
Dept: CT IMAGING | Age: 67
Discharge: HOME | End: 2022-03-22
Attending: ORTHOPAEDIC SURGERY
Payer: MEDICARE

## 2022-03-22 DIAGNOSIS — R93.89 ABNORMAL CHEST X-RAY: ICD-10-CM

## 2022-03-22 PROCEDURE — 71250 CT THORAX DX C-: CPT

## 2022-03-30 ENCOUNTER — TRANSCRIBE ORDER (OUTPATIENT)
Dept: SCHEDULING | Age: 67
End: 2022-03-30

## 2022-03-30 DIAGNOSIS — R93.89 ABNORMAL CHEST CT: Primary | ICD-10-CM

## 2022-04-14 RX ORDER — METFORMIN HYDROCHLORIDE 1000 MG/1
TABLET ORAL
Qty: 180 TABLET | Refills: 0 | Status: SHIPPED | OUTPATIENT
Start: 2022-04-14 | End: 2022-07-11

## 2022-05-18 ENCOUNTER — TRANSCRIBE ORDER (OUTPATIENT)
Dept: SCHEDULING | Age: 67
End: 2022-05-18

## 2022-05-18 DIAGNOSIS — Z12.31 OTHER SCREENING MAMMOGRAM: Primary | ICD-10-CM

## 2022-06-01 DIAGNOSIS — I10 HYPERTENSION, ESSENTIAL: ICD-10-CM

## 2022-06-01 RX ORDER — LISINOPRIL 40 MG/1
TABLET ORAL
Qty: 90 TABLET | Refills: 0 | Status: SHIPPED | OUTPATIENT
Start: 2022-06-01 | End: 2022-08-29

## 2022-06-18 LAB
ALBUMIN SERPL-MCNC: 4.4 G/DL (ref 3.8–4.8)
ALBUMIN/GLOB SERPL: 1.5 {RATIO} (ref 1.2–2.2)
ALP SERPL-CCNC: 70 IU/L (ref 44–121)
ALT SERPL-CCNC: 45 IU/L (ref 0–32)
AST SERPL-CCNC: 55 IU/L (ref 0–40)
BASOPHILS # BLD AUTO: 0 X10E3/UL (ref 0–0.2)
BASOPHILS NFR BLD AUTO: 1 %
BILIRUB SERPL-MCNC: <0.2 MG/DL (ref 0–1.2)
BUN SERPL-MCNC: 15 MG/DL (ref 8–27)
BUN/CREAT SERPL: 16 (ref 12–28)
CALCIUM SERPL-MCNC: 10 MG/DL (ref 8.7–10.3)
CHLORIDE SERPL-SCNC: 103 MMOL/L (ref 96–106)
CHOLEST SERPL-MCNC: 118 MG/DL (ref 100–199)
CO2 SERPL-SCNC: 22 MMOL/L (ref 20–29)
CREAT SERPL-MCNC: 0.91 MG/DL (ref 0.57–1)
EGFR: 69 ML/MIN/1.73
EOSINOPHIL # BLD AUTO: 0 X10E3/UL (ref 0–0.4)
EOSINOPHIL NFR BLD AUTO: 0 %
ERYTHROCYTE [DISTWIDTH] IN BLOOD BY AUTOMATED COUNT: 17.5 % (ref 11.7–15.4)
EST. AVERAGE GLUCOSE BLD GHB EST-MCNC: 157 MG/DL
FERRITIN SERPL-MCNC: 61 NG/ML (ref 15–150)
GLOBULIN SER CALC-MCNC: 2.9 G/DL (ref 1.5–4.5)
GLUCOSE SERPL-MCNC: 134 MG/DL (ref 65–99)
HBA1C MFR BLD: 7.1 % (ref 4.8–5.6)
HCT VFR BLD AUTO: 34.8 % (ref 34–46.6)
HDLC SERPL-MCNC: 37 MG/DL
HGB BLD-MCNC: 11 G/DL (ref 11.1–15.9)
IMM GRANULOCYTES # BLD AUTO: 0 X10E3/UL (ref 0–0.1)
IMM GRANULOCYTES NFR BLD AUTO: 1 %
IRON SATN MFR SERPL: 10 % (ref 15–55)
IRON SERPL-MCNC: 43 UG/DL (ref 27–139)
LDLC SERPL CALC-MCNC: 60 MG/DL (ref 0–99)
LYMPHOCYTES # BLD AUTO: 1.3 X10E3/UL (ref 0.7–3.1)
LYMPHOCYTES NFR BLD AUTO: 37 %
MCH RBC QN AUTO: 27.2 PG (ref 26.6–33)
MCHC RBC AUTO-ENTMCNC: 31.6 G/DL (ref 31.5–35.7)
MCV RBC AUTO: 86 FL (ref 79–97)
MONOCYTES # BLD AUTO: 0.5 X10E3/UL (ref 0.1–0.9)
MONOCYTES NFR BLD AUTO: 13 %
NEUTROPHILS # BLD AUTO: 1.7 X10E3/UL (ref 1.4–7)
NEUTROPHILS NFR BLD AUTO: 48 %
PLATELET # BLD AUTO: 209 X10E3/UL (ref 150–450)
POTASSIUM SERPL-SCNC: 4.5 MMOL/L (ref 3.5–5.2)
PROT SERPL-MCNC: 7.3 G/DL (ref 6–8.5)
RBC # BLD AUTO: 4.05 X10E6/UL (ref 3.77–5.28)
SODIUM SERPL-SCNC: 141 MMOL/L (ref 134–144)
TIBC SERPL-MCNC: 418 UG/DL (ref 250–450)
TRIGL SERPL-MCNC: 114 MG/DL (ref 0–149)
UIBC SERPL-MCNC: 375 UG/DL (ref 118–369)
VLDLC SERPL CALC-MCNC: 21 MG/DL (ref 5–40)
WBC # BLD AUTO: 3.5 X10E3/UL (ref 3.4–10.8)

## 2022-06-20 NOTE — PROGRESS NOTES
Will discuss at 6/24/22 appt. A1c 7.1%, was 6.1% on 2/18/22, meaning DM not as well-controlled. Unchanged mild LFT elevation. Mild anemia improved from 4 months ago (Hg now 11.0, was 9.4 on 2/18/22). Improving iron deficiency anemia. Normal kidney tests, cholesterol (tot chol 118, LDL 60), and iron level.

## 2022-06-21 ENCOUNTER — HOSPITAL ENCOUNTER (OUTPATIENT)
Dept: MAMMOGRAPHY | Age: 67
Discharge: HOME | End: 2022-06-21
Attending: INTERNAL MEDICINE
Payer: MEDICARE

## 2022-06-21 ENCOUNTER — HOSPITAL ENCOUNTER (OUTPATIENT)
Dept: CT IMAGING | Age: 67
Discharge: HOME | End: 2022-06-21
Attending: INTERNAL MEDICINE
Payer: MEDICARE

## 2022-06-21 DIAGNOSIS — Z12.31 OTHER SCREENING MAMMOGRAM: ICD-10-CM

## 2022-06-21 DIAGNOSIS — R93.89 ABNORMAL CHEST CT: ICD-10-CM

## 2022-06-21 PROCEDURE — 71250 CT THORAX DX C-: CPT

## 2022-06-21 PROCEDURE — 77063 BREAST TOMOSYNTHESIS BI: CPT

## 2022-06-24 ENCOUNTER — OFFICE VISIT (OUTPATIENT)
Dept: INTERNAL MEDICINE CLINIC | Age: 67
End: 2022-06-24
Payer: MEDICARE

## 2022-06-24 VITALS
TEMPERATURE: 99 F | OXYGEN SATURATION: 98 % | WEIGHT: 217 LBS | SYSTOLIC BLOOD PRESSURE: 147 MMHG | RESPIRATION RATE: 12 BRPM | DIASTOLIC BLOOD PRESSURE: 89 MMHG | HEART RATE: 79 BPM | HEIGHT: 66 IN | BODY MASS INDEX: 34.87 KG/M2

## 2022-06-24 DIAGNOSIS — K76.0 NAFLD (NONALCOHOLIC FATTY LIVER DISEASE): ICD-10-CM

## 2022-06-24 DIAGNOSIS — E11.9 TYPE 2 DIABETES MELLITUS WITHOUT COMPLICATION, WITHOUT LONG-TERM CURRENT USE OF INSULIN (HCC): Primary | ICD-10-CM

## 2022-06-24 DIAGNOSIS — D50.9 IRON DEFICIENCY ANEMIA, UNSPECIFIED IRON DEFICIENCY ANEMIA TYPE: ICD-10-CM

## 2022-06-24 DIAGNOSIS — Z98.1 STATUS POST LUMBAR SPINAL FUSION: ICD-10-CM

## 2022-06-24 DIAGNOSIS — I10 HYPERTENSION, ESSENTIAL: ICD-10-CM

## 2022-06-24 DIAGNOSIS — E78.00 HYPERCHOLESTEROLEMIA: ICD-10-CM

## 2022-06-24 PROCEDURE — 99214 OFFICE O/P EST MOD 30 MIN: CPT | Performed by: INTERNAL MEDICINE

## 2022-06-24 PROCEDURE — 1090F PRES/ABSN URINE INCON ASSESS: CPT | Performed by: INTERNAL MEDICINE

## 2022-06-24 PROCEDURE — G8754 DIAS BP LESS 90: HCPCS | Performed by: INTERNAL MEDICINE

## 2022-06-24 PROCEDURE — G8510 SCR DEP NEG, NO PLAN REQD: HCPCS | Performed by: INTERNAL MEDICINE

## 2022-06-24 PROCEDURE — G8417 CALC BMI ABV UP PARAM F/U: HCPCS | Performed by: INTERNAL MEDICINE

## 2022-06-24 PROCEDURE — G8536 NO DOC ELDER MAL SCRN: HCPCS | Performed by: INTERNAL MEDICINE

## 2022-06-24 PROCEDURE — G8753 SYS BP > OR = 140: HCPCS | Performed by: INTERNAL MEDICINE

## 2022-06-24 PROCEDURE — G9899 SCRN MAM PERF RSLTS DOC: HCPCS | Performed by: INTERNAL MEDICINE

## 2022-06-24 PROCEDURE — 1123F ACP DISCUSS/DSCN MKR DOCD: CPT | Performed by: INTERNAL MEDICINE

## 2022-06-24 PROCEDURE — 1101F PT FALLS ASSESS-DOCD LE1/YR: CPT | Performed by: INTERNAL MEDICINE

## 2022-06-24 PROCEDURE — 3017F COLORECTAL CA SCREEN DOC REV: CPT | Performed by: INTERNAL MEDICINE

## 2022-06-24 PROCEDURE — G8399 PT W/DXA RESULTS DOCUMENT: HCPCS | Performed by: INTERNAL MEDICINE

## 2022-06-24 PROCEDURE — G8427 DOCREV CUR MEDS BY ELIG CLIN: HCPCS | Performed by: INTERNAL MEDICINE

## 2022-06-24 PROCEDURE — 3051F HG A1C>EQUAL 7.0%<8.0%: CPT | Performed by: INTERNAL MEDICINE

## 2022-06-24 PROCEDURE — 2022F DILAT RTA XM EVC RTNOPTHY: CPT | Performed by: INTERNAL MEDICINE

## 2022-06-24 RX ORDER — LANOLIN ALCOHOL/MO/W.PET/CERES
CREAM (GRAM) TOPICAL DAILY
COMMUNITY
Start: 2022-02-25

## 2022-06-24 RX ORDER — LORATADINE 10 MG/1
10 TABLET ORAL
COMMUNITY

## 2022-06-24 RX ORDER — BLOOD SUGAR DIAGNOSTIC
STRIP MISCELLANEOUS
COMMUNITY
Start: 2022-02-24

## 2022-06-24 NOTE — PROGRESS NOTES
CC:   Chief Complaint   Patient presents with    Diabetes    Hypertension    Anemia       HISTORY OF PRESENT ILLNESS  Amy Turpin is a 74 y.o. female. Presents for 4 month follow up evaluation. She has type 2 DM, HTN, hyperlipidemia, hypothyroidism, MANUEL on CPAP, cervical and lumbar spinal stenosis with left-sided sciatica, and obesity. No complaints. Saw pulmonologist who ordered CT chest for lung nodules. Showed no change and was told nodules could be old sarcoidosis changes. Denies SOB, wheezing, or cough. No back pain since having back surgery with Dr. Aleksandra Rosario on 11/29/21. Denies polydipsia, polyuria, or hypoglycemia. Takes Trulicity, metformin,and glipizide. Home FBS: 's. She reports medication compliance: compliant all of the time. Medication side effects: none. Diabetic diet compliance: compliant most of the time. On review, has been eating a lot of [employer] rice. Also taking some [employer] products for fatty liver disease. Lab review: A1c 7.1% on 6/17/22, was 6.1% on 2/18/22. Eye exam: UTD    Denies HA's, CP, SOB, dizziness, heart palpitations, or leg swelling. Shingrix vaccine: plans to get    ROS  A complete review of systems was performed and is negative except for those mentioned in the HPI.        Patient Active Problem List   Diagnosis Code    Hypertension, essential I10    Hypercholesterolemia E78.00    Acquired hypothyroidism E03.9    MANUEL (obstructive sleep apnea) G47.33    Carpal tunnel syndrome of right wrist G56.01    Spinal stenosis of cervical region M48.02    Spinal stenosis of lumbar region with neurogenic claudication M48.062    Osteopenia of multiple sites M85.89    Severe obesity (HCC) E66.01    Type 2 diabetes mellitus without complication, without long-term current use of insulin (HCC) E11.9    NAFLD (nonalcoholic fatty liver disease) K76.0    Status post lumbar spinal fusion Z98.1    Iron deficiency anemia D50.9     Past Medical History: Diagnosis Date    Diabetes ([facility]     DM2-trulicity, PCP treats    Hypercholesterolemia     Hypertension     Sarcoidosis     brain    Sleep apnea     CPAP complient , Dr. Helene Willingham post epidural steroid injection 10/15/2021    Thyroid disease     TIA (transient ischemic attack)     Questionable per patient left side defecits (due to spinal stenosis)     Allergies   Allergen Reactions    Gabapentin Other (comments)     Hot flashes    Topiramate Other (comments)     Hot flashes       Current Outpatient Medications   Medication Sig Dispense Refill    ferrous sulfate 325 mg (65 mg iron) tablet Take  by mouth daily.  glucose blood VI test strips (OneTouch Ultra Test) strip USE STRIP TO CHECK GLUCOSE UP TO TWICE DAILY      loratadine (Claritin) 10 mg tablet Take 10 mg by mouth.  lisinopriL (PRINIVIL, ZESTRIL) 40 mg tablet Take 1 tablet by mouth once daily 90 Tablet 0    levothyroxine (SYNTHROID) 88 mcg tablet TAKE 1/2 (ONE-HALF) TABLET BY MOUTH ONCE DAILY ON MONDAY AND TAKE 1 TABLET ALL OTHER DAYS (Patient taking differently: Take 88 mcg by mouth Daily (before breakfast). ) 90 Tablet 3    metFORMIN (GLUCOPHAGE) 1,000 mg tablet TAKE 1 TABLET BY MOUTH TWICE DAILY WITH MEALS 180 Tablet 0    glucose blood VI test strips (ASCENSIA AUTODISC VI, ONE TOUCH ULTRA TEST VI) strip Use to check blood glucose up to twice a day. Dx: E11.9 200 Strip 3    dulaglutide (Trulicity) 3 HH/9.0 mL pnij 0.5 mL by SubCUTAneous route every seven (7) days. 12 Each 3    vitamin e (E GEMS) 1,000 unit capsule Take 1,000 Units by mouth daily.  TURMERIC PO Take 800 mg by mouth daily.  OTHER Take  by mouth daily. Immuneti supplement (Vitamin C, Zinc, Vitamin D3, Garlic bulb, Elderberry, Echinacea)      ferrous fumarate/vit Bcomp,C (SUPER B COMPLEX PO) Take 1 Capsule by mouth daily.  magnesium 250 mg tab Take 250 mg by mouth daily.       cholecalciferol (Vitamin D3) (2,000 UNITS /50 MCG) cap capsule Take 5,000 Units by mouth daily.  acetaminophen (Acetaminophen Extra Strength) 500 mg tablet Take 1,000 mg by mouth every six (6) hours as needed for Pain.  omega-3 acid ethyl esters (LOVAZA) 1 gram capsule Take 2 g by mouth two (2) times a day.  glipiZIDE SR (GLUCOTROL XL) 10 mg CR tablet Take 1 Tablet by mouth daily. Indications: type 2 diabetes mellitus 90 Tablet 3    pregabalin (LYRICA) 50 mg capsule Take 50 mg by mouth two (2) times a day.  lancets misc Use to check blood glucose up to twice a day. Dx: E11.9 200 Each 3    Blood-Glucose Meter monitoring kit Use to check blood glucose up to twice a day. Dx: E11.9 1 Kit 0    rosuvastatin (CRESTOR) 10 mg tablet Take 1 Tab by mouth nightly. 90 Tab 3    cyanocobalamin (VITAMIN B-12) 500 mcg tablet Take 500 mcg by mouth daily.  aspirin delayed-release 81 mg tablet Take 81 mg by mouth daily.  fluticasone (FLONASE ALLERGY RELIEF) 50 mcg/actuation nasal spray 2 Sprays by Both Nostrils route as needed.  ascorbic acid, vitamin C, (VITAMIN C) 500 mg tablet Take  by mouth.  Cetirizine (ZyrTEC) 10 mg cap Take 10 mg by mouth daily. PHYSICAL EXAM  Visit Vitals  BP (!) 147/89 (BP 1 Location: Left upper arm, BP Patient Position: Sitting)   Pulse 79   Temp 99 °F (37.2 °C) (Oral)   Resp 12   Ht 5' 5.5\" (1.664 m)   Wt 217 lb (98.4 kg)   SpO2 98%   BMI 35.56 kg/m²       General: Obese, no distress. HEENT:  Head normocephalic/atraumatic, no scleral icterus  Lungs:  Clear to ausculation bilaterally. Good air movement. Heart:  Regular rate and rhythm, normal S1 and S2, no murmur, gallop, or rub  Abdomen: Soft, non-distended, normal bowel sounds, no tenderness, no guarding, masses, rebound tenderness, or HSM. Extremities: No clubbing or cyanosis. Trace left ankle edema. No RLE edema   Neurological: Alert and oriented. Psychiatric: Normal mood and affect.  Behavior is normal.     Labs drawn 6/17/22:  Results for orders placed or performed in visit on 85/57/40   METABOLIC PANEL, COMPREHENSIVE   Result Value Ref Range    Glucose 134 (H) 65 - 99 mg/dL    BUN 15 8 - 27 mg/dL    Creatinine 0.91 0.57 - 1.00 mg/dL    eGFR 69 >59 mL/min/1.73    BUN/Creatinine ratio 16 12 - 28    Sodium 141 134 - 144 mmol/L    Potassium 4.5 3.5 - 5.2 mmol/L    Chloride 103 96 - 106 mmol/L    CO2 22 20 - 29 mmol/L    Calcium 10.0 8.7 - 10.3 mg/dL    Protein, total 7.3 6.0 - 8.5 g/dL    Albumin 4.4 3.8 - 4.8 g/dL    GLOBULIN, TOTAL 2.9 1.5 - 4.5 g/dL    A-G Ratio 1.5 1.2 - 2.2    Bilirubin, total <0.2 0.0 - 1.2 mg/dL    Alk. phosphatase 70 44 - 121 IU/L    AST (SGOT) 55 (H) 0 - 40 IU/L    ALT (SGPT) 45 (H) 0 - 32 IU/L   CBC WITH AUTOMATED DIFF   Result Value Ref Range    WBC 3.5 3.4 - 10.8 x10E3/uL    RBC 4.05 3.77 - 5.28 x10E6/uL    HGB 11.0 (L) 11.1 - 15.9 g/dL    HCT 34.8 34.0 - 46.6 %    MCV 86 79 - 97 fL    MCH 27.2 26.6 - 33.0 pg    MCHC 31.6 31.5 - 35.7 g/dL    RDW 17.5 (H) 11.7 - 15.4 %    PLATELET 933 210 - 216 x10E3/uL    NEUTROPHILS 48 Not Estab. %    Lymphocytes 37 Not Estab. %    MONOCYTES 13 Not Estab. %    EOSINOPHILS 0 Not Estab. %    BASOPHILS 1 Not Estab. %    ABS. NEUTROPHILS 1.7 1.4 - 7.0 x10E3/uL    Abs Lymphocytes 1.3 0.7 - 3.1 x10E3/uL    ABS. MONOCYTES 0.5 0.1 - 0.9 x10E3/uL    ABS. EOSINOPHILS 0.0 0.0 - 0.4 x10E3/uL    ABS. BASOPHILS 0.0 0.0 - 0.2 x10E3/uL    IMMATURE GRANULOCYTES 1 Not Estab. %    ABS. IMM.  GRANS. 0.0 0.0 - 0.1 x10E3/uL   HEMOGLOBIN A1C WITH EAG   Result Value Ref Range    Hemoglobin A1c 7.1 (H) 4.8 - 5.6 %    Estimated average glucose 157 mg/dL   LIPID PANEL   Result Value Ref Range    Cholesterol, total 118 100 - 199 mg/dL    Triglyceride 114 0 - 149 mg/dL    HDL Cholesterol 37 (L) >39 mg/dL    VLDL, calculated 21 5 - 40 mg/dL    LDL, calculated 60 0 - 99 mg/dL   IRON PROFILE   Result Value Ref Range    TIBC 418 250 - 450 ug/dL    UIBC 375 (H) 118 - 369 ug/dL    Iron 43 27 - 139 ug/dL    Iron % saturation 10 (L) 15 - 55 % FERRITIN   Result Value Ref Range    Ferritin 61 15 - 150 ng/mL         ASSESSMENT AND PLAN    ICD-10-CM ICD-9-CM    1. Type 2 diabetes mellitus without complication, without long-term current use of insulin (HCC)  E11.9 250.00    2. Hypertension, essential  I10 401.9    3. Hypercholesterolemia  E78.00 272.0    4. NAFLD (nonalcoholic fatty liver disease)  K76.0 571.8    5. Status post lumbar spinal fusion  Z98.1 V45.4    6. Iron deficiency anemia, unspecified iron deficiency anemia type  D50.9 280.9      Discussed lab results from 6/17/22. A1c 7.1%, was 6.1% on 2/18/22, meaning DM not as well-controlled. Unchanged mild LFT elevation. Mild anemia improved from 4 months ago (Hg now 11.0, was 9.4 on 2/18/22). Improving iron deficiency anemia. Normal kidney tests, cholesterol (tot chol 118, LDL 60), and iron level. Diagnoses and all orders for this visit:    1. Type 2 diabetes mellitus without complication, without long-term current use of insulin ([facility])  A1c 7.1% on 6/17/22. Controlled but not as well as it was 4 months ago. Discussed cutting down on rice and bringing in supplement bottles or sending photograph of ingredients through My Chart. I recommended referral to Diabetic Education but she declined. 2. Hypertension, essential  Elevated today. Continue lisinopril 40 mg daily. Check at home 2-times a week and bring log and BP monitor to next clinic appointment. . If still elevated, add HCTZ 12.5 mg daily (she is hesitant to take because thinks it affected her kidneys in the past), amlodipine (may worsen leg swelling), or indapamide. 3. Hypercholesterolemia  Controlled on Crestor 10 mg nightly and Lovaza 2 gm twice daily. Continue ASA 81 mg daily. 4. NAFLD (nonalcoholic fatty liver disease)  Given handout on management. 5. Status post lumbar spinal fusion  No back pain.     6. Iron deficiency anemia, unspecified iron deficiency anemia type  Plan to continue ferrous sulfate for another 2 months (started in 2/22). Follow-up and Dispositions    · Return in about 2 months (around 8/24/2022), or if symptoms worsen or fail to improve, for HTN; bring BP monitor to clinic. I have discussed the diagnosis with the patient and the intended plan as seen in the above orders. Patient is in agreement. The patient has received an after-visit summary and questions were answered concerning future plans. I have discussed medication side effects and warnings with the patient as well.

## 2022-06-24 NOTE — PATIENT INSTRUCTIONS
Nonalcoholic Steatohepatitis (BAIRD): Care Instructions  Overview     Nonalcoholic steatohepatitis (BAIRD) is liver inflammation. It is caused by a buildup of fat in the liver. The fat buildup is not caused by drinking alcohol. Because of the inflammation, the liver does not work as well as it should. BAIRD is part of a group of liver diseases called nonalcoholic fatty liver disease. In these diseases, fat builds up in the liver and sometimes causes liver damage. This damage can get worse over time. Follow-up care is a key part of your treatment and safety. Be sure to make and go to all appointments, and call your doctor if you are having problems. It's also a good idea to know your test results and keep a list of the medicines you take. How can you care for yourself at home? · Stay at a healthy weight. Or if you need to, slowly get to a healthy weight. · Control your cholesterol. Talk to your doctor about ways to lower your cholesterol, if needed. You might try getting active, taking medicines, and making healthy changes to your diet. · Eat healthy foods. This includes fruits, vegetables, lean meats and dairy, and whole grains. · If you have diabetes, keep your blood sugar in your target range. · Get at least 30 minutes of exercise on most days of the week. Walking is a good choice. · Limit alcohol, or do not drink. Alcohol can damage the liver and cause health problems. · Get immunized. Having BAIRD increases your risk for infections, so it's important to get all recommended vaccines. When should you call for help? Call 911 anytime you think you may need emergency care. For example, call if:    · You have trouble breathing.     · You vomit blood or what looks like coffee grounds.    Call your doctor now or seek immediate medical care if:    · You feel very sleepy or confused.     · You have new or worse belly pain.     · You have a fever.     · There is a new or increasing yellow tint to your skin or the whites of your eyes.     · You have any abnormal bleeding, such as:  ? Nosebleeds. ? Vaginal bleeding that is different (heavier, more frequent, at a different time of the month) than what you are used to.  ? Bloody or black stools, or rectal bleeding. ? Bloody or pink urine. Watch closely for changes in your health, and be sure to contact your doctor if:    · Your belly is getting bigger.     · You are gaining weight.     · You have any problems. Where can you learn more? Go to http://www.gray.com/  Enter F761 in the search box to learn more about \"Nonalcoholic Steatohepatitis (BAIRD): Care Instructions. \"  Current as of: September 8, 2021               Content Version: 13.2  © 2006-2022 [employer]. Care instructions adapted under license by [employer] (which disclaims liability or warranty for this information). If you have questions about a medical condition or this instruction, always ask your healthcare professional. [place] [address] any warranty or liability for your use of this information. Learning About Carbohydrate (Carb) Counting and Eating Out When You Have Diabetes  Why plan your meals? Meal planning can be a key part of managing diabetes. Planning meals and snacks with the right balance of carbohydrate, protein, and fat can help you keep your blood sugar at the target level you set with your doctor. You don't have to eat special foods. You can eat what your family eats, including sweets once in a while. But you do have to pay attention to how often you eat and how much you eat of certain foods. You may want to work with a dietitian or a diabetes educator. They can give you tips and meal ideas and can answer your questions about meal planning. This health professional can also help you reach a healthy weight if that is one of your goals. What should you know about eating carbs?   Managing the amount of carbohydrate (carbs) you eat is an important part of healthy meals when you have diabetes. Carbohydrate is found in many foods. · Learn which foods have carbs. And learn the amounts of carbs in different foods. ? Bread, cereal, pasta, and rice have about 15 grams of carbs in a serving. A serving is 1 slice of bread (1 ounce), ½ cup of cooked cereal, or 1/3 cup of cooked pasta or rice. ? Fruits have 15 grams of carbs in a serving. A serving is 1 small fresh fruit, such as an apple or orange; ½ of a banana; ½ cup of cooked or canned fruit; ½ cup of fruit juice; 1 cup of melon or raspberries; or 2 tablespoons of dried fruit. ? Milk and no-sugar-added yogurt have 15 grams of carbs in a serving. A serving is 1 cup of milk or 3/4 cup (6 oz) of no-sugar-added yogurt. ? Starchy vegetables have 15 grams of carbs in a serving. A serving is ½ cup of mashed potatoes or sweet potato; 1 cup winter squash; ½ of a small baked potato; ½ cup of cooked beans; or ½ cup cooked corn or green peas. · Learn how much carbs to eat each day and at each meal. A dietitian or certified diabetes educator can teach you how to keep track of the amount of carbs you eat. This is called carbohydrate counting. · If you are not sure how to count carbohydrate grams, use the plate method to plan meals. It is a quick way to make sure that you have a balanced meal. It also can help you manage the amount of carbohydrate you eat at meals. ? Divide your plate by types of foods. Put non-starchy vegetables on half the plate, meat or other protein food on one-quarter of the plate, and a grain or starchy vegetable in the final quarter of the plate. To this you can add a small piece of fruit and 1 cup of milk or yogurt, depending on how many carbs you are supposed to eat at a meal.  · Try to eat about the same amount of carbs at each meal. Do not \"save up\" your daily allowance of carbs to eat at one meal.  · Proteins have very little or no carbs.  Examples of proteins are beef, chicken, turkey, fish, eggs, tofu, cheese, cottage cheese, and peanut butter. How can you eat out and still eat healthy? · Learn to estimate the serving sizes of foods that have carbohydrate. If you measure food at home, it will be easier to estimate the amount in a serving of restaurant food. · If the meal you order has too much carbohydrate (such as potatoes, corn, or baked beans), ask to have a low-carbohydrate food instead. Ask for a salad or non-starchy vegetables like broccoli, cauliflower, green beans, or peppers. · If you eat more carbohydrate at a meal than you had planned, take a walk or do other exercise. This will help lower your blood sugar. What are some tips for eating healthy? · Limit saturated fat, such as the fat from meat and dairy products. This is a healthy choice because people who have diabetes are at higher risk of heart disease. So choose lean cuts of meat and nonfat or low-fat dairy products. Use olive or canola oil instead of butter or shortening when cooking. · Don't skip meals. Your blood sugar may drop too low if you skip meals and take insulin or certain medicines for diabetes. · Check with your doctor before you drink alcohol. Alcohol can cause your blood sugar to drop too low. Alcohol can also cause a bad reaction if you take certain diabetes medicines. Follow-up care is a key part of your treatment and safety. Be sure to make and go to all appointments, and call your doctor if you are having problems. It's also a good idea to know your test results and keep a list of the medicines you take. Where can you learn more? Go to http://www.[employer].com/  Enter I147 in the search box to learn more about \"Learning About Carbohydrate (Carb) Counting and Eating Out When You Have Diabetes. \"  Current as of: September 8, 2021               Content Version: 13.2  © 2126-4224 [employer].    Care instructions adapted under license by [employer] (which disclaims liability or warranty for this information). If you have questions about a medical condition or this instruction, always ask your healthcare professional. [address] any warranty or liability for your use of this information.

## 2022-06-24 NOTE — PROGRESS NOTES
Kayla Beckett  Identified pt with two pt identifiers(name and ). Chief Complaint   Patient presents with    Diabetes    Hypertension    Anemia       Reviewed record In preparation for visit and have obtained necessary documentation. 1. Have you been to the ER, urgent care clinic or hospitalized since your last visit? No     2. Have you seen or consulted any other health care providers outside of the [address] [place] [address] since your last visit? Include any pap smears or colon screening. No    Vitals reviewed with provider.     Health Maintenance reviewed:     Health Maintenance Due   Topic    Shingrix Vaccine Age 50> (1 of 2)          Wt Readings from Last 3 Encounters:   22 217 lb (98.4 kg)   22 215 lb (97.5 kg)   21 214 lb 4.6 oz (97.2 kg)        Temp Readings from Last 3 Encounters:   22 99 °F (37.2 °C) (Oral)   22 98.1 °F (36.7 °C) (Oral)   21 98.5 °F (36.9 °C)        BP Readings from Last 3 Encounters:   22 (!) 147/89   22 132/87   21 (!) 141/69        Pulse Readings from Last 3 Encounters:   22 79   22 85   21 95        Vitals:    22 0915 22 0924   BP: (!) 145/88 (!) 147/89   Pulse: 79    Resp: 12    Temp: 99 °F (37.2 °C)    TempSrc: Oral    SpO2: 98%    Weight: 217 lb (98.4 kg)    Height: 5' 5.5\" (1.664 m)    PainSc:   5    PainLoc: Neck           Learning Assessment:   :       Learning Assessment 2018   PRIMARY LEARNER Patient   HIGHEST LEVEL OF EDUCATION - PRIMARY LEARNER  SOME COLLEGE   BARRIERS PRIMARY LEARNER NONE   CO-LEARNER CAREGIVER No   PRIMARY LANGUAGE ENGLISH   LEARNER PREFERENCE PRIMARY DEMONSTRATION   ANSWERED BY patient   RELATIONSHIP SELF        Depression Screening:   :       3 most recent PHQ Screens 2022   Little interest or pleasure in doing things Not at all   Feeling down, depressed, irritable, or hopeless Not at all   Total Score PHQ 2 0        Fall Risk Assessment:   : Fall Risk Assessment, last 12 mths 11/24/2021   Able to walk? Yes   Fall in past 12 months? 0   Do you feel unsteady? 0   Are you worried about falling 0        Abuse Screening:   :       Abuse Screening Questionnaire 11/24/2021 10/6/2020 3/3/2020 12/28/2018   Do you ever feel afraid of your partner? N N N N   Are you in a relationship with someone who physically or mentally threatens you? N N N N   Is it safe for you to go home?  Y Y Y Y        ADL Screening:   :       ADL Assessment 11/24/2021   Feeding yourself No Help Needed   Getting from bed to chair No Help Needed   Getting dressed No Help Needed   Bathing or showering No Help Needed   Walk across the room (includes cane/walker) No Help Needed   Using the telphone No Help Needed   Taking your medications No Help Needed   Preparing meals No Help Needed   Managing money (expenses/bills) No Help Needed   Moderately strenuous housework (laundry) No Help Needed   Shopping for personal items (toiletries/medicines) No Help Needed   Shopping for groceries No Help Needed   Driving No Help Needed   Climbing a flight of stairs No Help Needed   Getting to places beyond walking distances No Help Needed

## 2022-08-23 ENCOUNTER — TRANSCRIBE ORDER (OUTPATIENT)
Dept: SCHEDULING | Age: 67
End: 2022-08-23

## 2022-08-23 DIAGNOSIS — R93.89 ABNORMAL CHEST CT: Primary | ICD-10-CM

## 2022-08-29 DIAGNOSIS — I10 HYPERTENSION, ESSENTIAL: ICD-10-CM

## 2022-08-29 RX ORDER — LISINOPRIL 40 MG/1
TABLET ORAL
Qty: 90 TABLET | Refills: 0 | Status: SHIPPED | OUTPATIENT
Start: 2022-08-29

## 2022-09-01 ENCOUNTER — OFFICE VISIT (OUTPATIENT)
Dept: INTERNAL MEDICINE CLINIC | Age: 67
End: 2022-09-01
Payer: MEDICARE

## 2022-09-01 VITALS
WEIGHT: 215 LBS | OXYGEN SATURATION: 98 % | TEMPERATURE: 98.4 F | BODY MASS INDEX: 34.55 KG/M2 | RESPIRATION RATE: 20 BRPM | SYSTOLIC BLOOD PRESSURE: 147 MMHG | DIASTOLIC BLOOD PRESSURE: 96 MMHG | HEIGHT: 66 IN | HEART RATE: 78 BPM

## 2022-09-01 DIAGNOSIS — I10 WHITE COAT SYNDROME WITH HYPERTENSION: ICD-10-CM

## 2022-09-01 DIAGNOSIS — I10 HYPERTENSION, ESSENTIAL: Primary | ICD-10-CM

## 2022-09-01 PROCEDURE — G8399 PT W/DXA RESULTS DOCUMENT: HCPCS | Performed by: INTERNAL MEDICINE

## 2022-09-01 PROCEDURE — G9899 SCRN MAM PERF RSLTS DOC: HCPCS | Performed by: INTERNAL MEDICINE

## 2022-09-01 PROCEDURE — 1123F ACP DISCUSS/DSCN MKR DOCD: CPT | Performed by: INTERNAL MEDICINE

## 2022-09-01 PROCEDURE — G8432 DEP SCR NOT DOC, RNG: HCPCS | Performed by: INTERNAL MEDICINE

## 2022-09-01 PROCEDURE — 99213 OFFICE O/P EST LOW 20 MIN: CPT | Performed by: INTERNAL MEDICINE

## 2022-09-01 PROCEDURE — G8427 DOCREV CUR MEDS BY ELIG CLIN: HCPCS | Performed by: INTERNAL MEDICINE

## 2022-09-01 PROCEDURE — G8754 DIAS BP LESS 90: HCPCS | Performed by: INTERNAL MEDICINE

## 2022-09-01 PROCEDURE — 1101F PT FALLS ASSESS-DOCD LE1/YR: CPT | Performed by: INTERNAL MEDICINE

## 2022-09-01 PROCEDURE — G8417 CALC BMI ABV UP PARAM F/U: HCPCS | Performed by: INTERNAL MEDICINE

## 2022-09-01 PROCEDURE — G8753 SYS BP > OR = 140: HCPCS | Performed by: INTERNAL MEDICINE

## 2022-09-01 PROCEDURE — 1090F PRES/ABSN URINE INCON ASSESS: CPT | Performed by: INTERNAL MEDICINE

## 2022-09-01 PROCEDURE — G8536 NO DOC ELDER MAL SCRN: HCPCS | Performed by: INTERNAL MEDICINE

## 2022-09-01 PROCEDURE — 3017F COLORECTAL CA SCREEN DOC REV: CPT | Performed by: INTERNAL MEDICINE

## 2022-09-01 NOTE — PROGRESS NOTES
Pulm CC:   Chief Complaint   Patient presents with    Hypertension       HISTORY OF PRESENT ILLNESS  Mamadou Johnson is a 74 y.o. female. Presents for follow up evaluation of HTN. At last clinic visit, /89 on lisinopril 40 mg daily. Has been checking home BP daily: -133, DBP 72-83. Brought in home arm BP monitor. Gave similar reading to clinic reading. Clinic: 150/93, her monitor: 147/96. 128/76 on 8/31/22. Brought in 3 supplements for me to review ingredients: Liver support , Glucose Support, and Weight Loss. Plans to stop the Weight Loss supplement because not working. Uses CPAP machine nightly. Plans to get a different type of mask. Has upcoming appointment with Dr. Melisa Vergara.     COVID-19 vaccine: due for 2nd booster vaccine  Eye exam: 8/3/22, Dr. Hubert Fermin, [facility], was told no diabetic retinopathy but eye pressures a little high     Patient Active Problem List   Diagnosis Code    Hypertension, essential I10    Hypercholesterolemia E78.00    Acquired hypothyroidism E03.9    MANUEL (obstructive sleep apnea) G47.33    Carpal tunnel syndrome of right wrist G56.01    Spinal stenosis of cervical region M48.02    Spinal stenosis of lumbar region with neurogenic claudication M48.062    Osteopenia of multiple sites M85.89    Severe obesity (HCC) E66.01    Type 2 diabetes mellitus without complication, without long-term current use of insulin (HCC) E11.9    NAFLD (nonalcoholic fatty liver disease) K76.0    Status post lumbar spinal fusion Z98.1    Iron deficiency anemia D50.9     Past Medical History:   Diagnosis Date    Diabetes ([address]     DM2-trulicity, PCP treats    Hypercholesterolemia     Hypertension     Sarcoidosis     brain    Sleep apnea     CPAP complient , Dr. Melisa Vergara    Status post epidural steroid injection 10/15/2021    Thyroid disease     TIA (transient ischemic attack)     Questionable per patient left side defecits (due to spinal stenosis)     Allergies   Allergen Reactions    Gabapentin Other (comments)     Hot flashes    Topiramate Other (comments)     Hot flashes       Current Outpatient Medications   Medication Sig Dispense Refill    OTHER,NON-FORMULARY, Energy Greens supplement drink      lisinopriL (PRINIVIL, ZESTRIL) 40 mg tablet Take 1 tablet by mouth once daily 90 Tablet 0    metFORMIN (GLUCOPHAGE) 1,000 mg tablet TAKE 1 TABLET BY MOUTH TWICE DAILY WITH MEALS 180 Tablet 3    rosuvastatin (CRESTOR) 10 mg tablet Take 1 tablet by mouth nightly 90 Tablet 3    ferrous sulfate 325 mg (65 mg iron) tablet Take  by mouth daily. glucose blood VI test strips (OneTouch Ultra Test) strip USE STRIP TO CHECK GLUCOSE UP TO TWICE DAILY      loratadine (CLARITIN) 10 mg tablet Take 10 mg by mouth.      levothyroxine (SYNTHROID) 88 mcg tablet TAKE 1/2 (ONE-HALF) TABLET BY MOUTH ONCE DAILY ON MONDAY AND TAKE 1 TABLET ALL OTHER DAYS (Patient taking differently: Take 88 mcg by mouth Daily (before breakfast). ) 90 Tablet 3    dulaglutide (Trulicity) 3 ZN/0.3 mL pnij 0.5 mL by SubCUTAneous route every seven (7) days. 12 Each 3    vitamin e (E GEMS) 1,000 unit capsule Take 1,000 Units by mouth daily. TURMERIC PO Take 800 mg by mouth daily. OTHER Take  by mouth daily. Immuneti supplement (Vitamin C, Zinc, Vitamin D3, Garlic bulb, Elderberry, Echinacea)      ferrous fumarate/vit Bcomp,C (SUPER B COMPLEX PO) Take 1 Capsule by mouth daily. magnesium 250 mg tab Take 250 mg by mouth daily. cholecalciferol (VITAMIN D3) (2,000 UNITS /50 MCG) cap capsule Take 5,000 Units by mouth daily. acetaminophen (TYLENOL) 500 mg tablet Take 1,000 mg by mouth every six (6) hours as needed for Pain. omega-3 acid ethyl esters (LOVAZA) 1 gram capsule Take 2 g by mouth two (2) times a day. glipiZIDE SR (GLUCOTROL XL) 10 mg CR tablet Take 1 Tablet by mouth daily.  Indications: type 2 diabetes mellitus 90 Tablet 3    pregabalin (LYRICA) 50 mg capsule Take 50 mg by mouth two (2) times a day. lancets misc Use to check blood glucose up to twice a day. Dx: E11.9 200 Each 3    Blood-Glucose Meter monitoring kit Use to check blood glucose up to twice a day. Dx: E11.9 1 Kit 0    cyanocobalamin (VITAMIN B12) 500 mcg tablet Take 500 mcg by mouth daily. aspirin delayed-release 81 mg tablet Take 81 mg by mouth daily. fluticasone propionate (FLONASE) 50 mcg/actuation nasal spray 2 Sprays by Both Nostrils route as needed. ascorbic acid, vitamin C, (VITAMIN C) 500 mg tablet Take  by mouth. PHYSICAL EXAM  Visit Vitals  BP (!) 147/96 (BP 1 Location: Left upper arm, BP Patient Position: Sitting, BP Cuff Size: Large adult) Comment: pt's own blood pressure cuff   Pulse 78   Temp 98.4 °F (36.9 °C)   Resp 20   Ht 5' 5.5\" (1.664 m)   Wt 215 lb (97.5 kg)   SpO2 98%   BMI 35.23 kg/m²       General: Obese, no distress. HEENT:  Head normocephalic/atraumatic, no scleral icterus  Lungs:  Clear to ausculation bilaterally. Good air movement. Heart:  Regular rate and rhythm, normal S1 and S2, no murmur, gallop, or rub  Extremities: No clubbing, cyanosis, or edema. Neurological: Alert and oriented. Psychiatric: Normal mood and affect. Behavior is normal.         ASSESSMENT AND PLAN    ICD-10-CM ICD-9-CM    1. Hypertension, essential  I10 401.9       2. White coat syndrome with hypertension  I10 401.9         Diagnoses and all orders for this visit:    1. Hypertension, essential    2. White coat syndrome with hypertension    Her home BP monitor gave reading similar to clinic monitor. Home BP's have been al at goal of <140/90. Most likely has white coat syndrome. Continue lisinopril 40 mg daily with no additional medication at this time. Instructed her to continue checking home BP 1-2 times a week and bring record to next clinic visit. Counseled on low-salt diet, exercise, and weight loss.     Follow-up and Dispositions    Return in about 3 months (around 12/1/2022), or if symptoms worsen or fail to improve, for DM, HTN, POC A1c. I have discussed the diagnosis with the patient and the intended plan as seen in the above orders. Patient is in agreement. The patient has received an after-visit summary and questions were answered concerning future plans. I have discussed medication side effects and warnings with the patient as well.

## 2022-09-01 NOTE — PROGRESS NOTES
John Brown  Identified pt with two pt identifiers(name and ). Chief Complaint   Patient presents with    Hypertension       Reviewed record In preparation for visit and have obtained necessary documentation. 1. Have you been to the ER, urgent care clinic or hospitalized since your last visit? No     2. Have you seen or consulted any other health care providers outside of the [address] [place] [address] since your last visit? Include any pap smears or colon screening. No    Vitals reviewed with provider. Health Maintenance reviewed:     Health Maintenance Due   Topic    Shingrix Vaccine Age 49> (1 of 2)    COVID-19 Vaccine (4 - Booster for Tobin Peter series)    Eye Exam Retinal or Dilated     Flu Vaccine (1)          Wt Readings from Last 3 Encounters:   22 217 lb (98.4 kg)   22 215 lb (97.5 kg)   21 214 lb 4.6 oz (97.2 kg)        Temp Readings from Last 3 Encounters:   22 99 °F (37.2 °C) (Oral)   22 98.1 °F (36.7 °C) (Oral)   21 98.5 °F (36.9 °C)        BP Readings from Last 3 Encounters:   22 (!) 147/89   22 132/87   21 (!) 141/69        Pulse Readings from Last 3 Encounters:   22 79   22 85   21 95      There were no vitals filed for this visit. Learning Assessment:   :       Learning Assessment 2018   PRIMARY LEARNER Patient   HIGHEST LEVEL OF EDUCATION - PRIMARY LEARNER  SOME COLLEGE   BARRIERS PRIMARY LEARNER NONE   CO-LEARNER CAREGIVER No   PRIMARY LANGUAGE ENGLISH   LEARNER PREFERENCE PRIMARY DEMONSTRATION   ANSWERED BY patient   RELATIONSHIP SELF        Depression Screening:   :       3 most recent PHQ Screens 2022   Little interest or pleasure in doing things Not at all   Feeling down, depressed, irritable, or hopeless Not at all   Total Score PHQ 2 0        Fall Risk Assessment:   :       Fall Risk Assessment, last 12 mths 2021   Able to walk? Yes   Fall in past 12 months? 0   Do you feel unsteady? 0   Are you worried about falling 0        Abuse Screening:   :       Abuse Screening Questionnaire 11/24/2021 10/6/2020 3/3/2020 12/28/2018   Do you ever feel afraid of your partner? N N N N   Are you in a relationship with someone who physically or mentally threatens you? N N N N   Is it safe for you to go home?  Y Y Y Y        ADL Screening:   :       ADL Assessment 11/24/2021   Feeding yourself No Help Needed   Getting from bed to chair No Help Needed   Getting dressed No Help Needed   Bathing or showering No Help Needed   Walk across the room (includes cane/walker) No Help Needed   Using the telphone No Help Needed   Taking your medications No Help Needed   Preparing meals No Help Needed   Managing money (expenses/bills) No Help Needed   Moderately strenuous housework (laundry) No Help Needed   Shopping for personal items (toiletries/medicines) No Help Needed   Shopping for groceries No Help Needed   Driving No Help Needed   Climbing a flight of stairs No Help Needed   Getting to places beyond walking distances No Help Needed

## 2022-09-27 ENCOUNTER — DOCUMENTATION ONLY (OUTPATIENT)
Dept: SLEEP MEDICINE | Age: 67
End: 2022-09-27

## 2022-09-27 ENCOUNTER — OFFICE VISIT (OUTPATIENT)
Dept: SLEEP MEDICINE | Age: 67
End: 2022-09-27
Payer: MEDICARE

## 2022-09-27 VITALS
HEART RATE: 77 BPM | SYSTOLIC BLOOD PRESSURE: 157 MMHG | BODY MASS INDEX: 34.51 KG/M2 | WEIGHT: 214.7 LBS | RESPIRATION RATE: 20 BRPM | OXYGEN SATURATION: 99 % | HEIGHT: 66 IN | TEMPERATURE: 98.1 F | DIASTOLIC BLOOD PRESSURE: 86 MMHG

## 2022-09-27 DIAGNOSIS — I10 HYPERTENSION, ESSENTIAL: ICD-10-CM

## 2022-09-27 DIAGNOSIS — E66.9 OBESITY, CLASS II, BMI 35-39.9: ICD-10-CM

## 2022-09-27 DIAGNOSIS — G47.33 OBSTRUCTIVE SLEEP APNEA (ADULT) (PEDIATRIC): Primary | ICD-10-CM

## 2022-09-27 PROCEDURE — G8753 SYS BP > OR = 140: HCPCS | Performed by: INTERNAL MEDICINE

## 2022-09-27 PROCEDURE — 1101F PT FALLS ASSESS-DOCD LE1/YR: CPT | Performed by: INTERNAL MEDICINE

## 2022-09-27 PROCEDURE — G8536 NO DOC ELDER MAL SCRN: HCPCS | Performed by: INTERNAL MEDICINE

## 2022-09-27 PROCEDURE — G8432 DEP SCR NOT DOC, RNG: HCPCS | Performed by: INTERNAL MEDICINE

## 2022-09-27 PROCEDURE — G8427 DOCREV CUR MEDS BY ELIG CLIN: HCPCS | Performed by: INTERNAL MEDICINE

## 2022-09-27 PROCEDURE — G8754 DIAS BP LESS 90: HCPCS | Performed by: INTERNAL MEDICINE

## 2022-09-27 PROCEDURE — 3017F COLORECTAL CA SCREEN DOC REV: CPT | Performed by: INTERNAL MEDICINE

## 2022-09-27 PROCEDURE — 1123F ACP DISCUSS/DSCN MKR DOCD: CPT | Performed by: INTERNAL MEDICINE

## 2022-09-27 PROCEDURE — G8417 CALC BMI ABV UP PARAM F/U: HCPCS | Performed by: INTERNAL MEDICINE

## 2022-09-27 PROCEDURE — G8399 PT W/DXA RESULTS DOCUMENT: HCPCS | Performed by: INTERNAL MEDICINE

## 2022-09-27 PROCEDURE — 1090F PRES/ABSN URINE INCON ASSESS: CPT | Performed by: INTERNAL MEDICINE

## 2022-09-27 PROCEDURE — G9899 SCRN MAM PERF RSLTS DOC: HCPCS | Performed by: INTERNAL MEDICINE

## 2022-09-27 PROCEDURE — 99213 OFFICE O/P EST LOW 20 MIN: CPT | Performed by: INTERNAL MEDICINE

## 2022-09-27 NOTE — PROGRESS NOTES
[address] [facility] [place]. [place], [address]  Tel.  [phone]  Fax. [address] [facility] [address]  [place], [address] [facility]  Tel.  [phone]  Fax. [phone] [address]  Tel.  [phone]  Fax. [phone]     S>Sumi Francis is a 74 y.o. female seen for a positive airway pressure follow-up. She reports no problems using the device. The following problems are identified:    Drowsiness no Problems exhaling no   Snoring No-feels she needs more airflow at beginning since getting new PAP device for Robert Forget to put on no   Mask Comfortable yes Can't fall asleep no   Dry Mouth no Mask falls off no   Air Leaking no Frequent awakenings no     Download reviewed. She admits that her sleep has improved. Therapy Apnea Index averaged over PAP use: 7 /hr which reflects improved sleep breathing condition. Allergies   Allergen Reactions    Gabapentin Other (comments)     Hot flashes    Topiramate Other (comments)     Hot flashes       She has a current medication list which includes the following prescription(s): glipizide sr, OTHER,NON-FORMULARY,, lisinopril, metformin, rosuvastatin, ferrous sulfate, onetouch ultra test, loratadine, trulicity, vitamin e, turmeric, OTHER, ferrous fumarate/vit bcomp,c, magnesium, cholecalciferol, acetaminophen, omega-3 acid ethyl esters, pregabalin, lancets, blood-glucose meter, cyanocobalamin, aspirin delayed-release, fluticasone propionate, ascorbic acid (vitamin c), and levothyroxine. .      She  has a past medical history of Diabetes ([facility] [address] Hypercholesterolemia, Hypertension, Sarcoidosis, Sleep apnea, Status post epidural steroid injection (10/15/2021), Thyroid disease, and TIA (transient ischemic attack). She has no past medical history of Adverse effect of anesthesia, Difficult intubation, Malignant hyperthermia due to anesthesia, or Nausea & vomiting.     [place] Sleepiness Score: 6   and Modified F. O.S.Q. Score Total / 2: 19   which reflect improved sleep quality over therapy time. O>    Visit Vitals  BP (!) 157/86 (BP 1 Location: Right arm, BP Patient Position: Sitting, BP Cuff Size: Large adult)   Pulse 77   Temp 98.1 °F (36.7 °C) (Temporal)   Resp 20   Ht 5' 5.5\" (1.664 m)   Wt 214 lb 11.2 oz (97.4 kg)   SpO2 99%   BMI 35.18 kg/m²           General:   Alert, oriented, not in distress   Neck:   No JVD    Chest/Lungs:  symetrical lung expansion , no accessory muscle use    Extremities:  no obvious rashes , negative edema    Neuro:  No focal deficits ; No obvious tremor    Psych:  Normal affect ,  Normal countenance ;         A>    ICD-10-CM ICD-9-CM    1. Obstructive sleep apnea (adult) (pediatric)  G47.33 327.23       2. Obesity, Class II, BMI 35-39.9  E66.9 278.00       3. Hypertension, essential  I10 401.9         AHI = 34(4-19). On CPAP, Respironics, DS2  :  13-16 cmH2O. Compliant:      yes    Therapeutic Response:  Positive    P>      * she is compliant with PAP therapy and PAP continues to benefit patient and remains necessary for control of her sleep apnea. Change setting to 14-18 cmH20    * She was asked to contact our office for any problems regarding PAP therapy. * Counseling was provided regarding the importance of regular PAP use and on proper sleep hygiene and safe driving. * Re-enforced proper and regular cleaning for the device. I have ordered replacement supplies    2. Obesity - weight has been going down gradually. I have discussed the relationship of weight to obstructive sleep apnea. I have advised her to continue her weight loss plan. We discussed reducing portions and avoiding beverages with calories. she understands that weight loss can reduce severity of sleep apnea and snoring. 3. Hypertension - suboptimally on current regimen. she will continue her current regimen. she will continue to monitor at home and with her PMD for further adjustments as needed.     I have reviewed the relationship between hypertension as it relates to sleep-disordered breathing.     Electronically signed by    Olivia Mari MD  Diplomate in Sleep Medicine  [facility]   9/27/2022

## 2022-12-01 DIAGNOSIS — I10 HYPERTENSION, ESSENTIAL: ICD-10-CM

## 2022-12-01 RX ORDER — LISINOPRIL 40 MG/1
TABLET ORAL
Qty: 90 TABLET | Refills: 0 | Status: SHIPPED | OUTPATIENT
Start: 2022-12-01

## 2022-12-09 ENCOUNTER — TELEPHONE (OUTPATIENT)
Dept: INTERNAL MEDICINE CLINIC | Age: 67
End: 2022-12-09

## 2022-12-09 NOTE — TELEPHONE ENCOUNTER
----- Message from Jimmie Noel sent at 12/9/2022 10:33 AM EST -----  Subject: Message to Provider    QUESTIONS  Information for Provider? Patient has an appt on the 12/21/22; please mail   lab orders for lab testing at [facility].   ---------------------------------------------------------------------------  --------------  [id number]  [phone]; OK to leave message on voicemail  ---------------------------------------------------------------------------  --------------  SCRIPT ANSWERS  Relationship to Patient?  Self

## 2022-12-21 ENCOUNTER — OFFICE VISIT (OUTPATIENT)
Dept: INTERNAL MEDICINE CLINIC | Age: 67
End: 2022-12-21
Payer: MEDICARE

## 2022-12-21 VITALS
HEART RATE: 89 BPM | HEIGHT: 66 IN | DIASTOLIC BLOOD PRESSURE: 87 MMHG | BODY MASS INDEX: 34.87 KG/M2 | RESPIRATION RATE: 20 BRPM | SYSTOLIC BLOOD PRESSURE: 131 MMHG | WEIGHT: 217 LBS | TEMPERATURE: 98 F | OXYGEN SATURATION: 98 %

## 2022-12-21 DIAGNOSIS — E11.9 TYPE 2 DIABETES MELLITUS WITHOUT COMPLICATION, WITHOUT LONG-TERM CURRENT USE OF INSULIN (HCC): Primary | ICD-10-CM

## 2022-12-21 DIAGNOSIS — K21.9 GASTROESOPHAGEAL REFLUX DISEASE WITHOUT ESOPHAGITIS: ICD-10-CM

## 2022-12-21 DIAGNOSIS — I10 HYPERTENSION, ESSENTIAL: ICD-10-CM

## 2022-12-21 LAB — HBA1C MFR BLD HPLC: 7.1 %

## 2022-12-21 PROCEDURE — G8752 SYS BP LESS 140: HCPCS | Performed by: INTERNAL MEDICINE

## 2022-12-21 PROCEDURE — 1123F ACP DISCUSS/DSCN MKR DOCD: CPT | Performed by: INTERNAL MEDICINE

## 2022-12-21 PROCEDURE — 3074F SYST BP LT 130 MM HG: CPT | Performed by: INTERNAL MEDICINE

## 2022-12-21 PROCEDURE — 99214 OFFICE O/P EST MOD 30 MIN: CPT | Performed by: INTERNAL MEDICINE

## 2022-12-21 PROCEDURE — 2022F DILAT RTA XM EVC RTNOPTHY: CPT | Performed by: INTERNAL MEDICINE

## 2022-12-21 PROCEDURE — 3017F COLORECTAL CA SCREEN DOC REV: CPT | Performed by: INTERNAL MEDICINE

## 2022-12-21 PROCEDURE — G8536 NO DOC ELDER MAL SCRN: HCPCS | Performed by: INTERNAL MEDICINE

## 2022-12-21 PROCEDURE — 3051F HG A1C>EQUAL 7.0%<8.0%: CPT | Performed by: INTERNAL MEDICINE

## 2022-12-21 PROCEDURE — G9899 SCRN MAM PERF RSLTS DOC: HCPCS | Performed by: INTERNAL MEDICINE

## 2022-12-21 PROCEDURE — 83036 HEMOGLOBIN GLYCOSYLATED A1C: CPT | Performed by: INTERNAL MEDICINE

## 2022-12-21 PROCEDURE — G8427 DOCREV CUR MEDS BY ELIG CLIN: HCPCS | Performed by: INTERNAL MEDICINE

## 2022-12-21 PROCEDURE — G8417 CALC BMI ABV UP PARAM F/U: HCPCS | Performed by: INTERNAL MEDICINE

## 2022-12-21 PROCEDURE — G8510 SCR DEP NEG, NO PLAN REQD: HCPCS | Performed by: INTERNAL MEDICINE

## 2022-12-21 PROCEDURE — 1090F PRES/ABSN URINE INCON ASSESS: CPT | Performed by: INTERNAL MEDICINE

## 2022-12-21 PROCEDURE — 1101F PT FALLS ASSESS-DOCD LE1/YR: CPT | Performed by: INTERNAL MEDICINE

## 2022-12-21 PROCEDURE — 3078F DIAST BP <80 MM HG: CPT | Performed by: INTERNAL MEDICINE

## 2022-12-21 PROCEDURE — G8399 PT W/DXA RESULTS DOCUMENT: HCPCS | Performed by: INTERNAL MEDICINE

## 2022-12-21 PROCEDURE — G8754 DIAS BP LESS 90: HCPCS | Performed by: INTERNAL MEDICINE

## 2022-12-21 RX ORDER — DULAGLUTIDE 3 MG/.5ML
0.75 INJECTION, SOLUTION SUBCUTANEOUS
Qty: 12 EACH | Refills: 3
Start: 2022-12-21

## 2022-12-21 RX ORDER — OMEPRAZOLE 20 MG/1
20 CAPSULE, DELAYED RELEASE ORAL DAILY
Qty: 30 CAPSULE | Refills: 0 | Status: SHIPPED | OUTPATIENT
Start: 2022-12-21 | End: 2023-01-20

## 2022-12-21 NOTE — PROGRESS NOTES
Mela Son  Identified pt with two pt identifiers(name and ). Chief Complaint   Patient presents with    Diabetes    Hypertension       Reviewed record In preparation for visit and have obtained necessary documentation. 1. Have you been to the ER, urgent care clinic or hospitalized since your last visit? Yes, 2022 positive covid. 2. Have you seen or consulted any other health care providers outside of the [address] [place] [address] since your last visit? Include any pap smears or colon screening. No    Vitals reviewed with provider.     Health Maintenance reviewed:     Health Maintenance Due   Topic    COVID-19 Vaccine (4 - Booster for Pfizer series)    Flu Vaccine (1)    Foot Exam Q1     MICROALBUMIN Q1     Medicare Yearly Exam           Wt Readings from Last 3 Encounters:   22 217 lb (98.4 kg)   22 214 lb 11.2 oz (97.4 kg)   22 215 lb (97.5 kg)        Temp Readings from Last 3 Encounters:   22 98 °F (36.7 °C) (Oral)   22 98.1 °F (36.7 °C) (Temporal)   22 98.4 °F (36.9 °C)        BP Readings from Last 3 Encounters:   22 131/87   22 (!) 157/86   22 (!) 147/96        Pulse Readings from Last 3 Encounters:   22 89   22 77   22 78        Vitals:    22 0920   BP: 131/87   Pulse: 89   Resp: 20   Temp: 98 °F (36.7 °C)   TempSrc: Oral   SpO2: 98%   Weight: 217 lb (98.4 kg)   Height: 5' 5.5\" (1.664 m)   PainSc:   5   PainLoc: Neck          Learning Assessment:   :       Learning Assessment 2018   PRIMARY LEARNER Patient   HIGHEST LEVEL OF EDUCATION - PRIMARY LEARNER  SOME COLLEGE   BARRIERS PRIMARY LEARNER NONE   CO-LEARNER CAREGIVER No   PRIMARY LANGUAGE ENGLISH   LEARNER PREFERENCE PRIMARY DEMONSTRATION   ANSWERED BY patient   RELATIONSHIP SELF        Depression Screening:   :       3 most recent PHQ Screens 2022   Little interest or pleasure in doing things Not at all   Feeling down, depressed, irritable, or hopeless Not at all   Total Score PHQ 2 0        Fall Risk Assessment:   :       Fall Risk Assessment, last 12 mths 12/21/2022   Able to walk? Yes   Fall in past 12 months? 0   Do you feel unsteady? 0   Are you worried about falling 0        Abuse Screening:   :       Abuse Screening Questionnaire 11/24/2021 10/6/2020 3/3/2020 12/28/2018   Do you ever feel afraid of your partner? N N N N   Are you in a relationship with someone who physically or mentally threatens you? N N N N   Is it safe for you to go home?  Y Y Y Y        ADL Screening:   :       ADL Assessment 11/24/2021   Feeding yourself No Help Needed   Getting from bed to chair No Help Needed   Getting dressed No Help Needed   Bathing or showering No Help Needed   Walk across the room (includes cane/walker) No Help Needed   Using the telphone No Help Needed   Taking your medications No Help Needed   Preparing meals No Help Needed   Managing money (expenses/bills) No Help Needed   Moderately strenuous housework (laundry) No Help Needed   Shopping for personal items (toiletries/medicines) No Help Needed   Shopping for groceries No Help Needed   Driving No Help Needed   Climbing a flight of stairs No Help Needed   Getting to places beyond walking distances No Help Needed

## 2022-12-21 NOTE — LETTER
12/28/2022 8:06 AM    Ms. Betsy Recinos  [address] [facility] [address]      Results for orders placed or performed in visit on 12/21/22   MICROALBUMIN, UR, RAND W/ MICROALB/CREAT RATIO   Result Value Ref Range    Creatinine, urine random 159.0 Not Estab. mg/dL    Microalbumin, urine 905.2 Not Estab. ug/mL    Microalb/Creat ratio (ug/mg creat.) 569 (H) 0 - 29 mg/g creat   AMB POC HEMOGLOBIN A1C   Result Value Ref Range    Hemoglobin A1c (POC) 7.1 %       Your urine microalbumin showed you are spilling protein into the urine. This a sign that diabetes has started affecting your kidneys.     Sincerely,      Mary Caballero MD

## 2022-12-21 NOTE — PROGRESS NOTES
CC:   Chief Complaint   Patient presents with    Diabetes    Hypertension       HISTORY OF PRESENT ILLNESS  Татьяна Hancock is a 69 y.o. female. Presents for follow up evaluation of HTN and DM. Complains of feeling heat from her abdomen. Also increased gurgling. Denies heartburn, nausea, vomiting, diarrhea, or constipation. Denies HA's, CP, SOB, dizziness, heart palpitations, or leg swelling. Brought in her home BP monitor. Gave similar reading to clinic monitor. Brought in home BS log from 12/2022: 121-135/73-83. Denies polydipsia, polyuria, or hypoglycemia. Takes Trulicity, metformin,and glipizide. Usually compliant with diabetic diet. Home FBS: 's. Lab review: A1c 7.1% today (12/21/22), 7.1% on 6/17/22, and 6.1% on 2/18/22. Eye exam: UTD     Scheduled for CT chest by pulmonologist to evaluate lung nodules. was told nodules could be old sarcoidosis changes. Denies SOB, wheezing, or cough. ROS: Chronic mild LLE weakness since having surgery with Dr. Loren Cope on 11/29/21 for lumbar spinal stenosis with left-sided sciatica.     Patient Active Problem List   Diagnosis Code    Hypertension, essential I10    Hypercholesterolemia E78.00    Acquired hypothyroidism E03.9    MANUEL (obstructive sleep apnea) G47.33    Carpal tunnel syndrome of right wrist G56.01    Spinal stenosis of cervical region M48.02    Spinal stenosis of lumbar region with neurogenic claudication M48.062    Osteopenia of multiple sites M85.89    Severe obesity (HCC) E66.01    Type 2 diabetes mellitus without complication, without long-term current use of insulin (HCC) E11.9    NAFLD (nonalcoholic fatty liver disease) K76.0    Status post lumbar spinal fusion Z98.1    Iron deficiency anemia D50.9     Past Medical History:   Diagnosis Date    Diabetes ([facility] [address]     DM2-trulicity, PCP treats    Hypercholesterolemia     Hypertension     Sarcoidosis     brain    Sleep apnea     CPAP complient , Dr. Winter Sandhu    Status post epidural steroid injection 10/15/2021    Thyroid disease     TIA (transient ischemic attack)     Questionable per patient left side defecits (due to spinal stenosis)     Allergies   Allergen Reactions    Gabapentin Other (comments)     Hot flashes    Topiramate Other (comments)     Hot flashes       Current Outpatient Medications   Medication Sig Dispense Refill    lisinopriL (PRINIVIL, ZESTRIL) 40 mg tablet Take 1 tablet by mouth once daily 90 Tablet 0    glipiZIDE SR (GLUCOTROL XL) 10 mg CR tablet Take 1 tablet by mouth once daily 90 Tablet 3    OTHER,NON-FORMULARY, Energy Greens supplement drink      metFORMIN (GLUCOPHAGE) 1,000 mg tablet TAKE 1 TABLET BY MOUTH TWICE DAILY WITH MEALS 180 Tablet 3    rosuvastatin (CRESTOR) 10 mg tablet Take 1 tablet by mouth nightly 90 Tablet 3    ferrous sulfate 325 mg (65 mg iron) tablet Take  by mouth daily. glucose blood VI test strips (OneTouch Ultra Test) strip USE STRIP TO CHECK GLUCOSE UP TO TWICE DAILY      loratadine (CLARITIN) 10 mg tablet Take 10 mg by mouth.      levothyroxine (SYNTHROID) 88 mcg tablet TAKE 1/2 (ONE-HALF) TABLET BY MOUTH ONCE DAILY ON MONDAY AND TAKE 1 TABLET ALL OTHER DAYS (Patient taking differently: Take 88 mcg by mouth Daily (before breakfast). ) 90 Tablet 3    dulaglutide (Trulicity) 3 FW/8.5 mL pnij 0.5 mL by SubCUTAneous route every seven (7) days. 12 Each 3    vitamin e (E GEMS) 1,000 unit capsule Take 1,000 Units by mouth daily. OTHER Take  by mouth daily. Immuneti supplement (Vitamin C, Zinc, Vitamin D3, Garlic bulb, Elderberry, Echinacea)      ferrous fumarate/vit Bcomp,C (SUPER B COMPLEX PO) Take 1 Capsule by mouth daily. magnesium 250 mg tab Take 250 mg by mouth daily. cholecalciferol (VITAMIN D3) (2,000 UNITS /50 MCG) cap capsule Take 5,000 Units by mouth daily. acetaminophen (TYLENOL) 500 mg tablet Take 1,000 mg by mouth every six (6) hours as needed for Pain.       omega-3 acid ethyl esters (LOVAZA) 1 gram capsule Take 2 g by mouth two (2) times a day. pregabalin (LYRICA) 50 mg capsule Take 50 mg by mouth two (2) times a day. lancets misc Use to check blood glucose up to twice a day. Dx: E11.9 200 Each 3    Blood-Glucose Meter monitoring kit Use to check blood glucose up to twice a day. Dx: E11.9 1 Kit 0    cyanocobalamin (VITAMIN B12) 500 mcg tablet Take 500 mcg by mouth daily. aspirin delayed-release 81 mg tablet Take 81 mg by mouth daily. fluticasone propionate (FLONASE) 50 mcg/actuation nasal spray 2 Sprays by Both Nostrils route as needed. ascorbic acid, vitamin C, (VITAMIN C) 500 mg tablet Take  by mouth. TURMERIC PO Take 800 mg by mouth daily. PHYSICAL EXAM  Visit Vitals  /87 (BP 1 Location: Left upper arm, BP Patient Position: Sitting, BP Cuff Size: Large adult) Comment: 132/84 on patient's home BP monitor   Pulse 89   Temp 98 °F (36.7 °C) (Oral)   Resp 20   Ht 5' 5.5\" (1.664 m)   Wt 217 lb (98.4 kg)   SpO2 98%   BMI 35.56 kg/m²       General: Obese, no distress. HEENT:  Head normocephalic/atraumatic, no scleral icterus  Lungs:  Clear to ausculation bilaterally. Good air movement. Heart:  Regular rate and rhythm, normal S1 and S2, no murmur, gallop, or rub  Abdomen: Soft, non-distended, normal bowel sounds, epigastric tenderness, no guarding or masses. Extremities: No clubbing, cyanosis. Trace left ankle edema. Neurological: Alert and oriented. Psychiatric: Normal mood and affect.  Behavior is normal.   Diabetic foot exam:     Left Foot:   Visual Exam: normal except for dryness at heel   Pulse DP: 1+ (weak)   Filament test: normal sensation    Vibratory sensation: normal      Right Foot:   Visual Exam: normal    Pulse DP: 1+ (weak)   Filament test: normal sensation    Vibratory sensation: normal      Results for orders placed or performed in visit on 12/21/22   AMB POC HEMOGLOBIN A1C   Result Value Ref Range    Hemoglobin A1c (POC) 7.1 %         ASSESSMENT AND PLAN    ICD-10-CM ICD-9-CM    1. Type 2 diabetes mellitus without complication, without long-term current use of insulin (HCC)  E11.9 250.00 AMB POC HEMOGLOBIN A1C       DIABETES FOOT EXAM      MICROALBUMIN, UR, RAND W/ MICROALB/CREAT RATIO      MICROALBUMIN, UR, RAND W/ MICROALB/CREAT RATIO      dulaglutide (Trulicity) 3 EX/3.2 mL pnij      2. Hypertension, essential  I10 401.9       3. Gastroesophageal reflux disease without esophagitis  K21.9 530.81 omeprazole (PRILOSEC) 20 mg capsule        Diagnoses and all orders for this visit:    1. Type 2 diabetes mellitus without complication, without long-term current use of insulin ([facility])  A1c 7.1% today. Not at goal of <7.0%. Increase Trulicity dose from 0.5 mL to 0.75 mL every 7 days. She reports that Trulicity will start becoming very expensive for her in May 2023.  -     AMB POC HEMOGLOBIN A1C  -      DIABETES FOOT EXAM  -     MICROALBUMIN, UR, RAND W/ MICROALB/CREAT RATIO; Future  -     Increase to: dulaglutide (Trulicity) 3 TA/8.1 mL pnij; 0.75 mL by SubCUTAneous route every seven (7) days. 2. Hypertension, essential  Controlled. Her home BP monitor provided similar reading to clinic monitor. 3. Gastroesophageal reflux disease without esophagitis  -     omeprazole (PRILOSEC) 20 mg capsule; Take 1 Capsule by mouth daily for 30 days. Follow-up and Dispositions    Return in about 4 months (around 4/21/2023), or if symptoms worsen or fail to improve, for Medicare AW, DM. I have discussed the diagnosis with the patient and the intended plan as seen in the above orders. Patient is in agreement. The patient has received an after-visit summary and questions were answered concerning future plans. I have discussed medication side effects and warnings with the patient as well.

## 2022-12-23 LAB
ALBUMIN/CREAT UR: 569 MG/G CREAT (ref 0–29)
CREAT UR-MCNC: 159 MG/DL
MICROALBUMIN UR-MCNC: 905.2 UG/ML

## 2022-12-23 NOTE — PROGRESS NOTES
Message sent to patient by My Chart:  Your urine microalbumin showed you are spilling protein into the urine. This a sign that diabetes has started affecting your kidneys.     Dr. Stephan Villa

## 2023-01-13 DIAGNOSIS — E11.9 TYPE 2 DIABETES MELLITUS WITHOUT COMPLICATION, WITHOUT LONG-TERM CURRENT USE OF INSULIN (HCC): ICD-10-CM

## 2023-01-13 RX ORDER — DULAGLUTIDE 3 MG/.5ML
1.5 INJECTION, SOLUTION SUBCUTANEOUS
Qty: 12 EACH | Refills: 1 | Status: SHIPPED | OUTPATIENT
Start: 2023-01-13

## 2023-01-13 NOTE — TELEPHONE ENCOUNTER
Patient says her Trulicity was to be increased according to what  told her. Patient says pharmacy does not have anything on file.  Need refill

## 2023-01-18 ENCOUNTER — TELEPHONE (OUTPATIENT)
Dept: INTERNAL MEDICINE CLINIC | Age: 68
End: 2023-01-18

## 2023-01-18 DIAGNOSIS — E11.9 TYPE 2 DIABETES MELLITUS WITHOUT COMPLICATION, WITHOUT LONG-TERM CURRENT USE OF INSULIN (HCC): Primary | ICD-10-CM

## 2023-01-18 NOTE — TELEPHONE ENCOUNTER
Trulicity 3mg is on backorder. [facility] needs to know what to prescribe. Maybe adjust the dosage? 1.5 and .75 are available.      Mayra ''R''     Phone: [phone]: [phone]

## 2023-01-31 DIAGNOSIS — E11.9 TYPE 2 DIABETES MELLITUS WITHOUT COMPLICATION, WITHOUT LONG-TERM CURRENT USE OF INSULIN (HCC): ICD-10-CM

## 2023-01-31 NOTE — TELEPHONE ENCOUNTER
PCP: Yaakov Eastman MD     Last appt: 2022     Future Appointments   Date Time Provider Chidi Unger   2/3/2023  9:30 AM [facility]   10/3/2023  9:10 AM Sienna Quintana NP [facility]          Requested Prescriptions     Pending Prescriptions Disp Refills    dulaglutide (TRULICITY) 1.5 CG/2.4 mL sub-q pen 12 Each 2     Si mL by SubCUTAneous route every seven (7) days.

## 2023-02-03 ENCOUNTER — HOSPITAL ENCOUNTER (OUTPATIENT)
Dept: CT IMAGING | Age: 68
Discharge: HOME | End: 2023-02-03
Attending: INTERNAL MEDICINE
Payer: MEDICARE

## 2023-02-03 DIAGNOSIS — R93.89 ABNORMAL CHEST CT: ICD-10-CM

## 2023-02-03 PROCEDURE — 71250 CT THORAX DX C-: CPT

## 2023-02-09 DIAGNOSIS — E11.9 TYPE 2 DIABETES MELLITUS WITHOUT COMPLICATION, WITHOUT LONG-TERM CURRENT USE OF INSULIN (HCC): Primary | ICD-10-CM

## 2023-02-09 RX ORDER — DULAGLUTIDE 3 MG/.5ML
0.5 INJECTION, SOLUTION SUBCUTANEOUS
Qty: 5 EACH | Refills: 3 | Status: SHIPPED | OUTPATIENT
Start: 2023-02-09

## 2023-02-09 NOTE — TELEPHONE ENCOUNTER
PCP: John Paul Albert MD     Last appt: 12/21/2022     Future Appointments   Date Time Provider Chidi Unger   10/3/2023  9:10 AM Nneka Ferraro NP [facility] AM[facility]          Requested Prescriptions     Pending Prescriptions Disp Refills    dulaglutide (Trulicity) 3 ED/1.8 mL pnij  2     Sig: by SubCUTAneous route every seven (7) days.

## 2023-02-24 DIAGNOSIS — K21.9 GASTROESOPHAGEAL REFLUX DISEASE WITHOUT ESOPHAGITIS: ICD-10-CM

## 2023-02-24 DIAGNOSIS — E11.9 TYPE 2 DIABETES MELLITUS WITHOUT COMPLICATION, WITHOUT LONG-TERM CURRENT USE OF INSULIN (HCC): Primary | ICD-10-CM

## 2023-02-24 RX ORDER — TIRZEPATIDE 7.5 MG/.5ML
0.5 INJECTION, SOLUTION SUBCUTANEOUS
Qty: 4 ADJUSTABLE DOSE PRE-FILLED PEN SYRINGE | Refills: 0 | Status: SHIPPED | OUTPATIENT
Start: 2023-02-24

## 2023-02-24 NOTE — TELEPHONE ENCOUNTER
Ask pharmacist what alternative is available. Normally I would substitute with Ozempic but I know that is on back order also.

## 2023-02-24 NOTE — TELEPHONE ENCOUNTER
I called [facility] and she stated that the only other option right now is Mounjaro - 7.5 & 10mg. It is not a guarantee that they will have it. The other option is t have the patient check with other pharmacies to see what they have in stock.

## 2023-02-24 NOTE — TELEPHONE ENCOUNTER
Pharmacy is calling to say insurance will only cover 3ml trulicity pens but they are currently on backorder. Pt needs an alternative asap.

## 2023-02-24 NOTE — TELEPHONE ENCOUNTER
I sent a prescription for Mounjaro. Let patient know that I sent this medication in to substitute for Trulicity that is on back order. Her insurance might not cover Donavon Stephen so it best if she should call other pharmacies to see if they have Trulicity in stock.

## 2023-02-27 ENCOUNTER — TELEPHONE (OUTPATIENT)
Dept: INTERNAL MEDICINE CLINIC | Age: 68
End: 2023-02-27

## 2023-02-27 RX ORDER — OMEPRAZOLE 20 MG/1
20 CAPSULE, DELAYED RELEASE ORAL DAILY
Qty: 30 CAPSULE | Refills: 0 | Status: SHIPPED | OUTPATIENT
Start: 2023-02-27 | End: 2023-03-29

## 2023-02-27 NOTE — TELEPHONE ENCOUNTER
I called the patient and verified them by name and date of birth. I informed the patient on the message from the provider. They stated understanding and had no further questions. I informed her that I was going to complete the PA today. PCP: Ana M Hdez MD     Last appt: 2022     Future Appointments   Date Time Provider Chidi Trujilloi   10/3/2023  9:10 AM Solange Hester NP [facility] BS AMB          Requested Prescriptions     Pending Prescriptions Disp Refills    omeprazole (PRILOSEC) 20 mg capsule 30 Capsule 0     Sig: Take 1 Capsule by mouth daily for 30 days. Signed Prescriptions Disp Refills    tirzepatide (Mounjaro) 7.5 mg/0.5 mL pnij 4 Adjustable Dose Pre-filled Pen Syringe 0     Si.5 mL by SubCUTAneous route every seven (7) days.      Authorizing Provider: Daniel Collins

## 2023-03-21 NOTE — PROCEDURES
ELECTRODIAGNOSTIC REPORT      Test Date:  3/16/2020    Patient: Carlos Garcia : 1955 Physician: Collins Boyle M.D.   ID#: [id number] SEX: Female Ref. Phys: Collins Boyle M.D. Patient History / Exam:  75year old right handed female who presents with sensory loss and pain in the right hand Especially worse in the morning. Recently was relieved by carpal tunnel injection. EMG & NCV Findings:  Evaluation of the left median motor nerve showed normal distal onset latency (3.1 ms), normal amplitude (6.7 mV), and normal conduction velocity (Elbow-Wrist, 57 m/s). The right median motor nerve showed normal distal onset latency (4.2 ms) and normal amplitude (4.4 mV). The left ulnar motor nerve showed normal distal onset latency (2.5 ms), reduced amplitude (6.7 mV), decreased conduction velocity (Wrist-Abd Dig Minimi, 32 m/s), normal conduction velocity (B Elbow-Wrist, 59 m/s), and normal conduction velocity (A Elbow-B Elbow, 67 m/s). The right ulnar motor nerve showed normal distal onset latency (2.9 ms), normal amplitude (8.0 mV), decreased conduction velocity (Wrist-Abd Dig Minimi, 28 m/s), normal conduction velocity (B Elbow-Wrist, 64 m/s), and normal conduction velocity (A Elbow-B Elbow, 63 m/s). The left median sensory nerve showed normal distal onset latency (2.7 ms), normal distal peak latency (3.3 ms), and normal amplitude (41.3 µV). The right median sensory nerve showed normal distal onset latency (3.3 ms), normal distal peak latency (3.6 ms), and reduced amplitude (0.8 µV). The left radial sensory, the right radial sensory, the left ulnar sensory, and the right ulnar sensory nerves showed normal distal peak latency (L2.1, R2.2, L3.2, R3.6 ms) and normal amplitude (L51.1, R41.7, L21.2, R15.0 µV).   The left median/ulnar (palm) comparison nerve showed normal distal peak latency (Median Palm, 1.9 ms), normal amplitude (Median Palm, 43.1 µV), normal distal peak latency (Ulnar Palm, 2.0 ms), normal amplitude (Ulnar Palm, 13.5 µV), and normal peak latency difference (Median Palm-Ulnar Palm, 0.1 ms). The right median/ulnar (palm) comparison nerve showed no response (Median Palm), normal distal peak latency (Ulnar Palm, 1.8 ms), and normal amplitude (Ulnar Palm, 11.4 µV). All left vs. right side differences were within normal limits. All F Wave latencies were within normal limits. All F Wave left vs. right side latency differences were within normal limits. Needle evaluation of the right abductor pollicis brevis muscle showed moderately increased spontaneous activity, diminished recruitment, and slightly increased polyphasic potentials. The right Oppens Policis muscle showed slightly increased spontaneous activity, diminished recruitment, and increased motor unit amplitude. All remaining muscles (as indicated in the following table) showed no evidence of electrical instability. 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  Surgical evaluation recommended.       ___________________________  Salbador Bess M.D.    Nerve Conduction Studies  Anti Sensory Summary Table     Stim Site NR Onset (ms) Peak (ms) O-P Amp (µV) Norm Peak (ms) Norm O-P Amp Site1 Site2 Dist (cm) Norm Gene (m/s)   Left Median Anti Sensory (2nd Digit)  34.5°C   Wrist    2.7 3.3 41.3 <4 >11 Wrist 2nd Digit 14.0    Right Median Anti Sensory (2nd Digit)  34.5°C   Wrist    3.3 3.6 0.8 <4 >11 Wrist 2nd Digit 14.0    Left Radial Anti Sensory (Base 1st Digit)  34.5°C   Wrist    1.7 2.1 51.1 <2.8 7 Wrist Base 1st Digit 10.0    Right Radial Anti Sensory (Base 1st Digit)  34.5°C   Wrist    1.7 2.2 41.7 <2.8 7 Wrist Base 1st Digit 10.0    Left Ulnar Anti Sensory (5th Digit)  34.5°C   Wrist    2.5 3.2 21.2 <4.0 >10 Wrist 5th Digit 14.0    Right Ulnar Anti Sensory (5th Digit)  34.5°C   Wrist    2.8 3.6 15.0 <4.0 >10 Wrist 5th Digit 14.0      Motor Summary Table     Stim Site NR Onset (ms) Norm Onset (ms) O-P Amp (mV) Norm O-P Amp P-T Amp (mV) Site1 Site2 Dist (cm) Gene (m/s)   Left Median Motor (Abd Poll Brev)  34.5°C   Wrist    3.1 <4.5 6.7 >4.1  Wrist Abd Poll Brev 8.0 26   Elbow    7.3  6.3   Elbow Wrist 24.0 57   Right Median Motor (Abd Poll Brev)  34.5°C   Wrist    4.2 <4.5 4.4 >4.1  Wrist Abd Poll Brev 8.0 19   Elbow    13.8  0.0   Elbow Wrist 0.0    Left Ulnar Motor (Abd Dig Minimi)  34.5°C   Wrist    2.5 <3.1 6.7 >7.0  Wrist Abd Dig Minimi 8.0 32   B Elbow    6.6  5.6   B Elbow Wrist 24.0 59   A Elbow    8.1  4.8   A Elbow B Elbow 10.0 67   Right Ulnar Motor (Abd Dig Minimi)  34.5°C   Wrist    2.9 <3.1 8.0 >7.0  Wrist Abd Dig Minimi 8.0 28   B Elbow    6.5  8.0   B Elbow Wrist 23.0 64   A Elbow    8.1  7.8   A Elbow B Elbow 10.0 63     Comparison Summary Table     Stim Site NR Peak (ms) P-T Amp (µV) Site1 Site2 Dist (cm) Delta-P (ms)   Left Median/Ulnar Palm Comparison (Wrist)  34.5°C   Median Palm    1.9 64.4 Median Palm Ulnar Palm 8.0 0.1   Ulnar Palm    2.0 17.5       Right Median/Ulnar Palm Comparison (Wrist)  34.5°C   Median Palm NR   Median Palm Ulnar Palm 8.0    Ulnar Palm    1.8 15.2         F Wave Studies     NR F-Lat (ms) Lat Norm (ms) L-R F-Lat (ms) L-R Lat Norm   Left Ulnar (Mrkrs) (Abd Dig Min)  34.5°C      28.66 <32 0.00 <2.5   Right Ulnar (Mrkrs) (Abd Dig Min)  34.5°C      28.66 <32 0.00 <2.5       EMG     Side Muscle Nerve Root Ins Act Fibs Psw Recrt Duration Amp Poly Comment   Right Abd Poll Brev Median C8-T1 Nml 2+ Nml Reduced Nml Nml 1+    Right ABD Dig Min Ulnar C8-T1 Nml Nml Nml Nml Nml Nml Nml    Right 1stDorInt Ulnar C8-T1 Nml Nml Nml Nml Nml Nml Nml    Right PronatorTeres Median C6-7 Nml Nml Nml Nml Nml Nml Nml    Right Biceps Musculocut C5-6 Nml Nml Nml Nml Nml Nml Nml    Right Oppens Policis Median J1-J6 Nml 1+ Nml Reduced Nml Incr Nml      Waveforms: General

## 2023-04-03 PROBLEM — E11.29 CONTROLLED TYPE 2 DIABETES MELLITUS WITH MICROALBUMINURIA, WITHOUT LONG-TERM CURRENT USE OF INSULIN (HCC): Status: RESOLVED | Noted: 2018-12-28 | Resolved: 2021-01-19

## 2023-04-03 PROBLEM — R80.9 CONTROLLED TYPE 2 DIABETES MELLITUS WITH MICROALBUMINURIA, WITHOUT LONG-TERM CURRENT USE OF INSULIN (HCC): Status: RESOLVED | Noted: 2018-12-28 | Resolved: 2021-01-19

## 2023-04-19 ENCOUNTER — OFFICE VISIT (OUTPATIENT)
Dept: INTERNAL MEDICINE CLINIC | Age: 68
End: 2023-04-19
Payer: MEDICARE

## 2023-04-19 VITALS
RESPIRATION RATE: 18 BRPM | OXYGEN SATURATION: 96 % | SYSTOLIC BLOOD PRESSURE: 134 MMHG | HEIGHT: 66 IN | BODY MASS INDEX: 34.23 KG/M2 | WEIGHT: 213 LBS | HEART RATE: 93 BPM | DIASTOLIC BLOOD PRESSURE: 84 MMHG | TEMPERATURE: 98.3 F

## 2023-04-19 DIAGNOSIS — E03.9 ACQUIRED HYPOTHYROIDISM: ICD-10-CM

## 2023-04-19 DIAGNOSIS — I10 HYPERTENSION, ESSENTIAL: ICD-10-CM

## 2023-04-19 DIAGNOSIS — D50.9 IRON DEFICIENCY ANEMIA, UNSPECIFIED IRON DEFICIENCY ANEMIA TYPE: ICD-10-CM

## 2023-04-19 DIAGNOSIS — E66.01 SEVERE OBESITY (HCC): ICD-10-CM

## 2023-04-19 DIAGNOSIS — E11.9 TYPE 2 DIABETES MELLITUS WITHOUT COMPLICATION, WITHOUT LONG-TERM CURRENT USE OF INSULIN (HCC): ICD-10-CM

## 2023-04-19 DIAGNOSIS — E78.00 HYPERCHOLESTEROLEMIA: ICD-10-CM

## 2023-04-19 DIAGNOSIS — Z00.00 MEDICARE ANNUAL WELLNESS VISIT, SUBSEQUENT: Primary | ICD-10-CM

## 2023-04-19 PROCEDURE — G0439 PPPS, SUBSEQ VISIT: HCPCS | Performed by: INTERNAL MEDICINE

## 2023-04-19 PROCEDURE — G8427 DOCREV CUR MEDS BY ELIG CLIN: HCPCS | Performed by: INTERNAL MEDICINE

## 2023-04-19 PROCEDURE — 1123F ACP DISCUSS/DSCN MKR DOCD: CPT | Performed by: INTERNAL MEDICINE

## 2023-04-19 PROCEDURE — 3017F COLORECTAL CA SCREEN DOC REV: CPT | Performed by: INTERNAL MEDICINE

## 2023-04-19 PROCEDURE — 99214 OFFICE O/P EST MOD 30 MIN: CPT | Performed by: INTERNAL MEDICINE

## 2023-04-19 PROCEDURE — 3079F DIAST BP 80-89 MM HG: CPT | Performed by: INTERNAL MEDICINE

## 2023-04-19 PROCEDURE — 3075F SYST BP GE 130 - 139MM HG: CPT | Performed by: INTERNAL MEDICINE

## 2023-04-19 PROCEDURE — 1101F PT FALLS ASSESS-DOCD LE1/YR: CPT | Performed by: INTERNAL MEDICINE

## 2023-04-19 PROCEDURE — G8510 SCR DEP NEG, NO PLAN REQD: HCPCS | Performed by: INTERNAL MEDICINE

## 2023-04-19 PROCEDURE — G8536 NO DOC ELDER MAL SCRN: HCPCS | Performed by: INTERNAL MEDICINE

## 2023-04-19 PROCEDURE — 2022F DILAT RTA XM EVC RTNOPTHY: CPT | Performed by: INTERNAL MEDICINE

## 2023-04-19 PROCEDURE — 3046F HEMOGLOBIN A1C LEVEL >9.0%: CPT | Performed by: INTERNAL MEDICINE

## 2023-04-19 PROCEDURE — 1090F PRES/ABSN URINE INCON ASSESS: CPT | Performed by: INTERNAL MEDICINE

## 2023-04-19 PROCEDURE — G8399 PT W/DXA RESULTS DOCUMENT: HCPCS | Performed by: INTERNAL MEDICINE

## 2023-04-19 PROCEDURE — G9899 SCRN MAM PERF RSLTS DOC: HCPCS | Performed by: INTERNAL MEDICINE

## 2023-04-19 PROCEDURE — G8417 CALC BMI ABV UP PARAM F/U: HCPCS | Performed by: INTERNAL MEDICINE

## 2023-04-19 NOTE — ACP (ADVANCE CARE PLANNING)
Advance Care Planning     Advance Care Planning (ACP) Physician/NP/PA Conversation      Date of Conversation: 4/19/2023  Conducted with: Patient with Decision Making Capacity    Healthcare Decision Maker:     Primary Decision Maker: Surendra Luna - Spouse - [phone]  Click here to complete [address] including selection of the Healthcare Decision Maker Relationship (ie \"Primary\")      Care Preferences:    Hospitalization: \"If your health worsens and it becomes clear that your chance of recovery is unlikely, what would be your preference regarding hospitalization? \"  The patient would prefer hospitalization. Ventilation: \"If you were unable to breathe on your own and your chance of recovery was unlikely, what would be your preference about the use of a ventilator (breathing machine) if it was available to you? \"   The patient would desire the use of a ventilator. Resuscitation: \"In the event your heart stopped as a result of an underlying serious health condition, would you want attempts to be made to restart your heart, or would you prefer a natural death? \"   Yes, attempt to resuscitate.     Additional topics discussed: treatment goals    Conversation Outcomes / Follow-Up Plan:   ACP complete - no further action today  Reviewed DNR/DNI and patient elects Full Code (Attempt Resuscitation)     Length of Voluntary ACP Conversation in minutes:  <16 minutes (Non-Billable)    Micaela Elder MD

## 2023-04-19 NOTE — PATIENT INSTRUCTIONS
Medicare Wellness Visit, Female     The best way to live healthy is to have a lifestyle where you eat a well-balanced diet, exercise regularly, limit alcohol use, and quit all forms of tobacco/nicotine, if applicable. Regular preventive services are another way to keep healthy. Preventive services (vaccines, screening tests, monitoring & exams) can help personalize your care plan, which helps you manage your own care. Screening tests can find health problems at the earliest stages, when they are easiest to treat. [facility] follows the current, evidence-based guidelines published by the [facility] ([facility]) when recommending preventive services for our patients. Because we follow these guidelines, sometimes recommendations change over time as research supports it. (For example, mammograms used to be recommended annually. Even though Medicare will still pay for an annual mammogram, the newer guidelines recommend a mammogram every two years for women of average risk). Of course, you and your doctor may decide to screen more often for some diseases, based on your risk and your co-morbidities (chronic disease you are already diagnosed with). Preventive services for you include:  - Medicare offers their members a free annual wellness visit, which is time for you and your primary care provider to discuss and plan for your preventive service needs.  Take advantage of this benefit every year!    -Over the age of 72 should receive the recommended pneumonia vaccines.    -All adults should have a flu vaccine yearly.  -All adults should have a tetanus vaccine every 10 years.   -Over the age 48 should receive the shingles vaccines.        -All adults should be screened once for Hepatitis C.  -All adults age 38-68 who are overweight should have a diabetes screening test once every three years.   -Other screening tests and preventive services for persons with diabetes include: an eye exam to screen for diabetic retinopathy, a kidney function test, a foot exam, and stricter control over your cholesterol.   -Cardiovascular screening for adults with routine risk involves an electrocardiogram (ECG) at intervals determined by your doctor.     -Colorectal cancer screenings should be done for adults age 39-70 with no increased risk factors for colorectal cancer. There are a number of acceptable methods of screening for this type of cancer. Each test has its own benefits and drawbacks. Discuss with your doctor what is most appropriate for you during your annual wellness visit. The different tests include: colonoscopy (considered the best screening method), a fecal occult blood test, a fecal DNA test, and sigmoidoscopy.    -Lung cancer screening is recommended annually with a low dose CT scan for adults between age 54 and 68, who have smoked at least 30 pack years (equivalent of 1 pack per day for 30 days), and who is a current smoker or quit less than 15 years ago.    -A bone mass density test is recommended when a woman turns 65 to screen for osteoporosis. This test is only recommended one time, as a screening. Some providers will use this same test as a disease monitoring tool if you already have osteoporosis. -Breast cancer screenings are recommended every other year for women of normal risk, age 54-69.    -Cervical cancer screenings for women over age 72 are only recommended with certain risk factors.      Here is a list of your current Health Maintenance items (your personalized list of preventive services) with a due date:  Health Maintenance Due   Topic Date Due    Shingles Vaccine (1 of 2) Never done    Colorectal Screening  10/08/2021    COVID-19 Vaccine (4 - Booster for Pfizer series) 04/07/2022

## 2023-04-19 NOTE — PROGRESS NOTES
Chief Complaint   Patient presents with    Annual Wellness Visit       HISTORY OF PRESENT ILLNESS  Lencho Suresh is a 69 y.o. female    Presents for Medicare AWV and 4 month follow up evaluation of DM. Denies polydipsia, polyuria, or hypoglycemia. Takes Mounjaro, metformin, and glipizide. Mounjaro costs about $200 a month since she is in the Medicare Part D donut hole until Dec; afterwards will be $40 a month. Her sons are helping pay the costs until then. Home FBS: 's. Lab review: A1c 7.1% today (12/21/22), 7.1% on 6/17/22, and 6.1% on 2/18/22. Eye exam: UTD    CT ordered by pulmonologist showed scarring she was told was most likely from old sarcoidosis.     COVID vaccine: had 3 vaccines, declined any further vaccines    Patient Active Problem List   Diagnosis Code    Hypertension, essential I10    Hypercholesterolemia E78.00    Acquired hypothyroidism E03.9    MANUEL (obstructive sleep apnea) G47.33    Carpal tunnel syndrome of right wrist G56.01    Spinal stenosis of cervical region M48.02    Spinal stenosis of lumbar region with neurogenic claudication M48.062    Osteopenia of multiple sites M85.89    Severe obesity (HCC) E66.01    Type 2 diabetes mellitus without complication, without long-term current use of insulin (HCC) E11.9    NAFLD (nonalcoholic fatty liver disease) K76.0    Status post lumbar spinal fusion Z98.1    Iron deficiency anemia D50.9     Past Medical History:   Diagnosis Date    Diabetes ([facility] [address]     DM2-trulicity, PCP treats    Hypercholesterolemia     Hypertension     Sarcoidosis     brain    Sleep apnea     CPAP complient , Dr. Jorge Brooks    Status post epidural steroid injection 10/15/2021    Thyroid disease     TIA (transient ischemic attack)     Questionable per patient left side defecits (due to spinal stenosis)     Allergies   Allergen Reactions    Gabapentin Other (comments)     Hot flashes    Topiramate Other (comments)     Hot flashes       Current Outpatient Medications Medication Sig Dispense Refill    levothyroxine (SYNTHROID) 88 mcg tablet TAKE 1/2 (ONE-HALF) TABLET BY MOUTH ONCE DAILY ON  MONDAY  AND  1  TABLET  ALL OTHER  DAYS (Patient taking differently: Pt states taking one hole tablet  Indications: a condition with low thyroid hormone levels) 90 Tablet 1    tirzepatide (Mounjaro) 7.5 mg/0.5 mL pnij 0.5 mL by SubCUTAneous route every seven (7) days. 4 mL 2    metFORMIN (GLUCOPHAGE) 1,000 mg tablet TAKE 1 TABLET BY MOUTH TWICE DAILY WITH MEALS 180 Tablet 1    lisinopriL (PRINIVIL, ZESTRIL) 40 mg tablet Take 1 tablet by mouth once daily 90 Tablet 1    glipiZIDE SR (GLUCOTROL XL) 10 mg CR tablet Take 1 tablet by mouth once daily 90 Tablet 3    OTHER,NON-FORMULARY, Energy Greens supplement drink      rosuvastatin (CRESTOR) 10 mg tablet Take 1 tablet by mouth nightly 90 Tablet 3    ferrous sulfate 325 mg (65 mg iron) tablet Take  by mouth daily. glucose blood VI test strips (OneTouch Ultra Test) strip USE STRIP TO CHECK GLUCOSE UP TO TWICE DAILY      loratadine (CLARITIN) 10 mg tablet Take 1 Tablet by mouth.      vitamin e (E GEMS) 1,000 unit capsule Take 1 Capsule by mouth daily. OTHER Take  by mouth daily. Immuneti supplement (Vitamin C, Zinc, Vitamin D3, Garlic bulb, Elderberry, Echinacea)      ferrous fumarate/vit Bcomp,C (SUPER B COMPLEX PO) Take 1 Capsule by mouth daily. magnesium 250 mg tab Take 1 Tablet by mouth daily. cholecalciferol (VITAMIN D3) (2,000 UNITS /50 MCG) cap capsule Take 5,000 Units by mouth daily. acetaminophen (TYLENOL) 500 mg tablet Take 2 Tablets by mouth every six (6) hours as needed for Pain. omega-3 acid ethyl esters (LOVAZA) 1 gram capsule Take 2 Capsules by mouth two (2) times a day. pregabalin (LYRICA) 50 mg capsule Take 1 Capsule by mouth two (2) times a day. lancets misc Use to check blood glucose up to twice a day.  Dx: E11.9 200 Each 3    Blood-Glucose Meter monitoring kit Use to check blood glucose up to twice a day. Dx: E11.9 1 Kit 0    cyanocobalamin (VITAMIN B12) 500 mcg tablet Take 1 Tablet by mouth daily. aspirin delayed-release 81 mg tablet Take 1 Tablet by mouth daily. fluticasone propionate (FLONASE) 50 mcg/actuation nasal spray 2 Sprays by Both Nostrils route as needed. ascorbic acid, vitamin C, (VITAMIN C) 500 mg tablet Take  by mouth. PHYSICAL EXAM  Visit Vitals  /84 (BP 1 Location: Left upper arm, BP Patient Position: Sitting, BP Cuff Size: Large adult)   Pulse 93   Temp 98.3 °F (36.8 °C) (Oral)   Resp 18   Ht 5' 5.5\" (1.664 m)   Wt 213 lb (96.6 kg)   SpO2 96%   BMI 34.91 kg/m²   4 lb weight loss since last clinic visit 4 months ago    General: Obese, no distress. HEENT:  Head normocephalic/atraumatic, no scleral icterus  Lungs:  Clear to ausculation bilaterally. Good air movement. Heart:  Regular rate and rhythm, normal S1 and S2, no murmur, gallop, or rub  Abdomen: Soft, non-distended, normal bowel sounds, epigastric tenderness, no guarding or masses. Extremities: No clubbing, cyanosis. 1+ left ankle edema. Neurological: Alert and oriented. Psychiatric: Normal mood and affect. Behavior is normal.        Results for orders placed or performed in visit on 12/21/22   MICROALBUMIN, UR, RAND W/ MICROALB/CREAT RATIO   Result Value Ref Range    Creatinine, urine random 159.0 Not Estab. mg/dL    Microalbumin, urine 905.2 Not Estab. ug/mL    Microalb/Creat ratio (ug/mg creat.) 569 (H) 0 - 29 mg/g creat   AMB POC HEMOGLOBIN A1C   Result Value Ref Range    Hemoglobin A1c (POC) 7.1 %         ASSESSMENT AND PLAN    ICD-10-CM ICD-9-CM    1. Medicare annual wellness visit, subsequent  Z00.00 V70.0       2. Type 2 diabetes mellitus without complication, without long-term current use of insulin (HCC)  E11.9 250.00 HEMOGLOBIN A1C WITH EAG      HEMOGLOBIN A1C WITH EAG      3.  Hypertension, essential  C12 093.9 METABOLIC PANEL, COMPREHENSIVE      CBC WITH AUTOMATED DIFF METABOLIC PANEL, COMPREHENSIVE      CBC WITH AUTOMATED DIFF      4. Hypercholesterolemia  E78.00 272.0 LIPID PANEL      LIPID PANEL      5. Acquired hypothyroidism  E03.9 244.9 TSH 3RD GENERATION      T4, FREE      TSH 3RD GENERATION      T4, FREE      6. Iron deficiency anemia, unspecified iron deficiency anemia type  D50.9 280.9 IRON PROFILE      FERRITIN      IRON PROFILE      FERRITIN      7. Severe obesity ([address]  E66.01 278.01         Diagnoses and all orders for this visit:    1. Medicare annual wellness visit, subsequent    2. Type 2 diabetes mellitus without complication, without long-term current use of insulin (HCC)  -     HEMOGLOBIN A1C WITH EAG; Future    3. Hypertension, essential  -     METABOLIC PANEL, COMPREHENSIVE; Future  -     CBC WITH AUTOMATED DIFF; Future    4. Hypercholesterolemia  -     LIPID PANEL; Future    5. Acquired hypothyroidism  -     TSH 3RD GENERATION; Future  -     T4, FREE; Future    6. Iron deficiency anemia, unspecified iron deficiency anemia type  -     IRON PROFILE; Future  -     FERRITIN; Future    7. Severe obesity ([facility] [address]    Type 2 DM likely controlled. Continue Mounjaro, metformin, and glipizide. HTN controlled on lisinopril. Labs to check A1c and other labs as above. Follow-up and Dispositions    Return in about 4 months (around 8/19/2023), or if symptoms worsen or fail to improve, for DM, HTN; have labs done within next 2 weeks. I have discussed the diagnosis with the patient and the intended plan as seen in the above orders. Patient is in agreement. The patient has received an after-visit summary and questions were answered concerning future plans. I have discussed medication side effects and warnings with the patient as well.

## 2023-04-19 NOTE — PROGRESS NOTES
This is the Subsequent Medicare Annual Wellness Exam, performed 12 months or more after the Initial AWV or the last Subsequent AWV    I have reviewed the patient's medical history in detail and updated the computerized patient record. Assessment/Plan   Education and counseling provided:  Are appropriate based on today's review and evaluation    1. Medicare annual wellness visit, subsequent       Depression Risk Factor Screening     3 most recent PHQ Screens 4/19/2023   Little interest or pleasure in doing things Not at all   Feeling down, depressed, irritable, or hopeless Not at all   Total Score PHQ 2 0       Alcohol & Drug Abuse Risk Screen   Do you average more than 1 drink per night or more than 7 drinks a week?: (P) No  On any one occasion in the past three months have you had more than 3 drinks containing alcohol?: (P) No            Functional Ability and Level of Safety   Hearing:  Hearing: (P) additional comments below  Hearing comments: (P) Can't hear as well in my left ear. Activities of Daily Living: The home contains: (P) grab bars, rugs  Functional ADLs: (P) Patient does total self care     Ambulation:  Patient ambulates: (P) with mild difficulty  How far the patient can walk with difficulty: (P) Walk around the block of my home and to the mailbox. Walking is difficult due to: (P) additional comments below  Additional comments about walking difficulties: (P) Recovering from double back Surgery  Walking aids: (P) additional comments below  Additional comments : (P) I use my Cane when I walk around the block and to the mailbox. Fall Risk:  Fall Risk Assessment, last 12 mths 4/19/2023   Able to walk? Yes   Fall in past 12 months? 0   Do you feel unsteady?  0   Are you worried about falling 0     Abuse Screen:  Do you ever feel afraid of your partner?: (P) No  Are you in a relationship with someone who physically or mentally threatens you?: (P) No  Is it safe for you to go home?: (P) Yes Cognitive Screening   Has your family/caregiver stated any concerns about your memory?: (P) Yes, No, Additional comments below  Additional comments about memory: (P) Sometimes I have short term memory loss, but my memory comes back quickly     Cognitive Screening: Normal recall of 3 of 3 words after 5 mins.     Health Maintenance Due     Health Maintenance Due   Topic Date Due    Shingles Vaccine (1 of 2) Never done    Colorectal Cancer Screening Combo  10/08/2021    COVID-19 Vaccine (4 - Booster for Pfizer series) 04/07/2022       Patient Care Team   Patient Care Team:  Jud Muse MD as PCP - General (Internal Medicine Physician)  Jud Muse MD as PCP - REHABILITATION [facility] Empaneled Provider  Torito Gamez MD as Physician (Neurology)  Vicente Lilly MD (Sleep Medicine Physician)    History     Patient Active Problem List   Diagnosis Code    Hypertension, essential I10    Hypercholesterolemia E78.00    Acquired hypothyroidism E03.9    MANUEL (obstructive sleep apnea) G47.33    Carpal tunnel syndrome of right wrist G56.01    Spinal stenosis of cervical region M48.02    Spinal stenosis of lumbar region with neurogenic claudication M48.062    Osteopenia of multiple sites M85.89    Severe obesity ([address] E66.01    Type 2 diabetes mellitus without complication, without long-term current use of insulin (HCC) E11.9    NAFLD (nonalcoholic fatty liver disease) K76.0    Status post lumbar spinal fusion Z98.1    Iron deficiency anemia D50.9     Past Medical History:   Diagnosis Date    Diabetes ([address]     DM2-trulicity, PCP treats    Hypercholesterolemia     Hypertension     Sarcoidosis     brain    Sleep apnea     CPAP complient , Dr. Kusum Benjamin    Status post epidural steroid injection 10/15/2021    Thyroid disease     TIA (transient ischemic attack)     Questionable per patient left side defecits (due to spinal stenosis)      Past Surgical History:   Procedure Laterality Date    HX BREAST BIOPSY Right 1990s    Benign (per patient)    HX CARPAL TUNNEL RELEASE Right     HX  SECTION       x 2    HX CHOLECYSTECTOMY      HX HEENT      parathyroidectomy    HX HYSTERECTOMY      HX ORTHOPAEDIC  2021, 2021    back surgery     Current Outpatient Medications   Medication Sig Dispense Refill    levothyroxine (SYNTHROID) 88 mcg tablet TAKE 1/2 (ONE-HALF) TABLET BY MOUTH ONCE DAILY ON  MONDAY  AND  1  TABLET  ALL OTHER  DAYS (Patient taking differently: Pt states taking one hole tablet  Indications: a condition with low thyroid hormone levels) 90 Tablet 1    tirzepatide (Mounjaro) 7.5 mg/0.5 mL pnij 0.5 mL by SubCUTAneous route every seven (7) days. 4 mL 2    metFORMIN (GLUCOPHAGE) 1,000 mg tablet TAKE 1 TABLET BY MOUTH TWICE DAILY WITH MEALS 180 Tablet 1    lisinopriL (PRINIVIL, ZESTRIL) 40 mg tablet Take 1 tablet by mouth once daily 90 Tablet 1    glipiZIDE SR (GLUCOTROL XL) 10 mg CR tablet Take 1 tablet by mouth once daily 90 Tablet 3    OTHER,NON-FORMULARY, Energy Greens supplement drink      rosuvastatin (CRESTOR) 10 mg tablet Take 1 tablet by mouth nightly 90 Tablet 3    ferrous sulfate 325 mg (65 mg iron) tablet Take  by mouth daily. glucose blood VI test strips (OneTouch Ultra Test) strip USE STRIP TO CHECK GLUCOSE UP TO TWICE DAILY      loratadine (CLARITIN) 10 mg tablet Take 1 Tablet by mouth.      vitamin e (E GEMS) 1,000 unit capsule Take 1 Capsule by mouth daily. OTHER Take  by mouth daily. Immuneti supplement (Vitamin C, Zinc, Vitamin D3, Garlic bulb, Elderberry, Echinacea)      ferrous fumarate/vit Bcomp,C (SUPER B COMPLEX PO) Take 1 Capsule by mouth daily. magnesium 250 mg tab Take 1 Tablet by mouth daily. cholecalciferol (VITAMIN D3) (2,000 UNITS /50 MCG) cap capsule Take 5,000 Units by mouth daily. acetaminophen (TYLENOL) 500 mg tablet Take 2 Tablets by mouth every six (6) hours as needed for Pain.       omega-3 acid ethyl esters (LOVAZA) 1 gram capsule Take 2 Capsules by mouth two (2) times a day. pregabalin (LYRICA) 50 mg capsule Take 1 Capsule by mouth two (2) times a day. lancets misc Use to check blood glucose up to twice a day. Dx: E11.9 200 Each 3    Blood-Glucose Meter monitoring kit Use to check blood glucose up to twice a day. Dx: E11.9 1 Kit 0    cyanocobalamin (VITAMIN B12) 500 mcg tablet Take 1 Tablet by mouth daily. aspirin delayed-release 81 mg tablet Take 1 Tablet by mouth daily. fluticasone propionate (FLONASE) 50 mcg/actuation nasal spray 2 Sprays by Both Nostrils route as needed. ascorbic acid, vitamin C, (VITAMIN C) 500 mg tablet Take  by mouth.        Allergies   Allergen Reactions    Gabapentin Other (comments)     Hot flashes    Topiramate Other (comments)     Hot flashes       Family History   Problem Relation Age of Onset    Cancer Mother         bone    Diabetes Mother     Hypertension Mother     Hypertension Father     Cancer Father         blood    Hypertension Sister     Sleep Apnea Brother     No Known Problems Brother      Social History     Tobacco Use    Smoking status: Never    Smokeless tobacco: Never   Substance Use Topics    Alcohol use: Never         Shamika aCllahan MD

## 2023-04-19 NOTE — PROGRESS NOTES
[facility]  Identified pt with two pt identifiers(name and ). Chief Complaint   Patient presents with    Annual Wellness Visit       Reviewed record In preparation for visit and have obtained necessary documentation. 1. Have you been to the ER, urgent care clinic or hospitalized since your last visit? No     2. Have you seen or consulted any other health care providers outside of the [address] [place] [address] since your last visit? Include any pap smears or colon screening. No    Vitals reviewed with provider.     Health Maintenance reviewed:     Health Maintenance Due   Topic    Shingles Vaccine (1 of 2)    Colorectal Cancer Screening Combo     COVID-19 Vaccine (4 - Booster for Pfizer series)    Medicare Yearly Exam           Wt Readings from Last 3 Encounters:   23 213 lb (96.6 kg)   22 217 lb (98.4 kg)   22 214 lb 11.2 oz (97.4 kg)        Temp Readings from Last 3 Encounters:   23 98.3 °F (36.8 °C) (Oral)   22 98 °F (36.7 °C) (Oral)   22 98.1 °F (36.7 °C) (Temporal)        BP Readings from Last 3 Encounters:   23 134/84   22 131/87   22 (!) 157/86        Pulse Readings from Last 3 Encounters:   23 93   22 89   22 77        Vitals:    23 1148   BP: 134/84   Pulse: 93   Resp: 18   Temp: 98.3 °F (36.8 °C)   TempSrc: Oral   SpO2: 96%   Weight: 213 lb (96.6 kg)   Height: 5' 5.5\" (1.664 m)   PainSc:   0 - No pain          Learning Assessment:   :       Learning Assessment 2018   PRIMARY LEARNER Patient   HIGHEST LEVEL OF EDUCATION - PRIMARY LEARNER  SOME COLLEGE   BARRIERS PRIMARY LEARNER NONE   CO-LEARNER CAREGIVER No   PRIMARY LANGUAGE ENGLISH   LEARNER PREFERENCE PRIMARY DEMONSTRATION   ANSWERED BY patient   RELATIONSHIP SELF        Depression Screening:   :       3 most recent PHQ Screens 2023   Little interest or pleasure in doing things Not at all   Feeling down, depressed, irritable, or hopeless Not at all Total Score PHQ 2 0        Fall Risk Assessment:   :       Fall Risk Assessment, last 12 mths 4/19/2023   Able to walk? Yes   Fall in past 12 months? 0   Do you feel unsteady? 0   Are you worried about falling 0        Abuse Screening:   :       Abuse Screening Questionnaire 4/18/2023 11/24/2021 10/6/2020 3/3/2020 12/28/2018   Do you ever feel afraid of your partner? N N N N N   Are you in a relationship with someone who physically or mentally threatens you? N N N N N   Is it safe for you to go home?  Y Y Y Y Y        ADL Screening:   :       ADL Assessment 4/19/2023   Feeding yourself No Help Needed   Getting from bed to chair No Help Needed   Getting dressed No Help Needed   Bathing or showering No Help Needed   Walk across the room (includes cane/walker) No Help Needed   Using the telphone No Help Needed   Taking your medications No Help Needed   Preparing meals No Help Needed   Managing money (expenses/bills) No Help Needed   Moderately strenuous housework (laundry) No Help Needed   Shopping for personal items (toiletries/medicines) No Help Needed   Shopping for groceries No Help Needed   Driving No Help Needed   Climbing a flight of stairs No Help Needed   Getting to places beyond walking distances No Help Needed

## 2023-04-28 LAB
ALBUMIN SERPL-MCNC: 4.1 G/DL (ref 3.8–4.8)
ALBUMIN/GLOB SERPL: 1.5 {RATIO} (ref 1.2–2.2)
ALP SERPL-CCNC: 58 IU/L (ref 44–121)
ALT SERPL-CCNC: 32 IU/L (ref 0–32)
AST SERPL-CCNC: 42 IU/L (ref 0–40)
BASOPHILS # BLD AUTO: 0 X10E3/UL (ref 0–0.2)
BASOPHILS NFR BLD AUTO: 0 %
BILIRUB SERPL-MCNC: 0.2 MG/DL (ref 0–1.2)
BUN SERPL-MCNC: 10 MG/DL (ref 8–27)
BUN/CREAT SERPL: 10 (ref 12–28)
CALCIUM SERPL-MCNC: 9.2 MG/DL (ref 8.7–10.3)
CHLORIDE SERPL-SCNC: 104 MMOL/L (ref 96–106)
CHOLEST SERPL-MCNC: 106 MG/DL (ref 100–199)
CO2 SERPL-SCNC: 20 MMOL/L (ref 20–29)
CREAT SERPL-MCNC: 1 MG/DL (ref 0.57–1)
EGFRCR SERPLBLD CKD-EPI 2021: 61 ML/MIN/1.73
EOSINOPHIL # BLD AUTO: 0 X10E3/UL (ref 0–0.4)
EOSINOPHIL NFR BLD AUTO: 0 %
ERYTHROCYTE [DISTWIDTH] IN BLOOD BY AUTOMATED COUNT: 15.5 % (ref 11.7–15.4)
EST. AVERAGE GLUCOSE BLD GHB EST-MCNC: 148 MG/DL
FERRITIN SERPL-MCNC: 60 NG/ML (ref 15–150)
GLOBULIN SER CALC-MCNC: 2.7 G/DL (ref 1.5–4.5)
GLUCOSE SERPL-MCNC: 119 MG/DL (ref 70–99)
HBA1C MFR BLD: 6.8 % (ref 4.8–5.6)
HCT VFR BLD AUTO: 34 % (ref 34–46.6)
HDLC SERPL-MCNC: 39 MG/DL
HGB BLD-MCNC: 11 G/DL (ref 11.1–15.9)
IMM GRANULOCYTES # BLD AUTO: 0 X10E3/UL (ref 0–0.1)
IMM GRANULOCYTES NFR BLD AUTO: 1 %
IRON SATN MFR SERPL: 19 % (ref 15–55)
IRON SERPL-MCNC: 76 UG/DL (ref 27–139)
LDLC SERPL CALC-MCNC: 44 MG/DL (ref 0–99)
LYMPHOCYTES # BLD AUTO: 1.2 X10E3/UL (ref 0.7–3.1)
LYMPHOCYTES NFR BLD AUTO: 39 %
MCH RBC QN AUTO: 28.2 PG (ref 26.6–33)
MCHC RBC AUTO-ENTMCNC: 32.4 G/DL (ref 31.5–35.7)
MCV RBC AUTO: 87 FL (ref 79–97)
MONOCYTES # BLD AUTO: 0.3 X10E3/UL (ref 0.1–0.9)
MONOCYTES NFR BLD AUTO: 9 %
NEUTROPHILS # BLD AUTO: 1.6 X10E3/UL (ref 1.4–7)
NEUTROPHILS NFR BLD AUTO: 51 %
PLATELET # BLD AUTO: 227 X10E3/UL (ref 150–450)
POTASSIUM SERPL-SCNC: 4.6 MMOL/L (ref 3.5–5.2)
PROT SERPL-MCNC: 6.8 G/DL (ref 6–8.5)
RBC # BLD AUTO: 3.9 X10E6/UL (ref 3.77–5.28)
SODIUM SERPL-SCNC: 139 MMOL/L (ref 134–144)
T4 FREE SERPL-MCNC: 0.94 NG/DL (ref 0.82–1.77)
TIBC SERPL-MCNC: 395 UG/DL (ref 250–450)
TRIGL SERPL-MCNC: 129 MG/DL (ref 0–149)
TSH SERPL DL<=0.005 MIU/L-ACNC: 9.48 UIU/ML (ref 0.45–4.5)
UIBC SERPL-MCNC: 319 UG/DL (ref 118–369)
VLDLC SERPL CALC-MCNC: 23 MG/DL (ref 5–40)
WBC # BLD AUTO: 3.1 X10E3/UL (ref 3.4–10.8)

## 2023-05-19 RX ORDER — CHLORAL HYDRATE 500 MG
2000 CAPSULE ORAL 2 TIMES DAILY
COMMUNITY

## 2023-05-19 RX ORDER — ROSUVASTATIN CALCIUM 10 MG/1
1 TABLET, COATED ORAL NIGHTLY
COMMUNITY
Start: 2022-06-30

## 2023-05-19 RX ORDER — FLUTICASONE PROPIONATE 50 MCG
2 SPRAY, SUSPENSION (ML) NASAL PRN
COMMUNITY

## 2023-05-19 RX ORDER — ASCORBIC ACID 500 MG
TABLET ORAL
COMMUNITY

## 2023-05-19 RX ORDER — PREGABALIN 50 MG/1
50 CAPSULE ORAL 2 TIMES DAILY
COMMUNITY

## 2023-05-19 RX ORDER — LEVOTHYROXINE SODIUM 88 UG/1
TABLET ORAL
COMMUNITY
Start: 2023-04-15

## 2023-05-19 RX ORDER — LANCETS 30 GAUGE
EACH MISCELLANEOUS
COMMUNITY
Start: 2021-08-11

## 2023-05-19 RX ORDER — ACETAMINOPHEN 500 MG
1000 TABLET ORAL EVERY 6 HOURS PRN
COMMUNITY

## 2023-05-19 RX ORDER — TIRZEPATIDE 7.5 MG/.5ML
0.5 INJECTION, SOLUTION SUBCUTANEOUS
COMMUNITY
Start: 2023-03-29 | End: 2023-06-22 | Stop reason: SDUPTHER

## 2023-05-19 RX ORDER — FERROUS SULFATE 325(65) MG
TABLET ORAL DAILY
COMMUNITY
Start: 2022-02-25

## 2023-05-19 RX ORDER — GLIPIZIDE 10 MG/1
1 TABLET, FILM COATED, EXTENDED RELEASE ORAL DAILY
COMMUNITY
Start: 2022-09-20

## 2023-05-19 RX ORDER — MULTIVIT WITH MINERALS/LUTEIN
1000 TABLET ORAL DAILY
COMMUNITY

## 2023-05-19 RX ORDER — LORATADINE 10 MG/1
10 TABLET ORAL
COMMUNITY

## 2023-05-19 RX ORDER — LISINOPRIL 40 MG/1
1 TABLET ORAL DAILY
COMMUNITY
Start: 2023-02-24

## 2023-05-19 RX ORDER — ASPIRIN 81 MG/1
81 TABLET ORAL DAILY
COMMUNITY

## 2023-06-22 ENCOUNTER — HOSPITAL ENCOUNTER (OUTPATIENT)
Facility: HOSPITAL | Age: 68
Discharge: HOME | End: 2023-06-22
Payer: MEDICARE

## 2023-06-22 DIAGNOSIS — E11.9 TYPE 2 DIABETES MELLITUS WITHOUT COMPLICATION, WITHOUT LONG-TERM CURRENT USE OF INSULIN (HCC): Primary | ICD-10-CM

## 2023-06-22 DIAGNOSIS — Z12.31 VISIT FOR SCREENING MAMMOGRAM: ICD-10-CM

## 2023-06-22 PROCEDURE — 77063 BREAST TOMOSYNTHESIS BI: CPT

## 2023-06-22 RX ORDER — TIRZEPATIDE 7.5 MG/.5ML
7.5 INJECTION, SOLUTION SUBCUTANEOUS
Qty: 4 ML | Refills: 5 | Status: SHIPPED | OUTPATIENT
Start: 2023-06-22

## 2023-06-22 NOTE — TELEPHONE ENCOUNTER
PCP: Mary Peterson MD     Last appt: 4/19/2023      Future Appointments   Date Time Provider Cassie Betancourt   8/21/2023  1:20 PM Mary Peterson MD [facility]   10/3/2023  9:10 AM KERI Botello - D[facility]          Requested Prescriptions     Pending Prescriptions Disp Refills    Tirzepatide (MOUNJARO) 7.5 MG/0.5ML SOPN SC injection  1     Sig: Inject 0.5 mLs into the skin every 7 days

## 2023-07-26 ENCOUNTER — TELEPHONE (OUTPATIENT)
Facility: CLINIC | Age: 68
End: 2023-07-26

## 2023-07-26 NOTE — TELEPHONE ENCOUNTER
I called the patient and verified them by name and date of birth. I informed her that we are willing to fill out the form for authorization but she will be responsible for getting the form to us. We can do a letter but will need a fax number or mailing address to send it to. She stated understanding and spoke with the insurance company. They told her that we have the form and would fill it out for is. I verified that is incorrect. She stated understanding and had no further questions.

## 2023-07-26 NOTE — TELEPHONE ENCOUNTER
Pt called and stated that she called her insurance after speaking with someone yesterday. The insurance company told her that Dr. Gloria Rosales would have to send in a form to her [facility] requesting that her services be covered due to we are the closes to the pt and her medial history.

## 2023-08-21 ENCOUNTER — OFFICE VISIT (OUTPATIENT)
Facility: CLINIC | Age: 68
End: 2023-08-21
Payer: MEDICARE

## 2023-08-21 VITALS
RESPIRATION RATE: 16 BRPM | WEIGHT: 204 LBS | BODY MASS INDEX: 33.99 KG/M2 | HEIGHT: 65 IN | HEART RATE: 78 BPM | TEMPERATURE: 98.2 F | OXYGEN SATURATION: 94 % | DIASTOLIC BLOOD PRESSURE: 86 MMHG | SYSTOLIC BLOOD PRESSURE: 137 MMHG

## 2023-08-21 DIAGNOSIS — G47.33 OSA (OBSTRUCTIVE SLEEP APNEA): ICD-10-CM

## 2023-08-21 DIAGNOSIS — E11.9 TYPE 2 DIABETES MELLITUS WITHOUT COMPLICATION, WITHOUT LONG-TERM CURRENT USE OF INSULIN (HCC): Primary | ICD-10-CM

## 2023-08-21 DIAGNOSIS — I10 HYPERTENSION, ESSENTIAL: ICD-10-CM

## 2023-08-21 DIAGNOSIS — E03.9 ACQUIRED HYPOTHYROIDISM: ICD-10-CM

## 2023-08-21 DIAGNOSIS — E78.00 HYPERCHOLESTEROLEMIA: ICD-10-CM

## 2023-08-21 LAB — HBA1C MFR BLD: 6.1 %

## 2023-08-21 PROCEDURE — 1123F ACP DISCUSS/DSCN MKR DOCD: CPT | Performed by: INTERNAL MEDICINE

## 2023-08-21 PROCEDURE — 99214 OFFICE O/P EST MOD 30 MIN: CPT | Performed by: INTERNAL MEDICINE

## 2023-08-21 PROCEDURE — 3044F HG A1C LEVEL LT 7.0%: CPT | Performed by: INTERNAL MEDICINE

## 2023-08-21 PROCEDURE — 3079F DIAST BP 80-89 MM HG: CPT | Performed by: INTERNAL MEDICINE

## 2023-08-21 PROCEDURE — 3075F SYST BP GE 130 - 139MM HG: CPT | Performed by: INTERNAL MEDICINE

## 2023-08-21 PROCEDURE — 83036 HEMOGLOBIN GLYCOSYLATED A1C: CPT | Performed by: INTERNAL MEDICINE

## 2023-08-21 SDOH — ECONOMIC STABILITY: INCOME INSECURITY: HOW HARD IS IT FOR YOU TO PAY FOR THE VERY BASICS LIKE FOOD, HOUSING, MEDICAL CARE, AND HEATING?: SOMEWHAT HARD

## 2023-08-21 SDOH — ECONOMIC STABILITY: FOOD INSECURITY: WITHIN THE PAST 12 MONTHS, YOU WORRIED THAT YOUR FOOD WOULD RUN OUT BEFORE YOU GOT MONEY TO BUY MORE.: NEVER TRUE

## 2023-08-21 SDOH — ECONOMIC STABILITY: HOUSING INSECURITY
IN THE LAST 12 MONTHS, WAS THERE A TIME WHEN YOU DID NOT HAVE A STEADY PLACE TO SLEEP OR SLEPT IN A SHELTER (INCLUDING NOW)?: NO

## 2023-08-21 SDOH — ECONOMIC STABILITY: FOOD INSECURITY: WITHIN THE PAST 12 MONTHS, THE FOOD YOU BOUGHT JUST DIDN'T LAST AND YOU DIDN'T HAVE MONEY TO GET MORE.: NEVER TRUE

## 2023-09-01 RX ORDER — LISINOPRIL 40 MG/1
TABLET ORAL
Qty: 90 TABLET | Refills: 3 | Status: SHIPPED | OUTPATIENT
Start: 2023-09-01

## 2023-09-01 NOTE — TELEPHONE ENCOUNTER
PCP: Sincere Rios MD     Last appt:  8/21/2023    Future Appointments   Date Time Provider [address]   10/3/2023  9:10 AM [place] [address] [address] [facility],[address] AMB   4/22/2024  8:30 AM Sincere Rios MD [facility] BS AMB          Requested Prescriptions     Pending Prescriptions Disp Refills    lisinopril (PRINIVIL;ZESTRIL) 40 MG tablet [Pharmacy Med Name: Lisinopril 40 MG Oral Tablet] 90 tablet 0     Sig: Take 1 tablet by mouth once daily

## 2023-09-17 ASSESSMENT — SLEEP AND FATIGUE QUESTIONNAIRES
DO YOU HAVE DIFFICULTY BEING AS ACTIVE AS YOU WANT TO BE IN THE EVENING BECAUSE YOU ARE SLEEPY OR TIRED: YES, LITTLE
HOW LIKELY ARE YOU TO NOD OFF OR FALL ASLEEP WHILE WATCHING TV: 1
HOW LIKELY ARE YOU TO NOD OFF OR FALL ASLEEP WHILE SITTING AND TALKING TO SOMEONE: WOULD NEVER DOZE
DO YOU HAVE DIFFICULTY VISITING YOUR FAMILY OR FRIENDS IN THEIR HOME BECAUSE YOU BECOME SLEEPY OR TIRED: NO
HOW LIKELY ARE YOU TO NOD OFF OR FALL ASLEEP WHEN YOU ARE A PASSENGER IN A CAR FOR AN HOUR WITHOUT A BREAK: 0
HOW LIKELY ARE YOU TO NOD OFF OR FALL ASLEEP IN A CAR, WHILE STOPPED FOR A FEW MINUTES IN TRAFFIC: 0
DO YOU HAVE DIFFICULTY BEING AS ACTIVE AS YOU WANT TO BE IN THE MORNING BECAUSE YOU ARE SLEEPY OR TIRED: NO
HOW LIKELY ARE YOU TO NOD OFF OR FALL ASLEEP WHILE LYING DOWN TO REST IN THE AFTERNOON WHEN CIRCUMSTANCES PERMIT: 2
HAS YOUR RELATIONSHIP WITH FAMILY, FRIENDS OR WORK COLLEAGUES BEEN AFFECTED BECAUSE YOU ARE SLEEPY OR TIRED: NO
DO YOU HAVE DIFFICULTY OPERATING A MOTOR VEHICLE FOR LONG DISTANCES (GREATER THAN 100 MILES) BECAUSE YOU BECOME SLEEPY: NO
DO YOU HAVE DIFFICULTY OPERATING A MOTOR VEHICLE FOR SHORT DISTANCES (LESS THAN 100 MILES) BECAUSE YOU BECOME SLEEPY: NO
HOW LIKELY ARE YOU TO NOD OFF OR FALL ASLEEP WHILE SITTING INACTIVE IN A PUBLIC PLACE: 0
HOW LIKELY ARE YOU TO NOD OFF OR FALL ASLEEP WHILE SITTING AND TALKING TO SOMEONE: 0
DO YOU GENERALLY HAVE DIFFICULTY REMEMBERING THINGS BECAUSE YOU ARE SLEEPY OR TIRED: NO
HOW LIKELY ARE YOU TO NOD OFF OR FALL ASLEEP WHILE SITTING QUIETLY AFTER LUNCH WITHOUT ALCOHOL: WOULD NEVER DOZE
HOW LIKELY ARE YOU TO NOD OFF OR FALL ASLEEP WHILE SITTING AND READING: WOULD NEVER DOZE
HOW LIKELY ARE YOU TO NOD OFF OR FALL ASLEEP WHILE SITTING AND READING: 0
HAS YOUR MOOD BEEN AFFECTED BECAUSE YOU ARE SLEEPY OR TIRED: NO
HOW LIKELY ARE YOU TO NOD OFF OR FALL ASLEEP WHEN YOU ARE A PASSENGER IN A CAR FOR AN HOUR WITHOUT A BREAK: WOULD NEVER DOZE
FOSQ SCORE: 19.5
DO YOU HAVE DIFFICULTY WATCHING A MOVIE OR VIDEO BECAUSE YOU BECOME SLEEPY OR TIRED: NO
HOW LIKELY ARE YOU TO NOD OFF OR FALL ASLEEP WHILE SITTING QUIETLY AFTER LUNCH WITHOUT ALCOHOL: 0
HOW LIKELY ARE YOU TO NOD OFF OR FALL ASLEEP WHILE LYING DOWN TO REST IN THE AFTERNOON WHEN CIRCUMSTANCES PERMIT: MODERATE CHANCE OF DOZING
HOW LIKELY ARE YOU TO NOD OFF OR FALL ASLEEP WHILE SITTING INACTIVE IN A PUBLIC PLACE: WOULD NEVER DOZE
HOW LIKELY ARE YOU TO NOD OFF OR FALL ASLEEP IN A CAR, WHILE STOPPED FOR A FEW MINUTES IN TRAFFIC: WOULD NEVER DOZE
DO YOU HAVE DIFFICULTY CONCENTRATING ON THE THINGS YOU DO BECAUSE YOU ARE SLEEPY OR TIRED: NO
ESS TOTAL SCORE: 3
HOW LIKELY ARE YOU TO NOD OFF OR FALL ASLEEP WHILE WATCHING TV: SLIGHT CHANCE OF DOZING

## 2023-09-18 NOTE — PROGRESS NOTES
[address] [facility] [place] [place], [address]   Tel.  [phone]   Fax. [facility], [address]   Tel.  [phone]   Fax. [phone]  [facility], [address] [facility]   Tel.  [phone]   Fax. Cassie Kim (: 1955) is a 69 y.o. female, established patient, seen for positive airway pressure follow-up, she was last seen by Dr. Héctor Hendrix on 2022, prior notes reviewed in detail. Home sleep test 2019 showed AHI of 34/hr with a lowest SpO2 of 85%. She is seen today for follow up. ASSESSMENT/PLAN:   Diagnosis Orders   1. MADAN (obstructive sleep apnea)  DME Order for [facility]) as OP      2. Primary hypertension        3. BMI 33.0-33.9,adult          AHI = 34 (2019). On DS2 Respironics CPAP :  14-16 cmH2O. Set up 2019. She is adherent with PAP therapy and PAP continues to benefit patient and remains necessary for control of her sleep apnea. No follow-up provider specified. Sleep Apnea -  continue on current pressures. Orders Placed This Encounter   Procedures    DME Order for (Specify) as OP     Diagnosis: (G47.33) MADAN (obstructive sleep apnea)  (primary encounter diagnosis)     Replacement Supplies for Positive Airway Pressure Therapy Device:   Duration of need: 99 months.  Full Face Mask 1 every 3 months.  Full Face Mask Cushion 1 per month.  Headgear 1 every 6 months.  Positive Airway Pressure chinstrap 1 every 6 months.  Tubing with heating element 1 every 3 months.  Filter(s) Disposable 2 per month.  Filter(s) Non-Disposable 1 every 6 months. .   [address] for Humidifier (Replace) 1 every 6 months. SARAH Moon-BC NPI: [id number]    Electronically signed.  Date:- 23     *  Counseling was provided regarding the importance of regular PAP use with emphasis on ensuring sufficient total sleep time, proper sleep

## 2023-09-19 ENCOUNTER — TELEMEDICINE (OUTPATIENT)
Age: 68
End: 2023-09-19
Payer: MEDICARE

## 2023-09-19 DIAGNOSIS — G47.33 OSA (OBSTRUCTIVE SLEEP APNEA): Primary | ICD-10-CM

## 2023-09-19 DIAGNOSIS — I10 PRIMARY HYPERTENSION: ICD-10-CM

## 2023-09-19 PROCEDURE — 99213 OFFICE O/P EST LOW 20 MIN: CPT | Performed by: NURSE PRACTITIONER

## 2023-09-19 PROCEDURE — 1123F ACP DISCUSS/DSCN MKR DOCD: CPT | Performed by: NURSE PRACTITIONER

## 2023-09-19 NOTE — PATIENT INSTRUCTIONS
[facility], [address]  Tel.  [phone]  Fax. [address]  [address] [place] [address], [address] [place] [address]  Tel.  [phone]  Fax. [phone] [address]  Tel.  [phone]  Fax. [phone]     Learning About CPAP for Sleep Apnea  What is CPAP? CPAP is a small machine that you use at home every night while you sleep. It increases air pressure in your throat to keep your airway open. When you have sleep apnea, this can help you sleep better so you feel much better. CPAP stands for \"continuous positive airway pressure. \"  The CPAP machine will have one of the following:  A mask that covers your nose and mouth  Prongs that fit into your nose  A mask that covers your nose only, the most common type. This type is called NCPAP. The N stands for \"nasal.\"  Why is it done? CPAP is usually the best treatment for obstructive sleep apnea. It is the first treatment choice and the most widely used. Your doctor may suggest CPAP if you have: Moderate to severe sleep apnea. Sleep apnea and coronary artery disease (CAD) or heart failure. How does it help? CPAP can help you have more normal sleep, so you feel less sleepy and more alert during the daytime. CPAP may help keep heart failure or other heart problems from getting worse. NCPAP may help lower your blood pressure. If you use CPAP, your bed partner may also sleep better because you are not snoring or restless. What are the side effects? Some people who use CPAP have:  A dry or stuffy nose and a sore throat. Irritated skin on the face. Sore eyes. Bloating. If you have any of these problems, work with your doctor to fix them. Here are some things you can try:  Be sure the mask or nasal prongs fit well. See if your doctor can adjust the pressure of your CPAP. If your nose is dry, try a humidifier.   If your nose is runny or stuffy, try decongestant medicine or a steroid

## 2023-09-28 DIAGNOSIS — E78.00 HYPERCHOLESTEROLEMIA: Primary | ICD-10-CM

## 2023-09-28 DIAGNOSIS — E11.9 TYPE 2 DIABETES MELLITUS WITHOUT COMPLICATION, WITHOUT LONG-TERM CURRENT USE OF INSULIN (HCC): ICD-10-CM

## 2023-09-29 RX ORDER — GLIPIZIDE 10 MG/1
10 TABLET, FILM COATED, EXTENDED RELEASE ORAL DAILY
Qty: 90 TABLET | Refills: 3 | Status: SHIPPED | OUTPATIENT
Start: 2023-09-29

## 2023-09-29 RX ORDER — ROSUVASTATIN CALCIUM 10 MG/1
TABLET, COATED ORAL NIGHTLY
Qty: 90 TABLET | Refills: 3 | Status: SHIPPED | OUTPATIENT
Start: 2023-09-29

## 2023-09-29 NOTE — TELEPHONE ENCOUNTER
PCP: Aurea Bangura MD     Last appt:  8/21/2023    Future Appointments   Date Time Provider [address]   4/22/2024  8:30 AM Aurea Bangura MD BSIMA RAY [facility]   9/19/2024 10:30 AM KERI Potter - NP [facility] RAY [facility]          Requested Prescriptions     Pending Prescriptions Disp Refills    glipiZIDE (GLUCOTROL XL) 10 MG extended release tablet [Pharmacy Med Name: glipiZIDE ER 10 MG Oral Tablet Extended Release 24 Hour] 90 tablet 0     Sig: Take 1 tablet by mouth once daily    rosuvastatin (CRESTOR) 10 MG tablet [Pharmacy Med Name: Rosuvastatin Calcium 10 MG Oral Tablet] 90 tablet 0     Sig: Take 1 tablet by mouth nightly

## 2023-10-17 DIAGNOSIS — E03.9 ACQUIRED HYPOTHYROIDISM: Primary | ICD-10-CM

## 2023-10-17 RX ORDER — LEVOTHYROXINE SODIUM 88 UG/1
88 TABLET ORAL DAILY
Qty: 90 TABLET | Refills: 3 | Status: SHIPPED | OUTPATIENT
Start: 2023-10-17

## 2023-10-17 NOTE — TELEPHONE ENCOUNTER
PCP: Oc Crouch MD     Last appt:  8/21/2023      Future Appointments   Date Time Provider [address]   4/22/2024  8:30 AM MD ADELAIDA Celestin   9/19/2024 10:30 AM KERI Champagne - NP [facility] R[facility]          Requested Prescriptions     Pending Prescriptions Disp Refills    levothyroxine (SYNTHROID) 88 MCG tablet 90 tablet 3     Sig: Take 1 tablet by mouth daily

## 2023-12-05 DIAGNOSIS — E11.9 TYPE 2 DIABETES MELLITUS WITHOUT COMPLICATION, WITHOUT LONG-TERM CURRENT USE OF INSULIN (HCC): Primary | ICD-10-CM

## 2023-12-05 RX ORDER — BLOOD SUGAR DIAGNOSTIC
STRIP MISCELLANEOUS
Qty: 100 EACH | Refills: 0 | Status: SHIPPED | OUTPATIENT
Start: 2023-12-05

## 2023-12-05 NOTE — TELEPHONE ENCOUNTER
PCP: Lavelle Arita MD     Last appt:  8/21/2023      Future Appointments   Date Time Provider [address]   4/22/2024  8:30 AM Lavelle Arita MD BSILB [facility]   9/19/2024 10:30 AM KERI Rubi - NP [facility] R[facility]          Requested Prescriptions     Pending Prescriptions Disp Refills    ONETOUCH ULTRA strip [Pharmacy Med Name: OneTouch Ultra Blue In Vitro Strip] 100 each 0     Sig: USE  STRIP TO CHECK GLUCOSE UP TO TWICE DAILY
denies

## 2024-01-02 DIAGNOSIS — I10 HYPERTENSION, ESSENTIAL: Primary | ICD-10-CM

## 2024-01-02 DIAGNOSIS — E78.00 HYPERCHOLESTEROLEMIA: ICD-10-CM

## 2024-01-02 DIAGNOSIS — E11.9 TYPE 2 DIABETES MELLITUS WITHOUT COMPLICATION, WITHOUT LONG-TERM CURRENT USE OF INSULIN (HCC): ICD-10-CM

## 2024-01-02 DIAGNOSIS — E03.9 ACQUIRED HYPOTHYROIDISM: ICD-10-CM

## 2024-01-02 RX ORDER — LEVOTHYROXINE SODIUM 88 UG/1
88 TABLET ORAL DAILY
Qty: 90 TABLET | Refills: 3 | Status: SHIPPED | OUTPATIENT
Start: 2024-01-02

## 2024-01-02 RX ORDER — GLIPIZIDE 10 MG/1
10 TABLET, FILM COATED, EXTENDED RELEASE ORAL DAILY
Qty: 90 TABLET | Refills: 3 | Status: SHIPPED | OUTPATIENT
Start: 2024-01-02

## 2024-01-02 RX ORDER — ROSUVASTATIN CALCIUM 10 MG/1
10 TABLET, COATED ORAL NIGHTLY
Qty: 90 TABLET | Refills: 3 | Status: SHIPPED | OUTPATIENT
Start: 2024-01-02

## 2024-01-02 RX ORDER — LISINOPRIL 40 MG/1
40 TABLET ORAL DAILY
Qty: 90 TABLET | Refills: 3 | Status: SHIPPED | OUTPATIENT
Start: 2024-01-02

## 2024-01-02 NOTE — TELEPHONE ENCOUNTER
glipiZIDE (GLUCOTROL XL) 10 MG extended release tablet   levothyroxine (SYNTHROID) 88 MCG tablet   lisinopril (PRINIVIL;ZESTRIL) 40 MG tablet   metFORMIN (GLUCOPHAGE) 1000 MG tablet   rosuvastatin (CRESTOR) 10 MG tablet     [facility]

## 2024-01-02 NOTE — TELEPHONE ENCOUNTER
PCP: Vianca Ralph MD     Last appt:  8/21/2023      Future Appointments   Date Time Provider Department Center   4/22/2024  8:30 AM Vianca Ralph MD [facility] BS AMB   9/19/2024 10:30 AM Chen Mitchell, APRN - NP [facility] AM[facility]          Requested Prescriptions     Pending Prescriptions Disp Refills    rosuvastatin (CRESTOR) 10 MG tablet 90 tablet 3     Sig: Take 1 tablet by mouth nightly    metFORMIN (GLUCOPHAGE) 1000 MG tablet 180 tablet 3     Sig: Take 1 tablet by mouth 2 times daily (with meals)    lisinopril (PRINIVIL;ZESTRIL) 40 MG tablet 90 tablet 3     Sig: Take 1 tablet by mouth daily    levothyroxine (SYNTHROID) 88 MCG tablet 90 tablet 3     Sig: Take 1 tablet by mouth daily    glipiZIDE (GLUCOTROL XL) 10 MG extended release tablet 90 tablet 3     Sig: Take 1 tablet by mouth daily

## 2024-01-08 DIAGNOSIS — E11.9 TYPE 2 DIABETES MELLITUS WITHOUT COMPLICATION, WITHOUT LONG-TERM CURRENT USE OF INSULIN (HCC): ICD-10-CM

## 2024-01-08 RX ORDER — TIRZEPATIDE 7.5 MG/.5ML
7.5 INJECTION, SOLUTION SUBCUTANEOUS
Qty: 4 ML | Refills: 5 | Status: SHIPPED | OUTPATIENT
Start: 2024-01-08

## 2024-01-08 NOTE — TELEPHONE ENCOUNTER
Tirzepatide (MOUNJARO) 7.5 MG/0.5ML SOPN SC injection     Patient called that she will need a refill in two weeks all medications will be going to [facility].  Phone number [phone]  Fax number [phone]    She also stated that if Dr Ralph will call the pharmacy stating that she really  needs this  She could get it a lot cheaper.

## 2024-01-08 NOTE — TELEPHONE ENCOUNTER
[facility] ambulatory encounter  FAMILY PRACTICE OFFICE VISIT    CHIEF COMPLAINT:    Chief Complaint   Patient presents with   • Follow-up     med check       SUBJECTIVE:  Aleksandra Anne is a 60 year old female who presented requesting evaluation for reviewed meds. Some adjustment because of miralax cost. Also really wanting to stay on zofran for ehr nausea - this is all chronic because of gastric bypass and severe complications as well as dry mouth related to all meds- needing aquaoral..     Review of systems:    All other systems are reviewed and are negative except as documented in the history of present illness.     OBJECTIVE:  PROBLEM LIST:    Patient Active Problem List   Diagnosis   • Anal or rectal pain   • Blood in stool   • Heme + stool   • Chronic constipation   • Chronic pain   • Depressive disorder, not elsewhere classified   • Anoxic encephalopathy (CMS/HCC)   • Stress incontinence   • Varicose veins of lower extremities with inflammation   • Extrinsic asthma, unspecified   • Rosacea   • Irritant dermatitis   • Chest pain on breathing   • Precordial pain   • Chest pain, B/L ribs   • Contusion of left hand   • Trauma of chest   • Generalized anxiety disorder   • H/O gastric bypass   • Left-sided chest wall pain   • Rotator cuff rupture, complete   • Osteoarthrosis, unspecified whether generalized or localized, shoulder region   • Difficult airway for intubation   • Prepatellar bursitis   • Osteoarthrosis, unspecified whether generalized or localized, lower leg   • Irritant contact dermatitis   • Keratolysis exfoliativa   • Chronic pain of left knee   • Rupture of quadriceps tendon   • S/P TKR (total knee replacement) using cement   • Skin breakdown   • Quadriceps muscle rupture   • Closed fracture distal radius and ulna   • Autonomic orthostatic hypotension   • Major depressive disorder, recurrent, severe without psychotic features (CMS/HCC)   • Seizure X1   • Irritable bowel syndrome with constipation   •  PCP: Vianca Ralph MD     Last appt:  8/21/2023      Future Appointments   Date Time Provider Department Center   4/22/2024  8:30 AM Vianca Ralph MD [facility] BS AMB   9/19/2024 10:30 AM Chen Mitchell, APRN - NP [facility] BS AMB          Requested Prescriptions     Pending Prescriptions Disp Refills    Tirzepatide (MOUNJARO) 7.5 MG/0.5ML SOPN SC injection 4 mL 5     Sig: Inject 0.5 mLs into the skin every 7 days       Tilt table evaluation   • Syncope   • Iron deficiency anemia secondary to inadequate dietary iron intake   • Iron deficiency anemia secondary to inadequate dietary iron intake   • Osteoporosis   • Vitamin D deficiency   • Chronic nausea   • Disturbance of salivary secretion       PAST HISTORIES:  I have reviewed the past medical history, family history, social history, medications and allergies listed in the medical record as obtained by my nursing staff and support staff and agree with their documentation.    Physical Exam:    Vital Signs:    Visit Vitals  /88 (BP Location: [facility], Patient Position: Sitting, Cuff Size: Regular)   Pulse 60   Temp 97.6 °F (36.4 °C) (Temporal)   Wt 54.2 kg   SpO2 99%   BMI 21.17 kg/m²     Pulse Ox Interpretation:  Within normal limits.  General:  Alert, cooperative, conversive.  Skin:  Warm and dry without rash.    Head:  Normocephalic-atraumatic.   Neck:  Trachea is midline.     Eyes:  Normal conjunctivae and sclerae.   ENT:  Mucous membranes are moist.   Cardiovascular:  Symmetrical pulses.  Regular rate and rhythm without murmur.  Respiratory:   Normal respiratory effort.  Clear to auscultation.  No wheezes, rales or rhonchi.  Psychiatric:  Cooperative.  Appropriate mood and affect.      Assessment:   1. Low calcium levels    2. Vitamin D deficiency    3. Chronic nausea    4. Xerostomia        PLAN:  Orders Placed This Encounter   • Basic Metabolic Panel   • Vitamin D -25 Hydroxy   • betamethasone dipropionate (DIPROSONE) 0.05 % cream   • senna (SENOKOT) 8.6 MG tablet     Return in about 3 months (around 7/3/2019).    Instructions provided as documented in the After Visit Summary.    The patient indicated understanding of the diagnosis and agreed with the plan of care.

## 2024-01-22 ENCOUNTER — HOSPITAL ENCOUNTER (OUTPATIENT)
Facility: HOSPITAL | Age: 69
Discharge: HOME | End: 2024-01-25
Attending: INTERNAL MEDICINE
Payer: MEDICARE

## 2024-01-22 DIAGNOSIS — R93.89 ABNORMAL CHEST CT: ICD-10-CM

## 2024-01-22 PROCEDURE — 71250 CT THORAX DX C-: CPT

## 2024-01-24 RX ORDER — CALCIUM CITRATE/VITAMIN D3 200MG-6.25
TABLET ORAL
Qty: 100 EACH | Refills: 5 | Status: SHIPPED | OUTPATIENT
Start: 2024-01-24

## 2024-01-24 RX ORDER — GLUCOSAM/CHON-MSM1/C/MANG/BOSW 500-416.6
TABLET ORAL
Qty: 100 EACH | Refills: 5 | Status: SHIPPED | OUTPATIENT
Start: 2024-01-24

## 2024-01-24 RX ORDER — BLOOD-GLUCOSE METER
EACH MISCELLANEOUS
Qty: 1 EACH | Refills: 0 | Status: SHIPPED | OUTPATIENT
Start: 2024-01-24

## 2024-01-24 NOTE — TELEPHONE ENCOUNTER
PCP: Vianca Ralph MD     Last appt:  8/21/2023      Future Appointments   Date Time Provider Department Center   4/22/2024  8:30 AM Vianca Ralph MD [facility] BS AMB   9/19/2024 10:30 AM Chen Mitchell, APRN - NP [place] BS AMB          Requested Prescriptions     Pending Prescriptions Disp Refills    Blood Glucose Monitoring Suppl (TRUE METRIX AIR GLUCOSE METER) DREAD 1 each 0     Sig: USE TO CHECK GLUCOSE UP TO TWICE DAILY. DX: E11.9    blood glucose test strips (TRUE METRIX BLOOD GLUCOSE TEST) strip 100 each 5     Sig: USE TO CHECK GLUCOSE UP TO TWICE DAILY. DX: E11.9    TRUEplus Lancets 33G MISC 100 each 5     Sig: USE TO CHECK GLUCOSE UP TO TWICE DAILY. DX: E11.9

## 2024-01-25 RX ORDER — ISOPROPYL ALCOHOL 0.75 G/1
SWAB TOPICAL
Qty: 100 EACH | Refills: 5 | Status: SHIPPED | OUTPATIENT
Start: 2024-01-25

## 2024-01-25 NOTE — TELEPHONE ENCOUNTER
PCP: Vianca Ralph MD     Last appt:  8/21/2023      Future Appointments   Date Time Provider Department Center   4/22/2024  8:30 AM Vianca Ralph MD [facility] BS AMB   9/19/2024 10:30 AM Chen Mitchell, APRN - [place] [facility] [place]          Requested Prescriptions     Pending Prescriptions Disp Refills    Alcohol Swabs (B-D SINGLE USE SWABS REGULAR) PADS 100 each 5     Sig: USE TO CHECK GLUCOSE UP TO TWICE DAILY. DX: E11.9

## 2024-01-25 NOTE — TELEPHONE ENCOUNTER
PCP: Vianca Ralph MD     Last appt:  8/21/2023      Future Appointments   Date Time Provider Department Center   4/22/2024  8:30 AM Vianca Ralph MD [facility] BS AMB   9/19/2024 10:30 AM Chen Mitchell, APRN - NP [facility] BS AMB          Requested Prescriptions     Pending Prescriptions Disp Refills    Alcohol Swabs (B-D SINGLE USE SWABS REGULAR) PADS 100 each 5     Sig: USE TO CHECK GLUCOSE UP TO TWICE DAILY. DX: E11.9

## 2024-01-30 ENCOUNTER — TELEPHONE (OUTPATIENT)
Facility: CLINIC | Age: 69
End: 2024-01-30

## 2024-01-30 DIAGNOSIS — M48.02 SPINAL STENOSIS OF CERVICAL REGION: ICD-10-CM

## 2024-01-30 DIAGNOSIS — M54.2 CERVICALGIA: ICD-10-CM

## 2024-01-30 DIAGNOSIS — M48.062 SPINAL STENOSIS OF LUMBAR REGION WITH NEUROGENIC CLAUDICATION: ICD-10-CM

## 2024-01-30 DIAGNOSIS — M54.50 LUMBAR PAIN: Primary | ICD-10-CM

## 2024-01-30 DIAGNOSIS — M47.816 LUMBAR SPONDYLOSIS: ICD-10-CM

## 2024-01-30 NOTE — TELEPHONE ENCOUNTER
Referral order signed. Needs to go to Cathy for [facility] O pre-approval.

## 2024-02-01 NOTE — TELEPHONE ENCOUNTER
Referral signed. Give to Cathy; needs [facility] [facility] O pre-approval.

## 2024-02-01 NOTE — TELEPHONE ENCOUNTER
Pt does not need ortho referral, she needs a physical therapy referral. Dr. Lazo gave her a referral for physical therapy but her insurance is requesting it to be done through her pcp. (A scanned copy or what Dr. Lazo ordered is in the chart)     She is going to [facility] ([phone])  Reason: eval and treat  Visit per week: 2   Request therapy:dry needling    Associated dx: m54.50 (primary), m47.816, m51.36, m43.26, m48.061, m54.16, m48.062, r29.898, m54.2

## 2024-04-10 ENCOUNTER — TELEPHONE (OUTPATIENT)
Facility: CLINIC | Age: 69
End: 2024-04-10

## 2024-04-10 DIAGNOSIS — D22.9 SKIN MOLE: Primary | ICD-10-CM

## 2024-04-10 NOTE — TELEPHONE ENCOUNTER
----- Message from Roberto Carlos Domingo sent at 4/9/2024  2:43 PM EDT -----  Subject: Referral Request    Reason for referral request? Patient needs to be referrred to   dermatologist to have a mole removed  Provider patient wants to be referred to(if known):     Provider Phone Number(if known):    Additional Information for Provider? Patient would like to be referred to   dermatologist that dr. Ralph referred her to a couple of years ago  ---------------------------------------------------------------------------  --------------  CALL BACK INFO    [phone]; Do not leave any message, patient will call back for answer  ---------------------------------------------------------------------------  --------------

## 2024-04-21 SDOH — HEALTH STABILITY: PHYSICAL HEALTH: ON AVERAGE, HOW MANY MINUTES DO YOU ENGAGE IN EXERCISE AT THIS LEVEL?: 10 MIN

## 2024-04-21 SDOH — HEALTH STABILITY: PHYSICAL HEALTH: ON AVERAGE, HOW MANY DAYS PER WEEK DO YOU ENGAGE IN MODERATE TO STRENUOUS EXERCISE (LIKE A BRISK WALK)?: 1 DAY

## 2024-04-21 ASSESSMENT — LIFESTYLE VARIABLES
HOW OFTEN DO YOU HAVE A DRINK CONTAINING ALCOHOL: 1
HOW OFTEN DO YOU HAVE A DRINK CONTAINING ALCOHOL: NEVER
HOW MANY STANDARD DRINKS CONTAINING ALCOHOL DO YOU HAVE ON A TYPICAL DAY: PATIENT DOES NOT DRINK
HOW OFTEN DO YOU HAVE SIX OR MORE DRINKS ON ONE OCCASION: 1
HOW MANY STANDARD DRINKS CONTAINING ALCOHOL DO YOU HAVE ON A TYPICAL DAY: 0

## 2024-04-21 ASSESSMENT — PATIENT HEALTH QUESTIONNAIRE - PHQ9
2. FEELING DOWN, DEPRESSED OR HOPELESS: NOT AT ALL
SUM OF ALL RESPONSES TO PHQ QUESTIONS 1-9: 0
SUM OF ALL RESPONSES TO PHQ9 QUESTIONS 1 & 2: 0
SUM OF ALL RESPONSES TO PHQ QUESTIONS 1-9: 0
SUM OF ALL RESPONSES TO PHQ QUESTIONS 1-9: 0
1. LITTLE INTEREST OR PLEASURE IN DOING THINGS: NOT AT ALL
SUM OF ALL RESPONSES TO PHQ QUESTIONS 1-9: 0

## 2024-04-22 ENCOUNTER — OFFICE VISIT (OUTPATIENT)
Facility: CLINIC | Age: 69
End: 2024-04-22
Payer: MEDICARE

## 2024-04-22 VITALS
HEIGHT: 65 IN | RESPIRATION RATE: 16 BRPM | WEIGHT: 211.8 LBS | OXYGEN SATURATION: 95 % | HEART RATE: 87 BPM | DIASTOLIC BLOOD PRESSURE: 88 MMHG | TEMPERATURE: 97.9 F | BODY MASS INDEX: 35.29 KG/M2 | SYSTOLIC BLOOD PRESSURE: 139 MMHG

## 2024-04-22 DIAGNOSIS — I10 HYPERTENSION, ESSENTIAL: ICD-10-CM

## 2024-04-22 DIAGNOSIS — Z00.00 MEDICARE ANNUAL WELLNESS VISIT, SUBSEQUENT: Primary | ICD-10-CM

## 2024-04-22 DIAGNOSIS — M48.02 SPINAL STENOSIS OF CERVICAL REGION: ICD-10-CM

## 2024-04-22 DIAGNOSIS — E03.9 ACQUIRED HYPOTHYROIDISM: ICD-10-CM

## 2024-04-22 DIAGNOSIS — E66.01 SEVERE OBESITY (BMI 35.0-39.9) WITH COMORBIDITY (HCC): ICD-10-CM

## 2024-04-22 DIAGNOSIS — E11.9 TYPE 2 DIABETES MELLITUS WITHOUT COMPLICATION, WITHOUT LONG-TERM CURRENT USE OF INSULIN (HCC): ICD-10-CM

## 2024-04-22 DIAGNOSIS — E78.00 HYPERCHOLESTEROLEMIA: ICD-10-CM

## 2024-04-22 LAB — HBA1C MFR BLD: 6 %

## 2024-04-22 PROCEDURE — 3075F SYST BP GE 130 - 139MM HG: CPT | Performed by: INTERNAL MEDICINE

## 2024-04-22 PROCEDURE — 83036 HEMOGLOBIN GLYCOSYLATED A1C: CPT | Performed by: INTERNAL MEDICINE

## 2024-04-22 PROCEDURE — 99214 OFFICE O/P EST MOD 30 MIN: CPT | Performed by: INTERNAL MEDICINE

## 2024-04-22 PROCEDURE — 1123F ACP DISCUSS/DSCN MKR DOCD: CPT | Performed by: INTERNAL MEDICINE

## 2024-04-22 PROCEDURE — 2022F DILAT RTA XM EVC RTNOPTHY: CPT | Performed by: INTERNAL MEDICINE

## 2024-04-22 PROCEDURE — 1036F TOBACCO NON-USER: CPT | Performed by: INTERNAL MEDICINE

## 2024-04-22 PROCEDURE — 3046F HEMOGLOBIN A1C LEVEL >9.0%: CPT | Performed by: INTERNAL MEDICINE

## 2024-04-22 PROCEDURE — G8399 PT W/DXA RESULTS DOCUMENT: HCPCS | Performed by: INTERNAL MEDICINE

## 2024-04-22 PROCEDURE — 3079F DIAST BP 80-89 MM HG: CPT | Performed by: INTERNAL MEDICINE

## 2024-04-22 PROCEDURE — 1090F PRES/ABSN URINE INCON ASSESS: CPT | Performed by: INTERNAL MEDICINE

## 2024-04-22 PROCEDURE — 3017F COLORECTAL CA SCREEN DOC REV: CPT | Performed by: INTERNAL MEDICINE

## 2024-04-22 PROCEDURE — G8417 CALC BMI ABV UP PARAM F/U: HCPCS | Performed by: INTERNAL MEDICINE

## 2024-04-22 PROCEDURE — G0439 PPPS, SUBSEQ VISIT: HCPCS | Performed by: INTERNAL MEDICINE

## 2024-04-22 PROCEDURE — G8427 DOCREV CUR MEDS BY ELIG CLIN: HCPCS | Performed by: INTERNAL MEDICINE

## 2024-04-22 RX ORDER — HYDROCHLOROTHIAZIDE 12.5 MG/1
12.5 CAPSULE, GELATIN COATED ORAL EVERY MORNING
Qty: 30 CAPSULE | Refills: 3 | Status: SHIPPED | OUTPATIENT
Start: 2024-04-22

## 2024-04-22 NOTE — PROGRESS NOTES
Mary Martinez  Identified pt with two pt identifiers(name and ).  Chief Complaint   Patient presents with    Medicare AWV       1. Have you been to the ER, urgent care clinic since your last visit?  Hospitalized since your last visit? NO    2. Have you seen or consulted any other health care providers outside of the [facility] since your last visit?  Include any pap smears or colon screening. Pt has been to eye doc in the past yr with Dr. Ordaz's office about  or 2023  Pt plans on getting Shingles vaccine.       Provider notified of reason for visit, vitals and flowsheets obtained on patients.     Patient received paperwork for advance directive during previous visit but has not completed at this time     Reviewed record In preparation for visit, huddled with provider and have obtained necessary documentation      Health Maintenance Due   Topic    Shingles vaccine (2 of 3)    Diabetic retinal exam     Diabetic foot exam     Diabetic Alb to Cr ratio (uACR) test     Annual Wellness Visit (Medicare Advantage)     Lipids     GFR test (Diabetes, CKD 3-4, OR last GFR 15-59)        Wt Readings from Last 3 Encounters:   24 96.1 kg (211 lb 12.8 oz)   23 92.5 kg (204 lb)   23 96.6 kg (213 lb)     Temp Readings from Last 3 Encounters:   24 97.9 °F (36.6 °C) (Oral)   23 98.2 °F (36.8 °C) (Oral)     BP Readings from Last 3 Encounters:   24 (!) 148/87   23 137/86   23 134/84     Pulse Readings from Last 3 Encounters:   24 87   23 78   23 93          No data to display                  Learning Assessment:  :         2024     8:30 AM   [facility] AMB LEARNING ASSESSMENT   Primary Learner Patient   level of education GRADUATED HIGH SCHOOL OR GED   Primary Language ENGLISH   Learning Preference DEMONSTRATION   Answered By Patient   Relationship to Learner SELF       Fall Risk Assessment:  :         2024     6:53 PM 2023     1:45 
were made and/or referrals ordered.    Positive Risk Factor Screenings with Interventions:               General HRA Questions:  Select all that apply: (!) New or Increased Pain    Pain Interventions:  Patient advised to follow up in the office for further evaluation and treatment      Activity, Diet, and Weight:  On average, how many days per week do you engage in moderate to strenuous exercise (like a brisk walk)?: 1 day  On average, how many minutes do you engage in exercise at this level?: 10 min    Do you eat balanced/healthy meals regularly?: Yes    Body mass index is 35.25 kg/m². (!) Abnormal    Obesity Interventions:    See AVS for additional education material     Hearing Screen:  Do you or your family notice any trouble with your hearing that hasn't been managed with hearing aids?: (!) Yes    Interventions:  Referred to Audiology                 Objective   Vitals:    04/22/24 0824 04/22/24 0827   BP: (!) 154/92 (!) 148/87   Site: Left Upper Arm    Position: Sitting    Pulse: 87    Resp: 16    Temp: 97.9 °F (36.6 °C)    TempSrc: Oral    SpO2: 95%    Weight: 96.1 kg (211 lb 12.8 oz)    Height: 1.651 m (5' 5\")       Body mass index is 35.25 kg/m².     General: Obese, no distress.  HEENT:  Head normocephalic/atraumatic, no scleral icterus  Lungs:  Clear to auscultation bilaterally. Good air movement.  Heart:  Regular rate and rhythm, normal S1 and S2, no murmur, gallop, or rub  Abdomen: Soft, non-distended, normal bowel sounds, epigastric tenderness, no guarding or masses.  Extremities: No clubbing, cyanosis. Trace left ankle edema.   Neurological: Alert and oriented.  Psychiatric: Normal mood and affect. Behavior is normal.   Diabetic foot exam:   Left Foot:   Visual Exam: normal   Pulse DP: 2+ (normal)   Filament test: reduced sensation   Vibratory Sensation: normal  Right Foot:   Visual Exam: normal   Pulse DP: 2+ (normal)   Filament test: reduced sensation   Vibratory Sensation: normal

## 2024-04-23 LAB
ALBUMIN SERPL-MCNC: 3.7 G/DL (ref 3.5–5)
ALBUMIN/GLOB SERPL: 1 (ref 1.1–2.2)
ALP SERPL-CCNC: 50 U/L (ref 45–117)
ALT SERPL-CCNC: 39 U/L (ref 12–78)
ANION GAP SERPL CALC-SCNC: 2 MMOL/L (ref 5–15)
AST SERPL-CCNC: 40 U/L (ref 15–37)
BASOPHILS # BLD: 0 K/UL (ref 0–0.1)
BASOPHILS NFR BLD: 0 % (ref 0–1)
BILIRUB SERPL-MCNC: 0.3 MG/DL (ref 0.2–1)
BUN SERPL-MCNC: 20 MG/DL (ref 6–20)
BUN/CREAT SERPL: 20 (ref 12–20)
CALCIUM SERPL-MCNC: 8.4 MG/DL (ref 8.5–10.1)
CHLORIDE SERPL-SCNC: 109 MMOL/L (ref 97–108)
CHOLEST SERPL-MCNC: 104 MG/DL
CO2 SERPL-SCNC: 26 MMOL/L (ref 21–32)
CREAT SERPL-MCNC: 0.99 MG/DL (ref 0.55–1.02)
CREAT UR-MCNC: 80.3 MG/DL
DIFFERENTIAL METHOD BLD: ABNORMAL
EOSINOPHIL # BLD: 0 K/UL (ref 0–0.4)
EOSINOPHIL NFR BLD: 1 % (ref 0–7)
ERYTHROCYTE [DISTWIDTH] IN BLOOD BY AUTOMATED COUNT: 15.2 % (ref 11.5–14.5)
GLOBULIN SER CALC-MCNC: 3.8 G/DL (ref 2–4)
GLUCOSE SERPL-MCNC: 134 MG/DL (ref 65–100)
HCT VFR BLD AUTO: 33.4 % (ref 35–47)
HDLC SERPL-MCNC: 46 MG/DL
HDLC SERPL: 2.3 (ref 0–5)
HGB BLD-MCNC: 10.4 G/DL (ref 11.5–16)
IMM GRANULOCYTES # BLD AUTO: 0 K/UL
IMM GRANULOCYTES NFR BLD AUTO: 0 %
LDLC SERPL CALC-MCNC: 37.4 MG/DL (ref 0–100)
LYMPHOCYTES # BLD: 1.1 K/UL (ref 0.8–3.5)
LYMPHOCYTES NFR BLD: 38 % (ref 12–49)
MCH RBC QN AUTO: 28.4 PG (ref 26–34)
MCHC RBC AUTO-ENTMCNC: 31.1 G/DL (ref 30–36.5)
MCV RBC AUTO: 91.3 FL (ref 80–99)
MICROALBUMIN UR-MCNC: 93.5 MG/DL
MICROALBUMIN/CREAT UR-RTO: 1164 MG/G (ref 0–30)
MONOCYTES # BLD: 0.5 K/UL (ref 0–1)
MONOCYTES NFR BLD: 15 % (ref 5–13)
NEUTS SEG # BLD: 1.4 K/UL (ref 1.8–8)
NEUTS SEG NFR BLD: 46 % (ref 32–75)
NRBC # BLD: 0 K/UL (ref 0–0.01)
NRBC BLD-RTO: 0 PER 100 WBC
PLATELET # BLD AUTO: 205 K/UL (ref 150–400)
PMV BLD AUTO: 10.1 FL (ref 8.9–12.9)
POTASSIUM SERPL-SCNC: 4.1 MMOL/L (ref 3.5–5.1)
PROT SERPL-MCNC: 7.5 G/DL (ref 6.4–8.2)
RBC # BLD AUTO: 3.66 M/UL (ref 3.8–5.2)
RBC MORPH BLD: ABNORMAL
SODIUM SERPL-SCNC: 137 MMOL/L (ref 136–145)
SPECIMEN HOLD: NORMAL
T4 FREE SERPL-MCNC: 0.8 NG/DL (ref 0.8–1.5)
TRIGL SERPL-MCNC: 103 MG/DL
TSH SERPL DL<=0.05 MIU/L-ACNC: 2.05 UIU/ML (ref 0.36–3.74)
VLDLC SERPL CALC-MCNC: 20.6 MG/DL
WBC # BLD AUTO: 3 K/UL (ref 3.6–11)
WBC MORPH BLD: ABNORMAL

## 2024-05-03 ENCOUNTER — TELEPHONE (OUTPATIENT)
Facility: CLINIC | Age: 69
End: 2024-05-03

## 2024-05-03 DIAGNOSIS — I10 HYPERTENSION, ESSENTIAL: Primary | ICD-10-CM

## 2024-05-03 NOTE — TELEPHONE ENCOUNTER
Pt stated hydroCHLOROthiazide 12.5 MG gave her bad leg cramps and she would like to be prescribed something else

## 2024-05-06 RX ORDER — AMLODIPINE BESYLATE 5 MG/1
5 TABLET ORAL DAILY
Qty: 30 TABLET | Refills: 3 | Status: SHIPPED | OUTPATIENT
Start: 2024-05-06 | End: 2024-05-08 | Stop reason: ALTCHOICE

## 2024-05-06 NOTE — TELEPHONE ENCOUNTER
Spoke to pt verified name and . Pt stated she already tried amlodipine and had to be taken off of it due to feet swelling. She wanted to know if there was another medication she could try.

## 2024-05-06 NOTE — TELEPHONE ENCOUNTER
Call patient: A prescription for a different blood pressure medicine called amlodipine has been sent to your pharmacy.

## 2024-05-08 RX ORDER — METOPROLOL SUCCINATE 25 MG/1
25 TABLET, EXTENDED RELEASE ORAL DAILY
Qty: 30 TABLET | Refills: 3 | Status: SHIPPED | OUTPATIENT
Start: 2024-05-08

## 2024-05-08 NOTE — TELEPHONE ENCOUNTER
Call patient: Dr. Ralph has sent a prescription for a different blood pressure medication called metoprolol XL 25 mg once daily. Continue lisinopril 40 mg daily.

## 2024-06-04 ENCOUNTER — TRANSCRIBE ORDERS (OUTPATIENT)
Facility: HOSPITAL | Age: 69
End: 2024-06-04

## 2024-06-04 DIAGNOSIS — Z12.31 VISIT FOR SCREENING MAMMOGRAM: Primary | ICD-10-CM

## 2024-06-25 ENCOUNTER — HOSPITAL ENCOUNTER (OUTPATIENT)
Facility: HOSPITAL | Age: 69
Discharge: HOME | End: 2024-06-28
Payer: MEDICARE

## 2024-06-25 DIAGNOSIS — Z12.31 VISIT FOR SCREENING MAMMOGRAM: ICD-10-CM

## 2024-06-25 PROCEDURE — 77063 BREAST TOMOSYNTHESIS BI: CPT

## 2024-07-17 ENCOUNTER — LAB (OUTPATIENT)
Facility: CLINIC | Age: 69
End: 2024-07-17

## 2024-07-17 DIAGNOSIS — I10 HYPERTENSION, ESSENTIAL: ICD-10-CM

## 2024-07-17 LAB
ANION GAP SERPL CALC-SCNC: 4 MMOL/L (ref 5–15)
BUN SERPL-MCNC: 14 MG/DL (ref 6–20)
BUN/CREAT SERPL: 14 (ref 12–20)
CALCIUM SERPL-MCNC: 9.4 MG/DL (ref 8.5–10.1)
CHLORIDE SERPL-SCNC: 108 MMOL/L (ref 97–108)
CO2 SERPL-SCNC: 26 MMOL/L (ref 21–32)
CREAT SERPL-MCNC: 1.01 MG/DL (ref 0.55–1.02)
GLUCOSE SERPL-MCNC: 136 MG/DL (ref 65–100)
POTASSIUM SERPL-SCNC: 4.3 MMOL/L (ref 3.5–5.1)
SODIUM SERPL-SCNC: 138 MMOL/L (ref 136–145)

## 2024-07-25 ENCOUNTER — OFFICE VISIT (OUTPATIENT)
Facility: CLINIC | Age: 69
End: 2024-07-25
Payer: MEDICARE

## 2024-07-25 VITALS
BODY MASS INDEX: 35.42 KG/M2 | SYSTOLIC BLOOD PRESSURE: 133 MMHG | WEIGHT: 212.6 LBS | TEMPERATURE: 98.1 F | RESPIRATION RATE: 16 BRPM | HEART RATE: 89 BPM | DIASTOLIC BLOOD PRESSURE: 83 MMHG | OXYGEN SATURATION: 98 % | HEIGHT: 65 IN

## 2024-07-25 DIAGNOSIS — I10 HYPERTENSION, ESSENTIAL: Primary | ICD-10-CM

## 2024-07-25 DIAGNOSIS — R20.2 TINGLING OF RIGHT UPPER EXTREMITY: ICD-10-CM

## 2024-07-25 DIAGNOSIS — E11.9 TYPE 2 DIABETES MELLITUS WITHOUT COMPLICATION, WITHOUT LONG-TERM CURRENT USE OF INSULIN (HCC): ICD-10-CM

## 2024-07-25 DIAGNOSIS — R10.32 LLQ ABDOMINAL PAIN: ICD-10-CM

## 2024-07-25 LAB — HBA1C MFR BLD: 6.3 %

## 2024-07-25 PROCEDURE — 1090F PRES/ABSN URINE INCON ASSESS: CPT | Performed by: INTERNAL MEDICINE

## 2024-07-25 PROCEDURE — 3075F SYST BP GE 130 - 139MM HG: CPT | Performed by: INTERNAL MEDICINE

## 2024-07-25 PROCEDURE — 83036 HEMOGLOBIN GLYCOSYLATED A1C: CPT | Performed by: INTERNAL MEDICINE

## 2024-07-25 PROCEDURE — G8427 DOCREV CUR MEDS BY ELIG CLIN: HCPCS | Performed by: INTERNAL MEDICINE

## 2024-07-25 PROCEDURE — 1036F TOBACCO NON-USER: CPT | Performed by: INTERNAL MEDICINE

## 2024-07-25 PROCEDURE — 3046F HEMOGLOBIN A1C LEVEL >9.0%: CPT | Performed by: INTERNAL MEDICINE

## 2024-07-25 PROCEDURE — G8417 CALC BMI ABV UP PARAM F/U: HCPCS | Performed by: INTERNAL MEDICINE

## 2024-07-25 PROCEDURE — 1123F ACP DISCUSS/DSCN MKR DOCD: CPT | Performed by: INTERNAL MEDICINE

## 2024-07-25 PROCEDURE — 99214 OFFICE O/P EST MOD 30 MIN: CPT | Performed by: INTERNAL MEDICINE

## 2024-07-25 PROCEDURE — 2022F DILAT RTA XM EVC RTNOPTHY: CPT | Performed by: INTERNAL MEDICINE

## 2024-07-25 PROCEDURE — G8399 PT W/DXA RESULTS DOCUMENT: HCPCS | Performed by: INTERNAL MEDICINE

## 2024-07-25 PROCEDURE — 3079F DIAST BP 80-89 MM HG: CPT | Performed by: INTERNAL MEDICINE

## 2024-07-25 PROCEDURE — 3017F COLORECTAL CA SCREEN DOC REV: CPT | Performed by: INTERNAL MEDICINE

## 2024-07-25 NOTE — PROGRESS NOTES
Chief Complaint   Patient presents with    Hypertension       HISTORY OF PRESENT ILLNESS  Mary Martinez is a 68 y.o. female    Presents for 3 month follow up evaluation of HTN. At last appointment, /87. HCTZ 12.5 mg added to lisinopril but developed muscle cramps on HCTZ so changed to metoprolol XL 25 mg daily. Tolerating with no side effects. Home BP: 136/80, 132/77. Denies CP, SOB, dizziness, or heart palpitations    Complains of crampy LLQ abdominal pain. Began after starting Mounjaro. Occurs periodically, every few days, and lasts for minutes at a time but causes much discomfort.    Complains of tingling sensations down left arm, from shoulder to hand. Has history of cervical spinal stenosis. Dr. Franklin placed her on pregabalin for this. She no longer sees him, but sees his NP every 6 months; last saw a week ago.    Eye exam: scheduled in August with Dr. Meek.     Patient Active Problem List   Diagnosis    Carpal tunnel syndrome of right wrist    Osteopenia of multiple sites    Iron deficiency anemia    NAFLD (nonalcoholic fatty liver disease)    Status post lumbar spinal fusion    Type 2 diabetes mellitus without complication, without long-term current use of insulin (HCC)    MADAN (obstructive sleep apnea)    Hypercholesterolemia    Acquired hypothyroidism    Severe obesity (HCC)    Spinal stenosis of cervical region    Hypertension, essential    Spinal stenosis of lumbar region with neurogenic claudication     Past Medical History:   Diagnosis Date    Diabetes (HCC)     DM2-trulicity, PCP treats    Hypercholesterolemia     Hypertension     Sarcoidosis     brain    Sleep apnea     CPAP complient , Dr. Rankin    Status post epidural steroid injection 10/15/2021    Thyroid disease     TIA (transient ischemic attack)     Questionable per patient left side defecits (due to spinal stenosis)     Allergies   Allergen Reactions    Amlodipine Other (See Comments)     Leg swelling    Gabapentin Other (See

## 2024-07-25 NOTE — PROGRESS NOTES
Mary Martinez  Identified pt with two pt identifiers(name and ).  Chief Complaint   Patient presents with    Hypertension       1. Have you been to the ER, urgent care clinic since your last visit?  Hospitalized since your last visit? NO    2. Have you seen or consulted any other health care providers outside of the [facility] since your last visit?  Include any pap smears or colon screening. Pt has eye exam in August with Dr. Ordaz.       Provider notified of reason for visit, vitals and flowsheets obtained on patients.     Patient received paperwork for advance directive during previous visit but has not completed at this time     Reviewed record In preparation for visit, huddled with provider and have obtained necessary documentation      Health Maintenance Due   Topic    Shingles vaccine (2 of 3)    Diabetic retinal exam        Wt Readings from Last 3 Encounters:   24 96.1 kg (211 lb 12.8 oz)   23 92.5 kg (204 lb)   23 96.6 kg (213 lb)     Temp Readings from Last 3 Encounters:   24 97.9 °F (36.6 °C) (Oral)   23 98.2 °F (36.8 °C) (Oral)     BP Readings from Last 3 Encounters:   24 139/88   23 137/86   23 134/84     Pulse Readings from Last 3 Encounters:   24 87   23 78   23 93          No data to display                  Learning Assessment:  :         2024     8:30 AM   [facility] AMB LEARNING ASSESSMENT   Primary Learner Patient   level of education GRADUATED HIGH SCHOOL OR GED   Primary Language ENGLISH   Learning Preference DEMONSTRATION   Answered By Patient   Relationship to Learner SELF       Fall Risk Assessment:  :         2024     6:53 PM 2023     1:45 PM 2023    11:45 AM 2022     9:16 AM 2021     7:33 AM 2021     3:33 AM 2021     8:44 PM   Amb Fall Risk Assessment and TUG Test   Do you feel unsteady or are you worried about falling?  no no        2 or more falls in past year? no no

## 2024-08-22 LAB — DIABETIC RETINOPATHY: NEGATIVE

## 2024-09-02 DIAGNOSIS — I10 HYPERTENSION, ESSENTIAL: ICD-10-CM

## 2024-09-02 RX ORDER — METOPROLOL SUCCINATE 25 MG/1
25 TABLET, EXTENDED RELEASE ORAL DAILY
Qty: 90 TABLET | Refills: 3 | Status: SHIPPED | OUTPATIENT
Start: 2024-09-02

## 2024-09-16 ASSESSMENT — SLEEP AND FATIGUE QUESTIONNAIRES
HAS YOUR RELATIONSHIP WITH FAMILY, FRIENDS OR WORK COLLEAGUES BEEN AFFECTED BECAUSE YOU ARE SLEEPY OR TIRED: NO
DO YOU HAVE DIFFICULTY BEING AS ACTIVE AS YOU WANT TO BE IN THE EVENING BECAUSE YOU ARE SLEEPY OR TIRED: NO
HOW LIKELY ARE YOU TO NOD OFF OR FALL ASLEEP WHILE SITTING AND READING: SLIGHT CHANCE OF DOZING
HOW LIKELY ARE YOU TO NOD OFF OR FALL ASLEEP WHILE SITTING AND READING: SLIGHT CHANCE OF DOZING
HOW LIKELY ARE YOU TO NOD OFF OR FALL ASLEEP WHILE SITTING QUIETLY AFTER LUNCH WITHOUT ALCOHOL: SLIGHT CHANCE OF DOZING
DO YOU GENERALLY HAVE DIFFICULTY REMEMBERING THINGS BECAUSE YOU ARE SLEEPY OR TIRED: NO
HOW LIKELY ARE YOU TO NOD OFF OR FALL ASLEEP WHILE SITTING AND TALKING TO SOMEONE: WOULD NEVER DOZE
HOW LIKELY ARE YOU TO NOD OFF OR FALL ASLEEP WHILE SITTING QUIETLY AFTER LUNCH WITHOUT ALCOHOL: SLIGHT CHANCE OF DOZING
DO YOU HAVE DIFFICULTY OPERATING A MOTOR VEHICLE FOR SHORT DISTANCES (LESS THAN 100 MILES) BECAUSE YOU BECOME SLEEPY: NO
HOW LIKELY ARE YOU TO NOD OFF OR FALL ASLEEP WHEN YOU ARE A PASSENGER IN A CAR FOR AN HOUR WITHOUT A BREAK: WOULD NEVER DOZE
HOW LIKELY ARE YOU TO NOD OFF OR FALL ASLEEP WHILE WATCHING TV: SLIGHT CHANCE OF DOZING
HOW LIKELY ARE YOU TO NOD OFF OR FALL ASLEEP WHEN YOU ARE A PASSENGER IN A CAR FOR AN HOUR WITHOUT A BREAK: WOULD NEVER DOZE
HOW LIKELY ARE YOU TO NOD OFF OR FALL ASLEEP WHILE WATCHING TV: SLIGHT CHANCE OF DOZING
HOW LIKELY ARE YOU TO NOD OFF OR FALL ASLEEP IN A CAR, WHILE STOPPED FOR A FEW MINUTES IN TRAFFIC: WOULD NEVER DOZE
DO YOU HAVE DIFFICULTY WATCHING A MOVIE OR VIDEO BECAUSE YOU BECOME SLEEPY OR TIRED: YES, A LITTLE
DO YOU HAVE DIFFICULTY CONCENTRATING ON THE THINGS YOU DO BECAUSE YOU ARE SLEEPY OR TIRED: NO
ESS TOTAL SCORE: 6
HOW LIKELY ARE YOU TO NOD OFF OR FALL ASLEEP WHILE SITTING INACTIVE IN A PUBLIC PLACE: SLIGHT CHANCE OF DOZING
DO YOU HAVE DIFFICULTY OPERATING A MOTOR VEHICLE FOR LONG DISTANCES (GREATER THAN 100 MILES) BECAUSE YOU BECOME SLEEPY: NO
HOW LIKELY ARE YOU TO NOD OFF OR FALL ASLEEP IN A CAR, WHILE STOPPED FOR A FEW MINUTES IN TRAFFIC: WOULD NEVER DOZE
FOSQ SCORE: 19.5
HOW LIKELY ARE YOU TO NOD OFF OR FALL ASLEEP WHILE SITTING AND TALKING TO SOMEONE: WOULD NEVER DOZE
HOW LIKELY ARE YOU TO NOD OFF OR FALL ASLEEP WHILE SITTING INACTIVE IN A PUBLIC PLACE: SLIGHT CHANCE OF DOZING
DO YOU HAVE DIFFICULTY VISITING YOUR FAMILY OR FRIENDS IN THEIR HOME BECAUSE YOU BECOME SLEEPY OR TIRED: NO
DO YOU HAVE DIFFICULTY BEING AS ACTIVE AS YOU WANT TO BE IN THE MORNING BECAUSE YOU ARE SLEEPY OR TIRED: NO
HOW LIKELY ARE YOU TO NOD OFF OR FALL ASLEEP WHILE LYING DOWN TO REST IN THE AFTERNOON WHEN CIRCUMSTANCES PERMIT: MODERATE CHANCE OF DOZING
HAS YOUR MOOD BEEN AFFECTED BECAUSE YOU ARE SLEEPY OR TIRED: NO
HOW LIKELY ARE YOU TO NOD OFF OR FALL ASLEEP WHILE LYING DOWN TO REST IN THE AFTERNOON WHEN CIRCUMSTANCES PERMIT: MODERATE CHANCE OF DOZING

## 2024-09-18 ENCOUNTER — TELEMEDICINE (OUTPATIENT)
Age: 69
End: 2024-09-18
Payer: MEDICARE

## 2024-09-18 DIAGNOSIS — I10 PRIMARY HYPERTENSION: ICD-10-CM

## 2024-09-18 DIAGNOSIS — G47.33 OSA (OBSTRUCTIVE SLEEP APNEA): Primary | ICD-10-CM

## 2024-09-18 PROCEDURE — 99213 OFFICE O/P EST LOW 20 MIN: CPT | Performed by: NURSE PRACTITIONER

## 2024-09-18 PROCEDURE — G8399 PT W/DXA RESULTS DOCUMENT: HCPCS | Performed by: NURSE PRACTITIONER

## 2024-09-18 PROCEDURE — 1090F PRES/ABSN URINE INCON ASSESS: CPT | Performed by: NURSE PRACTITIONER

## 2024-09-18 PROCEDURE — 1036F TOBACCO NON-USER: CPT | Performed by: NURSE PRACTITIONER

## 2024-09-18 PROCEDURE — G8417 CALC BMI ABV UP PARAM F/U: HCPCS | Performed by: NURSE PRACTITIONER

## 2024-09-18 PROCEDURE — 1123F ACP DISCUSS/DSCN MKR DOCD: CPT | Performed by: NURSE PRACTITIONER

## 2024-09-18 PROCEDURE — G8427 DOCREV CUR MEDS BY ELIG CLIN: HCPCS | Performed by: NURSE PRACTITIONER

## 2024-09-18 PROCEDURE — 3017F COLORECTAL CA SCREEN DOC REV: CPT | Performed by: NURSE PRACTITIONER

## 2024-09-25 ENCOUNTER — TELEPHONE (OUTPATIENT)
Facility: CLINIC | Age: 69
End: 2024-09-25

## 2024-09-25 DIAGNOSIS — E66.01 SEVERE OBESITY: Primary | ICD-10-CM

## 2024-09-25 RX ORDER — TIRZEPATIDE 5 MG/.5ML
5 INJECTION, SOLUTION SUBCUTANEOUS WEEKLY
Qty: 2 ML | Refills: 3 | Status: SHIPPED | OUTPATIENT
Start: 2024-09-25

## 2024-10-23 DIAGNOSIS — E11.9 TYPE 2 DIABETES MELLITUS WITHOUT COMPLICATION, WITHOUT LONG-TERM CURRENT USE OF INSULIN (HCC): ICD-10-CM

## 2024-10-23 DIAGNOSIS — I10 HYPERTENSION, ESSENTIAL: ICD-10-CM

## 2024-10-23 DIAGNOSIS — E03.9 ACQUIRED HYPOTHYROIDISM: ICD-10-CM

## 2024-10-23 DIAGNOSIS — E78.00 HYPERCHOLESTEROLEMIA: ICD-10-CM

## 2024-10-23 NOTE — TELEPHONE ENCOUNTER
PCP: Vianca Ralph MD     Last appt:  7/25/2024      Future Appointments   Date Time Provider Department Center   11/26/2024  9:50 AM Vianca Ralph MD [facility]   9/17/2025 10:10 AM Chen Mitchell, APRN - NP [facility]          Requested Prescriptions     Pending Prescriptions Disp Refills    lisinopril (PRINIVIL;ZESTRIL) 40 MG tablet [Pharmacy Med Name: Lisinopril Oral Tablet 40 MG] 90 tablet 3     Sig: TAKE 1 TABLET EVERY DAY    metFORMIN (GLUCOPHAGE) 1000 MG tablet [Pharmacy Med Name: metFORMIN HCl Oral Tablet 1000 MG] 180 tablet 3     Sig: TAKE 1 TABLET TWICE DAILY WITH MEALS    levothyroxine (SYNTHROID) 88 MCG tablet [Pharmacy Med Name: Levothyroxine Sodium Oral Tablet 88 MCG] 90 tablet 3     Sig: TAKE 1 TABLET EVERY DAY    glipiZIDE (GLUCOTROL XL) 10 MG extended release tablet [Pharmacy Med Name: glipiZIDE ER Oral Tablet Extended Release 24 Hour 10 MG] 90 tablet 3     Sig: TAKE 1 TABLET EVERY DAY    rosuvastatin (CRESTOR) 10 MG tablet [Pharmacy Med Name: Rosuvastatin Calcium Oral Tablet 10 MG] 90 tablet 3     Sig: TAKE 1 TABLET EVERY NIGHT

## 2024-10-24 RX ORDER — ROSUVASTATIN CALCIUM 10 MG/1
10 TABLET, COATED ORAL NIGHTLY
Qty: 90 TABLET | Refills: 3 | Status: SHIPPED | OUTPATIENT
Start: 2024-10-24

## 2024-10-24 RX ORDER — GLIPIZIDE 10 MG/1
10 TABLET, FILM COATED, EXTENDED RELEASE ORAL DAILY
Qty: 90 TABLET | Refills: 3 | Status: SHIPPED | OUTPATIENT
Start: 2024-10-24

## 2024-10-24 RX ORDER — LEVOTHYROXINE SODIUM 88 UG/1
88 TABLET ORAL DAILY
Qty: 90 TABLET | Refills: 3 | Status: SHIPPED | OUTPATIENT
Start: 2024-10-24

## 2024-10-24 RX ORDER — LISINOPRIL 40 MG/1
40 TABLET ORAL DAILY
Qty: 90 TABLET | Refills: 3 | Status: SHIPPED | OUTPATIENT
Start: 2024-10-24

## 2024-11-18 ENCOUNTER — TELEPHONE (OUTPATIENT)
Facility: CLINIC | Age: 69
End: 2024-11-18

## 2024-11-18 NOTE — TELEPHONE ENCOUNTER
Spoke to pt verified name and . I let her know message per Dr. Ralph. She understood and had no further questions.

## 2024-11-24 SDOH — ECONOMIC STABILITY: FOOD INSECURITY: WITHIN THE PAST 12 MONTHS, THE FOOD YOU BOUGHT JUST DIDN'T LAST AND YOU DIDN'T HAVE MONEY TO GET MORE.: NEVER TRUE

## 2024-11-24 SDOH — ECONOMIC STABILITY: FOOD INSECURITY: WITHIN THE PAST 12 MONTHS, YOU WORRIED THAT YOUR FOOD WOULD RUN OUT BEFORE YOU GOT MONEY TO BUY MORE.: NEVER TRUE

## 2024-11-24 SDOH — ECONOMIC STABILITY: INCOME INSECURITY: HOW HARD IS IT FOR YOU TO PAY FOR THE VERY BASICS LIKE FOOD, HOUSING, MEDICAL CARE, AND HEATING?: NOT VERY HARD

## 2024-11-24 SDOH — ECONOMIC STABILITY: TRANSPORTATION INSECURITY
IN THE PAST 12 MONTHS, HAS LACK OF TRANSPORTATION KEPT YOU FROM MEETINGS, WORK, OR FROM GETTING THINGS NEEDED FOR DAILY LIVING?: NO

## 2024-11-26 ENCOUNTER — OFFICE VISIT (OUTPATIENT)
Facility: CLINIC | Age: 69
End: 2024-11-26
Payer: MEDICARE

## 2024-11-26 VITALS
RESPIRATION RATE: 16 BRPM | HEART RATE: 89 BPM | WEIGHT: 199.6 LBS | HEIGHT: 65 IN | DIASTOLIC BLOOD PRESSURE: 82 MMHG | OXYGEN SATURATION: 100 % | SYSTOLIC BLOOD PRESSURE: 138 MMHG | BODY MASS INDEX: 33.26 KG/M2 | TEMPERATURE: 98.5 F

## 2024-11-26 DIAGNOSIS — R80.9 TYPE 2 DIABETES MELLITUS WITH DIABETIC MICROALBUMINURIA, WITHOUT LONG-TERM CURRENT USE OF INSULIN (HCC): Primary | ICD-10-CM

## 2024-11-26 DIAGNOSIS — Z12.11 SCREENING FOR COLON CANCER: ICD-10-CM

## 2024-11-26 DIAGNOSIS — E78.00 HYPERCHOLESTEROLEMIA: ICD-10-CM

## 2024-11-26 DIAGNOSIS — E11.29 TYPE 2 DIABETES MELLITUS WITH DIABETIC MICROALBUMINURIA, WITHOUT LONG-TERM CURRENT USE OF INSULIN (HCC): Primary | ICD-10-CM

## 2024-11-26 DIAGNOSIS — E03.9 ACQUIRED HYPOTHYROIDISM: ICD-10-CM

## 2024-11-26 DIAGNOSIS — E55.9 VITAMIN D DEFICIENCY: ICD-10-CM

## 2024-11-26 DIAGNOSIS — E66.9 OBESITY (BMI 30-39.9): ICD-10-CM

## 2024-11-26 DIAGNOSIS — I10 HYPERTENSION, ESSENTIAL: ICD-10-CM

## 2024-11-26 LAB — HBA1C MFR BLD: 5.7 %

## 2024-11-26 PROCEDURE — 1159F MED LIST DOCD IN RCRD: CPT | Performed by: INTERNAL MEDICINE

## 2024-11-26 PROCEDURE — G8427 DOCREV CUR MEDS BY ELIG CLIN: HCPCS | Performed by: INTERNAL MEDICINE

## 2024-11-26 PROCEDURE — G8399 PT W/DXA RESULTS DOCUMENT: HCPCS | Performed by: INTERNAL MEDICINE

## 2024-11-26 PROCEDURE — 3075F SYST BP GE 130 - 139MM HG: CPT | Performed by: INTERNAL MEDICINE

## 2024-11-26 PROCEDURE — 83036 HEMOGLOBIN GLYCOSYLATED A1C: CPT | Performed by: INTERNAL MEDICINE

## 2024-11-26 PROCEDURE — 3017F COLORECTAL CA SCREEN DOC REV: CPT | Performed by: INTERNAL MEDICINE

## 2024-11-26 PROCEDURE — 3079F DIAST BP 80-89 MM HG: CPT | Performed by: INTERNAL MEDICINE

## 2024-11-26 PROCEDURE — 3046F HEMOGLOBIN A1C LEVEL >9.0%: CPT | Performed by: INTERNAL MEDICINE

## 2024-11-26 PROCEDURE — 99214 OFFICE O/P EST MOD 30 MIN: CPT | Performed by: INTERNAL MEDICINE

## 2024-11-26 PROCEDURE — 1090F PRES/ABSN URINE INCON ASSESS: CPT | Performed by: INTERNAL MEDICINE

## 2024-11-26 PROCEDURE — 1160F RVW MEDS BY RX/DR IN RCRD: CPT | Performed by: INTERNAL MEDICINE

## 2024-11-26 PROCEDURE — 1126F AMNT PAIN NOTED NONE PRSNT: CPT | Performed by: INTERNAL MEDICINE

## 2024-11-26 PROCEDURE — G8484 FLU IMMUNIZE NO ADMIN: HCPCS | Performed by: INTERNAL MEDICINE

## 2024-11-26 PROCEDURE — 1123F ACP DISCUSS/DSCN MKR DOCD: CPT | Performed by: INTERNAL MEDICINE

## 2024-11-26 PROCEDURE — 1036F TOBACCO NON-USER: CPT | Performed by: INTERNAL MEDICINE

## 2024-11-26 PROCEDURE — G8417 CALC BMI ABV UP PARAM F/U: HCPCS | Performed by: INTERNAL MEDICINE

## 2024-11-26 PROCEDURE — 2022F DILAT RTA XM EVC RTNOPTHY: CPT | Performed by: INTERNAL MEDICINE

## 2024-11-26 NOTE — PROGRESS NOTES
Mary Martinez  Identified pt with two pt identifiers(name and ).  Chief Complaint   Patient presents with    Hypertension    Diabetes       1. Have you been to the ER, urgent care clinic since your last visit?  Hospitalized since your last visit? NO    2. Have you seen or consulted any other health care providers outside of the [facility] since your last visit?  Include any pap smears or colon screening. NO      Provider notified of reason for visit, vitals and flowsheets obtained on patients.     Patient received paperwork for advance directive during previous visit but has not completed at this time     Reviewed record In preparation for visit, huddled with provider and have obtained necessary documentation      Health Maintenance Due   Topic    Colorectal Cancer Screen        Wt Readings from Last 3 Encounters:   24 90.5 kg (199 lb 9.6 oz)   24 96.4 kg (212 lb 9.6 oz)   24 96.1 kg (211 lb 12.8 oz)     Temp Readings from Last 3 Encounters:   24 98.5 °F (36.9 °C) (Oral)   24 98.1 °F (36.7 °C) (Oral)   24 97.9 °F (36.6 °C) (Oral)     BP Readings from Last 3 Encounters:   24 138/82   24 133/83   24 139/88     Pulse Readings from Last 3 Encounters:   24 89   24 89   24 87          No data to display                  Learning Assessment:  :         2024     8:30 AM   [facility] AMB LEARNING ASSESSMENT   Primary Learner Patient   level of education GRADUATED HIGH SCHOOL OR GED   Primary Language ENGLISH   Learning Preference DEMONSTRATION   Answered By Patient   Relationship to Learner SELF       Fall Risk Assessment:  :         2024     6:53 PM 2023     1:45 PM 2023    11:45 AM 2022     9:16 AM 2021     7:33 AM 2021     3:33 AM 2021     8:44 PM   Amb Fall Risk Assessment and TUG Test   Do you feel unsteady or are you worried about falling?  no no        2 or more falls in past year? no no

## 2024-12-21 LAB — NONINV COLON CA DNA+OCC BLD SCRN STL QL: NEGATIVE

## 2025-03-20 ENCOUNTER — LAB (OUTPATIENT)
Facility: CLINIC | Age: 70
End: 2025-03-20

## 2025-03-20 DIAGNOSIS — E11.29 TYPE 2 DIABETES MELLITUS WITH DIABETIC MICROALBUMINURIA, WITHOUT LONG-TERM CURRENT USE OF INSULIN (HCC): ICD-10-CM

## 2025-03-20 DIAGNOSIS — E55.9 VITAMIN D DEFICIENCY: ICD-10-CM

## 2025-03-20 DIAGNOSIS — E03.9 ACQUIRED HYPOTHYROIDISM: ICD-10-CM

## 2025-03-20 DIAGNOSIS — E78.00 HYPERCHOLESTEROLEMIA: ICD-10-CM

## 2025-03-20 DIAGNOSIS — R80.9 TYPE 2 DIABETES MELLITUS WITH DIABETIC MICROALBUMINURIA, WITHOUT LONG-TERM CURRENT USE OF INSULIN (HCC): ICD-10-CM

## 2025-03-20 DIAGNOSIS — I10 HYPERTENSION, ESSENTIAL: ICD-10-CM

## 2025-03-20 SDOH — HEALTH STABILITY: PHYSICAL HEALTH: ON AVERAGE, HOW MANY MINUTES DO YOU ENGAGE IN EXERCISE AT THIS LEVEL?: 10 MIN

## 2025-03-20 SDOH — HEALTH STABILITY: PHYSICAL HEALTH: ON AVERAGE, HOW MANY DAYS PER WEEK DO YOU ENGAGE IN MODERATE TO STRENUOUS EXERCISE (LIKE A BRISK WALK)?: 1 DAY

## 2025-03-20 ASSESSMENT — LIFESTYLE VARIABLES
HOW OFTEN DO YOU HAVE A DRINK CONTAINING ALCOHOL: NEVER
HOW OFTEN DO YOU HAVE A DRINK CONTAINING ALCOHOL: 1
HOW OFTEN DO YOU HAVE SIX OR MORE DRINKS ON ONE OCCASION: 1
HOW MANY STANDARD DRINKS CONTAINING ALCOHOL DO YOU HAVE ON A TYPICAL DAY: 0
HOW MANY STANDARD DRINKS CONTAINING ALCOHOL DO YOU HAVE ON A TYPICAL DAY: PATIENT DOES NOT DRINK

## 2025-03-20 ASSESSMENT — PATIENT HEALTH QUESTIONNAIRE - PHQ9
SUM OF ALL RESPONSES TO PHQ QUESTIONS 1-9: 0
SUM OF ALL RESPONSES TO PHQ QUESTIONS 1-9: 0
2. FEELING DOWN, DEPRESSED OR HOPELESS: NOT AT ALL
SUM OF ALL RESPONSES TO PHQ QUESTIONS 1-9: 0
SUM OF ALL RESPONSES TO PHQ QUESTIONS 1-9: 0
1. LITTLE INTEREST OR PLEASURE IN DOING THINGS: NOT AT ALL

## 2025-03-21 LAB
25(OH)D3 SERPL-MCNC: 32.1 NG/ML (ref 30–100)
ALBUMIN SERPL-MCNC: 3.7 G/DL (ref 3.5–5)
ALBUMIN/GLOB SERPL: 1 (ref 1.1–2.2)
ALP SERPL-CCNC: 53 U/L (ref 45–117)
ALT SERPL-CCNC: 35 U/L (ref 12–78)
ANION GAP SERPL CALC-SCNC: 7 MMOL/L (ref 2–12)
AST SERPL-CCNC: 33 U/L (ref 15–37)
BASOPHILS # BLD: 0.01 K/UL (ref 0–0.1)
BASOPHILS NFR BLD: 0.3 % (ref 0–1)
BILIRUB SERPL-MCNC: 0.3 MG/DL (ref 0.2–1)
BUN SERPL-MCNC: 17 MG/DL (ref 6–20)
BUN/CREAT SERPL: 17 (ref 12–20)
CALCIUM SERPL-MCNC: 9.6 MG/DL (ref 8.5–10.1)
CHLORIDE SERPL-SCNC: 108 MMOL/L (ref 97–108)
CHOLEST SERPL-MCNC: 130 MG/DL
CO2 SERPL-SCNC: 24 MMOL/L (ref 21–32)
CREAT SERPL-MCNC: 1.02 MG/DL (ref 0.55–1.02)
CREAT UR-MCNC: 130 MG/DL
DIFFERENTIAL METHOD BLD: ABNORMAL
EOSINOPHIL # BLD: 0.01 K/UL (ref 0–0.4)
EOSINOPHIL NFR BLD: 0.3 % (ref 0–7)
ERYTHROCYTE [DISTWIDTH] IN BLOOD BY AUTOMATED COUNT: 15.4 % (ref 11.5–14.5)
EST. AVERAGE GLUCOSE BLD GHB EST-MCNC: 137 MG/DL
GLOBULIN SER CALC-MCNC: 3.7 G/DL (ref 2–4)
GLUCOSE SERPL-MCNC: 144 MG/DL (ref 65–100)
HBA1C MFR BLD: 6.4 % (ref 4–5.6)
HCT VFR BLD AUTO: 34.5 % (ref 35–47)
HDLC SERPL-MCNC: 52 MG/DL
HDLC SERPL: 2.5 (ref 0–5)
HGB BLD-MCNC: 10.8 G/DL (ref 11.5–16)
IMM GRANULOCYTES # BLD AUTO: 0.02 K/UL (ref 0–0.04)
IMM GRANULOCYTES NFR BLD AUTO: 0.7 % (ref 0–0.5)
LDLC SERPL CALC-MCNC: 53.2 MG/DL (ref 0–100)
LYMPHOCYTES # BLD: 0.94 K/UL (ref 0.8–3.5)
LYMPHOCYTES NFR BLD: 31.5 % (ref 12–49)
MCH RBC QN AUTO: 28.6 PG (ref 26–34)
MCHC RBC AUTO-ENTMCNC: 31.3 G/DL (ref 30–36.5)
MCV RBC AUTO: 91.5 FL (ref 80–99)
MICROALBUMIN UR-MCNC: 287 MG/DL
MICROALBUMIN/CREAT UR-RTO: 2208 MG/G (ref 0–30)
MONOCYTES # BLD: 0.37 K/UL (ref 0–1)
MONOCYTES NFR BLD: 12.4 % (ref 5–13)
NEUTS SEG # BLD: 1.63 K/UL (ref 1.8–8)
NEUTS SEG NFR BLD: 54.8 % (ref 32–75)
NRBC # BLD: 0 K/UL (ref 0–0.01)
NRBC BLD-RTO: 0 PER 100 WBC
PLATELET # BLD AUTO: 210 K/UL (ref 150–400)
PMV BLD AUTO: 11.3 FL (ref 8.9–12.9)
POTASSIUM SERPL-SCNC: 3.9 MMOL/L (ref 3.5–5.1)
PROT SERPL-MCNC: 7.4 G/DL (ref 6.4–8.2)
RBC # BLD AUTO: 3.77 M/UL (ref 3.8–5.2)
SODIUM SERPL-SCNC: 139 MMOL/L (ref 136–145)
SPECIMEN HOLD: NORMAL
TRIGL SERPL-MCNC: 124 MG/DL
TSH SERPL DL<=0.05 MIU/L-ACNC: 0.86 UIU/ML (ref 0.36–3.74)
VLDLC SERPL CALC-MCNC: 24.8 MG/DL
WBC # BLD AUTO: 3 K/UL (ref 3.6–11)

## 2025-03-22 ENCOUNTER — RESULTS FOLLOW-UP (OUTPATIENT)
Facility: CLINIC | Age: 70
End: 2025-03-22

## 2025-03-24 SDOH — ECONOMIC STABILITY: FOOD INSECURITY: WITHIN THE PAST 12 MONTHS, THE FOOD YOU BOUGHT JUST DIDN'T LAST AND YOU DIDN'T HAVE MONEY TO GET MORE.: NEVER TRUE

## 2025-03-24 SDOH — ECONOMIC STABILITY: TRANSPORTATION INSECURITY
IN THE PAST 12 MONTHS, HAS THE LACK OF TRANSPORTATION KEPT YOU FROM MEDICAL APPOINTMENTS OR FROM GETTING MEDICATIONS?: NO

## 2025-03-24 SDOH — ECONOMIC STABILITY: FOOD INSECURITY: WITHIN THE PAST 12 MONTHS, YOU WORRIED THAT YOUR FOOD WOULD RUN OUT BEFORE YOU GOT MONEY TO BUY MORE.: NEVER TRUE

## 2025-03-24 SDOH — ECONOMIC STABILITY: INCOME INSECURITY: IN THE LAST 12 MONTHS, WAS THERE A TIME WHEN YOU WERE NOT ABLE TO PAY THE MORTGAGE OR RENT ON TIME?: NO

## 2025-03-27 ENCOUNTER — OFFICE VISIT (OUTPATIENT)
Facility: CLINIC | Age: 70
End: 2025-03-27
Payer: MEDICARE

## 2025-03-27 VITALS
DIASTOLIC BLOOD PRESSURE: 88 MMHG | WEIGHT: 208.8 LBS | BODY MASS INDEX: 34.79 KG/M2 | SYSTOLIC BLOOD PRESSURE: 138 MMHG | HEART RATE: 74 BPM | OXYGEN SATURATION: 99 % | HEIGHT: 65 IN | RESPIRATION RATE: 16 BRPM | TEMPERATURE: 97.9 F

## 2025-03-27 DIAGNOSIS — I10 HYPERTENSION, ESSENTIAL: ICD-10-CM

## 2025-03-27 DIAGNOSIS — R80.9 TYPE 2 DIABETES MELLITUS WITH DIABETIC MICROALBUMINURIA, WITHOUT LONG-TERM CURRENT USE OF INSULIN (HCC): ICD-10-CM

## 2025-03-27 DIAGNOSIS — Z00.00 MEDICARE ANNUAL WELLNESS VISIT, SUBSEQUENT: Primary | ICD-10-CM

## 2025-03-27 DIAGNOSIS — R09.89 DECREASED PULSES IN FEET: ICD-10-CM

## 2025-03-27 DIAGNOSIS — E66.9 OBESITY (BMI 30-39.9): ICD-10-CM

## 2025-03-27 DIAGNOSIS — E11.29 TYPE 2 DIABETES MELLITUS WITH DIABETIC MICROALBUMINURIA, WITHOUT LONG-TERM CURRENT USE OF INSULIN (HCC): ICD-10-CM

## 2025-03-27 PROCEDURE — 3079F DIAST BP 80-89 MM HG: CPT | Performed by: INTERNAL MEDICINE

## 2025-03-27 PROCEDURE — G0439 PPPS, SUBSEQ VISIT: HCPCS | Performed by: INTERNAL MEDICINE

## 2025-03-27 PROCEDURE — 1090F PRES/ABSN URINE INCON ASSESS: CPT | Performed by: INTERNAL MEDICINE

## 2025-03-27 PROCEDURE — 3044F HG A1C LEVEL LT 7.0%: CPT | Performed by: INTERNAL MEDICINE

## 2025-03-27 PROCEDURE — G8417 CALC BMI ABV UP PARAM F/U: HCPCS | Performed by: INTERNAL MEDICINE

## 2025-03-27 PROCEDURE — 1126F AMNT PAIN NOTED NONE PRSNT: CPT | Performed by: INTERNAL MEDICINE

## 2025-03-27 PROCEDURE — 1159F MED LIST DOCD IN RCRD: CPT | Performed by: INTERNAL MEDICINE

## 2025-03-27 PROCEDURE — 3075F SYST BP GE 130 - 139MM HG: CPT | Performed by: INTERNAL MEDICINE

## 2025-03-27 PROCEDURE — 99214 OFFICE O/P EST MOD 30 MIN: CPT | Performed by: INTERNAL MEDICINE

## 2025-03-27 PROCEDURE — 3017F COLORECTAL CA SCREEN DOC REV: CPT | Performed by: INTERNAL MEDICINE

## 2025-03-27 PROCEDURE — 2022F DILAT RTA XM EVC RTNOPTHY: CPT | Performed by: INTERNAL MEDICINE

## 2025-03-27 PROCEDURE — 1160F RVW MEDS BY RX/DR IN RCRD: CPT | Performed by: INTERNAL MEDICINE

## 2025-03-27 PROCEDURE — G8427 DOCREV CUR MEDS BY ELIG CLIN: HCPCS | Performed by: INTERNAL MEDICINE

## 2025-03-27 PROCEDURE — 1036F TOBACCO NON-USER: CPT | Performed by: INTERNAL MEDICINE

## 2025-03-27 PROCEDURE — G8399 PT W/DXA RESULTS DOCUMENT: HCPCS | Performed by: INTERNAL MEDICINE

## 2025-03-27 PROCEDURE — 1123F ACP DISCUSS/DSCN MKR DOCD: CPT | Performed by: INTERNAL MEDICINE

## 2025-03-27 RX ORDER — METOPROLOL SUCCINATE 25 MG/1
50 TABLET, EXTENDED RELEASE ORAL DAILY
Qty: 90 TABLET | Refills: 3 | Status: SHIPPED
Start: 2025-03-27

## 2025-03-27 NOTE — PROGRESS NOTES
Medicare Annual Wellness Visit    Mary Martinez is here for Medicare AWV    Assessment & Plan    ICD-10-CM    1. Medicare annual wellness visit, subsequent  Z00.00       2. Type 2 diabetes mellitus with diabetic microalbuminuria, without long-term current use of insulin (HCC)  E11.29  DIABETES FOOT EXAM    R80.9       3. Hypertension, essential  I10 metoprolol succinate (TOPROL XL) 25 MG extended release tablet      4. Decreased pulses in feet  R09.89 Vascular ankle brachial index (RACHEL)      5. Obesity (BMI 30-39.9)  E66.9         1. Medicare annual wellness visit.  - Augment physical activity regimen.  - Provided information on maintaining a balanced diet.  - Continued efforts towards weight loss.    2. Type 2 diabetes with diabetic microalbuminuria: Controlled. A1c 6.4%.  - Microalbuminuria has been progressively deteriorating.  - GFR mildly abnormal at 59 for the first time.  - Escalate Mounjaro dosage from 2.5 mg to 5 mg upon the next refill.  - Consider discontinuation of glipizide or metformin if blood sugars improve significantly.    3. Hypertension: Mildly elevated at 138/88. Has been mildly elevated at previous visits.  - Increase metoprolol XL dosage from 25 mg (1 tablet nightly) to 50 mg (2 tablets nightly).  - Monitor for any dizziness.    4. Decreased pulses in feet.  - Left foot with purplish discoloration, coolness to touch, and weaker pulse than right foot.  - Schedule vascular ankle-brachial index study to check circulation in both feet.    5. Obesity  - Escalate Mounjaro dosage from 2.5 mg to 5 mg upon the next refill to facilitate weight loss.       Return in about 3 months (around 6/27/2025), or if symptoms worsen or fail to improve, for HTN, DM, POC A1c.       Subjective     History of Present Illness  The patient is a 69-year-old female who presents for a Medicare annual wellness visit and a 4-month follow-up evaluation of type 2 diabetes and hypertension.    Denies polydipsia, polyuria,

## 2025-03-27 NOTE — PROGRESS NOTES
Mary Martinez  Identified pt with two pt identifiers(name and ).  Chief Complaint   Patient presents with    Medicare AWV       1. Have you been to the ER, urgent care clinic since your last visit?  Hospitalized since your last visit? NO    2. Have you seen or consulted any other health care providers outside of the [facility] since your last visit?  Include any pap smears or colon screening. NO      Provider notified of reason for visit, vitals and flowsheets obtained on patients.     Patient received paperwork for advance directive during previous visit but has not completed at this time     Reviewed record In preparation for visit, huddled with provider and have obtained necessary documentation      Health Maintenance Due   Topic    Annual Wellness Visit (Medicare Advantage)     Diabetic foot exam        Wt Readings from Last 3 Encounters:   25 94.7 kg (208 lb 12.8 oz)   24 90.5 kg (199 lb 9.6 oz)   24 96.4 kg (212 lb 9.6 oz)     Temp Readings from Last 3 Encounters:   25 97.9 °F (36.6 °C) (Oral)   24 98.5 °F (36.9 °C) (Oral)   24 98.1 °F (36.7 °C) (Oral)     BP Readings from Last 3 Encounters:   25 138/88   24 138/82   24 133/83     Pulse Readings from Last 3 Encounters:   25 74   24 89   24 89          No data to display                  Learning Assessment:  :         2024     8:30 AM   [facility] AMB LEARNING ASSESSMENT   Primary Learner Patient   level of education GRADUATED HIGH SCHOOL OR GED   Primary Language ENGLISH   Learning Preference DEMONSTRATION   Answered By Patient   Relationship to Learner SELF       Fall Risk Assessment:  :         3/20/2025     4:08 PM 2024     6:53 PM 2023     1:45 PM 2023    11:45 AM 2022     9:16 AM 2021     7:33 AM 2021     3:33 AM   Amb Fall Risk Assessment and TUG Test   Do you feel unsteady or are you worried about falling?  no no no       2 or

## 2025-03-27 NOTE — PATIENT INSTRUCTIONS
Learning About Being Active as an Older Adult  Why is being active important as you get older?     Being active is one of the best things you can do for your health. And it's never too late to start. Being active--or getting active, if you aren't already--has definite benefits. It can:  Give you more energy,  Keep your mind sharp.  Improve balance to reduce your risk of falls.  Help you manage chronic illness with fewer medicines.  No matter how old you are, how fit you are, or what health problems you have, there is a form of activity that will work for you. And the more physical activity you can do, the better your overall health will be.  What kinds of activity can help you stay healthy?  Being more active will make your daily activities easier. Physical activity includes planned exercise and things you do in daily life. There are four types of activity:  Aerobic.  Doing aerobic activity makes your heart and lungs strong.  Includes walking, dancing, and gardening.  Aim for at least 2½ hours spread throughout the week.  It improves your energy and can help you sleep better.  Muscle-strengthening.  This type of activity can help maintain muscle and strengthen bones.  Includes climbing stairs, using resistance bands, and lifting or carrying heavy loads.  Aim for at least twice a week.  It can help protect the knees and other joints.  Stretching.  Stretching gives you better range of motion in joints and muscles.  Includes upper arm stretches, calf stretches, and gentle yoga.  Aim for at least twice a week, preferably after your muscles are warmed up from other activities.  It can help you function better in daily life.  Balancing.  This helps you stay coordinated and have good posture.  Includes heel-to-toe walking, meggan chi, and certain types of yoga.  Aim for at least 3 days a week.  It can reduce your risk of falling.  Even if you have a hard time meeting the recommendations, it's better to be more active

## 2025-04-04 ENCOUNTER — HOSPITAL ENCOUNTER (OUTPATIENT)
Facility: HOSPITAL | Age: 70
Discharge: HOME | End: 2025-04-04
Payer: MEDICARE

## 2025-04-04 DIAGNOSIS — R09.89 DECREASED PULSES IN FEET: ICD-10-CM

## 2025-04-04 LAB
VAS LEFT ABI: 1.19
VAS LEFT ARM BP: 157 MMHG
VAS LEFT DORSALIS PEDIS BP: 192 MMHG
VAS LEFT PTA BP: 201 MMHG
VAS LEFT TBI: 0.76
VAS LEFT TOE PRESSURE: 129 MMHG
VAS RIGHT ABI: 1.19
VAS RIGHT ARM BP: 169 MMHG
VAS RIGHT DORSALIS PEDIS BP: 201 MMHG
VAS RIGHT PTA BP: 200 MMHG
VAS RIGHT TBI: 0.82
VAS RIGHT TOE PRESSURE: 138 MMHG

## 2025-04-04 PROCEDURE — 93922 UPR/L XTREMITY ART 2 LEVELS: CPT

## 2025-04-11 ENCOUNTER — RESULTS FOLLOW-UP (OUTPATIENT)
Facility: CLINIC | Age: 70
End: 2025-04-11

## 2025-04-21 DIAGNOSIS — R80.9 TYPE 2 DIABETES MELLITUS WITH DIABETIC MICROALBUMINURIA, WITHOUT LONG-TERM CURRENT USE OF INSULIN (HCC): Primary | ICD-10-CM

## 2025-04-21 DIAGNOSIS — I10 HYPERTENSION, ESSENTIAL: ICD-10-CM

## 2025-04-21 DIAGNOSIS — E66.9 OBESITY (BMI 30-39.9): ICD-10-CM

## 2025-04-21 DIAGNOSIS — E11.29 TYPE 2 DIABETES MELLITUS WITH DIABETIC MICROALBUMINURIA, WITHOUT LONG-TERM CURRENT USE OF INSULIN (HCC): Primary | ICD-10-CM

## 2025-04-21 RX ORDER — METOPROLOL SUCCINATE 25 MG/1
50 TABLET, EXTENDED RELEASE ORAL DAILY
Qty: 90 TABLET | Refills: 3 | Status: SHIPPED | OUTPATIENT
Start: 2025-04-21

## 2025-04-21 NOTE — TELEPHONE ENCOUNTER
Caller requests Refill of:    Tirzepatide 2.5 MG/0.5ML SOAJ     Pt would like to know if she can get 5 mg    Please send to:    [facility]     Visit Appointment History:  Future Appointments:  Future Appointments   Date Time Provider Department Center   7/11/2025  8:50 AM Vianca Ralph [facility]   9/17/2025 10:10 AM Chen Mitchell, APRN - NP [facility]         Last Appointment With Me:  3/27/2025         Caller confirmed instructions and dosages as correct.    Caller was advised that Meds will be refilled as soon as possible, however there can be a 48-72 business hour turn around on refill requests.

## 2025-04-21 NOTE — TELEPHONE ENCOUNTER
Caller requests Refill of:    metoprolol succinate (TOPROL XL) 25 MG extended release tablet     Please send to:    [facility]     Visit Appointment History:  Future Appointments:  Future Appointments   Date Time Provider Department Center   7/11/2025  8:50 AM Vianca Ralph MD [facility]   9/17/2025 10:10 AM Chen Mitchell, APRN - NP [facility]         Last Appointment With Me:  3/27/2025         Caller confirmed instructions and dosages as correct.    Caller was advised that Meds will be refilled as soon as possible, however there can be a 48-72 business hour turn around on refill requests.

## 2025-04-21 NOTE — TELEPHONE ENCOUNTER
PCP: Vianca Ralph MD     Last appt:  3/27/2025      Future Appointments   Date Time Provider Department Center   7/11/2025  8:50 AM Vianca Ralph MD [facility]   9/17/2025 10:10 AM Chen Mitchell, APRN - NP [facility]          Requested Prescriptions     Pending Prescriptions Disp Refills    metoprolol succinate (TOPROL XL) 25 MG extended release tablet 90 tablet 3     Sig: Take 2 tablets by mouth daily

## 2025-04-21 NOTE — TELEPHONE ENCOUNTER
PCP: Vianca Ralph MD     Last appt:  3/27/2025      Future Appointments   Date Time Provider Department Center   7/11/2025  8:50 AM Vianca Ralph MD [facility]   9/17/2025 10:10 AM Chen Mitchell, APRN - NP [facility]          Requested Prescriptions     Pending Prescriptions Disp Refills    Tirzepatide 5 MG/0.5ML SOAJ 2 mL 1

## 2025-04-24 RX ORDER — ISOPROPYL ALCOHOL 70 ML/100ML
SWAB TOPICAL
Qty: 200 EACH | Refills: 3 | Status: SHIPPED | OUTPATIENT
Start: 2025-04-24

## 2025-04-24 RX ORDER — GLUCOSAM/CHON-MSM1/C/MANG/BOSW 500-416.6
TABLET ORAL
Qty: 200 EACH | Refills: 3 | Status: SHIPPED | OUTPATIENT
Start: 2025-04-24

## 2025-04-24 RX ORDER — CALCIUM CITRATE/VITAMIN D3 200MG-6.25
TABLET ORAL
Qty: 200 STRIP | Refills: 3 | Status: SHIPPED | OUTPATIENT
Start: 2025-04-24

## 2025-04-24 NOTE — TELEPHONE ENCOUNTER
PCP: Vianca Ralph MD     Last appt:  3/27/2025      Future Appointments   Date Time Provider Department Center   7/11/2025  8:50 AM Vianca Ralph MD [facility]   9/17/2025 10:10 AM Chen Mitchell, APRN - NP [facility]          Requested Prescriptions     Pending Prescriptions Disp Refills    TRUEplus Lancets 33G MISC [Pharmacy Med Name: TRUEplus Lancets 33G Miscellaneous] 200 each 3     Sig: USE TO CHECK GLUCOSE UP TO TWICE DAILY.    Alcohol Swabs (DROPSAFE ALCOHOL PREP) 70 % PADS [Pharmacy Med Name: DropSafe Alcohol Prep Pad 70 %] 200 each 3     Sig: USE TO CHECK GLUCOSE UP TO TWICE DAILY

## 2025-04-24 NOTE — TELEPHONE ENCOUNTER
PCP: Vianca Ralph MD     Last appt:  3/27/2025      Future Appointments   Date Time Provider Department Center   7/11/2025  8:50 AM Vianca Ralph MD [facility]   9/17/2025 10:10 AM Chen Mitchell, APRN - NP [facility]          Requested Prescriptions     Pending Prescriptions Disp Refills    blood glucose test strips (TRUE METRIX BLOOD GLUCOSE TEST) strip [Pharmacy Med Name: TRUE METRIX SELF MONITORI] 200 strip 3     Sig: TEST BLOOD SUGAR UP TO TWICE DAILY

## 2025-06-03 ENCOUNTER — TRANSCRIBE ORDERS (OUTPATIENT)
Facility: HOSPITAL | Age: 70
End: 2025-06-03

## 2025-06-03 DIAGNOSIS — Z12.31 VISIT FOR SCREENING MAMMOGRAM: Primary | ICD-10-CM

## 2025-06-18 DIAGNOSIS — E66.9 OBESITY (BMI 30-39.9): ICD-10-CM

## 2025-06-18 DIAGNOSIS — R80.9 TYPE 2 DIABETES MELLITUS WITH DIABETIC MICROALBUMINURIA, WITHOUT LONG-TERM CURRENT USE OF INSULIN (HCC): ICD-10-CM

## 2025-06-18 DIAGNOSIS — E11.29 TYPE 2 DIABETES MELLITUS WITH DIABETIC MICROALBUMINURIA, WITHOUT LONG-TERM CURRENT USE OF INSULIN (HCC): ICD-10-CM

## 2025-06-18 RX ORDER — TIRZEPATIDE 5 MG/.5ML
INJECTION, SOLUTION SUBCUTANEOUS
Qty: 4 ML | Refills: 11 | Status: SHIPPED | OUTPATIENT
Start: 2025-06-18

## 2025-06-18 NOTE — TELEPHONE ENCOUNTER
PCP: Vianca Ralph MD     Last appt:  3/27/2025      Future Appointments   Date Time Provider Department Center   6/26/2025  8:30 AM [facility] 2 [facility]   7/11/2025  8:50 AM Vianca Ralph MD [facility]   9/17/2025 10:10 AM Chen Mitchell, APRN - NP [facility]          Requested Prescriptions     Pending Prescriptions Disp Refills    MOUNJARO 5 MG/0.5ML SOAJ pen [Pharmacy Med Name: Mounjaro Subcutaneous Solution Auto-injector 5 MG/0.5ML]  11     Sig: INJECT 5MG (1 PEN) UNDER THE SKIN EVERY WEEK       Simple: Patient demonstrates quick and easy understanding/Verbalized Understanding

## 2025-06-26 ENCOUNTER — HOSPITAL ENCOUNTER (OUTPATIENT)
Facility: HOSPITAL | Age: 70
Discharge: HOME | End: 2025-06-29
Payer: MEDICARE

## 2025-06-26 DIAGNOSIS — Z12.31 VISIT FOR SCREENING MAMMOGRAM: ICD-10-CM

## 2025-06-26 PROCEDURE — 77063 BREAST TOMOSYNTHESIS BI: CPT

## 2025-07-11 ENCOUNTER — OFFICE VISIT (OUTPATIENT)
Facility: CLINIC | Age: 70
End: 2025-07-11
Payer: MEDICARE

## 2025-07-11 VITALS
BODY MASS INDEX: 34.55 KG/M2 | HEART RATE: 71 BPM | OXYGEN SATURATION: 99 % | TEMPERATURE: 98.1 F | DIASTOLIC BLOOD PRESSURE: 84 MMHG | WEIGHT: 207.4 LBS | RESPIRATION RATE: 16 BRPM | SYSTOLIC BLOOD PRESSURE: 132 MMHG | HEIGHT: 65 IN

## 2025-07-11 DIAGNOSIS — E11.29 TYPE 2 DIABETES MELLITUS WITH DIABETIC MICROALBUMINURIA, WITHOUT LONG-TERM CURRENT USE OF INSULIN (HCC): Primary | ICD-10-CM

## 2025-07-11 DIAGNOSIS — R80.9 TYPE 2 DIABETES MELLITUS WITH DIABETIC MICROALBUMINURIA, WITHOUT LONG-TERM CURRENT USE OF INSULIN (HCC): Primary | ICD-10-CM

## 2025-07-11 DIAGNOSIS — I10 HYPERTENSION, ESSENTIAL: ICD-10-CM

## 2025-07-11 LAB — HBA1C MFR BLD: 6.1 %

## 2025-07-11 PROCEDURE — 1159F MED LIST DOCD IN RCRD: CPT | Performed by: INTERNAL MEDICINE

## 2025-07-11 PROCEDURE — G8417 CALC BMI ABV UP PARAM F/U: HCPCS | Performed by: INTERNAL MEDICINE

## 2025-07-11 PROCEDURE — 3075F SYST BP GE 130 - 139MM HG: CPT | Performed by: INTERNAL MEDICINE

## 2025-07-11 PROCEDURE — 1090F PRES/ABSN URINE INCON ASSESS: CPT | Performed by: INTERNAL MEDICINE

## 2025-07-11 PROCEDURE — 1123F ACP DISCUSS/DSCN MKR DOCD: CPT | Performed by: INTERNAL MEDICINE

## 2025-07-11 PROCEDURE — 2022F DILAT RTA XM EVC RTNOPTHY: CPT | Performed by: INTERNAL MEDICINE

## 2025-07-11 PROCEDURE — G8427 DOCREV CUR MEDS BY ELIG CLIN: HCPCS | Performed by: INTERNAL MEDICINE

## 2025-07-11 PROCEDURE — 99214 OFFICE O/P EST MOD 30 MIN: CPT | Performed by: INTERNAL MEDICINE

## 2025-07-11 PROCEDURE — G8399 PT W/DXA RESULTS DOCUMENT: HCPCS | Performed by: INTERNAL MEDICINE

## 2025-07-11 PROCEDURE — 3017F COLORECTAL CA SCREEN DOC REV: CPT | Performed by: INTERNAL MEDICINE

## 2025-07-11 PROCEDURE — 3079F DIAST BP 80-89 MM HG: CPT | Performed by: INTERNAL MEDICINE

## 2025-07-11 PROCEDURE — 1126F AMNT PAIN NOTED NONE PRSNT: CPT | Performed by: INTERNAL MEDICINE

## 2025-07-11 PROCEDURE — 3044F HG A1C LEVEL LT 7.0%: CPT | Performed by: INTERNAL MEDICINE

## 2025-07-11 PROCEDURE — 83036 HEMOGLOBIN GLYCOSYLATED A1C: CPT | Performed by: INTERNAL MEDICINE

## 2025-07-11 PROCEDURE — 1036F TOBACCO NON-USER: CPT | Performed by: INTERNAL MEDICINE

## 2025-07-11 NOTE — PROGRESS NOTES
Mary Martinez  Identified pt with two pt identifiers(name and ).  Chief Complaint   Patient presents with    Hypertension    Diabetes       1. Have you been to the ER, urgent care clinic since your last visit?  Hospitalized since your last visit? NO    2. Have you seen or consulted any other health care providers outside of the [facility] since your last visit?  Include any pap smears or colon screening. NO      Provider notified of reason for visit, vitals and flowsheets obtained on patients.     Patient received paperwork for advance directive during previous visit but has not completed at this time     Reviewed record In preparation for visit, huddled with provider and have obtained necessary documentation      There are no preventive care reminders to display for this patient.    Wt Readings from Last 3 Encounters:   25 94.7 kg (208 lb 12.8 oz)   24 90.5 kg (199 lb 9.6 oz)   24 96.4 kg (212 lb 9.6 oz)     Temp Readings from Last 3 Encounters:   25 97.9 °F (36.6 °C) (Oral)   24 98.5 °F (36.9 °C) (Oral)   24 98.1 °F (36.7 °C) (Oral)     BP Readings from Last 3 Encounters:   25 138/88   24 138/82   24 133/83     Pulse Readings from Last 3 Encounters:   25 74   24 89   24 89          No data to display                  Learning Assessment:  :         2024     8:30 AM   [facility] AMB LEARNING ASSESSMENT   Primary Learner Patient   level of education GRADUATED HIGH SCHOOL OR GED   Primary Language ENGLISH   Learning Preference DEMONSTRATION   Answered By Patient   Relationship to Learner SELF       Fall Risk Assessment:  :         3/20/2025     4:08 PM 2024     6:53 PM 2023     1:45 PM 2023    11:45 AM 2022     9:16 AM 2021     7:33 AM 2021     3:33 AM   Amb Fall Risk Assessment and TUG Test   Do you feel unsteady or are you worried about falling?  no no no       2 or more falls in past year? no 
mouth daily      Cholecalciferol 50 MCG (2000 UT) CAPS Take 5,000 Units by mouth daily 1000 daily      cyanocobalamin 500 MCG tablet Take 1 tablet by mouth daily      ferrous sulfate (IRON 325) 325 (65 Fe) MG tablet Take by mouth daily      fluticasone (FLONASE) 50 MCG/ACT nasal spray 2 sprays by Nasal route as needed      loratadine (CLARITIN) 10 MG tablet Take 1 tablet by mouth      Omega-3 Fatty Acids (FISH OIL) 1000 MG capsule Take 2 capsules by mouth 2 times daily      pregabalin (LYRICA) 50 MG capsule Take 2 capsules by mouth 2 times daily.      vitamin E 1000 units capsule Take 1 capsule by mouth daily       No current facility-administered medications for this visit.       /84 (BP Site: Left Upper Arm, Patient Position: Sitting)   Pulse 71   Temp 98.1 °F (36.7 °C) (Temporal)   Resp 16   Ht 1.651 m (5' 5\")   Wt 94.1 kg (207 lb 6.4 oz)   SpO2 99%   BMI 34.51 kg/m²   Physical Exam  General: Obese, no distress.  HEENT:  Head normocephalic/atraumatic, no scleral icterus  Neck: Supple. No carotid bruits, JVD, lymphadenopathy, or thyromegaly.  Lungs:  Clear to auscultation bilaterally. Good air movement.  Heart:  Regular rate and rhythm, normal S1 and S2, no murmur, gallop, or rub  Abdomen: Soft, non-distended, normal bowel sounds, no tenderness, no masses.   Extremities: 1+ pitting edema at the left ankle, lower leg, and foot. No clubbing or cyanosis.  Musculoskeletal: Normal ROM with mild crepitus at both knees.  Neurological: Alert and oriented. No focal deficits.  Psychiatric: Normal mood and affect. Behavior is normal.    Results for orders placed or performed in visit on 07/11/25   AMB POC HEMOGLOBIN A1C   Result Value Ref Range    Hemoglobin A1C, POC 6.1 %     Assessment & Plan  1. Type 2 diabetes mellitus - A1c decreased from 6.4% to 6.1%, indicating good control.  - Discontinue metformin, maintain glipizide XL 10 mg and Mounjaro 5 mg.  - Anticipate slight A1c increase due to metformin

## 2025-08-13 DIAGNOSIS — E11.9 TYPE 2 DIABETES MELLITUS WITHOUT COMPLICATION, WITHOUT LONG-TERM CURRENT USE OF INSULIN (HCC): ICD-10-CM

## 2025-08-13 DIAGNOSIS — E03.9 ACQUIRED HYPOTHYROIDISM: ICD-10-CM

## 2025-08-13 DIAGNOSIS — I10 HYPERTENSION, ESSENTIAL: ICD-10-CM

## 2025-08-13 DIAGNOSIS — E78.00 HYPERCHOLESTEROLEMIA: ICD-10-CM

## 2025-08-13 RX ORDER — GLIPIZIDE 10 MG/1
10 TABLET, FILM COATED, EXTENDED RELEASE ORAL DAILY
Qty: 90 TABLET | Refills: 3 | Status: SHIPPED | OUTPATIENT
Start: 2025-08-13

## 2025-08-13 RX ORDER — LISINOPRIL 40 MG/1
40 TABLET ORAL DAILY
Qty: 90 TABLET | Refills: 3 | Status: SHIPPED | OUTPATIENT
Start: 2025-08-13

## 2025-08-13 RX ORDER — ROSUVASTATIN CALCIUM 10 MG/1
10 TABLET, COATED ORAL NIGHTLY
Qty: 90 TABLET | Refills: 3 | Status: SHIPPED | OUTPATIENT
Start: 2025-08-13

## 2025-08-13 RX ORDER — LEVOTHYROXINE SODIUM 88 UG/1
88 TABLET ORAL DAILY
Qty: 90 TABLET | Refills: 3 | Status: SHIPPED | OUTPATIENT
Start: 2025-08-13

## 2025-08-28 DIAGNOSIS — E11.29 TYPE 2 DIABETES MELLITUS WITH DIABETIC MICROALBUMINURIA, WITHOUT LONG-TERM CURRENT USE OF INSULIN (HCC): ICD-10-CM

## 2025-08-28 DIAGNOSIS — R80.9 TYPE 2 DIABETES MELLITUS WITH DIABETIC MICROALBUMINURIA, WITHOUT LONG-TERM CURRENT USE OF INSULIN (HCC): ICD-10-CM

## 2025-08-28 DIAGNOSIS — E66.9 OBESITY (BMI 30-39.9): ICD-10-CM

## 2025-08-28 RX ORDER — TIRZEPATIDE 5 MG/.5ML
INJECTION, SOLUTION SUBCUTANEOUS
Qty: 4 ML | Refills: 11 | Status: CANCELLED | OUTPATIENT
Start: 2025-08-28

## (undated) DEVICE — NDL SPNE QNCKE 18GX3.5IN LF --

## (undated) DEVICE — SYR 50ML LR LCK 1ML GRAD NSAF --

## (undated) DEVICE — TRANSFER SET 3": Brand: MEDLINE INDUSTRIES, INC.

## (undated) DEVICE — NEEDLE BX 11GA L152MM STREAMLINED SUP SHRP T HNDL TRCR TAPR

## (undated) DEVICE — SPONGE GZ W4XL4IN COT 12 PLY TYP VII WVN C FLD DSGN

## (undated) DEVICE — KIT JACK TBL PT CARE

## (undated) DEVICE — SLING ARM LIFETEC ORTH UNIV --

## (undated) DEVICE — SOLUTION LACTATED RINGERS INJECTION USP

## (undated) DEVICE — DRAPE,CHEST,FENES,15X10,STERIL: Brand: MEDLINE

## (undated) DEVICE — (D)PREP SKN CHLRAPRP APPL 26ML -- CONVERT TO ITEM 371833

## (undated) DEVICE — GARMENT,MEDLINE,DVT,INT,CALF,MED, GEN2: Brand: MEDLINE

## (undated) DEVICE — SPONGE GZ W4XL4IN COT RADPQ HIGHLY ABSRB

## (undated) DEVICE — BANDAGE,GAUZE,BULKEE II,4.5"X4.1YD,STRL: Brand: MEDLINE

## (undated) DEVICE — GOWN,SIRUS,NONRNF,SETINSLV,2XL,18/CS: Brand: MEDLINE

## (undated) DEVICE — PICO 7 DUAL 15X20CM: Brand: PICO™ 7

## (undated) DEVICE — SURGIFOAM SPNG SZ 100

## (undated) DEVICE — 3M™ TEGADERM™ TRANSPARENT FILM DRESSING FRAME STYLE, 1626W, 4 IN X 4-3/4 IN (10 CM X 12 CM), 50/CT 4CT/CASE: Brand: 3M™ TEGADERM™

## (undated) DEVICE — GLOVE ORANGE PI 7 1/2   MSG9075

## (undated) DEVICE — INFECTION CONTROL KIT SYS

## (undated) DEVICE — 3.0MM PRECISION NEURO (MATCH HEAD)

## (undated) DEVICE — DRAPE MON DISP FOR EXCELSIUSGPS ROBOTIC NAVIGATION PLATFRM

## (undated) DEVICE — ZIMMER® STERILE DISPOSABLE TOURNIQUET CUFF WITH PLC, DUAL PORT, SINGLE BLADDER, 18 IN. (46 CM)

## (undated) DEVICE — C-ARMOR C-ARM EQUIPMENT COVERS CLEAR STERILE UNIVERSAL FIT 12 PER CASE: Brand: C-ARMOR

## (undated) DEVICE — DRAPE,LAP,CHOLE,W/TROUGHS,STERILE: Brand: MEDLINE

## (undated) DEVICE — SOLUTION IV 1000ML 0.9% SOD CHL

## (undated) DEVICE — LAMINECTOMY-MRMC: Brand: MEDLINE INDUSTRIES, INC.

## (undated) DEVICE — SOLUTION IRRIG 1000ML STRL H2O USP PLAS POUR BTL

## (undated) DEVICE — BNDG ADH FABRIC 2X4IN ST LF --

## (undated) DEVICE — 4-PORT MANIFOLD: Brand: NEPTUNE 2

## (undated) DEVICE — 450 ML BOTTLE OF 0.05% CHLORHEXIDINE GLUCONATE IN 99.95% STERILE WATER FOR IRRIGATION, USP AND APPLICATOR.: Brand: IRRISEPT ANTIMICROBIAL WOUND LAVAGE

## (undated) DEVICE — STERILE POLYISOPRENE POWDER-FREE SURGICAL GLOVES: Brand: PROTEXIS

## (undated) DEVICE — SUTURE STRATAFIX SPRL MCRYL + SZ 2-0 L18IN ABSRB UD CT-1 SXMP1B413

## (undated) DEVICE — NEEDLE HYPO 22GA L1.5IN BLK S STL HUB POLYPR SHLD REG BVL

## (undated) DEVICE — KENDALL DL ECG CABLE AND LEAD WIRE SYSTEM, 3-LEAD, SINGLE PATIENT USE: Brand: KENDALL

## (undated) DEVICE — BLADE KNF CRPL TUNN MINI DISP --

## (undated) DEVICE — WRAP COHESIVE W2INXL5YD TAN SELF ADH BNDG HND NON STERILE TEAR CARING

## (undated) DEVICE — FLOSEAL MATRIX IS INDICATED IN SURGICAL PROCEDURES (OTHER THAN IN OPHTHALMIC) AS AN ADJUNCT TO HEMOSTASIS WHEN CONTROL OF BLEEDING BY LIGATURE OR CONVENTIONALPROCEDURES IS INEFFECTIVE OR IMPRACTICAL.: Brand: FLOSEAL HEMOSTATIC MATRIX

## (undated) DEVICE — HAND I-LF: Brand: MEDLINE INDUSTRIES, INC.

## (undated) DEVICE — Device

## (undated) DEVICE — GLOVE SURG SZ 75 L12IN FNGR THK79MIL GRN LTX FREE

## (undated) DEVICE — SUTURE V-LOC 180 SZ 0 L12IN ABSRB GRN L37MM GS-21 1/2 CIR VLOCL0316

## (undated) DEVICE — 3M™ TEGADERM™ TRANSPARENT FILM DRESSING FRAME STYLE, 1624W, 2-3/8 IN X 2-3/4 IN (6 CM X 7 CM), 100/CT 4CT/CASE: Brand: 3M™ TEGADERM™

## (undated) DEVICE — CONTINU-FLO SOLUTION SET, 2 INJECTION SITES, MALE LUER LOCK ADAPTER WITH RETRACTABLE COLLAR, LARGE BORE STOPCOCK WITH ROTATING MALE LUER LOCK EXTENSION SET, 2 INJECTION SITES, MALE LUER LOCK ADAPTER WITH RETRACTABLE COLLAR: Brand: INTERLINK/CONTINU-FLO

## (undated) DEVICE — MARS 3VL KIT

## (undated) DEVICE — DRILL BIT 3.5MM

## (undated) DEVICE — SUTURE VCRL SZ 2-0 L18IN ABSRB UD CT-1 L36MM 1/2 CIR J839D

## (undated) DEVICE — TRAP FLUID BUFFALO FLTR

## (undated) DEVICE — ELECTRODE BLDE L4IN NONINSULATED EDGE

## (undated) DEVICE — SYSTEM SKIN CLSR 22CM DERMBND PRINEO

## (undated) DEVICE — DISSECTOR LAP DIA5MM BLNT TIP ENDOPATH

## (undated) DEVICE — SYR 20ML LL STRL LF --

## (undated) DEVICE — COVER LT HNDL PLAS RIG 1 PER PK

## (undated) DEVICE — PREP SKN CHLRAPRP APL 26ML STR --

## (undated) DEVICE — SPHERE STEALTH 12PK/TY --

## (undated) DEVICE — DRESSING,GAUZE,XEROFORM,CURAD,1"X8",ST: Brand: CURAD

## (undated) DEVICE — DRAPE C ARM DISP FOR EXCELSIUSGPS ROBOTIC NAVIGATION PLATFRM

## (undated) DEVICE — BIPOLAR FORCEPS CORD: Brand: VALLEYLAB

## (undated) DEVICE — SUTURE VCRL SZ 1 L18IN ABSRB VLT CT-1 L36MM 1/2 CIR J741D

## (undated) DEVICE — SUTURE ETHLN SZ 4-0 L18IN NONABSORBABLE BLK L19MM PS-2 3/8 1667H

## (undated) DEVICE — SNAP KOVER: Brand: UNBRANDED

## (undated) DEVICE — GLOVE SURG SZ 85 L12IN FNGR THK79MIL GRN LTX FREE

## (undated) DEVICE — TOTAL TRAY, 16FR 10ML SIL FOLEY, URN: Brand: MEDLINE

## (undated) DEVICE — SOLUTION IRRIG 1000ML 0.9% SOD CHL USP POUR PLAS BTL

## (undated) DEVICE — LOOP,VESSEL,MAXI,BLUE,2/PK,STERILE: Brand: MEDLINE